# Patient Record
Sex: MALE | Race: WHITE | Employment: OTHER | URBAN - METROPOLITAN AREA
[De-identification: names, ages, dates, MRNs, and addresses within clinical notes are randomized per-mention and may not be internally consistent; named-entity substitution may affect disease eponyms.]

---

## 2017-01-13 ENCOUNTER — OFFICE VISIT (OUTPATIENT)
Dept: FAMILY MEDICINE | Facility: CLINIC | Age: 47
End: 2017-01-13
Payer: COMMERCIAL

## 2017-01-13 VITALS
TEMPERATURE: 98.2 F | HEART RATE: 67 BPM | SYSTOLIC BLOOD PRESSURE: 104 MMHG | WEIGHT: 182.7 LBS | BODY MASS INDEX: 27.69 KG/M2 | HEIGHT: 68 IN | DIASTOLIC BLOOD PRESSURE: 63 MMHG | OXYGEN SATURATION: 98 %

## 2017-01-13 DIAGNOSIS — M54.9 CHRONIC BACK PAIN GREATER THAN 3 MONTHS DURATION: ICD-10-CM

## 2017-01-13 DIAGNOSIS — G89.29 CHRONIC BACK PAIN GREATER THAN 3 MONTHS DURATION: ICD-10-CM

## 2017-01-13 PROCEDURE — 99214 OFFICE O/P EST MOD 30 MIN: CPT

## 2017-01-13 RX ORDER — GABAPENTIN 300 MG/1
300 CAPSULE ORAL 3 TIMES DAILY PRN
Qty: 30 CAPSULE | Refills: 3 | Status: SHIPPED | OUTPATIENT
Start: 2017-01-13 | End: 2017-01-23

## 2017-01-13 NOTE — PROGRESS NOTES
"  SUBJECTIVE:                                                    Marcel Melchor is a 46 year old male who presents to clinic today for the following health issues:      Back Pain      Duration: SEVERAL  months         Specific cause: lifting over his head     Description:   Location of pain: low back right  Character of pain: sharp  Pain radiation:radiates into the right leg  New numbness or weakness in legs, not attributed to pain:  YES    Intensity: Currently  severe    History:   Pain interferes with job: YES,   History of back problems: previous herniated disc  Any previous MRI or X-rays: Yes- at [place].  Date Nov 3  Sees a specialist for back pain:  [facility]  Therapies tried without relief:     Alleviating factors:   Improved by: steroid injection      Precipitating factors:  Worsened by: Sitting    Functional and Psychosocial Screen (Star STarT Back):      Not performed today       Accompanying Signs & Symptoms:  Risk of Fracture:  None  Risk of Cauda Equina:  None  Risk of Infection:  None  Risk of Cancer:  None  Risk of Ankylosing Spondylitis:  Onset at age <35, male, AND morning back stiffness. no          This is a chronic condition for which patient has been seen by multiple providers. He has had more than 2 steroid injections unsuccessfully. He was placed on gabapentin but did not have any relief from the medicine. He has tried a chiropractor and abdominal therapies unsuccessfully. He is frustrated with ongoing symptoms.        ROS:  Constitutional, HEENT, cardiovascular, pulmonary, gi and gu systems are negative, except as otherwise noted.    OBJECTIVE:                                                    /63 mmHg  Pulse 67  Temp(Src) 98.2  F (36.8  C) (Oral)  Ht 5' 8\" (1.727 m)  Wt 182 lb 11.2 oz (82.872 kg)  BMI 27.79 kg/m2  SpO2 98%  Body mass index is 27.79 kg/(m^2).   middle-aged male in no acute distress. Nontoxic looking. Lumbar sacral area discomfort to palpation. However no " significant spinal column tenderness to palpation. Able to flex to ground level without difficulties. Bilateral straight leg lifting was greater than 60 . Abdomen was soft and nontender with bowel sounds active throughout. No organomegaly. No rebound tenderness. Reflexes are brisk.         ASSESSMENT/PLAN:                                                      Chronic persistent lower back pain. We discussed about condition extensively. Patient very frustrated about management. Requesting opiate prescription but was educated otherwise with discussion about ineffectiveness for such chronic condition. He is agreeable to be seen by a surgeon for further consultation. I will notify him as appropriate. He had preference for physician back and neck. In the meantime I will bump up his gabapentin. He will follow-up with his primary care doctor or spine specialist. Greater than 3o minutes was spent with this patient educating him appropriately about spinal pain management.    ICD-10-CM    1. Chronic back pain greater than 3 months duration M54.9 ORTHO  REFERRAL    G89.29        FAIZAN Lockwood [facility]

## 2017-01-13 NOTE — PATIENT INSTRUCTIONS
"  * Sciatica    Sciatica (\"Lumbar Radiculopathy\") causes a pain that spreads from the lower back down into the buttock, hip and leg. Sometimes leg pain can occur without any back pain. Sciatica is due to irritation or pressure on a spinal nerve as it comes out of the spinal canal. This is most often due to a bulge or rupture of a nearby spinal disk (the cartilage cushion between each spinal bone), which presses on a nearby nerve. Other causes include spinal stenosis (narrowing of the spinal canal) and spasm of the piriformis muscle (a muscle in the buttocks that the sciatic nerve passes through).  Sciatica may begin after a sudden twisting/bending force (such as in a car accident), or sometimes after a simple awkward movement. In either case, muscle spasm is commonly present and contributes to the pain.  The diagnosis of sciatica is made from the symptoms and physical exam. Unless you had a physical injury (such as a car accident or fall), X-rays are usually not ordered for the initial evaluation of sciatica because the nerves and disks cannot be seen on an X-ray. Most sciatica (80-90%) gets better with time.  What can I do about my low back pain?  There are three main things you can do to ease low back pain and help it go away.    Use heat or cold packs.    Take medicine as directed.    Use positions, movements and exercises. Stay active! Too much rest can make your symptoms worse.  Using heat or cold packs  Try cold packs or gentle heat to ease your pain. Use whichever gives the most relief. Apply the cold pack or heat for 15 minutes at a time, as often as needed.  Taking medicine  If taking over-the-counter medicine:    Take ibuprofen (Advil, Motrin) 600 mg. three times a day as needed for pain.  OR    Take Aleve (naproxen sodium) 220 to 440 mg. two times a day as needed for pain  If your doctor prescribed a muscle relaxant (cyclobenzapine 10 mg.):    Take one half ( ) to 1 tablet at bedtime    Do not drive when " taking this medicine. This drug may make you sleepy.  Using positions, movements and exercises  Research tells us that moving your joints and muscles can help you recover from back pain. Such activity should be simple and gentle.  Use the positions below as well as walking to help relieve your discomfort. Try taking a short walk every 3 to 4 hours during the day. Walk for a few minutes inside your home or take longer walks outside, on a treadmill or at a mall. Slowly increase the amount of time you walk. Expect discomfort when you begin, but it should lessen as your back starts to recover.  Finding a position that is comfortable  When your back pain is new, you may find that certain positions will ease your pain. Gently try each of the following positions until you find one that eases your pain. Once you find a position of comfort, use it as often as you like while you recover. Return to your daily routine as soon as possible.     Lie on your back with your legs bent. You can do this by placing a pillow under your knees or lie on the floor and rest your lower legs on the seat of a chair.    Lie on your side with your knees bent and place a pillow between your knees.    Lie on your stomach over pillows.  When should I call my doctor?  Your back pain should improve over the first couple of weeks. As it improves, you should be able to return to your normal activities. But call your doctor if:    You have a sudden change in your ability to control? your bladder or bowels.    You begin to feel tingling in your groin or legs.    The pain spreads down your leg and into your foot.    Your toes, feet or leg muscles begin to feel weak.    You feel generally unwell or sick.    Your pain gets worse.    9290-8657 [employer]. [address] [place] [address], [place] [address]. All rights reserved. This information is not intended as a substitute for professional medical care. Always follow your healthcare professional's  instructions.

## 2017-01-13 NOTE — NURSING NOTE
"Chief Complaint   Patient presents with     Back Pain       Initial /63 mmHg  Pulse 67  Temp(Src) 98.2  F (36.8  C) (Oral)  Ht 5' 8\" (1.727 m)  Wt 182 lb 11.2 oz (82.872 kg)  BMI 27.79 kg/m2  SpO2 98% Estimated body mass index is 27.79 kg/(m^2) as calculated from the following:    Height as of this encounter: 5' 8\" (1.727 m).    Weight as of this encounter: 182 lb 11.2 oz (82.872 kg).  BP completed using cuff size: large Lorraine Peters CMA\      "

## 2017-01-13 NOTE — MR AVS SNAPSHOT
"              After Visit Summary   1/13/2017    Marcel Melchor    MRN: [id number]           Patient Information     Date Of Birth          1970        Visit Information        Provider Department      1/13/2017 4:15 PM  [facility]        Today's Diagnoses     Acute right-sided low back pain with right-sided sciatica    -  1       Care Instructions      * Sciatica    Sciatica (\"Lumbar Radiculopathy\") causes a pain that spreads from the lower back down into the buttock, hip and leg. Sometimes leg pain can occur without any back pain. Sciatica is due to irritation or pressure on a spinal nerve as it comes out of the spinal canal. This is most often due to a bulge or rupture of a nearby spinal disk (the cartilage cushion between each spinal bone), which presses on a nearby nerve. Other causes include spinal stenosis (narrowing of the spinal canal) and spasm of the piriformis muscle (a muscle in the buttocks that the sciatic nerve passes through).  Sciatica may begin after a sudden twisting/bending force (such as in a car accident), or sometimes after a simple awkward movement. In either case, muscle spasm is commonly present and contributes to the pain.  The diagnosis of sciatica is made from the symptoms and physical exam. Unless you had a physical injury (such as a car accident or fall), X-rays are usually not ordered for the initial evaluation of sciatica because the nerves and disks cannot be seen on an X-ray. Most sciatica (80-90%) gets better with time.  What can I do about my low back pain?  There are three main things you can do to ease low back pain and help it go away.    Use heat or cold packs.    Take medicine as directed.    Use positions, movements and exercises. Stay active! Too much rest can make your symptoms worse.  Using heat or cold packs  Try cold packs or gentle heat to ease your pain. Use whichever gives the most relief. Apply the cold pack or heat for 15 minutes at " a time, as often as needed.  Taking medicine  If taking over-the-counter medicine:    Take ibuprofen (Advil, Motrin) 600 mg. three times a day as needed for pain.  OR    Take Aleve (naproxen sodium) 220 to 440 mg. two times a day as needed for pain  If your doctor prescribed a muscle relaxant (cyclobenzapine 10 mg.):    Take one half ( ) to 1 tablet at bedtime    Do not drive when taking this medicine. This drug may make you sleepy.  Using positions, movements and exercises  Research tells us that moving your joints and muscles can help you recover from back pain. Such activity should be simple and gentle.  Use the positions below as well as walking to help relieve your discomfort. Try taking a short walk every 3 to 4 hours during the day. Walk for a few minutes inside your home or take longer walks outside, on a treadmill or at a mall. Slowly increase the amount of time you walk. Expect discomfort when you begin, but it should lessen as your back starts to recover.  Finding a position that is comfortable  When your back pain is new, you may find that certain positions will ease your pain. Gently try each of the following positions until you find one that eases your pain. Once you find a position of comfort, use it as often as you like while you recover. Return to your daily routine as soon as possible.     Lie on your back with your legs bent. You can do this by placing a pillow under your knees or lie on the floor and rest your lower legs on the seat of a chair.    Lie on your side with your knees bent and place a pillow between your knees.    Lie on your stomach over pillows.  When should I call my doctor?  Your back pain should improve over the first couple of weeks. As it improves, you should be able to return to your normal activities. But call your doctor if:    You have a sudden change in your ability to control? your bladder or bowels.    You begin to feel tingling in your groin or legs.    The pain spreads  "down your leg and into your foot.    Your toes, feet or leg muscles begin to feel weak.    You feel generally unwell or sick.    Your pain gets worse.    1465-6286 [employer]. [address] [place] [address], [place] [address]. All rights reserved. This information is not intended as a substitute for professional medical care. Always follow your healthcare professional's instructions.              Follow-ups after your visit        Who to contact     If you have questions or need follow up information about today's clinic visit or your schedule please contact [facility] directly at [phone].  Normal or non-critical lab and imaging results will be communicated to you by Blaasthart, letter or phone within 4 business days after the clinic has received the results. If you do not hear from us within 7 days, please contact the clinic through [employer] or phone. If you have a critical or abnormal lab result, we will notify you by phone as soon as possible.  Submit refill requests through [employer] or call your pharmacy and they will forward the refill request to us. Please allow 3 business days for your refill to be completed.          Additional Information About Your Visit        MyChart Information     [employer] gives you secure access to your electronic health record. If you see a primary care provider, you can also send messages to your care team and make appointments. If you have questions, please call your primary care clinic.  If you do not have a primary care provider, please call [phone] and they will assist you.        Care EveryWhere ID     This is your Care EveryWhere ID. This could be used by other organizations to access your [facility] medical records  [id number]        Your Vitals Were     Pulse Temperature Height BMI (Body Mass Index) Pulse Oximetry       67 98.2  F (36.8  C) (Oral) 5' 8\" (1.727 m) 27.79 kg/m2 98%        Blood Pressure from Last 3 Encounters:   01/13/17 104/63 "   12/01/16 110/70   10/31/16 100/56    Weight from Last 3 Encounters:   01/13/17 182 lb 11.2 oz (82.872 kg)   12/01/16 174 lb 9.6 oz (79.198 kg)   10/31/16 171 lb (77.565 kg)              Today, you had the following     No orders found for display         Today's Medication Changes          These changes are accurate as of: 1/13/17  4:58 PM.  If you have any questions, ask your nurse or doctor.               These medicines have changed or have updated prescriptions.        Dose/Directions    gabapentin 300 MG capsule   Commonly known as:  NEURONTIN   This may have changed:    - when to take this  - reasons to take this   Used for:  Acute right-sided low back pain with right-sided sciatica        Dose:  300 mg   Take 1 capsule (300 mg) by mouth 3 times daily as needed   Quantity:  30 capsule   Refills:  3            Where to get your medicines      These medications were sent to [facility]/pharmacy #[id number] - [place], [place] - [address] [address]  [address] [address] [facility] [address]     Phone:  [phone]    - gabapentin 300 MG capsule             Primary Care Provider Office Phone # Fax #    Wes Braga PA-C [phone]       [facility] [address] [facility] [address]        Thank you!     Thank you for choosing [facility]  for your care. Our goal is always to provide you with excellent care. Hearing back from our patients is one way we can continue to improve our services. Please take a few minutes to complete the written survey that you may receive in the mail after your visit with us. Thank you!             Your Updated Medication List - Protect others around you: Learn how to safely use, store and throw away your medicines at www.disposemymeds.org.          This list is accurate as of: 1/13/17  4:58 PM.  Always use your most recent med list.                   Brand Name Dispense Instructions for use    amphetamine-dextroamphetamine 10 MG per tablet     ADDERALL     TAKE 1 TABLET BY ORAL ROUTE TWICE A DAY       * CANASA 1000 MG Suppository   Generic drug:  mesalamine      USE 1 SUPPOSITORY PER RECTUM EVERY NIGHT       * ASACOL  MG EC tablet   Generic drug:  mesalamine      TAKE 3 TABLETS BY MOUTH TWICE DAILY       clindamycin 1 % lotion    CLEOCIN T    60 mL    APPLY TOPICALLY TO FACE AND CHEST TWICE A DAY AS NEEDED       finasteride 1 MG tablet    PROPECIA     Take 1 tablet by mouth daily       gabapentin 300 MG capsule    NEURONTIN    30 capsule    Take 1 capsule (300 mg) by mouth 3 times daily as needed       venlafaxine 75 MG 24 hr capsule    EFFEXOR-XR    60 capsule    Take 2 capsules (150 mg) by mouth daily       * Notice:  This list has 2 medication(s) that are the same as other medications prescribed for you. Read the directions carefully, and ask your doctor or other care provider to review them with you.

## 2017-01-23 ENCOUNTER — OFFICE VISIT (OUTPATIENT)
Dept: FAMILY MEDICINE | Facility: CLINIC | Age: 47
End: 2017-01-23
Payer: COMMERCIAL

## 2017-01-23 VITALS
WEIGHT: 190 LBS | TEMPERATURE: 98.6 F | SYSTOLIC BLOOD PRESSURE: 108 MMHG | DIASTOLIC BLOOD PRESSURE: 75 MMHG | RESPIRATION RATE: 16 BRPM | HEART RATE: 80 BPM | BODY MASS INDEX: 28.9 KG/M2

## 2017-01-23 DIAGNOSIS — M54.40 CHRONIC RIGHT-SIDED LOW BACK PAIN WITH SCIATICA, SCIATICA LATERALITY UNSPECIFIED: Primary | ICD-10-CM

## 2017-01-23 DIAGNOSIS — G89.29 CHRONIC RIGHT-SIDED LOW BACK PAIN WITH SCIATICA, SCIATICA LATERALITY UNSPECIFIED: Primary | ICD-10-CM

## 2017-01-23 PROCEDURE — 99214 OFFICE O/P EST MOD 30 MIN: CPT | Performed by: PHYSICIAN ASSISTANT

## 2017-01-23 RX ORDER — CYCLOBENZAPRINE HCL 5 MG
5 TABLET ORAL 3 TIMES DAILY PRN
Qty: 30 TABLET | Refills: 0 | Status: SHIPPED | OUTPATIENT
Start: 2017-01-23 | End: 2017-02-20

## 2017-01-23 RX ORDER — HYDROCODONE BITARTRATE AND ACETAMINOPHEN 5; 325 MG/1; MG/1
1-2 TABLET ORAL EVERY 4 HOURS PRN
Qty: 20 TABLET | Refills: 0 | Status: SHIPPED | OUTPATIENT
Start: 2017-01-23 | End: 2017-06-06

## 2017-01-23 RX ORDER — GABAPENTIN 300 MG/1
300 CAPSULE ORAL 3 TIMES DAILY PRN
Qty: 90 CAPSULE | Refills: 1 | Status: SHIPPED | OUTPATIENT
Start: 2017-01-23 | End: 2017-03-21

## 2017-01-23 ASSESSMENT — ANXIETY QUESTIONNAIRES
GAD7 TOTAL SCORE: 6
5. BEING SO RESTLESS THAT IT IS HARD TO SIT STILL: SEVERAL DAYS
2. NOT BEING ABLE TO STOP OR CONTROL WORRYING: SEVERAL DAYS
IF YOU CHECKED OFF ANY PROBLEMS ON THIS QUESTIONNAIRE, HOW DIFFICULT HAVE THESE PROBLEMS MADE IT FOR YOU TO DO YOUR WORK, TAKE CARE OF THINGS AT HOME, OR GET ALONG WITH OTHER PEOPLE: SOMEWHAT DIFFICULT
1. FEELING NERVOUS, ANXIOUS, OR ON EDGE: SEVERAL DAYS
3. WORRYING TOO MUCH ABOUT DIFFERENT THINGS: NOT AT ALL
6. BECOMING EASILY ANNOYED OR IRRITABLE: MORE THAN HALF THE DAYS
7. FEELING AFRAID AS IF SOMETHING AWFUL MIGHT HAPPEN: NOT AT ALL

## 2017-01-23 ASSESSMENT — PATIENT HEALTH QUESTIONNAIRE - PHQ9: 5. POOR APPETITE OR OVEREATING: SEVERAL DAYS

## 2017-01-23 NOTE — NURSING NOTE
"Chief Complaint   Patient presents with     Recheck Medication       Initial /75 mmHg  Pulse 80  Temp(Src) 98.6  F (37  C) (Oral)  Resp 16  Wt 190 lb (86.183 kg) Estimated body mass index is 28.9 kg/(m^2) as calculated from the following:    Height as of 1/13/17: 5' 8\" (1.727 m).    Weight as of this encounter: 190 lb (86.183 kg).  BP completed using cuff size: bela Greenberg MA      "

## 2017-01-23 NOTE — MR AVS SNAPSHOT
After Visit Summary   1/23/2017    Marcel Melchor    MRN: [id number]           Patient Information     Date Of Birth          1970        Visit Information        Provider Department      1/23/2017 9:45 AM Wes Braga PA-C [facility]        Today's Diagnoses     Chronic right-sided low back pain with sciatica, sciatica laterality unspecified    -  1        Follow-ups after your visit        Follow-up notes from your care team     Return for back specialist.      Who to contact     If you have questions or need follow up information about today's clinic visit or your schedule please contact [facility] directly at [phone].  Normal or non-critical lab and imaging results will be communicated to you by Avazhart, letter or phone within 4 business days after the clinic has received the results. If you do not hear from us within 7 days, please contact the clinic through Avazhart or phone. If you have a critical or abnormal lab result, we will notify you by phone as soon as possible.  Submit refill requests through [employer] or call your pharmacy and they will forward the refill request to us. Please allow 3 business days for your refill to be completed.          Additional Information About Your Visit        MyChart Information     [employer] gives you secure access to your electronic health record. If you see a primary care provider, you can also send messages to your care team and make appointments. If you have questions, please call your primary care clinic.  If you do not have a primary care provider, please call [phone] and they will assist you.        Care EveryWhere ID     This is your Care EveryWhere ID. This could be used by other organizations to access your [facility] medical records  [id number]        Your Vitals Were     Pulse Temperature Respirations             80 98.6  F (37  C) (Oral) 16          Blood Pressure from Last 3 Encounters:    01/23/17 108/75   01/13/17 104/63   12/01/16 110/70    Weight from Last 3 Encounters:   01/23/17 190 lb (86.183 kg)   01/13/17 182 lb 11.2 oz (82.872 kg)   12/01/16 174 lb 9.6 oz (79.198 kg)              Today, you had the following     No orders found for display         Today's Medication Changes          These changes are accurate as of: 1/23/17 10:39 AM.  If you have any questions, ask your nurse or doctor.               Start taking these medicines.        Dose/Directions    cyclobenzaprine 5 MG tablet   Commonly known as:  FLEXERIL   Used for:  Chronic right-sided low back pain with sciatica, sciatica laterality unspecified   Started by:  Wes Braga PA-C        Dose:  5 mg   Take 1 tablet (5 mg) by mouth 3 times daily as needed for muscle spasms   Quantity:  30 tablet   Refills:  0       HYDROcodone-acetaminophen 5-325 MG per tablet   Commonly known as:  NORCO   Used for:  Chronic right-sided low back pain with sciatica, sciatica laterality unspecified   Started by:  Wes Braga PA-C        Dose:  1-2 tablet   Take 1-2 tablets by mouth every 4 hours as needed for moderate to severe pain   Quantity:  20 tablet   Refills:  0            Where to get your medicines      These medications were sent to [facility]/pharmacy #[id number] - [place] - [address]  [address] [facility] [address]     Phone:  [phone]    - cyclobenzaprine 5 MG tablet  - gabapentin 300 MG capsule      Some of these will need a paper prescription and others can be bought over the counter.  Ask your nurse if you have questions.     Bring a paper prescription for each of these medications    - HYDROcodone-acetaminophen 5-325 MG per tablet             Primary Care Provider Office Phone # Fax #    Wes Braga PA-C [phone]       [facility] [address]  [facility] [address]        Thank you!     Thank you for choosing [facility]  for your  care. Our goal is always to provide you with excellent care. Hearing back from our patients is one way we can continue to improve our services. Please take a few minutes to complete the written survey that you may receive in the mail after your visit with us. Thank you!             Your Updated Medication List - Protect others around you: Learn how to safely use, store and throw away your medicines at www.disposemymeds.org.          This list is accurate as of: 1/23/17 10:39 AM.  Always use your most recent med list.                   Brand Name Dispense Instructions for use    amphetamine-dextroamphetamine 10 MG per tablet    ADDERALL     TAKE 1 TABLET BY ORAL ROUTE TWICE A DAY       ASACOL  MG EC tablet   Generic drug:  mesalamine      TAKE 3 TABLETS BY MOUTH TWICE DAILY       clindamycin 1 % lotion    CLEOCIN T    60 mL    APPLY TOPICALLY TO FACE AND CHEST TWICE A DAY AS NEEDED       cyclobenzaprine 5 MG tablet    FLEXERIL    30 tablet    Take 1 tablet (5 mg) by mouth 3 times daily as needed for muscle spasms       finasteride 1 MG tablet    PROPECIA     Take 1 tablet by mouth daily       gabapentin 300 MG capsule    NEURONTIN    90 capsule    Take 1 capsule (300 mg) by mouth 3 times daily as needed       HYDROcodone-acetaminophen 5-325 MG per tablet    NORCO    20 tablet    Take 1-2 tablets by mouth every 4 hours as needed for moderate to severe pain       venlafaxine 75 MG 24 hr capsule    EFFEXOR-XR    60 capsule    Take 2 capsules (150 mg) by mouth daily

## 2017-01-23 NOTE — PROGRESS NOTES
SUBJECTIVE:                                                    Marcel Melchor is a 46 year old male who presents to clinic today for the following health issues:      Back Pain Follow Up       Description:   Location of pain:  bilateral  Character of pain: sharp, stabbing, burning, cramping, constant and intermittent  Pain radiation: to calf  Since last visit, pain is:  Worsened-did kyaking  New numbness or weakness in legs, not attributed to pain:  no     Intensity: Currently 6/10    History:   Pain interferes with job: YES  Therapies tried without relief: decompression, acupuncture and chiropractor  Therapies tried with relief: ? none       Accompanying Signs & Symptoms:  Risk of Fracture:  None  Risk of Cauda Equina:  None  Risk of Infection:  None  Risk of Cancer:  None           Was on vacation between christmas and new years and went kayaking which made things much worse.  Then on a convention recent he tweaked it again but a lot of sitting and standing.  Went in to , was not given anything for acute pain but increased gabapentin to 300mg TID which made an improvement but last night he bent down to pick something up and had immediate pain going down right leg again.  Could not sleep at all last night.    Two steroid injections at [facility] late last fall that only really provided a couple days of relief.    Just wondering today about avoiding these times of acute pain or what can be done when they do happen.  NSIAD's have not been helpful.  Prednisone not very helpful either.  Feeling frustrated about what to do next.  Will to try anything at this point.      Problem list and histories reviewed & adjusted, as indicated.  Additional history: as documented    Problem list, Medication list, Allergies, and Medical/Social/Surgical histories reviewed in [facility] and updated as appropriate.    ROS:  Constitutional, HEENT, cardiovascular, pulmonary, gi and gu systems are negative, except as otherwise noted.    OBJECTIVE:                                                     /75 mmHg  Pulse 80  Temp(Src) 98.6  F (37  C) (Oral)  Resp 16  Wt 190 lb (86.183 kg)  Body mass index is 28.9 kg/(m^2).  GENERAL: healthy, alert and no distress  MS: no gross musculoskeletal defects noted, no edema  SKIN: no suspicious lesions or rashes  BACK: ROM limited due to pain, TTP right paralumbar muscles with muscle spasm present  PSYCH: mentation appears normal, affect normal/bright    Diagnostic Test Results:  none      ASSESSMENT/PLAN:                                                    1. Chronic right-sided low back pain with sciatica, sciatica laterality unspecified  Refilled gabapentin-- discussed OK to try two capsules at a given dose and let me know if this works better.  Can re-write Rx as needed.  Short term Rx for norco and to try flexeril for pain.  He will call Ortho referral provided by  as he has not had time to do this yet.  He was referred to [facility] in [place].  - gabapentin (NEURONTIN) 300 MG capsule; Take 1 capsule (300 mg) by mouth 3 times daily as needed  Dispense: 90 capsule; Refill: 1  - cyclobenzaprine (FLEXERIL) 5 MG tablet; Take 1 tablet (5 mg) by mouth 3 times daily as needed for muscle spasms  Dispense: 30 tablet; Refill: 0  - HYDROcodone-acetaminophen (NORCO) 5-325 MG per tablet; Take 1-2 tablets by mouth every 4 hours as needed for moderate to severe pain  Dispense: 20 tablet; Refill: 0      Rtc if sxs change, worsen or fail to resolve with above tx.        Wes Braga PA-C  [facility]

## 2017-01-24 ASSESSMENT — PATIENT HEALTH QUESTIONNAIRE - PHQ9: SUM OF ALL RESPONSES TO PHQ QUESTIONS 1-9: 10

## 2017-01-24 ASSESSMENT — ANXIETY QUESTIONNAIRES: GAD7 TOTAL SCORE: 6

## 2017-01-30 DIAGNOSIS — F41.9 ANXIETY: Primary | ICD-10-CM

## 2017-01-30 DIAGNOSIS — R45.4 IRRITABILITY AND ANGER: ICD-10-CM

## 2017-01-30 NOTE — TELEPHONE ENCOUNTER
venlafaxine (EFFEXOR-XR) 75 MG 24 hr capsule    Last Written Prescription Date: 12/1/16  Last Fill Quantity: 60, # refills: 1  Last Office Visit with [facility], [facility] or [facility] prescribing provider: 1/23/2017          BP Readings from Last 3 Encounters:   01/23/17 108/75   01/13/17 104/63   12/01/16 110/70     Pulse: (for Fetzima)  CREATININE   Date Value Ref Range Status   11/03/2015 0.88 mg/dL Final   ]    Last PHQ-9 score on record=   PHQ-9 SCORE 1/23/2017   Total Score -   Total Score 10       MANUEL Lopez

## 2017-01-31 RX ORDER — VENLAFAXINE HYDROCHLORIDE 75 MG/1
150 CAPSULE, EXTENDED RELEASE ORAL DAILY
Qty: 60 CAPSULE | Refills: 3 | Status: SHIPPED | OUTPATIENT
Start: 2017-01-31 | End: 2017-03-23

## 2017-01-31 NOTE — TELEPHONE ENCOUNTER
Prescription approved per [facility] Refill Protocol.    Carmelina Ross, RN, BSN, PHN

## 2017-02-01 ENCOUNTER — TRANSFERRED RECORDS (OUTPATIENT)
Dept: HEALTH INFORMATION MANAGEMENT | Facility: CLINIC | Age: 47
End: 2017-02-01

## 2017-02-20 DIAGNOSIS — G89.29 CHRONIC RIGHT-SIDED LOW BACK PAIN WITH SCIATICA, SCIATICA LATERALITY UNSPECIFIED: ICD-10-CM

## 2017-02-20 DIAGNOSIS — M54.40 CHRONIC RIGHT-SIDED LOW BACK PAIN WITH SCIATICA, SCIATICA LATERALITY UNSPECIFIED: ICD-10-CM

## 2017-02-20 RX ORDER — CYCLOBENZAPRINE HCL 5 MG
5 TABLET ORAL 3 TIMES DAILY PRN
Qty: 30 TABLET | Refills: 0 | Status: SHIPPED | OUTPATIENT
Start: 2017-02-20 | End: 2017-03-23

## 2017-02-20 NOTE — TELEPHONE ENCOUNTER
cyclobenzaprine (FLEXERIL) 5 MG tablet      Last Written Prescription Date:  1/23/17  Last Fill Quantity: 30,   # refills: 0  Last Office Visit with [facility], [facility] or [facility] prescribing provider: 1/23/2017    Future Office visit:       Routing refill request to provider for review/approval because:  Drug not on the [facility], [facility] or [facility] refill protocol or controlled substance    MANUEL Lopez

## 2017-03-01 ENCOUNTER — TELEPHONE (OUTPATIENT)
Dept: PALLIATIVE MEDICINE | Facility: CLINIC | Age: 47
End: 2017-03-01

## 2017-03-01 ENCOUNTER — OFFICE VISIT (OUTPATIENT)
Dept: NEUROSURGERY | Facility: CLINIC | Age: 47
End: 2017-03-01
Attending: PHYSICIAN ASSISTANT
Payer: COMMERCIAL

## 2017-03-01 VITALS
WEIGHT: 190 LBS | RESPIRATION RATE: 100 BRPM | DIASTOLIC BLOOD PRESSURE: 62 MMHG | HEART RATE: 73 BPM | SYSTOLIC BLOOD PRESSURE: 108 MMHG | TEMPERATURE: 97.6 F | HEIGHT: 68 IN | BODY MASS INDEX: 28.79 KG/M2

## 2017-03-01 DIAGNOSIS — M54.16 LUMBAR RADICULAR PAIN: Primary | ICD-10-CM

## 2017-03-01 PROCEDURE — 99211 OFF/OP EST MAY X REQ PHY/QHP: CPT | Performed by: NURSE PRACTITIONER

## 2017-03-01 PROCEDURE — 99203 OFFICE O/P NEW LOW 30 MIN: CPT | Performed by: NURSE PRACTITIONER

## 2017-03-01 ASSESSMENT — PAIN SCALES - GENERAL: PAINLEVEL: MODERATE PAIN (5)

## 2017-03-01 NOTE — NURSING NOTE
"Marcel Melchor is a 47 year old male who presents for:  Chief Complaint   Patient presents with     Neurologic Problem     LBP right side x over 3 months referred by PCP, Wes Braga PA-C        Initial Vitals:  /62 (BP Location: Right arm, Patient Position: Chair, Cuff Size: Adult Large)  Pulse 73  Temp 97.6  F (36.4  C)  Resp (!) 100  Ht 5' 8\" (1.727 m)  Wt 190 lb (86.2 kg)  BMI 28.89 kg/m2 Estimated body mass index is 28.89 kg/(m^2) as calculated from the following:    Height as of this encounter: 5' 8\" (1.727 m).    Weight as of this encounter: 190 lb (86.2 kg).. Body surface area is 2.03 meters squared. BP completed using cuff size: large  Moderate Pain (5)    Do you feel safe in your environment?  Yes  Do you need any refills today? No    Nursing Comments: LBP right side x over 3 months referred by PCP, Wes Braga PA-C.  Patient rates low back pain today as 5.      5 min. nursing intake time  Lisbeth Sam CMA      Discharge plan:     1.  Please schedule your injection. Someone will contact you from the pain clinic within 24 hours to schedule.      2. Please contact the clinic if pain persists at [phone]. To schedule with Dr. Abraham Venegas to discuss surgery needed     2 min. nursing discharge time  Lisbeth Sam CMA    "

## 2017-03-01 NOTE — MR AVS SNAPSHOT
After Visit Summary   3/1/2017    Marcel Melchor    MRN: [id number]           Patient Information     Date Of Birth          1970        Visit Information        Provider Department      3/1/2017 10:40 AM Pam Aguilar APRN CNP [facility]        Today's Diagnoses     Lumbar radicular pain    -  1      Care Instructions    1.  Please schedule your injection. Someone will contact you from the pain clinic within 24 hours to schedule.      2. Please contact the clinic if pain persists at [phone]. To schedule with Dr. Abraham Venegas to discuss surgery needed         Follow-ups after your visit        Additional Services     PAIN MANAGEMENT CENTER ([place]) REFERRAL       Your provider has referred you to the [facility]. Dr. Pierre or Dr. Johnson    Reason for Referral: Procedure or injection only - patient will be contacted within 24 hrs to schedule: Epidural Steroid (transforaminal approach): Lumbar right L4-5 please has had 3 L5-S1.  Diagnostic as well as therapeutic     Please complete the following questions:    What is your diagnosis for the patient's pain? Lumbar radicular pain     Do you have any specific questions for the pain specialist? No    Are there any red flags that may impact the assessment or management of the patient? None    **ANY DIAGNOSTIC TESTS THAT ARE NOT IN EPIC SHOULD BE SENT TO THE PAIN CENTER**    Please note the Pre-Op Pain Consult must be scheduled 2-3 weeks prior to the patient's surgery.  Patient's trying to schedule within 2 weeks of surgery may not be accommodated.     Pre-Op Pain Consults are only good for 30 days.    REGARDING OPIOID MEDICATIONS:  We will always address appropriateness of opioid pain medications, but we generally will not automatically take on a prescribing role. When we do take on prescribing of opioids for chronic pain, it is in collaboration with the referring physician for an intermediate  period of time (months), with an expectation that the primary physician or provider will assume the prescribing role if medications are effective at stable doses with demonstrated compliance.  Therefore, please do not assume that your prescribing responsibilities end on the day of pain clinic consultation.  Is this agreeable to you? YES    For any questions, contact the [facility] at [phone].    Please be aware that coverage of these services is subject to the terms and limitations of your health insurance plan.  Call member services at your health plan with any benefit or coverage questions.      Please bring the following with you to your appointment:    (1) Any X-Rays, CTs or MRIs which have been performed.  Contact the facility where they were done to arrange for  prior to your scheduled appointment.    (2) List of current medications   (3) This referral request   (4) Any documents/labs given to you for this referral                  Who to contact     If you have questions or need follow up information about today's clinic visit or your schedule please contact [facility] directly at [phone].  Normal or non-critical lab and imaging results will be communicated to you by MyChart, letter or phone within 4 business days after the clinic has received the results. If you do not hear from us within 7 days, please contact the clinic through [employer] or phone. If you have a critical or abnormal lab result, we will notify you by phone as soon as possible.  Submit refill requests through [employer] or call your pharmacy and they will forward the refill request to us. Please allow 3 business days for your refill to be completed.          Additional Information About Your Visit        [employer] Information     [employer] gives you secure access to your electronic health record. If you see a primary care provider, you can also send messages to your care team and make  "appointments. If you have questions, please call your primary care clinic.  If you do not have a primary care provider, please call [phone] and they will assist you.        Care EveryWhere ID     This is your Care EveryWhere ID. This could be used by other organizations to access your [facility] medical records  [id number]        Your Vitals Were     Pulse Temperature Respirations Height BMI (Body Mass Index)       73 97.6  F (36.4  C) 100 5' 8\" (1.727 m) 28.89 kg/m2        Blood Pressure from Last 3 Encounters:   03/01/17 108/62   01/23/17 108/75   01/13/17 104/63    Weight from Last 3 Encounters:   03/01/17 190 lb (86.2 kg)   01/23/17 190 lb (86.2 kg)   01/13/17 182 lb 11.2 oz (82.9 kg)              We Performed the Following     PAIN MANAGEMENT CENTER ([place]) REFERRAL        Primary Care Provider Office Phone # Fax #    Wes Braga PA-C [phone]       [address] [facility] [address]        Thank you!     Thank you for choosing [facility]  for your care. Our goal is always to provide you with excellent care. Hearing back from our patients is one way we can continue to improve our services. Please take a few minutes to complete the written survey that you may receive in the mail after your visit with us. Thank you!             Your Updated Medication List - Protect others around you: Learn how to safely use, store and throw away your medicines at www.disposemymeds.org.          This list is accurate as of: 3/1/17 11:05 AM.  Always use your most recent med list.                   Brand Name Dispense Instructions for use    amphetamine-dextroamphetamine 10 MG per tablet    ADDERALL     TAKE 1 TABLET BY ORAL ROUTE TWICE A DAY       ASACOL  MG EC tablet   Generic drug:  mesalamine      TAKE 3 TABLETS BY MOUTH TWICE DAILY       clindamycin 1 % lotion    CLEOCIN T    60 mL    APPLY TOPICALLY TO FACE AND CHEST TWICE A DAY AS " NEEDED       cyclobenzaprine 5 MG tablet    FLEXERIL    30 tablet    Take 1 tablet (5 mg) by mouth 3 times daily as needed for muscle spasms       finasteride 1 MG tablet    PROPECIA     Take 1 tablet by mouth daily       gabapentin 300 MG capsule    NEURONTIN    90 capsule    Take 1 capsule (300 mg) by mouth 3 times daily as needed       HYDROcodone-acetaminophen 5-325 MG per tablet    NORCO    20 tablet    Take 1-2 tablets by mouth every 4 hours as needed for moderate to severe pain       venlafaxine 75 MG 24 hr capsule    EFFEXOR-XR    60 capsule    Take 2 capsules (150 mg) by mouth daily

## 2017-03-01 NOTE — PATIENT INSTRUCTIONS
1.  Please schedule your injection. Someone will contact you from the pain clinic within 24 hours to schedule.      2. Please contact the clinic if pain persists at [phone]. To schedule with Dr. Abraham Venegas to discuss surgery needed

## 2017-03-01 NOTE — PROGRESS NOTES
Dr. Abraham Venegas  [facility]  Neurosurgery Clinic Visit        CC: low back and leg pain     Primary care Provider: Wes Braga      Reason For Visit:   I was asked by Dr. Braga to consult on the patient for lumbar radicular pain.      HPI: Marcel Melchor is a 47 year old male with lumbar radicular pain. He reports that the pain began about 3 months ago. He noted that he has had back pain on and off but in the past 3 months it has gotten worse. He went to [facility] and had 3 lumbar injections without any pain relief. He notes that the pain is worse with sitting. He notes low back pain with radicular pain in to the right lateral thigh to the lateral calf.  He states that driving is difficult.  He denies any falls or foot drop.  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      Pain at its worst 10  Pain right now:  5    Past Medical History   Diagnosis Date     Anxiety 9/3/2013     AJAY (obstructive sleep apnea) 11/11/2014     Rotator cuff syndrome 1/19/2012       Past Medical History reviewed with patient during visit.    Past Surgical History   Procedure Laterality Date     C nonspecific procedure       fx R ankle-ORIF     Past Surgical History reviewed with patient during visit.    Current Outpatient Prescriptions   Medication     cyclobenzaprine (FLEXERIL) 5 MG tablet     venlafaxine (EFFEXOR-XR) 75 MG 24 hr capsule     gabapentin (NEURONTIN) 300 MG capsule     amphetamine-dextroamphetamine (ADDERALL) 10 MG tablet     finasteride (PROPECIA) 1 MG tablet     ASACOL  MG EC tablet     clindamycin (CLEOCIN T) 1 % lotion     HYDROcodone-acetaminophen (NORCO) 5-325 MG per tablet     No current facility-administered medications for this visit.        Allergies   Allergen Reactions     Amoxicillin      itching       Social History     Social History     Marital status: Single     Spouse name: N/A     Number of children: N/A     Years of education: N/A     Social History Main Topics     Smoking  status: Never Smoker     Smokeless tobacco: Never Used     Alcohol use No     Drug use: No     Sexual activity: Yes     Partners: Female     Other Topics Concern     Parent/Sibling W/ Cabg, Mi Or Angioplasty Before 65f 55m? No     Social History Narrative       Family History   Problem Relation Age of Onset     Breast Cancer Mother       at age 47         Review Of Systems  Skin: negative  Eyes: negative  Ears/Nose/Throat: negative  Respiratory: No shortness of breath, dyspnea on exertion, cough, or hemoptysis  Cardiovascular: negative  Gastrointestinal: Chrons  Genitourinary: negative  Musculoskeletal: back pain  Neurologic: right leg numbness  Psychiatric: negative  Hematologic/Lymphatic/Immunologic: negative  Endocrine: negative     ROS: 10 point ROS neg other than the symptoms noted above in the HPI.      Vital Signs: There were no vitals taken for this visit.    Examination:  Constitutional:  Alert, well nourished, NAD.  HEENT: Normocephalic, atraumatic.   Pulm:  Without shortness of breath   CV:  No pitting edema of BLE.    Neurological:  Awake  Alert  Oriented x 3  Speech clear  Cranial nerves II - XII intact  PERRL  EOMI  Face symmetric  Tongue midline  Motor exam   Shoulder Abduction:  Right:  5/5   Left:  5/5  Biceps:                      Right:  5/5   Left:  5/5  Triceps:                     Right:  5/5   Left:  5/5  Wrist Extensors:       Right:  5/5   Left:  5/5  Wrist Flexors:           Right:  5/5   Left:  5/5  Intrinsics:                   Right:  5/5   Left:  5/5   Hip Flexor:                Right: 5/5  Left:  5/5  Hip Adductor:             Right:  5/5  Left:  5/5  Hip Abductor:             Right:  5/5  Left:  5/5  Gastroc Soleus:        Right:  5/5  Left:  5/5  Tib/Ant:                      Right:  5/5  Left:  5/5  EHL:                          Right:  5/5  Left:  5/5   Sensation normal to bilateral upper and lower extremities    Gait: Able to stand from a seated position. Normal non-antalgic,  non-myelopathic gait.  Able to heel/toe walk without loss of balance    Lumbar examination reveals tenderness of the spine and paraspinous muscles.  Pain with lumbar extension.  Hip height is symmetrical. Negative SI joint, sciatic notch or greater trochanter.  Straight leg raise positive for leg pain on the right.      Imaging:     [facility] 11-3-2016    1.  3mm AP right posterolateral disc extrusion compresses right L5 root.     2.  Right foraminal and far lateral osteophyte and disc bulge at L2-3 encroaches upon right L2 ganglion nerve.  3. Right L2-3 annular fissure and disc protrusion without neural impingement.     Assessment/Plan:    Marcel Melchor is a 47 year old male with lumbar radicular pain. He reports that the pain began about 3 months ago. He noted that he has had back pain on and off but in the past 3 months it has gotten worse. He went to [facility] and had 3 lumbar injections without any pain relief. He notes that the pain is worse with sitting. He notes low back pain with radicular pain in to the right lateral thigh to the lateral calf.  He states that driving is difficult.  He denies any falls or foot drop.  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  His lumbar MRI was reviewed in detail.  It was explained that he does have a herniation at L4-5 on the right. We obtained his [facility] injection reports. He had 2 LESI at L5-S1 and one transforaminal on the right at L5-S1.  It is recommended that he try one more injection at L4-5. He is open to this.        Patient Instructions   1.  Please schedule your injection. Someone will contact you from the pain clinic within 24 hours to schedule.      2. Please contact the clinic if pain persists at [phone]. To schedule with Dr. Abraham Venegas to discuss surgery needed          Pam Aguilar [facility]  [facility]  [address] [address]    Tel [phone]  Pager [phone]  Pain

## 2017-03-02 NOTE — TELEPHONE ENCOUNTER
Pre-screening questions for Radiology Injections:    Injection to be done at which interventional clinic site? [facility]    Procedure ordered by Pam Aguilar NP    Procedure ordered? Lumbar Epidural Steroid Injection    What insurance would patient like us to bill for this procedure? BCBS      Worker's comp-Any injection DO NOT SCHEDULE and route to Sheron Gamez.      [employer] insurance - If scheduling an SI joint injection DO NOT SCHEDULE and route Sheron Gamez.    [employer]- MBB's must be scheduled at LEAST two weeks apart      [employer] - Any injection besides hip/shoulder/knee joint DO NOT SCHEDULE and route to Sheron Gamez. She will obtain PA and call pt back to schedule procedure or notify pt of denial.     Is an  needed? No     Patient has a drive home? (mandatory) YES:      Is patient taking any blood thinners (plavix, coumadin, jantoven, warfarin, heparin, pradaxa or dabigatran )? No   (If so, do not schedule, contact RN and/or MD)     Is patient taking any aspirin products? No   (If more than 325mg/day do not schedule; Contact RN/MD. For all non-cervical interventional procedures if patient is taking MORE than 325mg/day, limit aspirin to 81-325mg/day x 1 week. No hold required day of procedure.  For CERVICAL procedures, hold all aspirin products for 6 days.)      Does the patient have a bleeding or clotting disorder? No   (If yes, okay to schedule, but contact RN/MD).  **For any patients with platelet count <100, must be forwarded to provider**    Is patient diabetic?  No  If YES, have them bring their glucometer.    Does patient have an active infection or treated for one within the past week? No     Is patient currently taking any antibiotics?  No   For patients on chronic, preventative, or prophylactic antibiotics, procedures can be scheduled.   For patients on antibiotics for active or recent infection:  Gabby Santiago, Alex Knapp-antibiotic course must  have been completed for 4 days  Franck King-antibiotic course must have been completed for 7 days    Is patient currently taking any steroid medications? (i.e. Prednisone, Medrol)  No   For patients on steroid medications:  Gabby Santiago [place], Burton-steroid course must have been completed for 4 days  Franck King-steroid course must have been completed for 7 days  Review with patient:  If you are started on any steroids or antibiotics between now and your appointment, you must contact us because it may affect our ability to perform your procedure     Is patient actively being treated for cancer or immunocompromised, including the spleen having been removed? No  **For Dr. Parekh patients without spleens should have the chart sent to her**  (If YES, do NOT schedule and route to RN)    Are you able to get on and off an exam table with minimal or no assistance? Yes  (If NO, do NOT schedule and route to RN)  Are you able to roll over and lay on your stomach with minimal or no assistance? Yes  (If NO, do NOT schedule and route to RN)         Any allergies to contrast dye, iodine, shellfish, or numbing and steroid medications? No  (If so, inform nursing and note in scheduling comments.)    Allergies: Amoxicillin      Any chance of pregnancy?Not Applicable    Has the patient had a flu shot or any other vaccinations within 7 days before or after the procedure.  No       Does patient have an MRI/CT?  YES:   (SI joint, hip injections, lumbar sympathetic blocks, and stellate ganglion blocks do not require an MRI)    If so, was it done at [place]? Yes      If not, where was it done? N/A     Was the MRI done w/in the last 3 years?  Yes   If MRI was not done at [facility], [facility] or [facility] do NOT schedule. Route to nursing.  (If pt has disc the injection can be scheduled but pt has to bring disc to appt. If they show up w/out disc the injection cannot be done)    Reminders (please tell patient  if applicable):       Instructed pt to arrive 30 minutes early for IV start if this is for a cervical procedure, ALL sympathetic (stellate ganglion, hypogastric, or lumbar sympathetic block) and all sedation procedures (RFA, spinal cord stimulation trials).  Not Applicable    -IVs are not routinely placed for Pierre and [place]azi cervical case       If NPO for sedation, informed patient that it is okay to take medications with sips of water (except if they are to hold blood thinners).  Not Applicable   *DO take blood pressure medication if it is prescribed*      If this is for a cervical ANDREY, informed patient that aspirin needs to be held for 6 days.   Not Applicable      Do not schedule procedures requiring IV placement in the first appointment of the day or first appointment after lunch         For patients 85 or older we recommend having an adult stay w/ them for the remainder of the day.         Does the patient have any questions?  NO      Sheron Gamez  [facility]

## 2017-03-21 DIAGNOSIS — M54.40 CHRONIC RIGHT-SIDED LOW BACK PAIN WITH SCIATICA, SCIATICA LATERALITY UNSPECIFIED: ICD-10-CM

## 2017-03-21 DIAGNOSIS — G89.29 CHRONIC RIGHT-SIDED LOW BACK PAIN WITH SCIATICA, SCIATICA LATERALITY UNSPECIFIED: ICD-10-CM

## 2017-03-21 RX ORDER — GABAPENTIN 300 MG/1
300 CAPSULE ORAL 3 TIMES DAILY PRN
Qty: 90 CAPSULE | Refills: 1 | Status: SHIPPED | OUTPATIENT
Start: 2017-03-21 | End: 2017-05-17

## 2017-03-21 NOTE — TELEPHONE ENCOUNTER
gabapentin (NEURONTIN) 300 MG capsule      Sig: Take 1 capsule (300 mg) by mouth 3 times daily as needed    Last Written Prescription Date: 1/23/17  Per pharm last fill date: 2/23/17  Last Quantity: 90, # refills: 1  Last Office Visit with [facility] or  [facility] prescribing provider: 1/23/2017       Creatinine   Date Value Ref Range Status   11/03/2015 0.88 mg/dL Final     Lab Results   Component Value Date    AST 15 11/03/2015     Lab Results   Component Value Date    ALT 28 11/03/2015     BP Readings from Last 3 Encounters:   03/01/17 108/62   01/23/17 108/75   01/13/17 104/63         Routing refill request to provider for review/approval because:  Drug not on the [facility], [facility] or  [facility] refill protocol or controlled substance    MANUEL Lopez

## 2017-03-23 ENCOUNTER — OFFICE VISIT (OUTPATIENT)
Dept: ORTHOPEDICS | Facility: CLINIC | Age: 47
End: 2017-03-23
Payer: COMMERCIAL

## 2017-03-23 ENCOUNTER — RADIANT APPOINTMENT (OUTPATIENT)
Dept: GENERAL RADIOLOGY | Facility: CLINIC | Age: 47
End: 2017-03-23
Attending: PHYSICAL MEDICINE & REHABILITATION
Payer: COMMERCIAL

## 2017-03-23 ENCOUNTER — OFFICE VISIT (OUTPATIENT)
Dept: FAMILY MEDICINE | Facility: CLINIC | Age: 47
End: 2017-03-23
Payer: COMMERCIAL

## 2017-03-23 VITALS — DIASTOLIC BLOOD PRESSURE: 85 MMHG | OXYGEN SATURATION: 100 % | SYSTOLIC BLOOD PRESSURE: 122 MMHG | HEART RATE: 54 BPM

## 2017-03-23 VITALS
OXYGEN SATURATION: 99 % | WEIGHT: 176 LBS | RESPIRATION RATE: 16 BRPM | TEMPERATURE: 98.9 F | BODY MASS INDEX: 26.76 KG/M2 | DIASTOLIC BLOOD PRESSURE: 58 MMHG | HEART RATE: 68 BPM | SYSTOLIC BLOOD PRESSURE: 100 MMHG

## 2017-03-23 DIAGNOSIS — F41.9 ANXIETY: ICD-10-CM

## 2017-03-23 DIAGNOSIS — R45.4 IRRITABILITY AND ANGER: ICD-10-CM

## 2017-03-23 DIAGNOSIS — M51.26 LUMBAR DISC HERNIATION: ICD-10-CM

## 2017-03-23 DIAGNOSIS — M51.369 LUMBAR DEGENERATIVE DISC DISEASE: ICD-10-CM

## 2017-03-23 DIAGNOSIS — H81.10 BPPV (BENIGN PAROXYSMAL POSITIONAL VERTIGO), UNSPECIFIED LATERALITY: Primary | ICD-10-CM

## 2017-03-23 DIAGNOSIS — M54.16 RIGHT LUMBAR RADICULITIS: Primary | ICD-10-CM

## 2017-03-23 DIAGNOSIS — M54.16 LUMBAR RADICULAR PAIN: ICD-10-CM

## 2017-03-23 PROCEDURE — 99213 OFFICE O/P EST LOW 20 MIN: CPT | Performed by: PHYSICIAN ASSISTANT

## 2017-03-23 PROCEDURE — 64483 NJX AA&/STRD TFRM EPI L/S 1: CPT | Mod: RT | Performed by: PHYSICAL MEDICINE & REHABILITATION

## 2017-03-23 RX ORDER — VENLAFAXINE HYDROCHLORIDE 75 MG/1
75 CAPSULE, EXTENDED RELEASE ORAL DAILY
Qty: 30 CAPSULE | Refills: 0 | Status: SHIPPED | OUTPATIENT
Start: 2017-03-23 | End: 2017-06-06

## 2017-03-23 ASSESSMENT — PAIN SCALES - GENERAL: PAINLEVEL: SEVERE PAIN (6)

## 2017-03-23 NOTE — PROGRESS NOTES
"  SUBJECTIVE:                                                    Marcel Melchor is a 47 year old male who presents to clinic today for the following health issues:      EAR PROBLEM      Duration: last week    Description (location/character/radiation): ringing in ears, off balance, dizziness, some vomiting    Intensity:  moderate    Accompanying signs and symptoms: constant ringing    History (similar episodes/previous evaluation): none    Precipitating or alleviating factors: Chrons    Therapies tried and outcome: None     Marcel is here due to \"not feeling well\".  He is feeling dizzy, off balance and nauseous.  He mentions that he stopped taking all medications over the weekend as he felt they were not working.  He notes that he feels that his mood has never really improved as he had hoped with them.  Also notes that his back pain continues despite using gabapentin.  So he figured that he should not stay on them.  Now over the weekend ears ringing, off balance, dizzy.  Anxious, irritable.  Had last injection for back and leg pain today.  Has been \"miserable\"/ thinks they had been injecting the incorrect spot for the previous injections.      Problem list and histories reviewed & adjusted, as indicated.  Additional history: as documented      Reviewed and updated as needed this visit by clinical staff  Tobacco  Meds  Problems  Med Hx  Surg Hx  Fam Hx  Soc Hx      Reviewed and updated as needed this visit by Provider         ROS:  Constitutional, HEENT, cardiovascular, pulmonary, gi and gu systems are negative, except as otherwise noted.    OBJECTIVE:                                                    /58 (BP Location: Right arm, Patient Position: Chair, Cuff Size: Adult Regular)  Pulse 68  Temp 98.9  F (37.2  C) (Oral)  Resp 16  Wt 176 lb (79.8 kg)  SpO2 99%  BMI 26.76 kg/m2  Body mass index is 26.76 kg/(m^2).  GENERAL: healthy, alert and no distress  HENT: ear canals and TM's normal, nose and " mouth without ulcers or lesions  MS: no gross musculoskeletal defects noted, no edema  SKIN: no suspicious lesions or rashes  NEURO: Normal strength and tone, mentation intact and speech normal  PSYCH: mentation appears normal and affect flat    Diagnostic Test Results:  none      ASSESSMENT/PLAN:                                                    1. BPPV (benign paroxysmal positional vertigo), unspecified laterality  It is possible he is experiencing vertigo symptoms on top of withdrawal from stopping his medications suddenly.  He will restart Effexor as he is going on vacation next week.  He can take 75mg or 150mg and start tapering off if this is what he would like to do.  Follow up when back from vacation.  See handout for discussion re: vertigo.    2. Anxiety    - venlafaxine (EFFEXOR-XR) 75 MG 24 hr capsule; Take 1 capsule (75 mg) by mouth daily  Dispense: 30 capsule; Refill: 0    3. Irritability and anger    - venlafaxine (EFFEXOR-XR) 75 MG 24 hr capsule; Take 1 capsule (75 mg) by mouth daily  Dispense: 30 capsule; Refill: 0        Wes Braga PA-C  [facility]

## 2017-03-23 NOTE — NURSING NOTE
"Chief Complaint   Patient presents with     Ear Problem       Initial /58 (BP Location: Right arm, Patient Position: Chair, Cuff Size: Adult Regular)  Pulse 68  Temp 98.9  F (37.2  C) (Oral)  Resp 16  Wt 176 lb (79.8 kg)  SpO2 99%  BMI 26.76 kg/m2 Estimated body mass index is 26.76 kg/(m^2) as calculated from the following:    Height as of 3/1/17: 5' 8\" (1.727 m).    Weight as of this encounter: 176 lb (79.8 kg).  Medication Reconciliation: complete   Leonila Greenberg MA      "

## 2017-03-23 NOTE — NURSING NOTE
Injection intake:    If this procedure is requiring IV sedation has patient been NPO for 6  Hours? NA    Is patient on coumadin, plavix or other prescribed blood thinner?   No    If patient is on coumadin was it held for 5 days?   NA    If patient is on plavix was it held for 7 days?    NA     Does patient take aspirin?  No    If this is for a cervical procedure and patient is on aspirin has it been held for 6 days?   NA    Any allergies to contrast dye, iodine, steroid and/or numbing medications?  NO    Is patient currently taking antibiotics or have an active infection?  Not sure, possible ear    Does patient have a ? Yes       Is patient pregnant or breastfeeding?  NO    Are the vital signs normal?  Yes

## 2017-03-23 NOTE — PATIENT INSTRUCTIONS
[facility]   Discharge Instructions  Provider: Dr. James Hastings  Phone: [phone], option #2    Your Procedure:  Meds used:  Lidocaine (local anesthetic)   Bupivacaine (anesthetic)   DepoMedrol (steroid)   Omnipaque (contrast dye)        You may resume your normal diet and medications.     Avoid strenuous activity for the first 24 hours. You may resume your regular activities after that.     You may shower, however no swimming, tub baths, or hot tubs for 24 hours following your procedure    Be cautious with walking as numbness and/or weakness in the lower extremities  may occur up to 6-8 hours due to effect of local anesthetic    With diabetes, check your blood sugar more frequently than usual as your blood sugar may be higher than normal for 10-14 days following steroid injection. Contact your doctor who manages your diabetes if your blood sugar is higher than usual    Mild to moderate increase in pain for one to several days following the injection is not uncommon    You may use ice packs 10-15 minutes three to four times a day at the injection site for comfort    You may use anti-inflammatory medications (such as Ibuprofen or Aleve or Advil) or Tylenol for pain control if necessary    Steroid injections may take several days to start working and you may see more effect from the procedure after 10-14 days      If you experience any of the following,  Call [facility] during work hours at [phone], option #2 or on call physician after hours at [phone], option #2:  -Fever over 100 degree F  -Swelling, bleeding, redness, drainage, warmth at the injection site  -Progressive weakness or numbness of your legs   -Loss of bowel or bladder function  -Unusual headache that is not relieved by Tylenol  -Unusual new onset of pain that is not improving

## 2017-03-23 NOTE — MR AVS SNAPSHOT
After Visit Summary   3/23/2017    Marcel Melchor    MRN: [id number]           Patient Information     Date Of Birth          1970        Visit Information        Provider Department      3/23/2017 8:00 AM James Hastings, DO [facility]        Today's Diagnoses     Right lumbar radiculitis    -  1    Lumbar disc herniation        Lumbar degenerative disc disease          Care Instructions    [facility]   Discharge Instructions  Provider: Dr. James Hastings  Phone: [phone], option #2    Your Procedure:  Meds used:  Lidocaine (local anesthetic)   Bupivacaine (anesthetic)   DepoMedrol (steroid)   Omnipaque (contrast dye)        You may resume your normal diet and medications.     Avoid strenuous activity for the first 24 hours. You may resume your regular activities after that.     You may shower, however no swimming, tub baths, or hot tubs for 24 hours following your procedure    Be cautious with walking as numbness and/or weakness in the lower extremities  may occur up to 6-8 hours due to effect of local anesthetic    With diabetes, check your blood sugar more frequently than usual as your blood sugar may be higher than normal for 10-14 days following steroid injection. Contact your doctor who manages your diabetes if your blood sugar is higher than usual    Mild to moderate increase in pain for one to several days following the injection is not uncommon    You may use ice packs 10-15 minutes three to four times a day at the injection site for comfort    You may use anti-inflammatory medications (such as Ibuprofen or Aleve or Advil) or Tylenol for pain control if necessary    Steroid injections may take several days to start working and you may see more effect from the procedure after 10-14 days      If you experience any of the following,  Call [facility] during work hours at [phone], option #2 or on call physician  after hours at [phone], option #2:  -Fever over 100 degree F  -Swelling, bleeding, redness, drainage, warmth at the injection site  -Progressive weakness or numbness of your legs   -Loss of bowel or bladder function  -Unusual headache that is not relieved by Tylenol  -Unusual new onset of pain that is not improving          Follow-ups after your visit        Your next 10 appointments already scheduled     Mar 23, 2017 11:20 AM CDT   SHORT with Wes Braga PA-C   [facility]    [address] [place] [address], [address]  [facility] [address] [phone]              Who to contact     If you have questions or need follow up information about today's clinic visit or your schedule please contact [facility] directly at [phone].  Normal or non-critical lab and imaging results will be communicated to you by MyChart, letter or phone within 4 business days after the clinic has received the results. If you do not hear from us within 7 days, please contact the clinic through Grapeshothart or phone. If you have a critical or abnormal lab result, we will notify you by phone as soon as possible.  Submit refill requests through [email or web address] or call your pharmacy and they will forward the refill request to us. Please allow 3 business days for your refill to be completed.          Additional Information About Your Visit        MyChart Information     [email or web address] gives you secure access to your electronic health record. If you see a primary care provider, you can also send messages to your care team and make appointments. If you have questions, please call your primary care clinic.  If you do not have a primary care provider, please call [phone] and they will assist you.        Care EveryWhere ID     This is your Care EveryWhere ID. This could be used by other organizations to access your [facility] medical records  [id number]        Your Vitals Were     Pulse  Pulse Oximetry                64 100%           Blood Pressure from Last 3 Encounters:   03/23/17 119/78   03/01/17 108/62   01/23/17 108/75    Weight from Last 3 Encounters:   03/01/17 86.2 kg (190 lb)   01/23/17 86.2 kg (190 lb)   01/13/17 82.9 kg (182 lb 11.2 oz)              Today, you had the following     No orders found for display       Primary Care Provider Office Phone # Fax #    Wes Braga PA-C [phone]       [place] [id number]  [facility] [address]        Thank you!     Thank you for choosing [facility]  for your care. Our goal is always to provide you with excellent care. Hearing back from our patients is one way we can continue to improve our services. Please take a few minutes to complete the written survey that you may receive in the mail after your visit with us. Thank you!             Your Updated Medication List - Protect others around you: Learn how to safely use, store and throw away your medicines at www.disposemymeds.org.          This list is accurate as of: 3/23/17  8:19 AM.  Always use your most recent med list.                   Brand Name Dispense Instructions for use    amphetamine-dextroamphetamine 10 MG per tablet    ADDERALL     TAKE 1 TABLET BY ORAL ROUTE TWICE A DAY       ASACOL  MG EC tablet   Generic drug:  mesalamine      TAKE 3 TABLETS BY MOUTH TWICE DAILY       clindamycin 1 % lotion    CLEOCIN T    60 mL    APPLY TOPICALLY TO FACE AND CHEST TWICE A DAY AS NEEDED       cyclobenzaprine 5 MG tablet    FLEXERIL    30 tablet    Take 1 tablet (5 mg) by mouth 3 times daily as needed for muscle spasms       finasteride 1 MG tablet    PROPECIA     Take 1 tablet by mouth daily       gabapentin 300 MG capsule    NEURONTIN    90 capsule    Take 1 capsule (300 mg) by mouth 3 times daily as needed       HYDROcodone-acetaminophen 5-325 MG per tablet    NORCO    20 tablet    Take 1-2 tablets by mouth every 4  hours as needed for moderate to severe pain       venlafaxine 75 MG 24 hr capsule    EFFEXOR-XR    60 capsule    Take 2 capsules (150 mg) by mouth daily

## 2017-03-23 NOTE — PATIENT INSTRUCTIONS
Benign Positional Vertigo    The inner ear is located behind the middle ear. It is a part of the balance center of the body. It contains small calcium particles within fluid filled canals (semi-circular canals). These particles can move out of position as a result of aging, head trauma or disease of the inner ear. Once that happens, movement of the head into certain positions may cause the particles to stimulate the inner ear and create the feeling of vertigo.  Vertigo is a false feeling of motion (as if you or the room is spinning). A vertigo attack may cause sudden nausea, vomiting and heavy sweating. Severe vertigo causes a loss of balance and may result in falling. During an attack of vertigo, head movement and body position changes will worsen symptoms.  An episode of vertigo may last seconds, minutes or hours. Once you are over the first episode of vertigo, it may never return. Sometimes symptoms recur off and on over several weeks or longer.  Home Care:    If symptoms are severe, rest quietly in bed. Change positions slowly. There is usually one position that will feel best, such as lying on one side or lying on your back with your head slightly raised on pillows.    Do not drive or work with dangerous machinery for one week after symptoms disappear, in case of a sudden return of symptoms.    Take medicine as prescribed to relieve your symptoms. Unless another medicine was prescribed for nausea, vomiting and vertigo, you may use over-the-counter motion sickness pills, such as meclizine (Bonine, Bonamine, Antivert) or dimenhydrinate (Dramamine).  Follow Up  with your doctor or as directed by our staff. Report any persistent ringing in the ear or hearing loss to your doctor.  [NOTE: If you had a CT or MRI scan, it will be reviewed by a specialist. You will be notified of any new findings that may affect your care.]  Get Prompt Medical Attention  if any of the following occur:    Worsening of vertigo not  controlled by the medicine prescribed    Repeated vomiting not controlled by the medicine prescribed    Increased weakness or fainting    Severe headache or unusual drowsiness or confusion    Weakness of an arm or leg or one side of the face    Difficulty with speech or vision    Seizure    0708-6908 [employer]. [address] [place] [address]. All rights reserved. This information is not intended as a substitute for professional medical care. Always follow your healthcare professional's instructions.

## 2017-03-23 NOTE — Clinical Note
Dear Marcel Pastrana saw me at [facility] on Mar 23, 2017.  Please refer to the visit note at your convenience and feel free to contact me should you have any questions.  Sincerely,  James Hastings DO, [facility]

## 2017-03-23 NOTE — MR AVS SNAPSHOT
After Visit Summary   3/23/2017    Marcel Melchor    MRN: [id number]           Patient Information     Date Of Birth          1970        Visit Information        Provider Department      3/23/2017 11:20 AM Wes Braga PA-C [facility]        Today's Diagnoses     BPPV (benign paroxysmal positional vertigo), unspecified laterality    -  1    Anxiety        Irritability and anger          Care Instructions      Benign Positional Vertigo    The inner ear is located behind the middle ear. It is a part of the balance center of the body. It contains small calcium particles within fluid filled canals (semi-circular canals). These particles can move out of position as a result of aging, head trauma or disease of the inner ear. Once that happens, movement of the head into certain positions may cause the particles to stimulate the inner ear and create the feeling of vertigo.  Vertigo is a false feeling of motion (as if you or the room is spinning). A vertigo attack may cause sudden nausea, vomiting and heavy sweating. Severe vertigo causes a loss of balance and may result in falling. During an attack of vertigo, head movement and body position changes will worsen symptoms.  An episode of vertigo may last seconds, minutes or hours. Once you are over the first episode of vertigo, it may never return. Sometimes symptoms recur off and on over several weeks or longer.  Home Care:    If symptoms are severe, rest quietly in bed. Change positions slowly. There is usually one position that will feel best, such as lying on one side or lying on your back with your head slightly raised on pillows.    Do not drive or work with dangerous machinery for one week after symptoms disappear, in case of a sudden return of symptoms.    Take medicine as prescribed to relieve your symptoms. Unless another medicine was prescribed for nausea, vomiting and vertigo, you may use over-the-counter motion  sickness pills, such as meclizine (Bonine, Bonamine, Antivert) or dimenhydrinate (Dramamine).  Follow Up  with your doctor or as directed by our staff. Report any persistent ringing in the ear or hearing loss to your doctor.  [NOTE: If you had a CT or MRI scan, it will be reviewed by a specialist. You will be notified of any new findings that may affect your care.]  Get Prompt Medical Attention  if any of the following occur:    Worsening of vertigo not controlled by the medicine prescribed    Repeated vomiting not controlled by the medicine prescribed    Increased weakness or fainting    Severe headache or unusual drowsiness or confusion    Weakness of an arm or leg or one side of the face    Difficulty with speech or vision    Seizure    2012-0478 [employer]. [address] [place] [address]. All rights reserved. This information is not intended as a substitute for professional medical care. Always follow your healthcare professional's instructions.              Follow-ups after your visit        Follow-up notes from your care team     Return if symptoms worsen or fail to improve.      Who to contact     If you have questions or need follow up information about today's clinic visit or your schedule please contact [facility] directly at [phone].  Normal or non-critical lab and imaging results will be communicated to you by MyChart, letter or phone within 4 business days after the clinic has received the results. If you do not hear from us within 7 days, please contact the clinic through Vizifyhart or phone. If you have a critical or abnormal lab result, we will notify you by phone as soon as possible.  Submit refill requests through Snapdeal or call your pharmacy and they will forward the refill request to us. Please allow 3 business days for your refill to be completed.          Additional Information About Your Visit        VizifyharEVRST Information     Snapdeal gives you secure  access to your electronic health record. If you see a primary care provider, you can also send messages to your care team and make appointments. If you have questions, please call your primary care clinic.  If you do not have a primary care provider, please call [phone] and they will assist you.        Care EveryWhere ID     This is your Care EveryWhere ID. This could be used by other organizations to access your [facility] medical records  [id number]        Your Vitals Were     Pulse Temperature Respirations Pulse Oximetry BMI (Body Mass Index)       68 98.9  F (37.2  C) (Oral) 16 99% 26.76 kg/m2        Blood Pressure from Last 3 Encounters:   03/23/17 100/58   03/23/17 122/85   03/01/17 108/62    Weight from Last 3 Encounters:   03/23/17 176 lb (79.8 kg)   03/01/17 190 lb (86.2 kg)   01/23/17 190 lb (86.2 kg)              Today, you had the following     No orders found for display         Today's Medication Changes          These changes are accurate as of: 3/23/17 12:09 PM.  If you have any questions, ask your nurse or doctor.               These medicines have changed or have updated prescriptions.        Dose/Directions    venlafaxine 75 MG 24 hr capsule   Commonly known as:  EFFEXOR-XR   This may have changed:  how much to take   Used for:  Anxiety, Irritability and anger   Changed by:  Wes Braga PA-C        Dose:  75 mg   Take 1 capsule (75 mg) by mouth daily   Quantity:  30 capsule   Refills:  0            Where to get your medicines      These medications were sent to [facility]/pharmacy #[id number] - [place], [place] - [address]  [address] [facility] [address]     Phone:  [phone]     venlafaxine 75 MG 24 hr capsule                Primary Care Provider Office Phone # Fax #    Wes Braga PA-C [phone]       [facility] [address] [facility] [address]        Thank you!     Thank you for choosing [facility]  for  your care. Our goal is always to provide you with excellent care. Hearing back from our patients is one way we can continue to improve our services. Please take a few minutes to complete the written survey that you may receive in the mail after your visit with us. Thank you!             Your Updated Medication List - Protect others around you: Learn how to safely use, store and throw away your medicines at www.disposemymeds.org.          This list is accurate as of: 3/23/17 12:09 PM.  Always use your most recent med list.                   Brand Name Dispense Instructions for use    amphetamine-dextroamphetamine 10 MG per tablet    ADDERALL     Reported on 3/23/2017       ASACOL  MG EC tablet   Generic drug:  mesalamine      TAKE 3 TABLETS BY MOUTH TWICE DAILY       clindamycin 1 % lotion    CLEOCIN T    60 mL    APPLY TOPICALLY TO FACE AND CHEST TWICE A DAY AS NEEDED       finasteride 1 MG tablet    PROPECIA     Take 1 tablet by mouth daily Reported on 3/23/2017       gabapentin 300 MG capsule    NEURONTIN    90 capsule    Take 1 capsule (300 mg) by mouth 3 times daily as needed       HYDROcodone-acetaminophen 5-325 MG per tablet    NORCO    20 tablet    Take 1-2 tablets by mouth every 4 hours as needed for moderate to severe pain       venlafaxine 75 MG 24 hr capsule    EFFEXOR-XR    30 capsule    Take 1 capsule (75 mg) by mouth daily

## 2017-03-23 NOTE — NURSING NOTE
Discharge Information    IV Discontiued Time:  NA    Amount of Fluid Infused:  NA    Discharge Criteria = When patient returns to baseline or as per MD order    Consciousness:  Pt is fully awake    Circulation:  BP +/- 20% of pre-procedure level    Respiration:  Patient is able to breathe deeply    O2 Sat:  Patient is able to maintain O2 Sat >92% on room air    Activity:  Moves 4 extremities on command    Ambulation:  Patient is able to stand and walk or stand and pivot into wheelchair    Dressing:  Clean/dry or No Dressing    Notes:   Discharge instructions and AVS given to patient    Patient meets criteria for discharge?  YES    Admitted to PCU?  No    Responsible adult present to accompany patient home?  Yes    Signature/Title:    Shelley Holcomb  RN Care Coordinator  [facility]

## 2017-03-23 NOTE — PROGRESS NOTES
Lumbar Transforaminal Epidural Corticosteroid Injection    INDICATIONS:  Patient is a 47 year old male with history of right low back pain with radiation secondary to lumbar disc bulge and lumbar degenerative disc disease, who presents for a right L4-5 lumbar transforaminal epidural corticosteroid injection at the request of Pam Aguilar NP.  Patient has been suffering from pain of the right low back with radiation affecting most regular activities of daily living and interfering with active rehabilitation modalities.    PREOPERATIVE DIAGNOSIS:  Right lumbar radiculitis/lumbar disc bulge/lumbar degenerative disc disease    POSTOPERATIVE DIAGNOSIS:  Same    PROCEDURE PERFORMED:  Right L4-5 transforaminal epidural corticosteroid injection with fluoroscopic guidance and localization    ATTENDING INTERVENTIONALIST:  James Hastings DO, CAQSM    A pause for the cause was completed, verifying correct patient, procedure, site, positioning, and implants or special equipment. Written informed consent was obtained.      After the risks and benefits of the procedure were explained to the patient, a standard transforaminal epidural injection procedure supply package was utilized.  Patient was brought to the procedure room and was sterilely prepped and draped in the usual fashion in the prone position.  No preprocedure medication was utilized.      PROCEDURES:  1. 4.5 cc's of 1% Lidocaine and 0.5 cc's of 8.4% Sodium Bicarbonate was injected into the underlying tissues in the direction of the foramen.  2. Under image intensifier control, a 22-gauge 5-inch spinal needle was successfully directed into the L4 subpedicular space employing a posterior oblique approach.  3. Instillation of 0.8 cc's of Omnipaque contrast medium passed by the L4 epiradicular sheath and showed continuous flow into the epidural space with good flow around the nerve root with 9.2 cc's of Omnipaque contrast medium discarded.  Patient was monitored for any  untoward reaction to the contrast medium before proceeding onward.  4. Following needle position verification, a solution of 2 cc of 0.5% Marcaine and 1 cc of Depo Medrol (80 milligrams per milliliter) was instilled.  Needle was then removed.      MONITORING:  Vital signs and cardiac monitoring was performed at all times (see nurse's notes).  Patient was observed for 15 minutes following the procedure and was then discharged fully alert and oriented in stable condition.    TOTAL FLUOROSCOPY TIME:  12 seconds    PAIN RESPONSE:  At the time of discharge, the patient noted 83% improvement in pain from 6/10 down to 1/10 post-procedure    PLAN:    1. Instructed to avoid baths and swimming activities for 24 hours.  Continue with normal daily activities as tolerated.  2. Acetaminophen/ice/Ibuprofen to be used for improved pain control post-procedure.  3. Follow-up with spine surgery service as directed for further evaluation/medical care.  Consider surgical intervention, increase dose of Gabapentin, formal physical therapy, etc. as deemed appropriate moving forward.  Instructed to return if symptoms worsen/increase before scheduled follow-up clinical visit.    James Hastings DO, [facility]    Disclaimer: This note consists of symbols derived from keyboarding, dictation and/or voice recognition software. As a result, there may be errors in the script that have gone undetected. Please consider this when interpreting information found in this chart.

## 2017-05-31 DIAGNOSIS — F41.9 ANXIETY: ICD-10-CM

## 2017-05-31 DIAGNOSIS — R45.4 IRRITABILITY AND ANGER: ICD-10-CM

## 2017-05-31 NOTE — TELEPHONE ENCOUNTER
venlafaxine (EFFEXOR-XR) 75 MG 24 hr capsule     Last Written Prescription Date: 3/23/17  Last Fill Quantity: 30, # refills: 0  Last Office Visit with G primary care provider: 3/23/2017       Last PHQ-9 score on record=   PHQ-9 SCORE 1/23/2017   Total Score -   Total Score 10         MANUEL Lopez  May 31, 2017  8:16 AM

## 2017-06-01 RX ORDER — VENLAFAXINE HYDROCHLORIDE 75 MG/1
CAPSULE, EXTENDED RELEASE ORAL
Qty: 60 CAPSULE | Refills: 0 | Status: SHIPPED | OUTPATIENT
Start: 2017-06-01 | End: 2017-06-06

## 2017-06-01 NOTE — TELEPHONE ENCOUNTER
Pt called and updated PHQ.      PHQ-9 SCORE 12/1/2016 1/23/2017 6/1/2017   Total Score - - -   Total Score 3 10 9

## 2017-06-01 NOTE — TELEPHONE ENCOUNTER
Spoke with patient but he could not do PHQ at this time.  States he will call back later  Efrain Grayson RN, BSN

## 2017-06-01 NOTE — TELEPHONE ENCOUNTER
At last visit it looks like he was tapering off.  I can refill for a month but let's have him come in for a visit or do a phone visit to discuss.  PHQ is a little high still.      Thanks,  Wes

## 2017-06-02 ASSESSMENT — PATIENT HEALTH QUESTIONNAIRE - PHQ9: SUM OF ALL RESPONSES TO PHQ QUESTIONS 1-9: 9

## 2017-06-02 NOTE — TELEPHONE ENCOUNTER
Pt informed and states he will call back to schedule.  Advised to schedule before he runs out of medication  Efrain Grayson RN, BSN

## 2017-06-06 ENCOUNTER — TRANSFERRED RECORDS (OUTPATIENT)
Dept: HEALTH INFORMATION MANAGEMENT | Facility: CLINIC | Age: 47
End: 2017-06-06

## 2017-06-06 ENCOUNTER — OFFICE VISIT (OUTPATIENT)
Dept: FAMILY MEDICINE | Facility: CLINIC | Age: 47
End: 2017-06-06
Payer: COMMERCIAL

## 2017-06-06 VITALS
DIASTOLIC BLOOD PRESSURE: 64 MMHG | HEART RATE: 70 BPM | BODY MASS INDEX: 27.83 KG/M2 | RESPIRATION RATE: 16 BRPM | WEIGHT: 183 LBS | TEMPERATURE: 98.3 F | SYSTOLIC BLOOD PRESSURE: 102 MMHG

## 2017-06-06 DIAGNOSIS — R45.4 IRRITABILITY AND ANGER: ICD-10-CM

## 2017-06-06 DIAGNOSIS — L65.9 HAIR LOSS: ICD-10-CM

## 2017-06-06 DIAGNOSIS — F41.9 ANXIETY: Primary | ICD-10-CM

## 2017-06-06 PROCEDURE — 99213 OFFICE O/P EST LOW 20 MIN: CPT | Performed by: PHYSICIAN ASSISTANT

## 2017-06-06 RX ORDER — FINASTERIDE 1 MG/1
1 TABLET, FILM COATED ORAL DAILY
Qty: 90 TABLET | Refills: 3 | Status: SHIPPED | OUTPATIENT
Start: 2017-06-06 | End: 2018-04-04

## 2017-06-06 RX ORDER — VENLAFAXINE HYDROCHLORIDE 75 MG/1
150 CAPSULE, EXTENDED RELEASE ORAL DAILY
Qty: 90 CAPSULE | Refills: 0 | Status: SHIPPED | OUTPATIENT
Start: 2017-06-06 | End: 2017-08-01

## 2017-06-06 ASSESSMENT — ANXIETY QUESTIONNAIRES
3. WORRYING TOO MUCH ABOUT DIFFERENT THINGS: MORE THAN HALF THE DAYS
6. BECOMING EASILY ANNOYED OR IRRITABLE: NEARLY EVERY DAY
5. BEING SO RESTLESS THAT IT IS HARD TO SIT STILL: MORE THAN HALF THE DAYS
GAD7 TOTAL SCORE: 13
1. FEELING NERVOUS, ANXIOUS, OR ON EDGE: NEARLY EVERY DAY
2. NOT BEING ABLE TO STOP OR CONTROL WORRYING: SEVERAL DAYS
7. FEELING AFRAID AS IF SOMETHING AWFUL MIGHT HAPPEN: NOT AT ALL
IF YOU CHECKED OFF ANY PROBLEMS ON THIS QUESTIONNAIRE, HOW DIFFICULT HAVE THESE PROBLEMS MADE IT FOR YOU TO DO YOUR WORK, TAKE CARE OF THINGS AT HOME, OR GET ALONG WITH OTHER PEOPLE: SOMEWHAT DIFFICULT

## 2017-06-06 ASSESSMENT — PATIENT HEALTH QUESTIONNAIRE - PHQ9: 5. POOR APPETITE OR OVEREATING: MORE THAN HALF THE DAYS

## 2017-06-06 NOTE — PROGRESS NOTES
"HPI    SUBJECTIVE:                                                    Marcel Melchor is a 47 year old male who presents to clinic today for the following health issues:      Medication Followup of EFFEXOR-XR 75 MG    Taking Medication as prescribed: yes    Side Effects:  None    Medication Helping Symptoms:  \"I don't know\"    Marecl is here today for Effexor-XR 75 mg. Does note that originally he was planning on going off of Effexor. Notes that he was planning on going off to \"feel more like himself.\" Now \"doesn't know who he is.\" Is wondering if he should be back on 150 mg. Does note of increased edginess and moodiness.  Is slightly concerned about weight gain on effexor. Does note that he was up to 190 lbs. Normally hovers around 175. Notes he will not eat during the day, but will come home and graze.        PROBLEMS TO ADD ON...  Refill of Propecia needed.         Problem list and histories reviewed & adjusted, as indicated.  Additional history: as documented    BP Readings from Last 3 Encounters:   06/06/17 102/64   03/23/17 100/58   03/23/17 122/85    Wt Readings from Last 3 Encounters:   06/06/17 83 kg (183 lb)   03/23/17 79.8 kg (176 lb)   03/01/17 86.2 kg (190 lb)            Labs reviewed in EPIC    Reviewed and updated as needed this visit by clinical staff  Tobacco  Allergies  Problems  Med Hx  Soc Hx      Reviewed and updated as needed this visit by Provider         ROS:  CONSTITUTIONAL:NEGATIVE for fever, chills, change in weight  INTEGUMENTARY/SKIN: NEGATIVE for worrisome rashes, moles or lesions  MUSCULOSKELETAL: NEGATIVE for significant arthralgias or myalgia  PSYCHIATRIC: POSITIVE for anxiety    This document serves as a record of the services and decisions personally performed and made by Wes Braga PA-C. It was created on her behalf by Sarina Gaspar, a trained medical scribe. The creation of this document is based the provider's statements to the medical scribe.  Sarina Gaspar June 6, 2017 " 3:28 PM    OBJECTIVE:                                                    /64 (BP Location: Right arm, Cuff Size: Adult Regular)  Pulse 70  Temp 98.3  F (36.8  C) (Oral)  Resp 16  Wt 83 kg (183 lb)  BMI 27.83 kg/m2  Body mass index is 27.83 kg/(m^2).  GENERAL: healthy, alert and no distress  MS: no gross musculoskeletal defects noted, no edema  SKIN: no suspicious lesions or rashes  NEURO: Normal strength and tone, mentation intact and speech normal  PSYCH: mentation appears normal, affect normal/bright    Diagnostic Test Results:  none      ASSESSMENT/PLAN:                                                    1. Anxiety  Will increase back to 150 mg daily due to increased anxiety and irritability. Will monitor for a month and check in for progress. Monitor weight. Follow up in one month.   - venlafaxine (EFFEXOR-XR) 75 MG 24 hr capsule; Take 2 capsules (150 mg) by mouth daily  Dispense: 90 capsule; Refill: 0    2. Irritability and anger  Increase to 150 mg to see if this helps with mood symptoms. Follow up in one month.   - venlafaxine (EFFEXOR-XR) 75 MG 24 hr capsule; Take 2 capsules (150 mg) by mouth daily  Dispense: 90 capsule; Refill: 0    3. Hair loss  Refill needed. Follow up in one year.   - finasteride (PROPECIA) 1 MG tablet; Take 1 tablet (1 mg) by mouth daily Reported on 3/23/2017  Dispense: 90 tablet; Refill: 3      The information in this document, created by the medical scribe for me, accurately reflects the services I personally performed and the decisions made by me. I have reviewed and approved this document for accuracy prior to leaving the patient care area.  3:28 PM 6/6/2017          Wes Braga PA-C  [facility]      Physical Exam

## 2017-06-06 NOTE — MR AVS SNAPSHOT
After Visit Summary   6/6/2017    Marcel Melchor    MRN: [id number]           Patient Information     Date Of Birth          1970        Visit Information        Provider Department      6/6/2017 3:00 PM Wes Braga PA-C [facility]        Today's Diagnoses     Anxiety    -  1    Irritability and anger        Hair loss           Follow-ups after your visit        Who to contact     If you have questions or need follow up information about today's clinic visit or your schedule please contact [facility] directly at [phone].  Normal or non-critical lab and imaging results will be communicated to you by [employer], letter or phone within 4 business days after the clinic has received the results. If you do not hear from us within 7 days, please contact the clinic through C[employer] or phone. If you have a critical or abnormal lab result, we will notify you by phone as soon as possible.  Submit refill requests through [employer] or call your pharmacy and they will forward the refill request to us. Please allow 3 business days for your refill to be completed.          Additional Information About Your Visit        MyChart Information     [employer] gives you secure access to your electronic health record. If you see a primary care provider, you can also send messages to your care team and make appointments. If you have questions, please call your primary care clinic.  If you do not have a primary care provider, please call [phone] and they will assist you.        Care EveryWhere ID     This is your Care EveryWhere ID. This could be used by other organizations to access your [facility] medical records  [id number]        Your Vitals Were     Pulse Temperature Respirations BMI (Body Mass Index)          70 98.3  F (36.8  C) (Oral) 16 27.83 kg/m2         Blood Pressure from Last 3 Encounters:   06/06/17 102/64   03/23/17 100/58   03/23/17 122/85    Weight from Last 3  Encounters:   06/06/17 183 lb (83 kg)   03/23/17 176 lb (79.8 kg)   03/01/17 190 lb (86.2 kg)              Today, you had the following     No orders found for display         Today's Medication Changes          These changes are accurate as of: 6/6/17  3:49 PM.  If you have any questions, ask your nurse or doctor.               These medicines have changed or have updated prescriptions.        Dose/Directions    venlafaxine 75 MG 24 hr capsule   Commonly known as:  EFFEXOR-XR   This may have changed:  how much to take   Used for:  Anxiety, Irritability and anger   Changed by:  Wes Braga PA-C        Dose:  150 mg   Take 2 capsules (150 mg) by mouth daily   Quantity:  90 capsule   Refills:  0            Where to get your medicines      These medications were sent to [facility]/pharmacy #[id number] - [place], [place] - [address]  [facility]  [address] [address]     Phone:  [phone]     finasteride 1 MG tablet    venlafaxine 75 MG 24 hr capsule                Primary Care Provider Office Phone # Fax #    Wes Braga PA-C [phone]       [facility] [address] [facility] [address]        Thank you!     Thank you for choosing [facility]  for your care. Our goal is always to provide you with excellent care. Hearing back from our patients is one way we can continue to improve our services. Please take a few minutes to complete the written survey that you may receive in the mail after your visit with us. Thank you!             Your Updated Medication List - Protect others around you: Learn how to safely use, store and throw away your medicines at www.disposemymeds.org.          This list is accurate as of: 6/6/17  3:49 PM.  Always use your most recent med list.                   Brand Name Dispense Instructions for use    amphetamine-dextroamphetamine 10 MG per tablet    ADDERALL     Reported on 3/23/2017       ASACOL  MG EC  tablet   Generic drug:  mesalamine      TAKE 3 TABLETS BY MOUTH TWICE DAILY       clindamycin 1 % lotion    CLEOCIN T    60 mL    APPLY TOPICALLY TO FACE AND CHEST TWICE A DAY AS NEEDED       finasteride 1 MG tablet    PROPECIA    90 tablet    Take 1 tablet (1 mg) by mouth daily Reported on 3/23/2017       gabapentin 300 MG capsule    NEURONTIN    90 capsule    TAKE 1 CAPSULE (300 MG) BY MOUTH 3 TIMES DAILY AS NEEDED       venlafaxine 75 MG 24 hr capsule    EFFEXOR-XR    90 capsule    Take 2 capsules (150 mg) by mouth daily

## 2017-06-06 NOTE — NURSING NOTE
"Chief Complaint   Patient presents with     Recheck Medication       Initial /64 (BP Location: Right arm, Cuff Size: Adult Regular)  Pulse 70  Temp 98.3  F (36.8  C) (Oral)  Resp 16  Wt 183 lb (83 kg)  BMI 27.83 kg/m2 Estimated body mass index is 27.83 kg/(m^2) as calculated from the following:    Height as of 3/1/17: 5' 8\" (1.727 m).    Weight as of this encounter: 183 lb (83 kg).  Medication Reconciliation: complete   Olga Medina MA    "

## 2017-06-07 ASSESSMENT — PATIENT HEALTH QUESTIONNAIRE - PHQ9: SUM OF ALL RESPONSES TO PHQ QUESTIONS 1-9: 11

## 2017-06-07 ASSESSMENT — ANXIETY QUESTIONNAIRES: GAD7 TOTAL SCORE: 13

## 2017-08-01 DIAGNOSIS — F41.9 ANXIETY: ICD-10-CM

## 2017-08-01 DIAGNOSIS — R45.4 IRRITABILITY AND ANGER: ICD-10-CM

## 2017-08-01 NOTE — LETTER
[address], [address]  [facility] [address]  Phone: [phone]  Fax: [phone]    08/02/17    Marcel Melchor  [address] [facility] [address]      Dear Marcel:     We recently received a call from your pharmacy requesting a refill of your medication.    A review of your chart indicates that an appointment is required with your provider for office follow up visit. Please call the clinic at [phone] to schedule your appointment.    We have authorized one refill of your medication to allow time for you to schedule your appointment.    Taking care of your health is important to us, and ongoing visits with your provider are vital to your care.  We look forward to seeing you in the near future.              Sincerely,      Wes Braga PA-C/Jessika FERGUSON RN

## 2017-08-02 RX ORDER — VENLAFAXINE HYDROCHLORIDE 75 MG/1
150 CAPSULE, EXTENDED RELEASE ORAL DAILY
Qty: 60 CAPSULE | Refills: 0 | Status: SHIPPED | OUTPATIENT
Start: 2017-08-02 | End: 2017-08-07

## 2017-08-02 NOTE — TELEPHONE ENCOUNTER
Medication is being filled for 1 time refill only due to:  due for f/u visit and PHQ 9, letter sent to schedule appt    Jessika Knapp RN, BS  Clinical Nurse Triage.

## 2017-08-02 NOTE — TELEPHONE ENCOUNTER
venlafaxine (EFFEXOR-XR) 75 MG 24 hr capsule     Last Written Prescription Date: 6/6/17  Last Fill Quantity: 90, # refills: 0  Last Office Visit with G primary care provider: 6/6/2017       Last PHQ-9 score on record=   PHQ-9 SCORE 6/6/2017   Total Score -   Total Score 11         MANUEL Lopez  August 2, 2017  8:22 AM

## 2017-08-07 DIAGNOSIS — R45.4 IRRITABILITY AND ANGER: ICD-10-CM

## 2017-08-07 DIAGNOSIS — F41.9 ANXIETY: ICD-10-CM

## 2017-08-07 RX ORDER — VENLAFAXINE HYDROCHLORIDE 75 MG/1
CAPSULE, EXTENDED RELEASE ORAL
Qty: 60 CAPSULE | Refills: 9 | Status: SHIPPED | OUTPATIENT
Start: 2017-08-07 | End: 2018-06-26

## 2017-08-07 NOTE — TELEPHONE ENCOUNTER
venlafaxine (EFFEXOR-XR) 75 MG 24 hr capsule    Last Written Prescription Date: 8/2/17  Last Fill Quantity: 60, # refills: 0  Last Office Visit with [facility], [facility] or [facility] prescribing provider: 6/6/17        BP Readings from Last 3 Encounters:   06/06/17 102/64   03/23/17 100/58   03/23/17 122/85     Pulse: (for Fetzima)  Creatinine   Date Value Ref Range Status   11/03/2015 0.88 mg/dL Final   ]    Last PHQ-9 score on record=   PHQ-9 SCORE 6/6/2017   Total Score -   Total Score 11

## 2017-11-16 ENCOUNTER — OFFICE VISIT (OUTPATIENT)
Dept: FAMILY MEDICINE | Facility: CLINIC | Age: 47
End: 2017-11-16
Payer: COMMERCIAL

## 2017-11-16 VITALS
WEIGHT: 187 LBS | HEART RATE: 68 BPM | SYSTOLIC BLOOD PRESSURE: 112 MMHG | RESPIRATION RATE: 16 BRPM | TEMPERATURE: 98.4 F | DIASTOLIC BLOOD PRESSURE: 64 MMHG | BODY MASS INDEX: 28.43 KG/M2

## 2017-11-16 DIAGNOSIS — M62.830 BACK MUSCLE SPASM: Primary | ICD-10-CM

## 2017-11-16 DIAGNOSIS — Z23 NEED FOR PROPHYLACTIC VACCINATION AND INOCULATION AGAINST INFLUENZA: ICD-10-CM

## 2017-11-16 PROCEDURE — 99213 OFFICE O/P EST LOW 20 MIN: CPT | Mod: 25 | Performed by: PHYSICIAN ASSISTANT

## 2017-11-16 PROCEDURE — 90471 IMMUNIZATION ADMIN: CPT | Performed by: PHYSICIAN ASSISTANT

## 2017-11-16 PROCEDURE — 90686 IIV4 VACC NO PRSV 0.5 ML IM: CPT | Performed by: PHYSICIAN ASSISTANT

## 2017-11-16 RX ORDER — MESALAMINE 1000 MG/1
1000 SUPPOSITORY RECTAL 2 TIMES DAILY
COMMUNITY
End: 2018-04-04

## 2017-11-16 RX ORDER — CYCLOBENZAPRINE HCL 10 MG
5-10 TABLET ORAL 3 TIMES DAILY PRN
Qty: 30 TABLET | Refills: 1 | Status: SHIPPED | OUTPATIENT
Start: 2017-11-16 | End: 2018-04-04

## 2017-11-16 NOTE — PROGRESS NOTES
SUBJECTIVE:   Marcel Melchor is a 47 year old male who presents to clinic today for the following health issues:      Back Pain       Duration: couple weeks        Specific cause: none    Description:   Location of pain: low back bilateral and middle of back bilateral  Character of pain: sharp, stabbing, burning, cramping and intermittent  Pain radiation:none  New numbness or weakness in legs, not attributed to pain:  no     Intensity: moderate    History:   Pain interferes with job: Not applicable  History of back problems: recurrent self limited episodes of low back pain in the past  Any previous MRI or X-rays: Yes- at [facility].  Date 10/3/16  Sees a specialist for back pain:  No  Therapies tried without relief: tens unit, cold, heat and NSAIDs    Alleviating factors:   Improved by: none      Precipitating factors:  Worsened by: Sitting    Functional and Psychosocial Screen (Star STarT Back):      Not performed today      Accompanying Signs & Symptoms:  Risk of Fracture:  None  Risk of Cauda Equina:  None  Risk of Infection:  None  Risk of Cancer:  None  Risk of Ankylosing Spondylitis:  Onset at age <35, male, AND morning back stiffness. no       Marcel has chronic back pain and is here for eval of a different type of pain today.  It feels differen than usual to him.  Last injection was providing relief until last couple of weeks.  Pain is when he is standing from seated position.  No numbness or tingling.  Has tried doing yoga but this was difficult for him due to pain.  Otherwise he is not doing anything else for the pain.      Problem list and histories reviewed & adjusted, as indicated.  Additional history: as documented      Reviewed and updated as needed this visit by clinical staff  Tobacco  Allergies  Meds  Problems  Med Hx  Surg Hx  Fam Hx  Soc Hx        Reviewed and updated as needed this visit by Provider         ROS:  Constitutional, HEENT, cardiovascular, pulmonary, gi and gu systems are negative,  except as otherwise noted.      OBJECTIVE:   /64 (BP Location: Right arm, Patient Position: Sitting, Cuff Size: Adult Regular)  Pulse 68  Temp 98.4  F (36.9  C) (Oral)  Resp 16  Wt 187 lb (84.8 kg)  BMI 28.43 kg/m2  Body mass index is 28.43 kg/(m^2).  GENERAL: healthy, alert and no distress  MS: no gross musculoskeletal defects noted, no edema  SKIN: no suspicious lesions or rashes  NEURO: Normal strength and tone, mentation intact and speech normal  Comprehensive back pain exam:  No tenderness, Lower extremity strength functional and equal on both sides, Lower extremity reflexes within normal limits bilaterally and Straight leg raise negative bilaterally  PSYCH: mentation appears normal, affect normal/bright    Diagnostic Test Results:  none     ASSESSMENT/PLAN:   1. Back muscle spasm    - cyclobenzaprine (FLEXERIL) 10 MG tablet; Take 0.5-1 tablets (5-10 mg) by mouth 3 times daily as needed for muscle spasms  Dispense: 30 tablet; Refill: 1    2. Need for prophylactic vaccination and inoculation against influenza    - FLU VAC, SPLIT VIRUS IM > 3 YO (QUADRIVALENT) [16884]  - Vaccine Administration, Initial [99792]    He will follow up if muscle relaxor is not effective.     Wes Braga PA-C  [facility]    Injectable Influenza Immunization Documentation    1.  Is the person to be vaccinated sick today?   No    2. Does the person to be vaccinated have an allergy to a component   of the vaccine?   No  Egg Allergy Algorithm Link    3. Has the person to be vaccinated ever had a serious reaction   to influenza vaccine in the past?   No    4. Has the person to be vaccinated ever had Guillain-Barré syndrome?   No    Form completed by Marcel

## 2017-11-16 NOTE — MR AVS SNAPSHOT
After Visit Summary   11/16/2017    Marcel Melchor    MRN: [id number]           Patient Information     Date Of Birth          1970        Visit Information        Provider Department      11/16/2017 11:40 AM Wes Braga PA-C [facility]        Today's Diagnoses     Back muscle spasm    -  1    Need for prophylactic vaccination and inoculation against influenza           Follow-ups after your visit        Follow-up notes from your care team     Return in about 1 week (around 11/23/2017).      Who to contact     If you have questions or need follow up information about today's clinic visit or your schedule please contact [facility] directly at [phone].  Normal or non-critical lab and imaging results will be communicated to you by [employer], letter or phone within 4 business days after the clinic has received the results. If you do not hear from us within 7 days, please contact the clinic through [employer] or phone. If you have a critical or abnormal lab result, we will notify you by phone as soon as possible.  Submit refill requests through [employer] or call your pharmacy and they will forward the refill request to us. Please allow 3 business days for your refill to be completed.          Additional Information About Your Visit        MyChart Information     [employer] gives you secure access to your electronic health record. If you see a primary care provider, you can also send messages to your care team and make appointments. If you have questions, please call your primary care clinic.  If you do not have a primary care provider, please call [phone] and they will assist you.        Care EveryWhere ID     This is your Care EveryWhere ID. This could be used by other organizations to access your [facility] medical records  [id number]        Your Vitals Were     Pulse Temperature Respirations BMI (Body Mass Index)          68 98.4  F (36.9  C) (Oral) 16  28.43 kg/m2         Blood Pressure from Last 3 Encounters:   11/16/17 112/64   06/06/17 102/64   03/23/17 100/58    Weight from Last 3 Encounters:   11/16/17 187 lb (84.8 kg)   06/06/17 183 lb (83 kg)   03/23/17 176 lb (79.8 kg)              We Performed the Following     FLU VAC, SPLIT VIRUS IM > 3 YO (QUADRIVALENT) [59080]     Vaccine Administration, Initial [10275]          Today's Medication Changes          These changes are accurate as of: 11/16/17 12:19 PM.  If you have any questions, ask your nurse or doctor.               Start taking these medicines.        Dose/Directions    cyclobenzaprine 10 MG tablet   Commonly known as:  FLEXERIL   Used for:  Back muscle spasm   Started by:  Wes Braga PA-C        Dose:  5-10 mg   Take 0.5-1 tablets (5-10 mg) by mouth 3 times daily as needed for muscle spasms   Quantity:  30 tablet   Refills:  1            Where to get your medicines      These medications were sent to [facility]/pharmacy #[id number] - [place], [place] - [address]  [address] [address] [address]     Phone:  [phone]     cyclobenzaprine 10 MG tablet                Primary Care Provider Office Phone # Fax #    Wes Braga PA-C [phone]       [address] [address] [address]        Equal Access to Services     [facility] Hospital[facility] AH: Hadii federico ku hadasho Soomaali, waaxda luqadaha, qaybta kaalmada adeegyada, rafael lagunas . So [facility] [phone].    ATENCIÓN: Si habla español, tiene a friedman disposición servicios gratuitos de asistencia lingüística. Llame al [phone].    We comply with applicable federal civil rights laws and [place] laws. We do not discriminate on the basis of race, color, national origin, age, disability, sex, sexual orientation, or gender identity.            Thank you!     Thank you for choosing [facility]  for your care. Our goal is always to provide you with excellent care. Hearing back from  our patients is one way we can continue to improve our services. Please take a few minutes to complete the written survey that you may receive in the mail after your visit with us. Thank you!             Your Updated Medication List - Protect others around you: Learn how to safely use, store and throw away your medicines at www.disposemymeds.org.          This list is accurate as of: 11/16/17 12:19 PM.  Always use your most recent med list.                   Brand Name Dispense Instructions for use Diagnosis    amphetamine-dextroamphetamine 10 MG per tablet    ADDERALL     Reported on 3/23/2017        * mesalamine 1000 MG Suppository    CANASA     Place 1,000 mg rectally 2 times daily        * ASACOL  MG EC tablet   Generic drug:  mesalamine      TAKE 3 TABLETS BY MOUTH TWICE DAILY        clindamycin 1 % lotion    CLEOCIN T    60 mL    APPLY TOPICALLY TO FACE AND CHEST TWICE A DAY AS NEEDED    Acne, unspecified acne type       cyclobenzaprine 10 MG tablet    FLEXERIL    30 tablet    Take 0.5-1 tablets (5-10 mg) by mouth 3 times daily as needed for muscle spasms    Back muscle spasm       finasteride 1 MG tablet    PROPECIA    90 tablet    Take 1 tablet (1 mg) by mouth daily Reported on 3/23/2017    Hair loss       venlafaxine 75 MG 24 hr capsule    EFFEXOR-XR    60 capsule    TAKE 2 CAPSULES (150 MG) BY MOUTH DAILY    Anxiety, Irritability and anger       * Notice:  This list has 2 medication(s) that are the same as other medications prescribed for you. Read the directions carefully, and ask your doctor or other care provider to review them with you.

## 2018-02-14 ENCOUNTER — TRANSFERRED RECORDS (OUTPATIENT)
Dept: HEALTH INFORMATION MANAGEMENT | Facility: CLINIC | Age: 48
End: 2018-02-14

## 2018-04-04 ENCOUNTER — OFFICE VISIT (OUTPATIENT)
Dept: FAMILY MEDICINE | Facility: CLINIC | Age: 48
End: 2018-04-04
Payer: COMMERCIAL

## 2018-04-04 VITALS
WEIGHT: 194 LBS | DIASTOLIC BLOOD PRESSURE: 70 MMHG | TEMPERATURE: 98.6 F | RESPIRATION RATE: 14 BRPM | BODY MASS INDEX: 29.5 KG/M2 | SYSTOLIC BLOOD PRESSURE: 112 MMHG | HEART RATE: 68 BPM

## 2018-04-04 DIAGNOSIS — M54.41 ACUTE BILATERAL LOW BACK PAIN WITH RIGHT-SIDED SCIATICA: Primary | ICD-10-CM

## 2018-04-04 PROCEDURE — 99213 OFFICE O/P EST LOW 20 MIN: CPT | Performed by: NURSE PRACTITIONER

## 2018-04-04 RX ORDER — CYCLOBENZAPRINE HCL 5 MG
5 TABLET ORAL 3 TIMES DAILY PRN
Qty: 30 TABLET | Refills: 0 | Status: SHIPPED | OUTPATIENT
Start: 2018-04-04 | End: 2018-04-30

## 2018-04-04 NOTE — PROGRESS NOTES
HPI    SUBJECTIVE:   Marcel Melchor is a 48 year old male who presents to clinic today for the following health issues:      Back Pain       Duration: Has been ongoing. Seen for last on 11/16/17        Specific cause: none    Description:   Location of pain: low back bilateral  Character of pain: sharp and dull ache  Pain radiation:radiates into the right buttocks  New numbness or weakness in legs, not attributed to pain:  no     Intensity: moderate    History:   Pain interferes with job: YES  History of back problems: recurrent self limited episodes of low back pain in the past  Any previous MRI or X-rays: Yes- at [facility].  Date 11/17  Sees a specialist for back pain:  Has seen specialist before, but not recently.   Therapies tried without relief: NSAIDs and stretch    Alleviating factors:   Improved by: injections and sleep       Precipitating factors:  Worsened by: Sitting and getting up     Functional and Psychosocial Screen (QR Wild STarT Back):      Not performed today    Accompanying Signs & Symptoms:  Risk of Fracture:  None  Risk of Cauda Equina:  None  Risk of Infection:  None  Risk of Cancer:  None  Risk of Ankylosing Spondylitis:  Onset at age <35, male, AND morning back stiffness. no     He has chronic low back pain.  Works as a .  About one year ago he had an acute flare of back pain.  He had an injection done in March 2017 and this worked really well.  Pain overall much improved.  Has had some very mild flares since that time, but tolerable and short lasting.  This flare started abut a month ago.  Low back pain radiating to the R buttock.  Pain worsens when going from sitting to standing.  Would like to try another injection.  He was also given flexeril.  This did help some with the pain though made him drowsy so was only taking at night.          Problem list and histories reviewed & adjusted, as indicated.  Additional history: as documented    Current Outpatient Prescriptions   Medication  Sig Dispense Refill     cyclobenzaprine (FLEXERIL) 5 MG tablet Take 1 tablet (5 mg) by mouth 3 times daily as needed for muscle spasms 30 tablet 0     venlafaxine (EFFEXOR-XR) 75 MG 24 hr capsule TAKE 2 CAPSULES (150 MG) BY MOUTH DAILY 60 capsule 9     ASACOL  MG EC tablet TAKE 3 TABLETS BY MOUTH TWICE DAILY  0     clindamycin (CLEOCIN T) 1 % lotion APPLY TOPICALLY TO FACE AND CHEST TWICE A DAY AS NEEDED 60 mL 3     Allergies   Allergen Reactions     Amoxicillin      itching       Reviewed and updated as needed this visit by clinical staff  Tobacco  Allergies  Problems  Med Hx  Soc Hx      Reviewed and updated as needed this visit by Provider         ROS:  CONSTITUTIONAL:NEGATIVE  for fever   INTEGUMENTARY/SKIN: NEGATIVE for rashes  MUSCULOSKELETAL: POSITIVE  for back pain   NEURO: NEGATIVE for numbness or tingling and weakness     OBJECTIVE:     /70 (BP Location: Right arm, Cuff Size: Adult Regular)  Pulse 68  Temp 98.6  F (37  C) (Oral)  Resp 14  Wt 194 lb (88 kg)  BMI 29.5 kg/m2  Body mass index is 29.5 kg/(m^2).  GENERAL: healthy, alert and no distress  MS: back: no spinous tenderness, R and L paralumbar tenderness more notable on the R side, ROM limited in all planes due to discomfort, gait normal, normal straight leg raise bilat  SKIN: no suspicious lesions or rashes  NEURO: Lower extremities:  DTR's normal/intact, strength normal  PSYCH: mentation appears normal, affect normal/bright    Diagnostic Test Results:  none     ASSESSMENT/PLAN:   1. Acute bilateral low back pain with right-sided sciatica  In the past found relief with steroid injection and would like to have another one.  Referral to ortho.  Flexeril as needed along with rest, heat and tylenol.  Avoid NSAIDs due to hx of crohn's.    - ORTHO  REFERRAL  - cyclobenzaprine (FLEXERIL) 5 MG tablet; Take 1 tablet (5 mg) by mouth 3 times daily as needed for muscle spasms  Dispense: 30 tablet; Refill: 0      Aleja Cardoso  APRN CNP  [facility]      ROS      Physical Exam

## 2018-04-04 NOTE — MR AVS SNAPSHOT
After Visit Summary   4/4/2018    Marcel Melchor    MRN: [id number]           Patient Information     Date Of Birth          1970        Visit Information        Provider Department      4/4/2018 11:40 AM Aleja Cardoso APRN CNP [facility]        Today's Diagnoses     Acute bilateral low back pain with right-sided sciatica    -  1       Follow-ups after your visit        Additional Services     ORTHO  REFERRAL       [facility] is referring you to the Orthopedic  Services at [facility].       The  Representative will assist you in the coordination of your Orthopedic and Musculoskeletal Care as prescribed by your physician.    The  Representative will call you within 1 business day to help schedule your appointment, or you may contact the  Representative at:    All areas ~ [phone]     Type of Referral : Non Surgical       Timeframe requested: 3 - 5 days    Had steroid injection with Dr. Hastings in March 2017.  Would like to be seen for possible injection again.      Coverage of these services is subject to the terms and limitations of your health insurance plan.  Please call member services at your health plan with any benefit or coverage questions.      If X-rays, CT or MRI's have been performed, please contact the facility where they were done to arrange for , prior to your scheduled appointment.  Please bring this referral request to your appointment and present it to your specialist.                  Who to contact     If you have questions or need follow up information about today's clinic visit or your schedule please contact [facility] directly at [phone].  Normal or non-critical lab and imaging results will be communicated to you by MyChart, letter or phone within 4 business days after the clinic has received the results. If you do not hear from us  within 7 days, please contact the clinic through [employer] or phone. If you have a critical or abnormal lab result, we will notify you by phone as soon as possible.  Submit refill requests through [employer] or call your pharmacy and they will forward the refill request to us. Please allow 3 business days for your refill to be completed.          Additional Information About Your Visit        [employer] [employer] Information     [employer] gives you secure access to your electronic health record. If you see a primary care provider, you can also send messages to your care team and make appointments. If you have questions, please call your primary care clinic.  If you do not have a primary care provider, please call [phone] and they will assist you.        Care EveryWhere ID     This is your Care EveryWhere ID. This could be used by other organizations to access your [facility] medical records  [id number]        Your Vitals Were     Pulse Temperature Respirations BMI (Body Mass Index)          68 98.6  F (37  C) (Oral) 14 29.5 kg/m2         Blood Pressure from Last 3 Encounters:   04/04/18 112/70   11/16/17 112/64   06/06/17 102/64    Weight from Last 3 Encounters:   04/04/18 194 lb (88 kg)   11/16/17 187 lb (84.8 kg)   06/06/17 183 lb (83 kg)              We Performed the Following     ORTHO  REFERRAL          Today's Medication Changes          These changes are accurate as of 4/4/18 12:26 PM.  If you have any questions, ask your nurse or doctor.               Start taking these medicines.        Dose/Directions    cyclobenzaprine 5 MG tablet   Commonly known as:  FLEXERIL   Used for:  Acute bilateral low back pain with right-sided sciatica   Started by:  Aleja Cardoso APRN CNP        Dose:  5 mg   Take 1 tablet (5 mg) by mouth 3 times daily as needed for muscle spasms   Quantity:  30 tablet   Refills:  0            Where to get your medicines      These medications were sent to [facility]/pharmacy #[id number] - [place], [place]  - [address]  [address], [facility] [address]     Phone:  [phone]     cyclobenzaprine 5 MG tablet                Primary Care Provider Office Phone # Fax #    Wes Braga PA-C [phone]       [address]  [facility] [address]        Equal Access to Services     [facility] Center[facility]YLA : Hadii aad ku hadasho Soomaali, waaxda luqadaha, qaybta kaalmada adeegyada, waxay idiin hayaan adeeg kharash la'aan ah. So [facility] [phone].    ATENCIÓN: Si habla español, tiene a friedman disposición servicios gratuitos de asistencia lingüística. Philomenaame al [phone].    We comply with applicable federal civil rights laws and [place] laws. We do not discriminate on the basis of race, color, national origin, age, disability, sex, sexual orientation, or gender identity.            Thank you!     Thank you for choosing [facility]  for your care. Our goal is always to provide you with excellent care. Hearing back from our patients is one way we can continue to improve our services. Please take a few minutes to complete the written survey that you may receive in the mail after your visit with us. Thank you!             Your Updated Medication List - Protect others around you: Learn how to safely use, store and throw away your medicines at www.disposemymeds.org.          This list is accurate as of 4/4/18 12:26 PM.  Always use your most recent med list.                   Brand Name Dispense Instructions for use Diagnosis    ASACOL  MG EC tablet   Generic drug:  mesalamine      TAKE 3 TABLETS BY MOUTH TWICE DAILY        clindamycin 1 % lotion    CLEOCIN T    60 mL    APPLY TOPICALLY TO FACE AND CHEST TWICE A DAY AS NEEDED    Acne, unspecified acne type       cyclobenzaprine 5 MG tablet    FLEXERIL    30 tablet    Take 1 tablet (5 mg) by mouth 3 times daily as needed for muscle spasms    Acute bilateral low back pain with right-sided sciatica       venlafaxine 75 MG 24 hr  capsule    EFFEXOR-XR    60 capsule    TAKE 2 CAPSULES (150 MG) BY MOUTH DAILY    Anxiety, Irritability and anger

## 2018-04-06 ENCOUNTER — OFFICE VISIT (OUTPATIENT)
Dept: ORTHOPEDICS | Facility: CLINIC | Age: 48
End: 2018-04-06
Payer: COMMERCIAL

## 2018-04-06 VITALS
SYSTOLIC BLOOD PRESSURE: 112 MMHG | DIASTOLIC BLOOD PRESSURE: 70 MMHG | WEIGHT: 194 LBS | BODY MASS INDEX: 29.4 KG/M2 | HEIGHT: 68 IN

## 2018-04-06 DIAGNOSIS — M51.369 LUMBAR DEGENERATIVE DISC DISEASE: ICD-10-CM

## 2018-04-06 DIAGNOSIS — M51.26 LUMBAR DISC HERNIATION: ICD-10-CM

## 2018-04-06 DIAGNOSIS — M54.16 RIGHT LUMBAR RADICULOPATHY: Primary | ICD-10-CM

## 2018-04-06 PROCEDURE — 99214 OFFICE O/P EST MOD 30 MIN: CPT | Performed by: FAMILY MEDICINE

## 2018-04-06 NOTE — MR AVS SNAPSHOT
After Visit Summary   4/6/2018    Marcel Melchor    MRN: [id number]           Patient Information     Date Of Birth          1970        Visit Information        Provider Department      4/6/2018 3:20 PM Daniel Conde DO [facility]        Today's Diagnoses     Right lumbar radiculopathy    -  1    Lumbar disc herniation        Lumbar degenerative disc disease          Care Instructions    1. Right lumbar radiculopathy    2. Lumbar disc herniation    3. Lumbar degenerative disc disease      Repeat injection ordered - may require repeat MRI prior  Referral to physical therapy for spine based rehab  If pain is not well controlled recommend re-establish care with Pam for ongoing care and management    Follow up with Pam / Dr. Venegas's team  or sooner if needed. Call direct clinic number [[phone]] at any time with questions or concerns.      NEW WEBSITE HAS LAUNCHED  [email or web address]    We care about your feedback and use it to improve our services. Please leave feedback on the Testimonials & Reviews page.              Follow-ups after your visit        Additional Services     BRETT PT, HAND, AND CHIROPRACTIC REFERRAL       **This order will print in the [facility] Scheduling Office**    Physical Therapy, Hand Therapy and Chiropractic Care are available through:    *[facility]  *[facility]  *[facility]    Call one number to schedule at any of the above locations: [phone].    Your provider has referred you to: Physical [place] at [facility] or [facility] [place]    Indication/Reason for Referral: Low Back Pain - R lumbar radic, acute/chronic  Onset of Illness: see chart  Therapy Orders: Evaluate and Treat  Special Programs: None  Special Request: MDT PT please - Faviola Leary ([place]), Caren Chau ([place]), Maura Young ([place]), Caren Kruse ([place]) or Anastacio Lambert ([place])    Star Graves       Additional Comments for the Therapist or Chiropractor: Formal physical therapy - specific exercises to include centralization with flexion/extension activities with mobilization/manipulation and core stabilization with use of modalities (ie ice, ultrasound, electrical stimulation, etc.) as needed with home exercise prescription.    Please be aware that coverage of these services is subject to the terms and limitations of your health insurance plan.  Call member services at your health plan with any benefit or coverage questions.      Please bring the following to your appointment:    *Your personal calendar for scheduling future appointments  *Comfortable clothing            PAIN MANAGEMENT REFERRAL       [facility] Referral    Please be aware that coverage of these services is subject to the terms and limitations of your health insurance plan.  Call member services at your health plan with any benefit or coverage questions.      Please bring the following to your appointment:  Any x-rays, CTs or MRIs which have been performed.  Contact the facility where they were done to arrange for  prior to your scheduled appointment.  Any new CT, MRI or other procedures ordered by your specialist must be performed at a [place] facility or coordinated by your clinic's referral office.    List of current medications   This referral request  Any documents/labs given to you for this referral      Reason for Consult:  Procedure or injection only - patient will be contacted within 24 hrs to schedule: Epidural Steroid (transforaminal approach): Repeat right TFESI L4-5     Please answer the following questions:    What is your diagnosis for this patient's pain?  Right lumbar radic    Do you have any specific questions for the pain specialist? No    Are there any red flags that may impact the assessment or management of this patient?    None    ANY DIAGNOSTIC TESTS THAT ARE NOT IN [facility] SHOULD BE SENT TO  THE PAIN CENTER    Please note the pre op pain consult must be scheduled 2-3 weeks prior to the patient's surgery. Patient's trying to schedule within 2 weeks of surgery may not be accommodated.    Pre Op Pain Consults are only good for 30 days.    REGARDING OPIOID MEDICATIONS:  We will always address appropriateness of opioid pain medications, but we generally will not automatically take on a prescribing role. When we do take on prescribing of opioids for chronic pain, it is in collaboration with the referring physician for an intermediate period of time (months), with an expectation that the primary physician or provider will assume the prescribing role if medications are effective at stable doses with demonstrated compliance.  Therefore, please do not assume that your prescribing responsibilities end on the day of pain clinic consultation.  Is this agreeable to you? YES    For any questions, contact the [facility] at [phone]                  Who to contact     If you have questions or need follow up information about today's clinic visit or your schedule please contact [facility] directly at [phone].  Normal or non-critical lab and imaging results will be communicated to you by HandelabraGameshart, letter or phone within 4 business days after the clinic has received the results. If you do not hear from us within 7 days, please contact the clinic through Closetboxt or phone. If you have a critical or abnormal lab result, we will notify you by phone as soon as possible.  Submit refill requests through Ubiquigent or call your pharmacy and they will forward the refill request to us. Please allow 3 business days for your refill to be completed.          Additional Information About Your Visit        Ubiquigent Information     [employer] gives you secure access to your electronic health record. If you see a primary care provider, you can also send messages to your care team and make  "appointments. If you have questions, please call your primary care clinic.  If you do not have a primary care provider, please call [phone] and they will assist you.        Care EveryWhere ID     This is your Care EveryWhere ID. This could be used by other organizations to access your [place] medical records  [id number]        Your Vitals Were     Height BMI (Body Mass Index)                5' 8\" (1.727 m) 29.5 kg/m2           Blood Pressure from Last 3 Encounters:   04/06/18 112/70   04/04/18 112/70   11/16/17 112/64    Weight from Last 3 Encounters:   04/06/18 194 lb (88 kg)   04/04/18 194 lb (88 kg)   11/16/17 187 lb (84.8 kg)              We Performed the Following     [facility] PT, HAND, AND CHIROPRACTIC REFERRAL     PAIN MANAGEMENT REFERRAL        Primary Care Provider Office Phone # Fax #    Wes Elly Braga PA-C [phone]       [address] [facility] [address]        Equal Access to Services     [facility]: Hadii aad ku hadasho Soomaali, waaxda luqadaha, qaybta kaalmada adeegyada, waxay idiin hayaan luis lagunas . So [facility] [phone].    ATENCIÓN: Si habla español, tiene a friedman disposición servicios gratuitos de asistencia lingüística. Llame al [phone].    We comply with applicable federal civil rights laws and [place] laws. We do not discriminate on the basis of race, color, national origin, age, disability, sex, sexual orientation, or gender identity.            Thank you!     Thank you for choosing [facility]  for your care. Our goal is always to provide you with excellent care. Hearing back from our patients is one way we can continue to improve our services. Please take a few minutes to complete the written survey that you may receive in the mail after your visit with us. Thank you!             Your Updated Medication List - Protect others around you: Learn how to safely use, store and throw away your medicines at " www.disposemymeds.org.          This list is accurate as of 4/6/18  3:52 PM.  Always use your most recent med list.                   Brand Name Dispense Instructions for use Diagnosis    ASACOL  MG EC tablet   Generic drug:  mesalamine      TAKE 3 TABLETS BY MOUTH TWICE DAILY        clindamycin 1 % lotion    CLEOCIN T    60 mL    APPLY TOPICALLY TO FACE AND CHEST TWICE A DAY AS NEEDED    Acne, unspecified acne type       cyclobenzaprine 5 MG tablet    FLEXERIL    30 tablet    Take 1 tablet (5 mg) by mouth 3 times daily as needed for muscle spasms    Acute bilateral low back pain with right-sided sciatica       venlafaxine 75 MG 24 hr capsule    EFFEXOR-XR    60 capsule    TAKE 2 CAPSULES (150 MG) BY MOUTH DAILY    Anxiety, Irritability and anger

## 2018-04-06 NOTE — PATIENT INSTRUCTIONS
1. Right lumbar radiculopathy    2. Lumbar disc herniation    3. Lumbar degenerative disc disease      Repeat injection ordered - may require repeat MRI prior  Referral to physical therapy for spine based rehab  If pain is not well controlled recommend re-establish care with Pam for ongoing care and management    Follow up with Pam / Dr. Venegas's team  or sooner if needed. Call direct clinic number [[phone]] at any time with questions or concerns.      NEW WEBSITE HAS LAUNCHED  [email or web address]    We care about your feedback and use it to improve our services. Please leave feedback on the Testimonials & Reviews page.

## 2018-04-06 NOTE — LETTER
4/6/2018         RE: Marcel Melchor  [address] [place] [facility] [address]        Dear Colleague,    Thank you for referring your patient, Marcel Melchor, to the [facility]. Please see a copy of my visit note below.    ASSESSMENT & PLAN    1. Right lumbar radiculopathy    2. Lumbar disc herniation    3. Lumbar degenerative disc disease      Returns for known back pain, acute/chronic  Repeat injection ordered - may require repeat MRI before but will await notification from scheduling if this is required.   Referral to physical therapy for spine based rehab  If pain is not well controlled recommend re-establish care with Pam for ongoing care and management    Follow up with Pam / Dr. Venegas's team  or sooner if needed. Call direct clinic number [[phone]] at any time with questions or concerns.    -----    SUBJECTIVE  Marcel Melchor is a/an 48 year old male who is seen in consultation at the request of  Aleja Cardoso C.N.P. for evaluation of acute on chronic low back pain. The patient is seen by themselves.    Onset: 1 month(s) ago. Reports insidious onset without acute precipitating event.  Location of Pain: bilateral low back with radiation to the right gluteal region  Rating of Pain at worst: 7/10  Rating of Pain Currently: 6/10  Worsened by: lifting, getting up from sitting, extending his back  Better with: repositioning  Treatments tried: had 3 different lumbar injections at [facility] with no relief of pain in 2016, had a  right L4-5 lumbar transforaminal epidural corticosteroid injection on 3/23/2017 with 75-80% relief of pain that lasted for about 1 year  Quality: aching, dull and can become sharp stabbing when he stands up  Red flags: Weakness: No, bowel/bladder loss: No, foot drop: No  Associated symptoms: notes numbness/tingling in right posterior thigh  Orthopedic history: ongoing lower back pain  Relevant surgical history: NO  Patient Social History: self employed  "in topher industry     Patient's past medical, surgical, social, and family histories were reviewed today and no changes are noted.    REVIEW OF SYSTEMS:  10 point ROS is negative other than symptoms noted above in HPI, Past Medical History or as stated below  Constitutional: NEGATIVE for fever, chills, change in weight  Skin: NEGATIVE for worrisome rashes, moles or lesions  GI/: NEGATIVE for bowel or bladder changes  Neuro: NEGATIVE for weakness, dizziness or paresthesias    OBJECTIVE:  /70  Ht 5' 8\" (1.727 m)  Wt 194 lb (88 kg)  BMI 29.5 kg/m2   General: healthy, alert and in no distress  HEENT: no scleral icterus or conjunctival erythema  Skin: no suspicious lesions or rash. No jaundice.  CV: no pedal edema  Resp: normal respiratory effort without conversational dyspnea   Psych: normal mood and affect  Gait: normal steady gait with appropriate coordination and balance  Neuro: normal light touch sensory exam of the bilateral lower extremities.    MSK:  THORACIC/LUMBAR SPINE  Inspection:    No gross deformity/asymmetry  Range of Motion:     Lumbar flexion limited by pain    Lumbar extension limited by pain  Strength:    able to heel walk, able to toe walk, quadriceps 5/5, hamstrings 5/5, gastrocsoleus 5/5, tibialis anterior 5/5, extensor hallicus longus 5/5  Special Tests:    Negative: slump test (bilateral)      Independent visualization of the below image:  EXAM: MR LUMBAR SPINE WITHOUT CONTRAST    CLINICAL INFORMATION: Right low back pain into the buttock and hip. Rule out disc bulge.    COMPARISON: None    TECHNICAL INFORMATION: This examination was performed on the 0.63T open upright MRI. Sagittal and axial fast spin echo T2 and T1-weighted and sagittal STIR images were performed.    INTERPRETATION:    Osseous Structures:    Fat suppressed images are negative for acute or subacute fractures.    Paraspinous Soft Tissues:    No mass lesions.    Conus, Cord and Cauda Equina:    Normal position conus " and no evidence of intradural mass or arachnoiditis.    L5-S1: No disc herniation or central stenosis, mild left facet hypertrophy and patent nerve root canals.    L4-5: Moderate disc degeneration, dorsal disc bulge and asymmetric 3 mm right disc extrusion compresses right L5 root. No central spinal stenosis. Mild right and no left foraminal stenosis.    L2-3 and L3-4: Mild disc degeneration, disc bulge at each level and asymmetric right annular fissure and disc protrusion at L2-3 without neural impingement or stenosis. Right foraminal far lateral osteophyte and bulge abuts L2 ganglion and nerve. Patent left nerve root canal.    The L1-2 and T12-L1 levels are unremarkable.    CONCLUSION: Multilevel spondylosis with significant findings as follows:    1. 3 mm AP right posterolateral L4-5 disc extrusion compresses right L5 root.  2. Right foraminal and far lateral osteophyte and disc bulge at L2-3 encroaches upon right L2 ganglion and nerve.  3. Right L2-3 annular fissure and disc protrusion without neural impingement.    Electronically signed on 11/3/2016 11:19:00 AM by Dax Nick M.D.     Patient's conditions were thoroughly discussed during today's visit with greater than 50% of the visit spent counseling the patient with total time spent face-to-face with the patient being 15 minutes.    Daniel Conde DO [facility]      Again, thank you for allowing me to participate in the care of your patient.        Sincerely,        Daniel Conde DO

## 2018-04-06 NOTE — PROGRESS NOTES
ASSESSMENT & PLAN    1. Right lumbar radiculopathy    2. Lumbar disc herniation    3. Lumbar degenerative disc disease      Returns for known back pain, acute/chronic  Repeat injection ordered - may require repeat MRI before but will await notification from scheduling if this is required.   Referral to physical therapy for spine based rehab  If pain is not well controlled recommend re-establish care with Pam for ongoing care and management    Follow up with Pam / Dr. Venegas's team  or sooner if needed. Call direct clinic number [[phone]] at any time with questions or concerns.    -----    SUBJECTIVE  Marcel Melchor is a/an 48 year old male who is seen in consultation at the request of  Aleja Cardoso C.N.P. for evaluation of acute on chronic low back pain. The patient is seen by themselves.    Onset: 1 month(s) ago. Reports insidious onset without acute precipitating event.  Location of Pain: bilateral low back with radiation to the right gluteal region  Rating of Pain at worst: 7/10  Rating of Pain Currently: 6/10  Worsened by: lifting, getting up from sitting, extending his back  Better with: repositioning  Treatments tried: had 3 different lumbar injections at [facility] with no relief of pain in 2016, had a  right L4-5 lumbar transforaminal epidural corticosteroid injection on 3/23/2017 with 75-80% relief of pain that lasted for about 1 year  Quality: aching, dull and can become sharp stabbing when he stands up  Red flags: Weakness: No, bowel/bladder loss: No, foot drop: No  Associated symptoms: notes numbness/tingling in right posterior thigh  Orthopedic history: ongoing lower back pain  Relevant surgical history: NO  Patient Social History: self employed in topher industry     Patient's past medical, surgical, social, and family histories were reviewed today and no changes are noted.    REVIEW OF SYSTEMS:  10 point ROS is negative other than symptoms noted above in HPI, Past Medical History or as  "stated below  Constitutional: NEGATIVE for fever, chills, change in weight  Skin: NEGATIVE for worrisome rashes, moles or lesions  GI/: NEGATIVE for bowel or bladder changes  Neuro: NEGATIVE for weakness, dizziness or paresthesias    OBJECTIVE:  /70  Ht 5' 8\" (1.727 m)  Wt 194 lb (88 kg)  BMI 29.5 kg/m2   General: healthy, alert and in no distress  HEENT: no scleral icterus or conjunctival erythema  Skin: no suspicious lesions or rash. No jaundice.  CV: no pedal edema  Resp: normal respiratory effort without conversational dyspnea   Psych: normal mood and affect  Gait: normal steady gait with appropriate coordination and balance  Neuro: normal light touch sensory exam of the bilateral lower extremities.    MSK:  THORACIC/LUMBAR SPINE  Inspection:    No gross deformity/asymmetry  Range of Motion:     Lumbar flexion limited by pain    Lumbar extension limited by pain  Strength:    able to heel walk, able to toe walk, quadriceps 5/5, hamstrings 5/5, gastrocsoleus 5/5, tibialis anterior 5/5, extensor hallicus longus 5/5  Special Tests:    Negative: slump test (bilateral)      Independent visualization of the below image:  EXAM: MR LUMBAR SPINE WITHOUT CONTRAST    CLINICAL INFORMATION: Right low back pain into the buttock and hip. Rule out disc bulge.    COMPARISON: None    TECHNICAL INFORMATION: This examination was performed on the 0.63T open upright MRI. Sagittal and axial fast spin echo T2 and T1-weighted and sagittal STIR images were performed.    INTERPRETATION:    Osseous Structures:    Fat suppressed images are negative for acute or subacute fractures.    Paraspinous Soft Tissues:    No mass lesions.    Conus, Cord and Cauda Equina:    Normal position conus and no evidence of intradural mass or arachnoiditis.    L5-S1: No disc herniation or central stenosis, mild left facet hypertrophy and patent nerve root canals.    L4-5: Moderate disc degeneration, dorsal disc bulge and asymmetric 3 mm right disc " extrusion compresses right L5 root. No central spinal stenosis. Mild right and no left foraminal stenosis.    L2-3 and L3-4: Mild disc degeneration, disc bulge at each level and asymmetric right annular fissure and disc protrusion at L2-3 without neural impingement or stenosis. Right foraminal far lateral osteophyte and bulge abuts L2 ganglion and nerve. Patent left nerve root canal.    The L1-2 and T12-L1 levels are unremarkable.    CONCLUSION: Multilevel spondylosis with significant findings as follows:    1. 3 mm AP right posterolateral L4-5 disc extrusion compresses right L5 root.  2. Right foraminal and far lateral osteophyte and disc bulge at L2-3 encroaches upon right L2 ganglion and nerve.  3. Right L2-3 annular fissure and disc protrusion without neural impingement.    Electronically signed on 11/3/2016 11:19:00 AM by Dax iNck M.D.     Patient's conditions were thoroughly discussed during today's visit with greater than 50% of the visit spent counseling the patient with total time spent face-to-face with the patient being 15 minutes.    Daniel Conde,  [facility]

## 2018-04-09 ENCOUNTER — TELEPHONE (OUTPATIENT)
Dept: PALLIATIVE MEDICINE | Facility: CLINIC | Age: 48
End: 2018-04-09

## 2018-04-09 NOTE — TELEPHONE ENCOUNTER
Lumbar MRI located on [facility] site. Closing    Shelley Holcomb  RN-BSN Care Coordinator  [facility]

## 2018-04-09 NOTE — TELEPHONE ENCOUNTER
Pre-screening Questions for Radiology Injections:    Injection to be done at which interventional clinic site? [facility]    Procedure ordered by Dr. Conde    Procedure ordered? Lumbar Epidural Steroid Injection    What insurance would patient like us to bill for this procedure? BCBS      Worker's comp or MVA (motor vehicle accident) -Any injection DO NOT SCHEDULE and route to Dinorah Huerta.      [employer] insurance - For SI joint injections, DO NOT SCHEDULE and route Sheron Gamez. [employer] FREEDOM NO PA REQUIRED EFFECTIVE 11/1/2017      [employer]- MBB's must be scheduled at LEAST two weeks apart      [employer] - Any injection besides hip/shoulder/knee joint DO NOT SCHEDULE and route to Sheron Gamez. She will obtain PA and call pt back to schedule procedure or notify pt of denial.       HP [employer]-PA REQUIRED FOR NON-ANDREY OR Joint injections    Any chance of pregnancy? NO   If YES, do NOT schedule and route to RN pool    Is an  needed? No     Patient has a drive home? (mandatory) YES: INFORMED    Is patient taking any blood thinners (plavix, coumadin, jantoven, warfarin, heparin, pradaxa or dabigatran )? No   If hold needed, do NOT schedule, route to RN pool     Is patient taking any aspirin products? No     If more than 325mg/day do NOT schedule; route to RN pool     For CERVICAL procedures, hold all aspirin products for 6 days.      Does the patient have a bleeding or clotting disorder? No     If YES, okay to schedule AND route to RN nurse pool    **For any patients with platelet count <100, must be forwarded to provider**    Is patient diabetic?  No  If YES, have them bring their glucometer.    Does patient have an active infection or treated for one within the past week? No     Is patient currently taking any antibiotics?  No     For patients on chronic, preventative, or prophylactic antibiotics, procedures may be scheduled.     For patients on antibiotics for active or recent  infection:    Ra Santiago Burton, Snitzer-antibiotic course must have been completed for 4 days    Dr. Hastings-antibiotic course must have been completed for 7 days    Is patient currently taking any steroid medications? (i.e. Prednisone, Medrol)  No     For patients on steroid medications:    Ra Santiago Burton, Snitzer-steroid course must have been completed for 4 days    -steroid course must have been completed for 7 days    Reviewed with patient:  If you are started on any steroids or antibiotics between now and your appointment, you must contact us because it may affect our ability to perform your procedure.  Yes    Is patient actively being treated for cancer or immunocompromised? No  If YES, do NOT schedule and route to RN pool     Are you able to get on and off an exam table with minimal or no assistance? Yes  If NO, do NOT schedule and route to RN pool    Are you able to roll over and lay on your stomach with minimal or no assistance? Yes  If NO, do NOT schedule and route to RN pool     Any allergies to contrast dye, iodine, shellfish, or numbing and steroid medications? No  If YES, route to RN pool AND add allergy information to appointment notes    Allergies: Amoxicillin      Has the patient had a flu shot or any other vaccinations within 7 days before or after the procedure.  No     Does patient have an MRI/CT?  YES: MRI  (SI joint, hip injections, lumbar sympathetic blocks, and stellate ganglion blocks do not require an MRI)    Was the MRI done w/in the last 3 years?  Yes    Was MRI done at [facility]? No      If not, where was it done? [facility]       If MRI was not done at [facility], [facility] or [facility] do NOT schedule and route to nursing.  If pt has an imaging disc, the injection may be scheduled but pt has to bring disc to appt. If they show up w/out disc the injection cannot be done    Reminders (please tell patient if applicable):       Instructed pt to arrive 30 minutes  early for IV start if this is for a cervical procedure, ALL sympathetic (stellate ganglion, hypogastric, or lumbar sympathetic block) and all sedation procedures (RFA, spinal cord stimulation trials).  Not Applicable   -IVs are not routinely placed for Dr. Johnson cervical cases   -Dr. Montesinos: IVs for cervical ESIs and cervical TBDs (not CMBBs/facet inj)      If NPO for sedation, informed patient that it is okay to take medications with sips of water (except if they are to hold blood thinners).  Not Applicable   *DO take blood pressure medication if it is prescribed*      If this is for a cervical ANDREY, informed patient that aspirin needs to be held for 6 days.   Not Applicable      For all patients not having spinal cord stimulator (SCS) trials or radiofrequency ablations (RFAs), informed patient:    IV sedation is not provided for this procedure.  If you feel that an oral anti-anxiety medication is needed, you can discuss this further with your referring provider or primary care provider.  The Pain Clinic provider will discuss specifics of what the procedure includes at your appointment.  Most procedures last 10-20 minutes.  We use numbing medications to help with any discomfort during the procedure.  Not Applicable      Do not schedule procedures requiring IV placement in the first appointment of the day or first appointment after lunch. Do NOT schedule at 0745, 0815 or 1245.  reviewed and not discussed       For patients 85 or older we recommend having an adult stay w/ them for the remainder of the day.   N/A    Does the patient have any questions?  NO  Christine Cortes  [facility]

## 2018-04-09 NOTE — TELEPHONE ENCOUNTER
ISSAC for patient to schedule Lumbar ANDREY      Christine Cortes    [facility]

## 2018-04-10 ENCOUNTER — TRANSFERRED RECORDS (OUTPATIENT)
Dept: HEALTH INFORMATION MANAGEMENT | Facility: CLINIC | Age: 48
End: 2018-04-10

## 2018-04-19 ENCOUNTER — RADIOLOGY INJECTION OFFICE VISIT (OUTPATIENT)
Dept: PALLIATIVE MEDICINE | Facility: CLINIC | Age: 48
End: 2018-04-19
Payer: COMMERCIAL

## 2018-04-19 ENCOUNTER — RADIANT APPOINTMENT (OUTPATIENT)
Dept: GENERAL RADIOLOGY | Facility: CLINIC | Age: 48
End: 2018-04-19
Attending: ANESTHESIOLOGY
Payer: COMMERCIAL

## 2018-04-19 VITALS — DIASTOLIC BLOOD PRESSURE: 83 MMHG | HEART RATE: 63 BPM | SYSTOLIC BLOOD PRESSURE: 130 MMHG | OXYGEN SATURATION: 99 %

## 2018-04-19 DIAGNOSIS — M54.16 LUMBAR RADICULOPATHY: Primary | ICD-10-CM

## 2018-04-19 DIAGNOSIS — M54.16 RIGHT LUMBAR RADICULOPATHY: ICD-10-CM

## 2018-04-19 PROCEDURE — 64483 NJX AA&/STRD TFRM EPI L/S 1: CPT | Mod: RT | Performed by: ANESTHESIOLOGY

## 2018-04-19 NOTE — NURSING NOTE
Pre-procedure Intake    Have you been fasting? NA    If yes, for how long?     Are you taking a prescribed blood thinner such as coumadin, Plavix, Xarelto?    If yes, when did you take your last dose?     Do you take aspirin?  No    If cervical procedure, have you held aspirin for 6 days?   NA    Do you have any allergies to contrast dye, iodine, steroid and/or numbing medications?  NO    Are you currently taking antibiotics or have an active infection?  NO    Have you had a fever/elevated temperature within the past week? NO    Are you currently taking oral steroids? NO    Do you have a ? Yes       Are you pregnant or breastfeeding?  Not Applicable    Are the vital signs normal?  Yes

## 2018-04-19 NOTE — PATIENT INSTRUCTIONS
[facility] Procedure Discharge Instructions    Today you saw:   Dr. Jodi Ledesma    Your procedure:  Epidural steroid injection      Medications used:  Lidocaine (anesthetic)  Dexamethasone (steroid)  Omnipaque (contrast)              Be cautious when walking as numbness and/or weakness in the legs may occur up to 6-8 hours after the procedure due to effect of the local anesthetic    Do not drive for 6 hours. The effect of the local anesthetic could slow your reflexes.     Avoid strenuous activity for the first 24 hours. You may resume your regular activities after that.     You may shower, however avoid swimming, tub baths or hot tubs for 24 hours following your procedure    You may have a mild to moderate increase in pain for several days following the injection.      You may use ice packs for 10-15 minutes, 3 to 4 times a day at the injection site for comfort    Do not use heat to painful areas for 6 to 8 hours. This will give the local anesthetic time to wear off and prevent you from accidentally burning your skin.    You may use anti-inflammatory medications (such as Ibuprofen/Advil or Aleve) or Tylenol for pain control if necessary    With diabetes, check your blood sugar more frequently than usual as your blood sugar may be higher than normal for 10-14 days following a steroid injection. Contact your doctor who manages your diabetes if your blood sugar is higher than usual    It may take up to 14 days for the steroid medication to start working although you may feel the effect as early as a few days after the procedure.     Follow up with your referring provider in 2-3 weeks      If you experience any of the following, call the pain center line during work hours at [phone] or on-call physician after hours at [phone]:  -Fever over 100 degree F  -Swelling, bleeding, redness, drainage, warmth at the injection site  -Progressive weakness or numbness in your legs or  arms  -Loss of bowel or bladder function  -Unusual headache that is not relieved by Tylenol or your regular headache medication  -Unusual new onset of pain that is not improving    Phone #s:  Nurse triage line for general questions: [phone]

## 2018-04-19 NOTE — NURSING NOTE
Discharge Information    IV Discontiued Time:  NA    Amount of Fluid Infused:  NA    Discharge Criteria = When patient returns to baseline or as per MD order    Consciousness:  Pt is fully awake    Circulation:  BP +/- 20% of pre-procedure level    Respiration:  Patient is able to breathe deeply    O2 Sat:  Patient is able to maintain O2 Sat >92% on room air    Activity:  Moves 4 extremities on command    Ambulation:  Patient is able to stand and walk or stand and pivot into wheelchair    Dressing:  Clean/dry or No Dressing    Notes:   Discharge instructions and AVS given to patient    Patient meets criteria for discharge?  YES    Admitted to PCU?  No    Responsible adult present to accompany patient home?  Yes    Signature/Title:    Evelina Rendon RN Care Coordinator  [facility]

## 2018-04-19 NOTE — MR AVS SNAPSHOT
After Visit Summary   4/19/2018    Marcel Melchor    MRN: [id number]           Patient Information     Date Of Birth          1970        Visit Information        Provider Department      4/19/2018 2:45 PM Jodi Johnson MD [facility]        Care Instructions    [facility] Procedure Discharge Instructions    Today you saw:   Dr. Jodi Ledesma    Your procedure:  Epidural steroid injection      Medications used:  Lidocaine (anesthetic)  Dexamethasone (steroid)  Omnipaque (contrast)              Be cautious when walking as numbness and/or weakness in the legs may occur up to 6-8 hours after the procedure due to effect of the local anesthetic    Do not drive for 6 hours. The effect of the local anesthetic could slow your reflexes.     Avoid strenuous activity for the first 24 hours. You may resume your regular activities after that.     You may shower, however avoid swimming, tub baths or hot tubs for 24 hours following your procedure    You may have a mild to moderate increase in pain for several days following the injection.      You may use ice packs for 10-15 minutes, 3 to 4 times a day at the injection site for comfort    Do not use heat to painful areas for 6 to 8 hours. This will give the local anesthetic time to wear off and prevent you from accidentally burning your skin.    You may use anti-inflammatory medications (such as Ibuprofen/Advil or Aleve) or Tylenol for pain control if necessary    With diabetes, check your blood sugar more frequently than usual as your blood sugar may be higher than normal for 10-14 days following a steroid injection. Contact your doctor who manages your diabetes if your blood sugar is higher than usual    It may take up to 14 days for the steroid medication to start working although you may feel the effect as early as a few days after the procedure.     Follow up with your referring provider in 2-3 weeks      If  you experience any of the following, call the pain center line during work hours at [phone] or on-call physician after hours at [phone]:  -Fever over 100 degree F  -Swelling, bleeding, redness, drainage, warmth at the injection site  -Progressive weakness or numbness in your legs or arms  -Loss of bowel or bladder function  -Unusual headache that is not relieved by Tylenol or your regular headache medication  -Unusual new onset of pain that is not improving    Phone #s:  Nurse triage line for general questions: [phone]          Follow-ups after your visit        Who to contact     If you have questions or need follow up information about today's clinic visit or your schedule please contact [facility] directly at [phone].  Normal or non-critical lab and imaging results will be communicated to you by [employer], letter or phone within 4 business days after the clinic has received the results. If you do not hear from us within 7 days, please contact the clinic through [employer] or phone. If you have a critical or abnormal lab result, we will notify you by phone as soon as possible.  Submit refill requests through [employer] or call your pharmacy and they will forward the refill request to us. Please allow 3 business days for your refill to be completed.          Additional Information About Your Visit        [employer] Information     [employer] gives you secure access to your electronic health record. If you see a primary care provider, you can also send messages to your care team and make appointments. If you have questions, please call your primary care clinic.  If you do not have a primary care provider, please call [phone] and they will assist you.        Care EveryWhere ID     This is your Care EveryWhere ID. This could be used by other organizations to access your [facility] medical records  [id number]        Your Vitals Were     Pulse Pulse Oximetry                62 99%            Blood Pressure from Last 3 Encounters:   04/19/18 132/77   04/06/18 112/70   04/04/18 112/70    Weight from Last 3 Encounters:   04/06/18 88 kg (194 lb)   04/04/18 88 kg (194 lb)   11/16/17 84.8 kg (187 lb)              Today, you had the following     No orders found for display       Primary Care Provider Office Phone # Fax #    Wes Elly Braga PA-C [phone]       [id number]  [facility] [address]        Equal Access to Services     [facility]: Hadii aad ku hadasho Soomaali, waaxda luqadaha, qaybta kaalmada adeegyada, rafael lagunas . So [facility] [phone].    ATENCIÓN: Si habla español, tiene a friedman disposición servicios gratuitos de asistencia lingüística. [facility] [phone].    We comply with applicable federal civil rights laws and [place] laws. We do not discriminate on the basis of race, color, national origin, age, disability, sex, sexual orientation, or gender identity.            Thank you!     Thank you for choosing [facility]  for your care. Our goal is always to provide you with excellent care. Hearing back from our patients is one way we can continue to improve our services. Please take a few minutes to complete the written survey that you may receive in the mail after your visit with us. Thank you!             Your Updated Medication List - Protect others around you: Learn how to safely use, store and throw away your medicines at www.disposemymeds.org.          This list is accurate as of 4/19/18  3:15 PM.  Always use your most recent med list.                   Brand Name Dispense Instructions for use Diagnosis    ASACOL  MG EC tablet   Generic drug:  mesalamine      TAKE 3 TABLETS BY MOUTH TWICE DAILY        clindamycin 1 % lotion    CLEOCIN T    60 mL    APPLY TOPICALLY TO FACE AND CHEST TWICE A DAY AS NEEDED    Acne, unspecified acne type       cyclobenzaprine 5 MG tablet    FLEXERIL    30 tablet    Take 1 tablet  (5 mg) by mouth 3 times daily as needed for muscle spasms    Acute bilateral low back pain with right-sided sciatica       venlafaxine 75 MG 24 hr capsule    EFFEXOR-XR    60 capsule    TAKE 2 CAPSULES (150 MG) BY MOUTH DAILY    Anxiety, Irritability and anger

## 2018-04-19 NOTE — PROGRESS NOTES
[facility] - Procedure Note    Date of Service: 4/19/2018    Procedure performed: Right L4-5 transforaminal epidural steroid injection with fluoroscopic guidance  Diagnosis: Lumbar spondylosis; Lumbar radiculitis/radiculopathy  : Jodi Johnson MD & Scotty Ledesma DO (pain fellow)   Anesthesia: none    Indications: Marcel Melchor is a 48 year old male who is seen at the request of Dr. Daniel Conde DO  for lumbar transforaminal epidural steroid injection. The patient describes right > left sided low back pain that is constant.The patient has been exhibiting symptoms consistent with lumbar intraspinal inflammation and radiculopathy. Symptoms have been persistent, disabling, and intermittently severe. The patient reports minimal improvement with conservative treatment, including chiropractor, yoga, and currently setting up physical therapy.     He has previously had a right L4/5 TFESI in March 2017 by Dr. Hastings which gave him 9 months of relief.      Lumbar MRI was done on 11/03/2016 which showed       Allergies:      Allergies   Allergen Reactions     Amoxicillin      itching        Vitals:  /77  Pulse 62  SpO2 99%    Review of Systems: The patient denies recent fever, chills, illness, use of antibiotics or anticoagulants. All other 10-point review of systems negative.     Procedure: The procedure and risks were explained, and informed written consent was obtained from the patient. Risks include but are not limited to: infection, bleeding, increased pain, and damage to soft tissue, nerve, muscle, and vasculature structures. After getting informed consent, patient was brought into the procedure suite and was placed in a prone position on the procedure table. A Pause for the Cause was performed. Patient was prepped and draped in sterile fashion.     After identifying the right L4 neuroforamen, the C-arm was rotated to a right lateral oblique angle.  A total of 4.5 mL of Lidocaine 1%  was used to anesthetize the skin and the needle track at a skin entry site coaxial with the fluoroscopy beam, and overriding the superior aspect of the neuroforamen.  A 22 gauge 5 inch spinal needle was advanced under intermittent fluoroscopy until it entered the foramen superiorly.    The position was then inspected from anteroposterior and lateral views, and the needle adjusted appropriately.  After negative aspiration, a total of 1 mL of Omnipaque-300 was injected using static and continuous fluoroscopy confirming appropriate position, with spread along the nerve root sheath and into the epidural space, with no intravascular or intrathecal uptake. 9 mL of Omnipaque-300 was wasted.    1mL of 1% lidocaine with 20 mg of dexamethasone was injected.  The needle was removed. Hemostasis was achieved, the area was cleaned, and bandaids were placed when appropriate. Images were saved to PACS.    The patient tolerated the procedure well, and was taken to the recovery room, and there was no evidence of procedural complications. No new sensory or motor deficits were noted following the procedure. The patient was stable and able to ambulate on discharge home. Post-procedure instructions were provided.     Pre-procedure pain score: 5/10 in the back, 4/10 in the leg  Post-procedure pain score: 2/10 in the back, 0/10 in the leg    Assessment/Plan: Marcel Melchor is a 48 year old male s/p right L4/5 transforaminal epidural steroid injection today for lumbar spondylosis, radiculitis/radiculopathy.     1. Following today's procedure, the patient was advised to contact the [facility] for any of the following:   Fever, chills, or night sweats   New onset of pain, numbness, or weakness   Any questions/concerns regarding the procedure  If unable to contact the Pain Center, the patient was instructed to go to a local Emergency Room for any complications.   2. The patient will receive a follow-up call in 1 week.  3.  The patient should follow-up with the referring provider in 2 weeks for post-procedure evaluation.      Jodi Johnson MD   [facility]

## 2018-04-24 ENCOUNTER — THERAPY VISIT (OUTPATIENT)
Dept: PHYSICAL THERAPY | Facility: CLINIC | Age: 48
End: 2018-04-24
Payer: COMMERCIAL

## 2018-04-24 DIAGNOSIS — M54.50 LUMBAGO: Primary | ICD-10-CM

## 2018-04-24 PROCEDURE — 97110 THERAPEUTIC EXERCISES: CPT | Mod: GP | Performed by: PHYSICAL THERAPIST

## 2018-04-24 PROCEDURE — 97161 PT EVAL LOW COMPLEX 20 MIN: CPT | Mod: GP | Performed by: PHYSICAL THERAPIST

## 2018-04-24 NOTE — PROGRESS NOTES
[facility] Initial Evaluation  Subjective:  Patient is a 48 year old male presenting with rehab back hpi. The history is provided by the patient. No  was used.   Marcel Melchor is a 48 year old male with a lumbar condition.  Condition occurred with:  Lifting (shoveling snow).  Condition occurred: at home.  This is a recurrent condition  Pt reports having LBP that has been present for greater than 5 weeks after shoveling snow at home.  He reports having multiple injections in lumbar spine with most recent occurring on 4/19/18 with substantial relief of sx's.  MD appt on 4/19/18..    Patient reports pain:  Central lumbar spine.  Radiates to:  No radiation.  Pain is described as aching and is intermittent and reported as 2/10.  Associated symptoms:  Loss of strength and loss of motion/stiffness. Pain is worse during the day.  Symptoms are exacerbated by bending, walking and sitting and relieved by rest.  Since onset symptoms are rapidly improving.  Special tests:  MRI.  Previous treatment includes other (Injections).  There was significant improvement following previous treatment.  General health as reported by patient is good.  Pertinent medical history includes:  Sleep disorder/apnea.  Medical allergies: no.  Other surgeries include:  No.  Current medications:  Anti-inflammatory.  Current occupation is   .  Patient is working in normal job without restrictions.  Primary job tasks include:  Prolonged sitting, prolonged standing, lifting and driving.    Barriers include:  None as reported by the patient.    Red flags:  None as reported by the patient.                        Objective:  Standing Alignment:        Lumbar:  Normal  Pelvic:  Normal              Flexibility/Screens:   Positive screens:  Lumbar                   Lumbar/SI Evaluation  ROM:    AROM Lumbar:   Flexion:          100% no sx's  Ext:                    100% with ER tightness   Side Bend:         Left:  100%    Right:  100%  Rotation:           Left:     Right:   Side Glide:        Left:     Right:           Lumbar Myotomes:  normal            Lumbar DTR's:  normal        Lumbar Dermtomes:  normal                Neural Tension/Mobility:  Lumbar:  Normal        Lumbar Palpation:  normal        Lumbar Provocation:  normal      Spinal Segmental Conclusions: Normal                                                         General     ROS    Assessment/Plan:    Patient is a 48 year old male with lumbar complaints.    Patient has the following significant findings with corresponding treatment plan.                Diagnosis 1:  LBP  Pain -  self management, education, directional preference exercise and home program  Decreased ROM/flexibility - manual therapy and therapeutic exercise  Decreased strength - therapeutic exercise and therapeutic activities  Impaired muscle performance - neuro re-education  Decreased function - therapeutic activities    Therapy Evaluation Codes:   1) History comprised of:   Personal factors that impact the plan of care:      None.    Comorbidity factors that impact the plan of care are:      Sleep disorder/apnea.     Medications impacting care: Anti-inflammatory.  2) Examination of Body Systems comprised of:   Body structures and functions that impact the plan of care:      Lumbar spine.   Activity limitations that impact the plan of care are:      Bending, Lifting, Running, Sitting, Standing and Walking.  3) Clinical presentation characteristics are:   Stable/Uncomplicated.  4) Decision-Making    Low complexity using standardized patient assessment instrument and/or measureable assessment of functional outcome.  Cumulative Therapy Evaluation is: Low complexity.    Previous and current functional limitations:  (See Goal Flow Sheet for this information)    Short term and Long term goals: (See Goal Flow Sheet for this information)     Communication ability:  Patient appears to be able to  clearly communicate and understand verbal and written communication and follow directions correctly.  Treatment Explanation - The following has been discussed with the patient:   RX ordered/plan of care  Anticipated outcomes  Possible risks and side effects  This patient would benefit from PT intervention to resume normal activities.   Rehab potential is good.    Frequency:  1 X week, once daily  Duration:  for 6 weeks  Discharge Plan:  Achieve all LTG.  Independent in home treatment program.  Reach maximal therapeutic benefit.    Please refer to the daily flowsheet for treatment today, total treatment time and time spent performing 1:1 timed codes.

## 2018-04-24 NOTE — MR AVS SNAPSHOT
After Visit Summary   4/24/2018    Marcel Melchor    MRN: [id number]           Patient Information     Date Of Birth          1970        Visit Information        Provider Department      4/24/2018 12:20 PM Benny Galaviz PT [facility] Physical Therapy        Today's Diagnoses     Lumbago    -  1       Follow-ups after your visit        Who to contact     If you have questions or need follow up information about today's clinic visit or your schedule please contact [facility] directly at [phone].  Normal or non-critical lab and imaging results will be communicated to you by Referrizerhart, letter or phone within 4 business days after the clinic has received the results. If you do not hear from us within 7 days, please contact the clinic through [employer] or phone. If you have a critical or abnormal lab result, we will notify you by phone as soon as possible.  Submit refill requests through [employer] or call your pharmacy and they will forward the refill request to us. Please allow 3 business days for your refill to be completed.          Additional Information About Your Visit        MyChart Information     [employer] gives you secure access to your electronic health record. If you see a primary care provider, you can also send messages to your care team and make appointments. If you have questions, please call your primary care clinic.  If you do not have a primary care provider, please call [phone] and they will assist you.        Care EveryWhere ID     This is your Care EveryWhere ID. This could be used by other organizations to access your [facility] medical records  [id number]         Blood Pressure from Last 3 Encounters:   04/19/18 130/83   04/06/18 112/70   04/04/18 112/70    Weight from Last 3 Encounters:   04/06/18 88 kg (194 lb)   04/04/18 88 kg (194 lb)   11/16/17 84.8 kg (187 lb)              We  Performed the Following     HC PT EVAL, LOW COMPLEXITY     BRETT INITIAL EVAL REPORT     THERAPEUTIC EXERCISES        Primary Care Provider Office Phone # Fax #    Wes Braga PA-C [phone]       [id number]  [facility] [address]        Equal Access to Services     JUSTO SIDHU : Hadii aad ku hadasho Soomaali, waaxda luqadaha, qaybta kaalmada adeegyada, waxay idiin hayaan aderacquel moustaphaverna lamarline . So [facility] [phone].    ATENCIÓN: Si habla español, tiene a friedman disposición servicios gratuitos de asistencia lingüística. Dang al [phone].    We comply with applicable federal civil rights laws and [place] laws. We do not discriminate on the basis of race, color, national origin, age, disability, sex, sexual orientation, or gender identity.            Thank you!     Thank you for choosing [facility]  for your care. Our goal is always to provide you with excellent care. Hearing back from our patients is one way we can continue to improve our services. Please take a few minutes to complete the written survey that you may receive in the mail after your visit with us. Thank you!             Your Updated Medication List - Protect others around you: Learn how to safely use, store and throw away your medicines at www.disposemymeds.org.          This list is accurate as of 4/24/18  1:12 PM.  Always use your most recent med list.                   Brand Name Dispense Instructions for use Diagnosis    ASACOL  MG EC tablet   Generic drug:  mesalamine      TAKE 3 TABLETS BY MOUTH TWICE DAILY        clindamycin 1 % lotion    CLEOCIN T    60 mL    APPLY TOPICALLY TO FACE AND CHEST TWICE A DAY AS NEEDED    Acne, unspecified acne type       cyclobenzaprine 5 MG tablet    FLEXERIL    30 tablet    Take 1 tablet (5 mg) by mouth 3 times daily as needed for muscle spasms    Acute bilateral low back pain with right-sided sciatica       venlafaxine  75 MG 24 hr capsule    EFFEXOR-XR    60 capsule    TAKE 2 CAPSULES (150 MG) BY MOUTH DAILY    Anxiety, Irritability and anger

## 2018-04-30 DIAGNOSIS — M54.41 ACUTE BILATERAL LOW BACK PAIN WITH RIGHT-SIDED SCIATICA: ICD-10-CM

## 2018-05-01 RX ORDER — CYCLOBENZAPRINE HCL 5 MG
TABLET ORAL
Qty: 30 TABLET | Refills: 0 | Status: SHIPPED | OUTPATIENT
Start: 2018-05-01 | End: 2018-06-06

## 2018-05-01 NOTE — TELEPHONE ENCOUNTER
cyclobenzaprine (FLEXERIL) 5 MG tablet      Sig: Take 1 tablet (5 mg) by mouth 3 times daily as needed for muscle spasms  Last Written Prescription Date:  4/4/18  Last Fill Quantity: 30,   # refills: 0  Last Office Visit with [facility], [facility] or [facility] prescribing provider: 4/4/2018     Associated Diagnoses   Acute bilateral low back pain with right-sided sciatica [M54.41]  - Primary           Routing refill request to provider for review/approval because:  Drug not on the [facility], [facility] or  [employer] refill protocol or controlled substance    MANUEL Lopez  May 1, 2018  8:35 AM

## 2018-06-04 ENCOUNTER — TRANSFERRED RECORDS (OUTPATIENT)
Dept: HEALTH INFORMATION MANAGEMENT | Facility: CLINIC | Age: 48
End: 2018-06-04

## 2018-06-06 DIAGNOSIS — M54.41 ACUTE BILATERAL LOW BACK PAIN WITH RIGHT-SIDED SCIATICA: ICD-10-CM

## 2018-06-06 RX ORDER — CYCLOBENZAPRINE HCL 5 MG
TABLET ORAL
Qty: 30 TABLET | Refills: 0 | Status: SHIPPED | OUTPATIENT
Start: 2018-06-06 | End: 2018-09-27

## 2018-06-06 NOTE — TELEPHONE ENCOUNTER
Will refill x 1 month.  Should be seen for back pain follow up at that time.  Looks as though he is also due to follow up with his PCP for mood (Effexor med check).  Please call to schedule appointment.    Aleja Cardoso CNP

## 2018-06-06 NOTE — TELEPHONE ENCOUNTER
"Spoke with patient, he states\" that he does NOT need a refill on this medication at this time, \"  \"that he still has a full bottle of them from the last time\". He said he told the pharmacy this as well.   I did let him know that he will be due for a follow up on his other medications if a refill is needed.  Patient verbalized understanding. Stated \"he would let us know when he can get in\"    Leonila Greenberg MA    "

## 2018-06-06 NOTE — TELEPHONE ENCOUNTER
Requested Prescriptions   Pending Prescriptions Disp Refills     cyclobenzaprine (FLEXERIL) 5 MG tablet [Pharmacy Med Name: CYCLOBENZAPRINE 5 MG TABLET] 30 tablet 0     Sig: TAKE 1 TABLET (5 MG) BY MOUTH 3 TIMES DAILY AS NEEDED FOR MUSCLE SPASMS    There is no refill protocol information for this order        Last Written Prescription Date:  5/1/18  Last Fill Quantity: 30,  # refills: 0   Last Office Visit: 4/4/2018   Future Office Visit:         Routing refill request to provider for review/approval because:  Drug not on the [facility], [facility] or [facility] refill protocol or controlled substance

## 2018-06-08 ENCOUNTER — OFFICE VISIT (OUTPATIENT)
Dept: FAMILY MEDICINE | Facility: CLINIC | Age: 48
End: 2018-06-08
Payer: COMMERCIAL

## 2018-06-08 VITALS
WEIGHT: 194.8 LBS | RESPIRATION RATE: 16 BRPM | SYSTOLIC BLOOD PRESSURE: 118 MMHG | DIASTOLIC BLOOD PRESSURE: 78 MMHG | TEMPERATURE: 98.6 F | BODY MASS INDEX: 29.52 KG/M2 | HEIGHT: 68 IN | OXYGEN SATURATION: 97 % | HEART RATE: 70 BPM

## 2018-06-08 DIAGNOSIS — R53.83 FATIGUE, UNSPECIFIED TYPE: Primary | ICD-10-CM

## 2018-06-08 DIAGNOSIS — K50.00 CROHN'S DISEASE OF SMALL INTESTINE WITHOUT COMPLICATION (H): ICD-10-CM

## 2018-06-08 DIAGNOSIS — R45.4 IRRITABILITY AND ANGER: ICD-10-CM

## 2018-06-08 DIAGNOSIS — F41.9 ANXIETY: ICD-10-CM

## 2018-06-08 LAB
ERYTHROCYTE [DISTWIDTH] IN BLOOD BY AUTOMATED COUNT: 14.2 % (ref 10–15)
HCT VFR BLD AUTO: 44.9 % (ref 40–53)
HGB BLD-MCNC: 16.4 G/DL (ref 13.3–17.7)
MCH RBC QN AUTO: 29.4 PG (ref 26.5–33)
MCHC RBC AUTO-ENTMCNC: 36.5 G/DL (ref 31.5–36.5)
MCV RBC AUTO: 81 FL (ref 78–100)
PLATELET # BLD AUTO: 244 10E9/L (ref 150–450)
RBC # BLD AUTO: 5.58 10E12/L (ref 4.4–5.9)
WBC # BLD AUTO: 4.9 10E9/L (ref 4–11)

## 2018-06-08 PROCEDURE — 99214 OFFICE O/P EST MOD 30 MIN: CPT | Performed by: NURSE PRACTITIONER

## 2018-06-08 PROCEDURE — 82306 VITAMIN D 25 HYDROXY: CPT | Performed by: NURSE PRACTITIONER

## 2018-06-08 PROCEDURE — 85027 COMPLETE CBC AUTOMATED: CPT | Performed by: NURSE PRACTITIONER

## 2018-06-08 PROCEDURE — 36415 COLL VENOUS BLD VENIPUNCTURE: CPT | Performed by: NURSE PRACTITIONER

## 2018-06-08 PROCEDURE — 84443 ASSAY THYROID STIM HORMONE: CPT | Performed by: NURSE PRACTITIONER

## 2018-06-08 RX ORDER — VENLAFAXINE HYDROCHLORIDE 150 MG/1
150 CAPSULE, EXTENDED RELEASE ORAL DAILY
Qty: 90 CAPSULE | Refills: 3 | Status: SHIPPED | OUTPATIENT
Start: 2018-06-08 | End: 2019-05-03

## 2018-06-08 ASSESSMENT — ANXIETY QUESTIONNAIRES
7. FEELING AFRAID AS IF SOMETHING AWFUL MIGHT HAPPEN: NOT AT ALL
3. WORRYING TOO MUCH ABOUT DIFFERENT THINGS: MORE THAN HALF THE DAYS
6. BECOMING EASILY ANNOYED OR IRRITABLE: NEARLY EVERY DAY
5. BEING SO RESTLESS THAT IT IS HARD TO SIT STILL: NOT AT ALL
2. NOT BEING ABLE TO STOP OR CONTROL WORRYING: SEVERAL DAYS
GAD7 TOTAL SCORE: 10
1. FEELING NERVOUS, ANXIOUS, OR ON EDGE: MORE THAN HALF THE DAYS
IF YOU CHECKED OFF ANY PROBLEMS ON THIS QUESTIONNAIRE, HOW DIFFICULT HAVE THESE PROBLEMS MADE IT FOR YOU TO DO YOUR WORK, TAKE CARE OF THINGS AT HOME, OR GET ALONG WITH OTHER PEOPLE: SOMEWHAT DIFFICULT

## 2018-06-08 ASSESSMENT — PATIENT HEALTH QUESTIONNAIRE - PHQ9: 5. POOR APPETITE OR OVEREATING: MORE THAN HALF THE DAYS

## 2018-06-08 NOTE — PROGRESS NOTES
SUBJECTIVE:   Marcel Melchor is a 48 year old male who presents to clinic today for the following health issues:      Anxiety Follow-Up     Status since last visit: No change    Other associated symptoms:None    Complicating factors:   Significant life event: No   Current substance abuse: None  Depression symptoms: No  ASUNCION-7 SCORE 12/1/2016 1/23/2017 6/6/2017   Total Score 5 6 13     PHQ-9 SCORE 6/1/2017 6/6/2017 6/6/2017   Total Score - - -   Total Score 9 11 11       ASUNCION-7    Amount of exercise or physical activity: 4-5 days/week for an average of greater than 60 minutes    Problems taking medications regularly: No    Medication side effects: none    Diet: regular (no restrictions)      Medication Followup of all Medications    Taking Medication as prescribed: yes    Side Effects:  None    Medication Helping Symptoms:  Pt states he does not know and that's why he is here.      Was seen at the sleep clinic and told he has slight narcolepsy.  Tried adderal.  This didn't help.  Now on nuvigil. In the beginning this was working really well.  Was walking up feeling rested which is something he hadn't done for quite some time. Now wakes up feeling tired again.  He also had [facility].  Testosterone was low and now doing injections.  He has been doing this for the past 3-4 months.  Still having some fatigue and waking up feeling tired.  He does have AJAY and wears a cpap.  He has been stressed with work.  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  This hasn't been working out well and now is causing issues with his wife and the friendship.  He reports he gets frustrated easily.  Some days this is better than others.        Problem list and histories reviewed & adjusted, as indicated.  Additional history: as documented    Current Outpatient Prescriptions   Medication Sig Dispense Refill     ASACOL  MG EC tablet TAKE 3 TABLETS BY MOUTH TWICE DAILY  0  "    clindamycin (CLEOCIN T) 1 % lotion APPLY TOPICALLY TO FACE AND CHEST TWICE A DAY AS NEEDED 60 mL 3     cyclobenzaprine (FLEXERIL) 5 MG tablet TAKE 1 TABLET (5 MG) BY MOUTH 3 TIMES DAILY AS NEEDED FOR MUSCLE SPASMS 30 tablet 0     venlafaxine (EFFEXOR-XR) 75 MG 24 hr capsule TAKE 2 CAPSULES (150 MG) BY MOUTH DAILY 60 capsule 9     Allergies   Allergen Reactions     Amoxicillin      itching       Reviewed and updated as needed this visit by clinical staff  Tobacco  Allergies  Meds  Med Hx  Surg Hx  Fam Hx  Soc Hx      Reviewed and updated as needed this visit by Provider         ROS:  Constitutional, HEENT, cardiovascular, pulmonary, gi and gu systems are negative, except as otherwise noted.    OBJECTIVE:     /78 (BP Location: Right arm, Patient Position: Chair, Cuff Size: Adult Regular)  Pulse 70  Temp 98.6  F (37  C) (Oral)  Resp 16  Ht 5' 8\" (1.727 m)  Wt 194 lb 12.8 oz (88.4 kg)  SpO2 97%  BMI 29.62 kg/m2  Body mass index is 29.62 kg/(m^2).  GENERAL: healthy, alert and no distress  NECK: no adenopathy, no asymmetry, masses, or scars and thyroid normal to palpation  RESP: lungs clear to auscultation - no rales, rhonchi or wheezes  CV: regular rate and rhythm, normal S1 S2, no S3 or S4, no murmur, click or rub, no peripheral edema and peripheral pulses strong  PSYCH: mentation appears normal, affect normal/bright    Diagnostic Test Results:  none     ASSESSMENT/PLAN:   1. Fatigue, unspecified type  Will check labs today.    - CBC with platelets  - TSH with free T4 reflex  - Vitamin D Deficiency    2. Anxiety  Overall thinks the medication is helping.  Will continue.  He is wondering if counseling would be helpful.  I do think this would benefit him, referral placed.     - venlafaxine (EFFEXOR-XR) 150 MG 24 hr capsule; Take 1 capsule (150 mg) by mouth daily  Dispense: 90 capsule; Refill: 3  - MENTAL HEALTH REFERRAL  - Adult; Outpatient Treatment; Individual/Couples/Family/Group Therapy/Health " Psychology; [facility] [phone]; We will contact you to schedule the appointment or please call with any questions    3. Irritability and anger  Overall thinks the medication is helping.  Will continue.  He is wondering if counseling would be helpful.  I do think this would benefit him, referral placed.    - venlafaxine (EFFEXOR-XR) 150 MG 24 hr capsule; Take 1 capsule (150 mg) by mouth daily  Dispense: 90 capsule; Refill: 3  - MENTAL HEALTH REFERRAL  - Adult; Outpatient Treatment; Individual/Couples/Family/Group Therapy/Health Psychology; [facility] [phone]; We will contact you to schedule the appointment or please call with any questions    4. Crohn's disease of small intestine without complication (H)  On daily asacol; doing well.  Managed by JAIME.    F/u  6 mos    FAIZAN [facility]

## 2018-06-08 NOTE — MR AVS SNAPSHOT
After Visit Summary   6/8/2018    Marcel Melchor    MRN: [id number]           Patient Information     Date Of Birth          1970        Visit Information        Provider Department      6/8/2018 9:00 AM Aleja Cardoso APRN CNP [facility]        Today's Diagnoses     Fatigue, unspecified type    -  1    Anxiety        Irritability and anger           Follow-ups after your visit        Additional Services     MENTAL HEALTH REFERRAL  - Adult; Outpatient Treatment; Individual/Couples/Family/Group Therapy/Health Psychology; G: [facility] [phone]; We will contact you to schedule the appointment or please call with any questions       All scheduling is subject to the client's specific insurance plan & benefits, provider/location availability, and provider clinical specialities.  Please arrive 15 minutes early for your first appointment and bring your completed paperwork.    Please be aware that coverage of these services is subject to the terms and limitations of your health insurance plan.  Call member services at your health plan with any benefit or coverage questions.                            Follow-up notes from your care team     Return in about 6 months (around 12/8/2018) for mental health.      Who to contact     If you have questions or need follow up information about today's clinic visit or your schedule please contact [facility] directly at [phone].  Normal or non-critical lab and imaging results will be communicated to you by MyChart, letter or phone within 4 business days after the clinic has received the results. If you do not hear from us within 7 days, please contact the clinic through MyChart or phone. If you have a critical or abnormal lab result, we will notify you by phone as soon as possible.  Submit refill requests through [employer] or call your pharmacy and they will forward the refill request to us. Please  "allow 3 business days for your refill to be completed.          Additional Information About Your Visit        Refresh[employer]iohart Information     [employer] gives you secure access to your electronic health record. If you see a primary care provider, you can also send messages to your care team and make appointments. If you have questions, please call your primary care clinic.  If you do not have a primary care provider, please call [phone] and they will assist you.        Care EveryWhere ID     This is your Care EveryWhere ID. This could be used by other organizations to access your [facility] medical records  [id number]        Your Vitals Were     Pulse Temperature Respirations Height Pulse Oximetry BMI (Body Mass Index)    70 98.6  F (37  C) (Oral) 16 5' 8\" (1.727 m) 97% 29.62 kg/m2       Blood Pressure from Last 3 Encounters:   06/08/18 118/78   04/19/18 130/83   04/06/18 112/70    Weight from Last 3 Encounters:   06/08/18 194 lb 12.8 oz (88.4 kg)   04/06/18 194 lb (88 kg)   04/04/18 194 lb (88 kg)              We Performed the Following     CBC with platelets     MENTAL HEALTH REFERRAL  - Adult; Outpatient Treatment; Individual/Couples/Family/Group Therapy/Health Psychology; [facility] [phone]; We will contact you to schedule the appointment or please call with any questions     TSH with free T4 reflex     Vitamin D Deficiency          Today's Medication Changes          These changes are accurate as of 6/8/18  3:05 PM.  If you have any questions, ask your nurse or doctor.               These medicines have changed or have updated prescriptions.        Dose/Directions    * venlafaxine 75 MG 24 hr capsule   Commonly known as:  EFFEXOR-XR   This may have changed:  Another medication with the same name was added. Make sure you understand how and when to take each.   Used for:  Anxiety, Irritability and anger   Changed by:  Aleja Cardoso APRN CNP        TAKE 2 CAPSULES (150 MG) BY MOUTH " DAILY   Quantity:  60 capsule   Refills:  9       * venlafaxine 150 MG 24 hr capsule   Commonly known as:  EFFEXOR-XR   This may have changed:  You were already taking a medication with the same name, and this prescription was added. Make sure you understand how and when to take each.   Used for:  Anxiety, Irritability and anger   Changed by:  Aleja Cardoso APRN CNP        Dose:  150 mg   Take 1 capsule (150 mg) by mouth daily   Quantity:  90 capsule   Refills:  3       * Notice:  This list has 2 medication(s) that are the same as other medications prescribed for you. Read the directions carefully, and ask your doctor or other care provider to review them with you.         Where to get your medicines      These medications were sent to [facility]/pharmacy #[id number] - [place] - [address]  [address] [facility] [address]     Phone:  [phone]     venlafaxine 150 MG 24 hr capsule                Primary Care Provider Office Phone # Fax #    Wes Braga PA-C [phone]       [id number]  [facility] [address]        Equal Access to Services     [facility]: Hadii federico fernandez hadasho Soomaali, waaxda luqadaha, qaybta kaalmada aderacquelyafide, rafael lagunas . So [facility] [phone].    ATENCIÓN: Si habla español, tiene a friedman disposición servicios gratuitos de asistencia lingüística. Dang al [phone].    We comply with applicable federal civil rights laws and [place] laws. We do not discriminate on the basis of race, color, national origin, age, disability, sex, sexual orientation, or gender identity.            Thank you!     Thank you for choosing [facility]  for your care. Our goal is always to provide you with excellent care. Hearing back from our patients is one way we can continue to improve our services. Please take a few minutes to complete the written survey that you may receive in the mail after your visit with us.  Thank you!             Your Updated Medication List - Protect others around you: Learn how to safely use, store and throw away your medicines at www.disposemymeds.org.          This list is accurate as of 6/8/18  3:05 PM.  Always use your most recent med list.                   Brand Name Dispense Instructions for use Diagnosis    ASACOL  MG EC tablet   Generic drug:  mesalamine      TAKE 3 TABLETS BY MOUTH TWICE DAILY        clindamycin 1 % lotion    CLEOCIN T    60 mL    APPLY TOPICALLY TO FACE AND CHEST TWICE A DAY AS NEEDED    Acne, unspecified acne type       cyclobenzaprine 5 MG tablet    FLEXERIL    30 tablet    TAKE 1 TABLET (5 MG) BY MOUTH 3 TIMES DAILY AS NEEDED FOR MUSCLE SPASMS    Acute bilateral low back pain with right-sided sciatica       NUVIGIL PO           * venlafaxine 75 MG 24 hr capsule    EFFEXOR-XR    60 capsule    TAKE 2 CAPSULES (150 MG) BY MOUTH DAILY    Anxiety, Irritability and anger       * venlafaxine 150 MG 24 hr capsule    EFFEXOR-XR    90 capsule    Take 1 capsule (150 mg) by mouth daily    Anxiety, Irritability and anger       * Notice:  This list has 2 medication(s) that are the same as other medications prescribed for you. Read the directions carefully, and ask your doctor or other care provider to review them with you.

## 2018-06-09 LAB — TSH SERPL DL<=0.005 MIU/L-ACNC: 0.97 MU/L (ref 0.4–4)

## 2018-06-09 ASSESSMENT — ANXIETY QUESTIONNAIRES: GAD7 TOTAL SCORE: 10

## 2018-06-09 ASSESSMENT — PATIENT HEALTH QUESTIONNAIRE - PHQ9: SUM OF ALL RESPONSES TO PHQ QUESTIONS 1-9: 9

## 2018-06-11 LAB — DEPRECATED CALCIDIOL+CALCIFEROL SERPL-MC: 49 UG/L (ref 20–75)

## 2018-06-20 ENCOUNTER — OFFICE VISIT (OUTPATIENT)
Dept: FAMILY MEDICINE | Facility: CLINIC | Age: 48
End: 2018-06-20
Payer: COMMERCIAL

## 2018-06-20 VITALS
HEIGHT: 68 IN | DIASTOLIC BLOOD PRESSURE: 72 MMHG | BODY MASS INDEX: 29.67 KG/M2 | RESPIRATION RATE: 16 BRPM | WEIGHT: 195.8 LBS | SYSTOLIC BLOOD PRESSURE: 110 MMHG | TEMPERATURE: 98.2 F | HEART RATE: 64 BPM | OXYGEN SATURATION: 98 %

## 2018-06-20 DIAGNOSIS — S99.922A INJURY OF TOE ON LEFT FOOT, INITIAL ENCOUNTER: Primary | ICD-10-CM

## 2018-06-20 PROCEDURE — 99213 OFFICE O/P EST LOW 20 MIN: CPT | Performed by: NURSE PRACTITIONER

## 2018-06-20 NOTE — MR AVS SNAPSHOT
After Visit Summary   6/20/2018    Marcel Melchor    MRN: [id number]           Patient Information     Date Of Birth          1970        Visit Information        Provider Department      6/20/2018 11:40 AM Aleja Cardoso APRN CNP [facility]        Today's Diagnoses     Injury of toe on left foot, initial encounter    -  1      Care Instructions    Soak toe in epsom salts.  May lose toe nail.  If redness or swelling worsen or if drainage appears please let me know.            Follow-ups after your visit        Your next 10 appointments already scheduled     Jul 23, 2018  1:30 PM CDT   (Arrive by 1:00 PM)   New Visit with Isis [facility] [place] ([facility] [place])    [address]  [place] [address] [phone]            Jul 30, 2018  3:30 PM CDT   Return Visit with Isis [facility] [place] ([facility] [place])    [address]  [place] [address] [phone]              Who to contact     If you have questions or need follow up information about today's clinic visit or your schedule please contact [facility] directly at [phone].  Normal or non-critical lab and imaging results will be communicated to you by ExpertFilehart, letter or phone within 4 business days after the clinic has received the results. If you do not hear from us within 7 days, please contact the clinic through ExpertFilehart or phone. If you have a critical or abnormal lab result, we will notify you by phone as soon as possible.  Submit refill requests through Pops or call your pharmacy and they will forward the refill request to us. Please allow 3 business days for your refill to be completed.          Additional Information About Your Visit        MyChart Information     Pops gives you secure access to your electronic health record. If you see a primary care provider, you can also send messages to your care team and make  "appointments. If you have questions, please call your primary care clinic.  If you do not have a primary care provider, please call [phone] and they will assist you.        Care EveryWhere ID     This is your Care EveryWhere ID. This could be used by other organizations to access your [place] medical records  [id number]        Your Vitals Were     Pulse Temperature Respirations Height Pulse Oximetry BMI (Body Mass Index)    64 98.2  F (36.8  C) (Oral) 16 5' 8\" (1.727 m) 98% 29.77 kg/m2       Blood Pressure from Last 3 Encounters:   06/20/18 110/72   06/08/18 118/78   04/19/18 130/83    Weight from Last 3 Encounters:   06/20/18 195 lb 12.8 oz (88.8 kg)   06/08/18 194 lb 12.8 oz (88.4 kg)   04/06/18 194 lb (88 kg)              Today, you had the following     No orders found for display       Primary Care Provider Office Phone # Fax #    Wes Braga PA-C [phone]       [id number]  [place] [facility] [address]        Equal Access to Services     [facility] AH: Hadii aad ku hadasho Soomaali, waaxda luqadaha, qaybta kaalmada adeegyada, waxay idiin hayaan adeeg kharaverna lamarline ah. So [facility] [phone].    ATENCIÓN: Si habla español, tiene a friedman disposición servicios gratuitos de asistencia lingüística. Llame al [phone].    We comply with applicable federal civil rights laws and [place] laws. We do not discriminate on the basis of race, color, national origin, age, disability, sex, sexual orientation, or gender identity.            Thank you!     Thank you for choosing [facility]  for your care. Our goal is always to provide you with excellent care. Hearing back from our patients is one way we can continue to improve our services. Please take a few minutes to complete the written survey that you may receive in the mail after your visit with us. Thank you!             Your Updated Medication List - Protect others around you: Learn how to safely use, store and throw " away your medicines at www.disposemymeds.org.          This list is accurate as of 6/20/18 12:06 PM.  Always use your most recent med list.                   Brand Name Dispense Instructions for use Diagnosis    ASACOL  MG EC tablet   Generic drug:  mesalamine      TAKE 3 TABLETS BY MOUTH TWICE DAILY        clindamycin 1 % lotion    CLEOCIN T    60 mL    APPLY TOPICALLY TO FACE AND CHEST TWICE A DAY AS NEEDED    Acne, unspecified acne type       cyclobenzaprine 5 MG tablet    FLEXERIL    30 tablet    TAKE 1 TABLET (5 MG) BY MOUTH 3 TIMES DAILY AS NEEDED FOR MUSCLE SPASMS    Acute bilateral low back pain with right-sided sciatica       NUVIGIL PO           * venlafaxine 75 MG 24 hr capsule    EFFEXOR-XR    60 capsule    TAKE 2 CAPSULES (150 MG) BY MOUTH DAILY    Anxiety, Irritability and anger       * venlafaxine 150 MG 24 hr capsule    EFFEXOR-XR    90 capsule    Take 1 capsule (150 mg) by mouth daily    Anxiety, Irritability and anger       * Notice:  This list has 2 medication(s) that are the same as other medications prescribed for you. Read the directions carefully, and ask your doctor or other care provider to review them with you.

## 2018-06-20 NOTE — PATIENT INSTRUCTIONS
Soak toe in epsom salts.  May lose toe nail.  If redness or swelling worsen or if drainage appears please let me know.

## 2018-06-20 NOTE — PROGRESS NOTES
"  SUBJECTIVE:   Marcel Melchor is a 48 year old male who presents to clinic today for the following health issues:    Joint Pain    Onset: Ongoing for Five Days    Description:   Location: Left Great Toe  Character: Sharp and Dull ache    Intensity: 5/10    Progression of Symptoms: same    Accompanying Signs & Symptoms:  Other symptoms: swelling and redness.     History:   Previous similar pain: no       Precipitating factors:   Trauma or overuse: YES- Stubbed Left Toe    Alleviating factors:  Improved by: Nothing    Therapies Tried and outcome: Pt had put hydrogen peroxide on toe after injury to prevent infection. No other therapies have been tried.            Problem list and histories reviewed & adjusted, as indicated.  Additional history: as documented    Current Outpatient Prescriptions   Medication Sig Dispense Refill     Armodafinil (NUVIGIL PO)        ASACOL  MG EC tablet TAKE 3 TABLETS BY MOUTH TWICE DAILY  0     clindamycin (CLEOCIN T) 1 % lotion APPLY TOPICALLY TO FACE AND CHEST TWICE A DAY AS NEEDED 60 mL 3     cyclobenzaprine (FLEXERIL) 5 MG tablet TAKE 1 TABLET (5 MG) BY MOUTH 3 TIMES DAILY AS NEEDED FOR MUSCLE SPASMS 30 tablet 0     venlafaxine (EFFEXOR-XR) 150 MG 24 hr capsule Take 1 capsule (150 mg) by mouth daily 90 capsule 3     venlafaxine (EFFEXOR-XR) 75 MG 24 hr capsule TAKE 2 CAPSULES (150 MG) BY MOUTH DAILY 60 capsule 9     Allergies   Allergen Reactions     Amoxicillin      itching       Reviewed and updated as needed this visit by clinical staff  Tobacco  Allergies  Meds  Med Hx  Surg Hx  Fam Hx  Soc Hx      Reviewed and updated as needed this visit by Provider         ROS:  MUSCULOSKELETAL: POSITIVE  for L toe pain     OBJECTIVE:     /72 (BP Location: Right arm, Patient Position: Chair, Cuff Size: Adult Large)  Pulse 64  Temp 98.2  F (36.8  C) (Oral)  Resp 16  Ht 5' 8\" (1.727 m)  Wt 195 lb 12.8 oz (88.8 kg)  SpO2 98%  BMI 29.77 kg/m2  Body mass index is 29.77 " kg/(m^2).  GENERAL: healthy, alert and no distress  MS: L foot: no gross musculoskeletal defects noted, L great toe: mild redness and swelling along the proximal nail fold, no drainage, nail slightly darkened but no cynthia subungual hematoma    SKIN: no suspicious lesions or rashes    L great toe, swelling and redness around the mid border, no drainage  Diagnostic Test Results:  none     ASSESSMENT/PLAN:   1. Injury of toe on left foot, initial encounter  Soak in Epsom salts. If increased redness, swelling or drainage let me know.      F/u as needed if no improvement or worsening symptoms     FAIZAN [facility]

## 2018-06-26 DIAGNOSIS — R45.4 IRRITABILITY AND ANGER: ICD-10-CM

## 2018-06-26 DIAGNOSIS — F41.9 ANXIETY: ICD-10-CM

## 2018-06-26 RX ORDER — VENLAFAXINE HYDROCHLORIDE 75 MG/1
150 CAPSULE, EXTENDED RELEASE ORAL DAILY
Qty: 60 CAPSULE | Refills: 0 | OUTPATIENT
Start: 2018-06-26

## 2018-06-26 NOTE — TELEPHONE ENCOUNTER
PHQ-9 SCORE 6/6/2017 6/6/2017 6/8/2018   Total Score - - -   Total Score 11 11 9     ASUNCION-7 SCORE 1/23/2017 6/6/2017 6/8/2018   Total Score 6 13 10       Routing refill request to provider for review/approval because:  PHQ 9 ASUNCION 7 > 4    Jessika Knapp RN, BS  Clinical Nurse Triage.

## 2018-06-26 NOTE — TELEPHONE ENCOUNTER
I think this is the pharmacy asking for 75mg caps on auto refill.  Aleja just gave him 150mg's.  I will deny this but maybe someone can double check me on this.    Wes

## 2018-06-26 NOTE — TELEPHONE ENCOUNTER
"Requested Prescriptions   Pending Prescriptions Disp Refills     venlafaxine (EFFEXOR-XR) 75 MG 24 hr capsule [Pharmacy Med Name: VENLAFAXINE HCL ER 75 MG CAP] 60 capsule 4    Last Written Prescription Date:  8/7/17  Last Fill Quantity: 60,  # refills: 9   Last Office Visit: 6/20/18  Future Office Visit:    Next 5 appointments (look out 90 days)     Jul 30, 2018  3:30 PM CDT   Return Visit with Isis Estrella   [facility] [place] ([facility])    [address]  [facility] [address]   [phone]                  Sig: TAKE 2 CAPSULES (150 MG) BY MOUTH DAILY    Serotonin-Norepinephrine Reuptake Inhibitors  Failed    6/26/2018  1:05 AM       Failed - Normal serum creatinine on file in past 12 months    Recent Labs   Lab Test 11/03/15   CR  0.88            Passed - Blood pressure under 140/90 in past 12 months    BP Readings from Last 3 Encounters:   06/20/18 110/72   06/08/18 118/78   04/19/18 130/83                Passed - Recent (12 mo) or future (30 days) visit within the authorizing provider's specialty    Patient had office visit in the last 12 months or has a visit in the next 30 days with authorizing provider or within the authorizing provider's specialty.  See \"Patient Info\" tab in inbasket, or \"Choose Columns\" in Meds & Orders section of the refill encounter.           Passed - Patient is age 18 or older          "

## 2018-07-19 ENCOUNTER — TRANSFERRED RECORDS (OUTPATIENT)
Dept: HEALTH INFORMATION MANAGEMENT | Facility: CLINIC | Age: 48
End: 2018-07-19

## 2018-07-23 ENCOUNTER — OFFICE VISIT (OUTPATIENT)
Dept: PSYCHOLOGY | Facility: CLINIC | Age: 48
End: 2018-07-23
Attending: NURSE PRACTITIONER
Payer: COMMERCIAL

## 2018-07-23 DIAGNOSIS — F43.25 ADJUSTMENT DISORDER WITH MIXED DISTURBANCE OF EMOTIONS AND CONDUCT: Primary | ICD-10-CM

## 2018-07-23 PROCEDURE — 90791 PSYCH DIAGNOSTIC EVALUATION: CPT | Performed by: SOCIAL WORKER

## 2018-07-23 ASSESSMENT — ANXIETY QUESTIONNAIRES
IF YOU CHECKED OFF ANY PROBLEMS ON THIS QUESTIONNAIRE, HOW DIFFICULT HAVE THESE PROBLEMS MADE IT FOR YOU TO DO YOUR WORK, TAKE CARE OF THINGS AT HOME, OR GET ALONG WITH OTHER PEOPLE: VERY DIFFICULT
2. NOT BEING ABLE TO STOP OR CONTROL WORRYING: SEVERAL DAYS
1. FEELING NERVOUS, ANXIOUS, OR ON EDGE: NEARLY EVERY DAY
3. WORRYING TOO MUCH ABOUT DIFFERENT THINGS: SEVERAL DAYS
5. BEING SO RESTLESS THAT IT IS HARD TO SIT STILL: SEVERAL DAYS
GAD7 TOTAL SCORE: 9
6. BECOMING EASILY ANNOYED OR IRRITABLE: MORE THAN HALF THE DAYS
7. FEELING AFRAID AS IF SOMETHING AWFUL MIGHT HAPPEN: NOT AT ALL

## 2018-07-23 ASSESSMENT — PATIENT HEALTH QUESTIONNAIRE - PHQ9: 5. POOR APPETITE OR OVEREATING: SEVERAL DAYS

## 2018-07-23 NOTE — Clinical Note
I saw this client for an intake assessment and will continue meeting with them for individual therapy. We will discuss stress management to reduce irritability. The client requested and I agree that a psychiatrist appointment would be helpful for him as he would like to have a specialist look at the various medication he is taking. Please let me know if you have questions or concerns.  Thank you, Isis Estrella

## 2018-07-23 NOTE — MR AVS SNAPSHOT
MRN:[id number]                      After Visit Summary   7/23/2018    Marcel Melchor    MRN: [id number]           Visit Information        Provider Department      7/23/2018 1:30 PM Sameer [facility] Generic      Your next 10 appointments already scheduled     Jul 30, 2018  3:30 PM CDT   Return Visit with [facility] ([facility] [place])    [address]  [place] [address]   [phone]            Aug 14, 2018  3:30 PM CDT   Return Visit with Isis [facility] ([facility] [place])    [address]  [place] [address] [phone]            Aug 21, 2018  3:30 PM CDT   Return Visit with Isis [facility] ([facility])    [address]  [place] [address]   [phone]              MyChart Information     DeskGodt gives you secure access to your electronic health record. If you see a primary care provider, you can also send messages to your care team and make appointments. If you have questions, please call your primary care clinic.  If you do not have a primary care provider, please call [phone] and they will assist you.        Care EveryWhere ID     This is your Care EveryWhere ID. This could be used by other organizations to access your [place] medical records  [id number]        Equal Access to Services     JUSTO SIDHU AH: Hadii federico fernandez hadasho Soleeanneali, waaxda luqadaha, qaybta kaalmada adeegyada, waxlaureano idifelipe vivar. So [facility] [phone].    ATENCIÓN: Si habla español, tiene a friedman disposición servicios gratuitos de asistencia lingüística. Llame al [phone].    We comply with applicable federal civil rights laws and [place] laws. We do not discriminate on the basis of race, color, national origin, age, disability, sex, sexual orientation, or gender identity.

## 2018-07-24 DIAGNOSIS — F41.9 ANXIETY: Primary | ICD-10-CM

## 2018-07-24 DIAGNOSIS — F43.25 ADJUSTMENT DISORDER WITH MIXED DISTURBANCE OF EMOTIONS AND CONDUCT: ICD-10-CM

## 2018-07-24 NOTE — PROGRESS NOTES
Adult Intake Structured Interview  Standard Diagnostic Assessment      CLIENT'S NAME: Marcel Melchor  MRN:   [id number]  :   1970  ACCT. NUMBER: [id number]  DATE OF SERVICE: 18      Identifying Information:  Client is a 48 year old, ,  male. Client was referred for counseling by Dr. Cardoso at [facility]. Client is currently employed full time. Client attended the session alone.       Client's Statement of Presenting Concern:  Client reports the reason for seeking therapy at this time as increased irritability and anxiety impacting his relationships.  Client stated that his symptoms have resulted in the following functional impairments: self-care, social interactions and work / vocational responsibilities      History of Presenting Concern:  Client reports that these problem(s) began in his childhood when he first remembers irritability and anxiety. He explained feeling stressed, on edge, annoyed and struggling to have patience on a daily basis. Client has attempted to resolve these concerns in the past through medication. Client reports that other professional(s) are involved in providing support / services. The client's PCP is also monitoring symptoms.      Social History:  Client reported he grew up in [place], [place]. They were the second born of four children. He has a brother 1.5 years older than him, a 44 year old sister and a 40 year old sister.  Parents  43 years ago when the client was 5 years old. The client's mother did remarry 32 years ago The client's father did not remarry and remains single. The client reports that his mother  at 47 years old from breast cancer. He reports that his father is still alive at 74 years old and suffers from COPD. Client reported that his  How Did Your Itching Occur?: gradual in onset  (over a period of years) How Severe Is Your Itching?: moderate childhood was rough with a single dad, very poor but wouldn't change it. Client described his current relationships with family of origin as closest to his oldest brother but that all three of his siblings suffer from substance abuse.    Client reported a history of one committed relationships or marriages. Client has been  for 20 years. Client reported having one child, an 18 year old daughter. Client identified some stable and meaningful social connections. Client reported that he has not been involved with the legal system. Client's highest education level was high school graduate. Client did not identify any learning problems. There are no ethnic, cultural or Yazdanism factors that may be relevant for therapy. Client identified his preferred language to be English. Client reported he does not need the assistance of an  or other support involved in therapy. Modifications will not be used to assist communication in therapy. Client did not serve in the .     Client reports family history includes Breast Cancer in his mother.    Mental Health History:  Client reported the following biological family members or relatives with mental health issues: Mother experienced Depression.  Client exhibited symptoms of Obsessive compulsive disorder but had not been formally diagnosed. The client reported feeling like he has OCD due to his tendency to fixate on certain things. Client has not received mental health services in the past.  Hospitalizations: None.  Client is currently receiving the following services: medication(s) from physician / PCP.      Chemical Health History:  Client reported the following biological family members or relatives with chemical health issues: Brother reportedly used prescription drugs , Father reportedly used alcohol , Mother reportedly used prescription drugs , Sister reportedly uses prescription drugs , Sister reportedly uses alcohol . Client has not received chemical  dependency treatment in the past. Client is not currently receiving any chemical dependency treatment. Client reports no problems as a result of their drinking / drug use.      Client Reports:  Client denies using alcohol.  Client denies using tobacco.  Client denies using marijuana.  Client reports using caffeine 6-10 times per day and drinks one diet soda at a time. Client started using caffeine at age 6.  Client denies using street drugs.  Client denies the non-medical use of prescription or over the counter drugs.    CAGE: None of the patient's responses to the CAGE screening were positive / Negative CAGE score   Based on the negative Cage-Aid score and clinical interview there  are not indications of drug or alcohol abuse.    Discussed the general effects of drugs and alcohol on health and well-being. Discussed the client's caffeine consumption and encouraged client to reduce use. Client reported he would consider it.      Significant Losses / Trauma / Abuse / Neglect Issues:  There are no indications or report of: significant losses, trauma, abuse or neglect.    Issues of possible neglect are not present.      Medical Issues:  Client has had a physical exam to rule out medical causes for current symptoms. Date of last physical exam was within the past year. Client was encouraged to follow up with PCP if symptoms were to develop. The client has a [facility] Provider, who is named Wes Braga.. The client reports not having a psychiatrist. Client reports no current medical concerns. The client reports the presence of chronic or episodic pain in the form of lower back pain. The pain level is mild and has a frequency of recurring.. There are not significant nutritional concerns.     Client reports current meds as:   Current Outpatient Prescriptions   Medication Sig     Armodafinil (NUVIGIL PO)      ASACOL  MG EC tablet TAKE 3 TABLETS BY MOUTH TWICE DAILY     clindamycin (CLEOCIN T) 1 %  lotion APPLY TOPICALLY TO FACE AND CHEST TWICE A DAY AS NEEDED     cyclobenzaprine (FLEXERIL) 5 MG tablet TAKE 1 TABLET (5 MG) BY MOUTH 3 TIMES DAILY AS NEEDED FOR MUSCLE SPASMS     venlafaxine (EFFEXOR-XR) 150 MG 24 hr capsule Take 1 capsule (150 mg) by mouth daily     No current facility-administered medications for this visit.        Client Allergies:  Allergies   Allergen Reactions     Amoxicillin      itching     the following allergies to medications: penicillin    Medical History:  Past Medical History:   Diagnosis Date     Anxiety 9/3/2013     AJAY (obstructive sleep apnea) 11/11/2014     Rotator cuff syndrome 1/19/2012         Medication Adherence:  Client reports taking prescribed medications as prescribed.    Client was provided recommendation to follow-up with prescribing physician.    Mental Status Assessment:  Appearance:   Appropriate   Eye Contact:   Good   Psychomotor Behavior: Restless  client shifted throughout sessions  Attitude:   Cooperative   Orientation:   All  Speech   Rate / Production: Hyperverbal  Normal  Client was very talkative   Volume:  Normal   Mood:    Anxious  Normal client presented as somewhat anxious, but overall in a good mood  Affect:    Appropriate   Thought Content:  Clear   Thought Form:  Coherent  Logical   Insight:    Good       Review of Symptoms:  Depression: Interest Energy Concentration Appetite  Eliane:  No symptoms  Psychosis: No symptoms  Anxiety: Worries Nervousness Irritability  Panic:  No symptoms  Post Traumatic Stress Disorder: No symptoms  Obsessive Compulsive Disorder: No symptoms  Eating Disorder: No symptoms  Oppositional Defiant Disorder: No symptoms  ADD / ADHD: No symptoms  Conduct Disorder: No symptoms      Safety Assessment:    History of Safety Concerns:   Client denied a history of suicidal ideation.    Client denied a history of suicide attempts.    Client denied a history of homicidal ideation.    Client denied a history of self-injurious ideation  "and behaviors.    Client denied a history of personal safety concerns.    Client denied a history of assaultive behaviors.        Current Safety Concerns:  Client denies current suicidal ideation.    Client denies current homicidal ideation and behaviors.  Client denies current self-injurious ideation and behaviors.    Client denies current concerns for personal safety.    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     Plan for Safety and Risk Management:  A safety and risk management plan has not been developed at this time, however client was given the after-hours number / 911 should there be a change in any of these risk factors.    Client's Strengths and Limitations:  Client identified the following strengths or resources that will help him succeed in counseling: friends / good social support and family support. Client identified the following supports: family, Uatsdin / spirituality and friends. Things that may interfere with the client's success in counseling include: \"self\".      Diagnostic Criteria:  A. The development of emotional or behavioral symptoms in response to an identifiable stressor(s) occurring within 3 months of the onset of the stressor(s)  B. These symptoms or behaviors are clinically significant, as evidenced by one or both of the following:  C. The stress-related disturbance does not meet criteria for another disorder & is not not an exacerbation of another mental disorder  D. The symptoms do not represent normal bereavement  E. Once the stressor or its consequences have terminated, the symptoms do not persist for more than an " additional 6 months       * Adjustment Disorder with Mixed Disturbance of Emotions and Conduct: The predominant manfestations are both emotional symptoms (e.g. 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 Health conditions  include  disease or illnesses, other health problems that may be short or long lasting,  injuries, mental health or emotional problems, and problems with alcohol or drugs.                     Think back over the past 30 days and answer these questions, thinking about how much  difficulty you had doing the following activities. For each question, please [place] only  one response.    S1 Standing for long periods such as 30 minutes? Moderate =   3   S2 Taking care of household responsibilities? Mild =           2   S3 Learning a new task, for example, learning how to get to a new place? Mild =           2   S4 How much of a problem do you have joining community activities (for example, festivals, Hoahaoism or other activities) in the same way as anyone else can? Severe =       4   S5 How much have you been emotionally affected by your health problems?  Mild =           2     In the past 30 days, how much difficulty did you have in:   S6 Concentrating on doing something for ten minutes? Moderate =   3   S7 Walking a long distance such as a kilometer (or equivalent)? None =         1   S8 Washing your whole body? None =         1   S9 Getting dressed? None =         1   S10 Dealing with people you do not know? Severe =       4   S11 Maintaining a friendship? Mild =           2   S12 Your day to day work? None =         1     H1 Overall, in the past 30 days, how many days were these difficulties present? Record number of days 20   H2 In the past 30 days, for how many days were you totally unable to carry out your usual activities or work because of any health condition? Record number of days  0   H3 In the past 30 days, not counting the days that you were totally unable, for how many days did you cut back or reduce your usual activities or work because of any health condition? Record number of days 0     Attendance Agreement:  Client has signed Attendance Agreement:Yes      Collaboration:  The client is receiving treatment / structured support from the following professional(s) / service and treatment. Collaboration will be initiated with: primary care physician.      Preliminary Treatment Plan:  The client reports no currently identified Sikh, ethnic or cultural issues relevant to therapy.     services are not indicated.    Modifications to assist communication are not indicated.    The concerns identified by the client will be addressed in therapy.    Initial Treatment will focus on: Adjustment Difficulties related to: irritability and conflict in relationships.    As a preliminary treatment goal, client will develop coping/problem-solving skills to facilitate more adaptive adjustment as evidenced by client's PHQ-9 reducing from 10 to 6 over the next three months and client reporting decreased irritability.    The focus of initial interventions will  be to teach stress mangement techniques.    The following referral(s) will be initiated: psychiatry.    A Release of Information is not needed at this time.    Report to child / adult protection services was NA.    Client will have access to their [facility]' medical record.    Isis BELL, LGSW July 24, 2018  Note reviewed and clinical supervision by ARABELLA Staples LICSW 7/27/2018

## 2018-07-24 NOTE — PROGRESS NOTES
I received a message from thearpist that Marcel recently had seen.  He was requesting to see psych to discuss his meds.  They needed an order.  I signed the order.  I'm not sure if Marcel will need to call to schedule this appointment or if someone will automatically reach out to him.  If he needs to schedule please give him the information he needs.    Aleja Cardoso CNP

## 2018-07-25 ASSESSMENT — ANXIETY QUESTIONNAIRES: GAD7 TOTAL SCORE: 9

## 2018-07-25 ASSESSMENT — PATIENT HEALTH QUESTIONNAIRE - PHQ9: SUM OF ALL RESPONSES TO PHQ QUESTIONS 1-9: 10

## 2018-07-30 ENCOUNTER — OFFICE VISIT (OUTPATIENT)
Dept: PSYCHOLOGY | Facility: CLINIC | Age: 48
End: 2018-07-30
Attending: NURSE PRACTITIONER
Payer: COMMERCIAL

## 2018-07-30 DIAGNOSIS — F43.25 ADJUSTMENT DISORDER WITH MIXED DISTURBANCE OF EMOTIONS AND CONDUCT: Primary | ICD-10-CM

## 2018-07-30 PROCEDURE — 90834 PSYTX W PT 45 MINUTES: CPT | Performed by: SOCIAL WORKER

## 2018-07-30 ASSESSMENT — PATIENT HEALTH QUESTIONNAIRE - PHQ9: 5. POOR APPETITE OR OVEREATING: SEVERAL DAYS

## 2018-07-30 ASSESSMENT — ANXIETY QUESTIONNAIRES
6. BECOMING EASILY ANNOYED OR IRRITABLE: MORE THAN HALF THE DAYS
5. BEING SO RESTLESS THAT IT IS HARD TO SIT STILL: NOT AT ALL
IF YOU CHECKED OFF ANY PROBLEMS ON THIS QUESTIONNAIRE, HOW DIFFICULT HAVE THESE PROBLEMS MADE IT FOR YOU TO DO YOUR WORK, TAKE CARE OF THINGS AT HOME, OR GET ALONG WITH OTHER PEOPLE: SOMEWHAT DIFFICULT
1. FEELING NERVOUS, ANXIOUS, OR ON EDGE: SEVERAL DAYS
3. WORRYING TOO MUCH ABOUT DIFFERENT THINGS: SEVERAL DAYS
7. FEELING AFRAID AS IF SOMETHING AWFUL MIGHT HAPPEN: NOT AT ALL
2. NOT BEING ABLE TO STOP OR CONTROL WORRYING: NOT AT ALL
GAD7 TOTAL SCORE: 5

## 2018-07-30 NOTE — PROGRESS NOTES
Progress Note    Client Name: Marcel Melchor  Date: 7/30/2018         Service Type: Individual      Session Start Time: 3:30  Session End Time: 4:15      Session Length: 45 min     Session #: 2     Attendees: Client attended alone    Treatment Plan Last Reviewed: 7/30/2018  PHQ-9 / ASUNCION-7 : 7/30/2018     DATA      Progress Since Last Session (Related to Symptoms / Goals / Homework):   Symptoms: No change client reports no change in symptoms    Homework: Completed in session identified goals for therapy      Episode of Care Goals: Minimal progress - PREPARATION (Decided to change - considering how); Intervened by negotiating a change plan and determining options / strategies for behavior change, identifying triggers, exploring social supports, and working towards setting a date to begin behavior change     Current / Ongoing Stressors and Concerns:   Ongoing: The client reports receiving feedback from others that he is irritable and difficult to work with. The client reports he may get irritable due to anxiety.              Current: The client reported that he had tried to be more mindful of what he was saying since his last appointment. He described several situations at work that caused him irritability and that he struggled to handle in a calm manner. Discussed the client's goals for therapy. He identified wanting to feel less annoyed and irritable, and discussed how helping his anxiety may help. The client reported he also wanted to learn to communicate more effectively with others. Explored the client's concern that in changing himself, he would become less genuine and have to pretend. Discussed how the client can change to be more effective in life while remaining authentic to himself.     Treatment Objective(s) Addressed in This Session:   use relaxation strategies 2 times per day to reduce the physical symptoms of anxiety       Intervention:   Motivational  Interviewing: identified goals for therapy        ASSESSMENT: Current Emotional / Mental Status (status of significant symptoms):   Risk status (Self / Other harm or suicidal ideation)   Client denies current fears or concerns for personal safety.   Client denies current or recent suicidal ideation or behaviors.   Client denies current or recent homicidal ideation or behaviors.   Client denies current or recent self injurious behavior or ideation.   Client denies other safety concerns.   Client Client reports there has been no change in risk factors since their last session.     Client Client reports there has been no change in protective factors since their last session.     A safety and risk management plan has not been developed at this time, however client was given the after-hours number / 911 should there be a change in any of these risk factors.     Appearance:   Appropriate    Eye Contact:   Good    Psychomotor Behavior: Normal    Attitude:   Cooperative    Orientation:   All   Speech    Rate / Production: Normal     Volume:  Normal    Mood:    Anxious  Irritable  Normal client appeared to be in an overall good mood, presented as irritable and anxious talking about work   Affect:    Appropriate    Thought Content:  Clear    Thought Form:  Coherent  Logical    Insight:    Good      Medication Review:   No changes to current psychiatric medication(s)     Medication Compliance:   NA     Changes in Health Issues:   None reported     Chemical Use Review:   Substance Use: Chemical use reviewed, no active concerns identified      Tobacco Use: No current tobacco use.       Collateral Reports Completed:   Not Applicable    PLAN: (Client Tasks / Therapist Tasks / Other)  Client will practice paced breathing, continue increasing his awareness into his thoughts and emotions, pause before talking when upset and return for therapy in two weeks.        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    Anticipated number of session or this episode of care: 8-12      MeasurableTreatment Goal(s) related to diagnosis / functional impairment(s)  Goal 1: Client will reduce symptoms of anxiety and irritability as evidenced by ASUNCION-7 reducing from 5 to 0 over the next four months and client reporting improved ability to maintain calm.    I will know I've met my goal when I don't have recurring issues with irritability.      Objective #A (Client Action)    Client will use cognitive strategies identified in therapy to challenge anxious thoughts.  Status: New - Date: 7/30/2018     Intervention(s)  Therapist will assign homework to challenge distorted thinking  teach CBT skills.    Objective #B  Client will identify 3 initial signs or symptoms of anxiety.  Status: New - Date: 7/30/2018     Intervention(s)  Therapist will teach emotional recognition/identification. DBT.    Objective #C  Client will use relaxation strategies 3 times per day to reduce the physical symptoms of anxiety.  Status: New - Date: 7/30/2018     Intervention(s)  Therapist will assign homework to use mindfulness  teach mindfulness skills.      Goal 2: Client will improve ability to communicate effectively with others as evidenced by client reporting use of 3-4 new communications skills on a  weekly basis.    I will know I've met my goal when I can feel open to being genuine and have feelings come out.      Objective #A (Client Action)    Status: New - Date: 7/30/2018     Client will learn & utilize at least 3 assertive communication skills weekly.    Intervention(s)  Therapist will assign homework to practice communication skills  teach assertiveness skills. DEAR MAN.    Objective #B  Client will pause and use skills before talking when irritable.    Status: New - Date: 7/30/2018     Intervention(s)  Therapist will teach mindfulness skills.    Objective #C  Client will identify and maintain motivation for changing communication skills.  Status: New - Date: 7/30/2018     Intervention(s)  Therapist will teach motivational skills.        Client has reviewed and agreed to the above plan.      Isis BELL, SW July 30, 2018  Note reviewed and clinical supervision by ARABELLA Staples [facility] 8/3/2018

## 2018-07-30 NOTE — MR AVS SNAPSHOT
MRN:[id number]                      After Visit Summary   7/30/2018    Marcel Melchor    MRN: [id number]           Visit Information        Provider Department      7/30/2018 3:30 PM [facility]      Your next 10 appointments already scheduled     Aug 14, 2018  3:30 PM CDT   Return Visit with [facility] ([facility])    [address]  [place] [address]   [phone]            Aug 21, 2018  3:30 PM CDT   Return Visit with [facility] ([facility] [place])    [address]  [place] [address] [phone]            Aug 30, 2018  4:30 PM CDT   Return Visit with [facility] ([facility])    [address]  [place] [address] [phone]              MyChart Information     [facility] gives you secure access to your electronic health record. If you see a primary care provider, you can also send messages to your care team and make appointments. If you have questions, please call your primary care clinic.  If you do not have a primary care provider, please call [phone] and they will assist you.        Care EveryWhere ID     This is your Care EveryWhere ID. This could be used by other organizations to access your [facility] medical records  [id number]        Equal Access to Services     JUSTO SIDHU AH: Hadii federico fernandez hadasho Soleeanneali, waaxda luqadaha, qaybta kaalmada adeegyada, waxlaureano idifelipe vivar. So [facility] [phone].    ATENCIÓN: Si habla español, tiene a friedman disposición servicios gratuitos de asistencia lingüística. Llame al [phone].    We comply with applicable federal civil rights laws and [place] laws. We do not discriminate on the basis of race, color, national origin, age, disability, sex, sexual orientation, or gender identity.

## 2018-07-31 ASSESSMENT — PATIENT HEALTH QUESTIONNAIRE - PHQ9: SUM OF ALL RESPONSES TO PHQ QUESTIONS 1-9: 7

## 2018-07-31 ASSESSMENT — ANXIETY QUESTIONNAIRES: GAD7 TOTAL SCORE: 5

## 2018-08-03 ENCOUNTER — TRANSFERRED RECORDS (OUTPATIENT)
Dept: HEALTH INFORMATION MANAGEMENT | Facility: CLINIC | Age: 48
End: 2018-08-03

## 2018-08-14 ENCOUNTER — OFFICE VISIT (OUTPATIENT)
Dept: FAMILY MEDICINE | Facility: CLINIC | Age: 48
End: 2018-08-14
Payer: COMMERCIAL

## 2018-08-14 ENCOUNTER — RADIANT APPOINTMENT (OUTPATIENT)
Dept: GENERAL RADIOLOGY | Facility: CLINIC | Age: 48
End: 2018-08-14
Attending: PHYSICIAN ASSISTANT
Payer: COMMERCIAL

## 2018-08-14 ENCOUNTER — OFFICE VISIT (OUTPATIENT)
Dept: PSYCHOLOGY | Facility: CLINIC | Age: 48
End: 2018-08-14
Payer: COMMERCIAL

## 2018-08-14 VITALS
RESPIRATION RATE: 18 BRPM | HEART RATE: 72 BPM | TEMPERATURE: 98.6 F | BODY MASS INDEX: 29.65 KG/M2 | WEIGHT: 195 LBS | DIASTOLIC BLOOD PRESSURE: 70 MMHG | SYSTOLIC BLOOD PRESSURE: 100 MMHG

## 2018-08-14 DIAGNOSIS — S99.912A ANKLE INJURY, LEFT, INITIAL ENCOUNTER: ICD-10-CM

## 2018-08-14 DIAGNOSIS — H60.8X1 OTHER OTITIS EXTERNA, RIGHT EAR: ICD-10-CM

## 2018-08-14 DIAGNOSIS — F43.25 ADJUSTMENT DISORDER WITH MIXED DISTURBANCE OF EMOTIONS AND CONDUCT: Primary | ICD-10-CM

## 2018-08-14 DIAGNOSIS — S99.912A ANKLE INJURY, LEFT, INITIAL ENCOUNTER: Primary | ICD-10-CM

## 2018-08-14 PROCEDURE — 99213 OFFICE O/P EST LOW 20 MIN: CPT | Performed by: PHYSICIAN ASSISTANT

## 2018-08-14 PROCEDURE — 90834 PSYTX W PT 45 MINUTES: CPT | Performed by: SOCIAL WORKER

## 2018-08-14 PROCEDURE — 73610 X-RAY EXAM OF ANKLE: CPT | Mod: LT

## 2018-08-14 RX ORDER — NEOMYCIN SULFATE, POLYMYXIN B SULFATE AND HYDROCORTISONE 10; 3.5; 1 MG/ML; MG/ML; [USP'U]/ML
4 SUSPENSION/ DROPS AURICULAR (OTIC) 4 TIMES DAILY
Qty: 10 ML | Refills: 0 | Status: SHIPPED | OUTPATIENT
Start: 2018-08-14 | End: 2018-09-27

## 2018-08-14 NOTE — PROGRESS NOTES
Progress Note    Client Name: Marcel Lauohjacinta  Date: 8/14/2018         Service Type: Individual      Session Start Time: 3:30  Session End Time: 4:15      Session Length: 45 min     Session #: 3     Attendees: Client attended alone    Treatment Plan Last Reviewed: 7/30/2018  PHQ-9 / ASUNCION-7 : 8/14/2018     DATA      Progress Since Last Session (Related to Symptoms / Goals / Homework):   Symptoms: Improving client reported decreased irritability    Homework: Achieved / completed to satisfaction client used paced breathing, challenged irritability      Episode of Care Goals: Satisfactory progress - ACTION (Actively working towards change); Intervened by reinforcing change plan / affirming steps taken     Current / Ongoing Stressors and Concerns:   Ongoing: The client reports receiving feedback from others that he is irritable and difficult to work with. The client reports he may get irritable due to anxiety.              Current: The client reported that he had been practicing on a daily basis to improve his irritability. He stated that he was using paced breathing as needed to reduce the intensity of his irritation and that he was thinking through his responses to people. The client explained that he had focusing on identifying when his impulsive response wouldn't be effective and changing his response accordingly. Explored how the client was finding ways to be assertive while also being polite and professional. He identified that he was getting better at letting go of moments when he is irritable as he is recognizing it isn't worth all of the energy it takes to be angry. 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 Discussed how the client was working to improve his relationships at home by saying positive things out loud towards his daughter and wife. He stated  feeling like he was not getting reinforcement from his family to say nice things, as they are dismissive when he tries to be nice to them. Explored how the client could be reinforced to continue being kind to his family members by recognizing he was upholding his values by doing so.       Treatment Objective(s) Addressed in This Session:   use cognitive strategies identified in therapy to challenge anxious thoughts       Intervention:   DBT: discussed practicing acceptance over inability to control situations, coping ahead  Motivational Interviewing: reviewed client's use of skills, identified areas that could be used more effectively        ASSESSMENT: Current Emotional / Mental Status (status of significant symptoms):   Risk status (Self / Other harm or suicidal ideation)   Client denies current fears or concerns for personal safety.   Client denies current or recent suicidal ideation or behaviors.   Client denies current or recent homicidal ideation or behaviors.   Client denies current or recent self injurious behavior or ideation.   Client denies other safety concerns.   Client Client reports there has been no change in risk factors since their last session.     Client Client reports there has been no change in protective factors since their last session.     A safety and risk management plan has not been developed at this time, however client was given the after-hours number / 911 should there be a change in any of these risk factors.     Appearance:   Appropriate    Eye Contact:   Good    Psychomotor Behavior: Normal    Attitude:   Cooperative    Orientation:   All   Speech    Rate / Production: Normal     Volume:  Normal    Mood:    Normal client presented with a good mood   Affect:    Appropriate    Thought Content:  Clear    Thought Form:  Coherent  Logical    Insight:    Good      Medication Review:   No changes to current psychiatric medication(s)     Medication Compliance:   NA     Changes in Health  Issues:   None reported     Chemical Use Review:   Substance Use: Chemical use reviewed, no active concerns identified      Tobacco Use: No current tobacco use.       Collateral Reports Completed:   Not Applicable    PLAN: (Client Tasks / Therapist Tasks / Other)  Client will continue using paced breathing, pausing when feeling irritable, identifying signs of irritability and return for therapy in two weeks.        Isis Estrella MSW, [facility] 8/14/2018  Note reviewed and clinical supervision by ARABELLA Staples [facility] 8/27/2018                                                            ________________________________________________________________________    Treatment Plan    Client's Name: Marcel Melchor  YOB: 1970    Date: 7/30/2108    DSM-V Diagnoses: Adjustment Disorders  309.4 (F43.25) With mixed disturbance of emotions and conduct R/O ASUNCION, client's symptoms may be due to anxiety, further assessment needed  Psychosocial / Contextual Factors: client reports experiencing irritability and anxiety at work  WHODAS: see in    Referral / Collaboration:  Referral to another professional/service is not indicated at this time..    Anticipated number of session or this episode of care: 8-12      MeasurableTreatment Goal(s) related to diagnosis / functional impairment(s)  Goal 1: Client will reduce symptoms of anxiety and irritability as evidenced by ASUNCION-7 reducing from 5 to 0 over the next four months and client reporting improved ability to maintain calm.    I will know I've met my goal when I don't have recurring issues with irritability.      Objective #A (Client Action)    Client will use cognitive strategies identified in therapy to challenge anxious thoughts.  Status: New - Date: 7/30/2018     Intervention(s)  Therapist will assign homework to challenge distorted thinking  teach CBT skills.    Objective #B  Client will identify 3 initial signs or symptoms of anxiety.  Status: New - Date: 7/30/2018      Intervention(s)  Therapist will teach emotional recognition/identification. DBT.    Objective #C  Client will use relaxation strategies 3 times per day to reduce the physical symptoms of anxiety.  Status: New - Date: 7/30/2018     Intervention(s)  Therapist will assign homework to use mindfulness  teach mindfulness skills.      Goal 2: Client will improve ability to communicate effectively with others as evidenced by client reporting use of 3-4 new communications skills on a weekly basis.    I will know I've met my goal when I can feel open to being genuine and have feelings come out.      Objective #A (Client Action)    Status: New - Date: 7/30/2018     Client will learn & utilize at least 3 assertive communication skills weekly.    Intervention(s)  Therapist will assign homework to practice communication skills  teach assertiveness skills. DEAR MINAL.    Objective #B  Client will pause and use skills before talking when irritable.    Status: New - Date: 7/30/2018     Intervention(s)  Therapist will teach mindfulness skills.    Objective #C  Client will identify and maintain motivation for changing communication skills.  Status: New - Date: 7/30/2018     Intervention(s)  Therapist will teach motivational skills.        Client has reviewed and agreed to the above plan.      Isis Estrella MSKARAN, LGSW July 30, 2018  Note reviewed and clinical supervision by ARABELLA Staples [facility] 8/[facility]/2018

## 2018-08-14 NOTE — MR AVS SNAPSHOT
MRN:[id number]                      After Visit Summary   8/14/2018    Marcel Melchor    MRN: [id number]           Visit Information        Provider Department      8/14/2018 3:30 PM Sameer Isis [facility] Generic      Your next 10 appointments already scheduled     Aug 30, 2018  4:30 PM CDT   Return Visit with Isis Sameer   [facility] ([facility])    [address]  [place] [address] [phone]            Sep 11, 2018  1:30 PM CDT   Return Visit with Isis Estrella   [facility] [place] ([facility] [place])    [address]  [place] [address] [phone]            Sep 27, 2018  1:15 PM CDT   (Arrive by 1:00 PM)   [facility]illo [facility]   [facility]    [address]  [address]  [place] [address] [phone]              MyChart Information     [employer] gives you secure access to your electronic health record. If you see a primary care provider, you can also send messages to your care team and make appointments. If you have questions, please call your primary care clinic.  If you do not have a primary care provider, please call [phone] and they will assist you.        Care EveryWhere ID     This is your Care EveryWhere ID. This could be used by other organizations to access your [place] medical records  [id number]        Equal Access to Services     JUSTO SIDHU : Hadii federico fernandez hadasho Soleeanneali, waaxda luqadaha, qaybta kaalmada adeegyada, waxlaureano cortesfelipe vivar. So [facility] [phone].    ATENCIÓN: Si habla español, tiene a friedman disposición servicios gratuitos de asistencia lingüística. Llame al [phone].    We comply with applicable federal civil rights laws and [place] laws. We do not discriminate on the basis of race, color, national origin, age, disability, sex, sexual orientation, or gender  identity.

## 2018-08-14 NOTE — MR AVS SNAPSHOT
After Visit Summary   8/14/2018    Marcel Melchor    MRN: [id number]           Patient Information     Date Of Birth          1970        Visit Information        Provider Department      8/14/2018 10:00 AM Wes Braga PA-C [facility]        Today's Diagnoses     Ankle injury, left, initial encounter    -  1    Other otitis externa, right ear           Follow-ups after your visit        Your next 10 appointments already scheduled     Aug 14, 2018  3:30 PM CDT   Return Visit with Isis [facility] ([facility])    [address]  [facility] [address]   [phone]            Aug 21, 2018  3:30 PM CDT   Return Visit with Isis [facility] ([facility])    [address]  [facility] [address]   [phone]            Aug 30, 2018  4:30 PM CDT   Return Visit with Isis [facility] ([facility] [place])    [address]  [facility] [address]   [phone]            Sep 27, 2018  1:15 PM CDT   (Arrive by 1:00 PM)   New Visit with Gregoria Castillo NP   [facility] ([facility]    [address]  [address]  [facility] [address] [phone]              Who to contact     If you have questions or need follow up information about today's clinic visit or your schedule please contact [facility] directly at [phone].  Normal or non-critical lab and imaging results will be communicated to you by MyChart, letter or phone within 4 business days after the clinic has received the results. If you do not hear from us within 7 days, please contact the clinic through MyChart or phone. If you have a critical or abnormal lab result, we will notify you by phone as soon as possible.  Submit refill requests through [employer] or call your pharmacy and they will forward the refill request to us. Please allow 3  business days for your refill to be completed.          Additional Information About Your Visit        [employer] Information     [employer] gives you secure access to your electronic health record. If you see a primary care provider, you can also send messages to your care team and make appointments. If you have questions, please call your primary care clinic.  If you do not have a primary care provider, please call [phone] and they will assist you.        Care EveryWhere ID     This is your Care EveryWhere ID. This could be used by other organizations to access your [place] medical records  [id number]        Your Vitals Were     Pulse Temperature Respirations BMI (Body Mass Index)          72 98.6  F (37  C) (Oral) 18 29.65 kg/m2         Blood Pressure from Last 3 Encounters:   08/14/18 100/70   06/20/18 110/72   06/08/18 118/78    Weight from Last 3 Encounters:   08/14/18 195 lb (88.5 kg)   06/20/18 195 lb 12.8 oz (88.8 kg)   06/08/18 194 lb 12.8 oz (88.4 kg)                 Today's Medication Changes          These changes are accurate as of 8/14/18 11:27 AM.  If you have any questions, ask your nurse or doctor.               Start taking these medicines.        Dose/Directions    neomycin-polymyxin-hydrocortisone 3.5-68583-6 otic suspension   Commonly known as:  CORTISPORIN   Used for:  Other otitis externa, right ear   Started by:  Wes Braga PA-C        Dose:  4 drop   Place 4 drops into the right ear 4 times daily   Quantity:  10 mL   Refills:  0       order for DME   Used for:  Ankle injury, left, initial encounter   Started by:  Wes Braga PA-C        Equipment being ordered: Cam boot   Quantity:  1 each   Refills:  0            Where to get your medicines      These medications were [facility] to [id number] [place] [facility] [address]  [id number], [facility] [address]     Phone:  [phone]     neomycin-polymyxin-hydrocortisone 3.5-46731-0 otic suspension          Some of these will need a paper prescription and others can be bought over the counter.  Ask your nurse if you have questions.     Bring a paper prescription for each of these medications     order for DME                Primary Care Provider Office Phone # Fax #    Wes Braga PA-C [phone] [id number]  [facility] [address]        Equal Access to Services     JUSTO SIDHU : Hadii aad ku hadasho Soomaali, waaxda luqadaha, qaybta kaalmada adeegyada, waxay idiin hayaan adeeg kharash la'aan ah. So [facility] [phone].    ATENCIÓN: Si habla español, tiene a friedman disposición servicios gratuitos de asistencia lingüística. Llame al [phone].    We comply with applicable federal civil rights laws and [place] laws. We do not discriminate on the basis of race, color, national origin, age, disability, sex, sexual orientation, or gender identity.            Thank you!     Thank you for choosing [facility]  for your care. Our goal is always to provide you with excellent care. Hearing back from our patients is one way we can continue to improve our services. Please take a few minutes to complete the written survey that you may receive in the mail after your visit with us. Thank you!             Your Updated Medication List - Protect others around you: Learn how to safely use, store and throw away your medicines at www.disposemymeds.org.          This list is accurate as of 8/14/18 11:27 AM.  Always use your most recent med list.                   Brand Name Dispense Instructions for use Diagnosis    ASACOL  MG EC tablet   Generic drug:  mesalamine      TAKE 3 TABLETS BY MOUTH TWICE DAILY        clindamycin 1 % lotion    CLEOCIN T    60 mL    APPLY TOPICALLY TO FACE AND CHEST TWICE A DAY AS NEEDED    Acne, unspecified acne type       cyclobenzaprine 5 MG tablet    FLEXERIL    30 tablet    TAKE 1 TABLET (5 MG) BY MOUTH 3 TIMES DAILY AS NEEDED FOR MUSCLE SPASMS     Acute bilateral low back pain with right-sided sciatica       neomycin-polymyxin-hydrocortisone 3.5-93872-7 otic suspension    CORTISPORIN    10 mL    Place 4 drops into the right ear 4 times daily    Other otitis externa, right ear       NUVIGIL PO           order for DME     1 each    Equipment being ordered: Cam boot    Ankle injury, left, initial encounter       venlafaxine 150 MG 24 hr capsule    EFFEXOR-XR    90 capsule    Take 1 capsule (150 mg) by mouth daily    Anxiety, Irritability and anger

## 2018-08-14 NOTE — PROGRESS NOTES
SUBJECTIVE:   Marcel Melchor is a 48 year old male who presents to clinic today for the following health issues:      Joint Pain    Onset: 8/13/18    Description:   Location: left ankle  Character: Sharp, Stabbing, Gnawing, Burning and Cramping    Intensity: 5-6/10    Progression of Symptoms: same    Accompanying Signs & Symptoms:  Other symptoms: swelling and tightness, side to side motion really makes it hurt    History:   Previous similar pain: no       Precipitating factors:   Trauma or overuse: YES- jet skiing yesterday    Alleviating factors:  Improved by: rest/inactivity and ice    Therapies Tried and outcome: ice, elevation    Yesterday while jet skiing Marcel injured his left ankle.  Iced and elevated it.  Took some ibuprofen and then went to bed.  Has not taken anything yet today.  Is bearing weight OK but painful.  Also would like right ear checked, feels that there is still water in it from when he fell.      Problem list and histories reviewed & adjusted, as indicated.  Additional history: as documented      Reviewed and updated as needed this visit by clinical staff  Tobacco  Allergies  Meds  Med Hx  Surg Hx  Fam Hx  Soc Hx      Reviewed and updated as needed this visit by Provider         ROS:  Constitutional, HEENT, cardiovascular, pulmonary, gi and gu systems are negative, except as otherwise noted.    OBJECTIVE:     /70 (BP Location: Right arm, Patient Position: Sitting, Cuff Size: Adult Regular)  Pulse 72  Temp 98.6  F (37  C) (Oral)  Resp 18  Wt 195 lb (88.5 kg)  BMI 29.65 kg/m2  Body mass index is 29.65 kg/(m^2).  GENERAL: healthy, alert and no distress  MS: LLE exam shows moderate swelling of lateral malleolus.  TTP posterior to lateral malleolus an up on to fibula.  There is also TTP at 5th metatarsal base.  No ecchymosis noted.  Ligaments intact when testing.  ENT: soft white material present in right ear canal, no inflammation noted.  SKIN: no suspicious lesions or  "rashes  PSYCH: mentation appears normal, affect normal/bright    Diagnostic Test Results:  Xray - FINDINGS: Lateral soft tissue swelling. No fracture or malalignment.  Mortise joint is congruent.     IMPRESSION: No acute osseous abnormality. (noted area of density between tibia and fibula considered benign density or \"bone island\") I was able to call radiologist and speak with him to confirm.  This is not acute finding.    ASSESSMENT/PLAN:   1. Ankle injury, left, initial encounter    - XR Ankle Left G/E 3 Views; Future  - order for DME; Equipment being ordered: Cam boot  Dispense: 1 each; Refill: 0    2. Other otitis externa, right ear    - neomycin-polymyxin-hydrocortisone (CORTISPORIN) 3.5-11465-1 otic suspension; Place 4 drops into the right ear 4 times daily  Dispense: 10 mL; Refill: 0        Wes Braga PA-C  [facility]    "

## 2018-08-15 ASSESSMENT — ANXIETY QUESTIONNAIRES
5. BEING SO RESTLESS THAT IT IS HARD TO SIT STILL: SEVERAL DAYS
IF YOU CHECKED OFF ANY PROBLEMS ON THIS QUESTIONNAIRE, HOW DIFFICULT HAVE THESE PROBLEMS MADE IT FOR YOU TO DO YOUR WORK, TAKE CARE OF THINGS AT HOME, OR GET ALONG WITH OTHER PEOPLE: NOT DIFFICULT AT ALL
7. FEELING AFRAID AS IF SOMETHING AWFUL MIGHT HAPPEN: NOT AT ALL
6. BECOMING EASILY ANNOYED OR IRRITABLE: SEVERAL DAYS
3. WORRYING TOO MUCH ABOUT DIFFERENT THINGS: NOT AT ALL
1. FEELING NERVOUS, ANXIOUS, OR ON EDGE: SEVERAL DAYS
GAD7 TOTAL SCORE: 3
2. NOT BEING ABLE TO STOP OR CONTROL WORRYING: NOT AT ALL

## 2018-08-15 ASSESSMENT — PATIENT HEALTH QUESTIONNAIRE - PHQ9: 5. POOR APPETITE OR OVEREATING: NOT AT ALL

## 2018-08-16 ASSESSMENT — PATIENT HEALTH QUESTIONNAIRE - PHQ9: SUM OF ALL RESPONSES TO PHQ QUESTIONS 1-9: 7

## 2018-08-16 ASSESSMENT — ANXIETY QUESTIONNAIRES: GAD7 TOTAL SCORE: 3

## 2018-08-21 ENCOUNTER — OFFICE VISIT (OUTPATIENT)
Dept: PSYCHOLOGY | Facility: CLINIC | Age: 48
End: 2018-08-21
Payer: COMMERCIAL

## 2018-08-21 ENCOUNTER — OFFICE VISIT (OUTPATIENT)
Dept: FAMILY MEDICINE | Facility: CLINIC | Age: 48
End: 2018-08-21
Payer: COMMERCIAL

## 2018-08-21 VITALS
BODY MASS INDEX: 30.26 KG/M2 | RESPIRATION RATE: 16 BRPM | HEART RATE: 60 BPM | DIASTOLIC BLOOD PRESSURE: 80 MMHG | TEMPERATURE: 98.3 F | WEIGHT: 199 LBS | SYSTOLIC BLOOD PRESSURE: 124 MMHG

## 2018-08-21 DIAGNOSIS — F43.25 ACUTE ADJUSTMENT DISORDER WITH MIXED DISTURBANCE OF EMOTIONS AND CONDUCT: Primary | ICD-10-CM

## 2018-08-21 DIAGNOSIS — S93.402D SPRAIN OF LEFT ANKLE, UNSPECIFIED LIGAMENT, SUBSEQUENT ENCOUNTER: Primary | ICD-10-CM

## 2018-08-21 PROCEDURE — 99213 OFFICE O/P EST LOW 20 MIN: CPT | Performed by: PHYSICIAN ASSISTANT

## 2018-08-21 PROCEDURE — 90834 PSYTX W PT 45 MINUTES: CPT | Performed by: SOCIAL WORKER

## 2018-08-21 ASSESSMENT — ANXIETY QUESTIONNAIRES
IF YOU CHECKED OFF ANY PROBLEMS ON THIS QUESTIONNAIRE, HOW DIFFICULT HAVE THESE PROBLEMS MADE IT FOR YOU TO DO YOUR WORK, TAKE CARE OF THINGS AT HOME, OR GET ALONG WITH OTHER PEOPLE: NOT DIFFICULT AT ALL
2. NOT BEING ABLE TO STOP OR CONTROL WORRYING: SEVERAL DAYS
5. BEING SO RESTLESS THAT IT IS HARD TO SIT STILL: SEVERAL DAYS
1. FEELING NERVOUS, ANXIOUS, OR ON EDGE: SEVERAL DAYS
7. FEELING AFRAID AS IF SOMETHING AWFUL MIGHT HAPPEN: NOT AT ALL
GAD7 TOTAL SCORE: 7
6. BECOMING EASILY ANNOYED OR IRRITABLE: MORE THAN HALF THE DAYS
3. WORRYING TOO MUCH ABOUT DIFFERENT THINGS: SEVERAL DAYS

## 2018-08-21 ASSESSMENT — PATIENT HEALTH QUESTIONNAIRE - PHQ9: 5. POOR APPETITE OR OVEREATING: SEVERAL DAYS

## 2018-08-21 NOTE — MR AVS SNAPSHOT
After Visit Summary   8/21/2018    Marcel Melchor    MRN: [id number]           Patient Information     Date Of Birth          1970        Visit Information        Provider Department      8/21/2018 1:40 PM Wes Braga PA-C [facility]        Today's Diagnoses     Sprain of left ankle, unspecified ligament, subsequent encounter    -  1       Follow-ups after your visit        Follow-up notes from your care team     Return in about 2 weeks (around 9/4/2018) for Follow up.      Your next 10 appointments already scheduled     Aug 21, 2018  3:30 PM CDT   [place] Visit with Isis  [place] ([facility] [place])    [address]  [place] [address]   [phone]            Aug 30, 2018  4:30 PM CDT   Return Visit with Isis  [place] ([facility] [place])    [address]  [place] [address]   [phone]            Sep 11, 2018  1:30 PM CDT   [place] Visit with Isis  [place] ([facility] [place])    [address]  [place] [address]   [phone]            Sep 27, 2018  1:15 PM CDT   (Arrive by 1:00 PM)   [facility] with Gregoria Castillo NP   [facility]    [address]  [address]  [facility] [id number] [phone]              Who to contact     If you have questions or need follow up information about today's clinic visit or your schedule please contact [facility] directly at [phone].  Normal or non-critical lab and imaging results will be communicated to you by MyChart, letter or phone within 4 business days after the clinic has received the results. If you do not hear from us within 7 days, please contact the clinic through MyChart or phone. If you have a critical or abnormal lab result, we will notify you by phone as soon as possible.  Submit refill requests through [employer]  or call your pharmacy and they will forward the refill request to us. Please allow 3 business days for your refill to be completed.          Additional Information About Your Visit        Active DSPhart Information     5 examples gives you secure access to your electronic health record. If you see a primary care provider, you can also send messages to your care team and make appointments. If you have questions, please call your primary care clinic.  If you do not have a primary care provider, please call [phone] and they will assist you.        Care EveryWhere ID     This is your Care EveryWhere ID. This could be used by other organizations to access your [facility] medical records  [id number]        Your Vitals Were     Pulse Temperature Respirations BMI (Body Mass Index)          60 98.3  F (36.8  C) (Oral) 16 30.26 kg/m2         Blood Pressure from Last 3 Encounters:   08/21/18 124/80   08/14/18 100/70   06/20/18 110/72    Weight from Last 3 Encounters:   08/21/18 199 lb (90.3 kg)   08/14/18 195 lb (88.5 kg)   06/20/18 195 lb 12.8 oz (88.8 kg)              Today, you had the following     No orders found for display         Today's Medication Changes          These changes are accurate as of 8/21/18  2:18 PM.  If you have any questions, ask your nurse or doctor.               Start taking these medicines.        Dose/Directions    order for DME   Used for:  Sprain of left ankle, unspecified ligament, subsequent encounter   Started by:  Wes Braga PA-C        Equipment being ordered: ankle brace/air cast   Quantity:  1 each   Refills:  0            Where to get your medicines      Some of these will need a paper prescription and others can be bought over the counter.  Ask your nurse if you have questions.     Bring a paper prescription for each of these medications     order for DME                Primary Care Provider Office Phone # Fax #    Wes Braga PA-C [phone]  [facility] [address]        Equal Access to Services     JUSTO SIDHU : Hadii federico fernandez hadrobertrigo Soleeanneali, waaxda luqadaha, qaybta kaalmafied beth, rafael vivar. So [facility] [phone].    ATENCIÓN: Si habla español, tiene a friedman disposición servicios gratuitos de asistencia lingüística. Llame al [phone].    We comply with applicable federal civil rights laws and [place] laws. We do not discriminate on the basis of race, color, national origin, age, disability, sex, sexual orientation, or gender identity.            Thank you!     Thank you for choosing [facility]  for your care. Our goal is always to provide you with excellent care. Hearing back from our patients is one way we can continue to improve our services. Please take a few minutes to complete the written survey that you may receive in the mail after your visit with us. Thank you!             Your Updated Medication List - Protect others around you: Learn how to safely use, store and throw away your medicines at www.disposemymeds.org.          This list is accurate as of 8/21/18  2:18 PM.  Always use your most recent med list.                   Brand Name Dispense Instructions for use Diagnosis    ASACOL  MG EC tablet   Generic drug:  mesalamine      TAKE 3 TABLETS BY MOUTH TWICE DAILY        clindamycin 1 % lotion    CLEOCIN T    60 mL    APPLY TOPICALLY TO FACE AND CHEST TWICE A DAY AS NEEDED    Acne, unspecified acne type       cyclobenzaprine 5 MG tablet    FLEXERIL    30 tablet    TAKE 1 TABLET (5 MG) BY MOUTH 3 TIMES DAILY AS NEEDED FOR MUSCLE SPASMS    Acute bilateral low back pain with right-sided sciatica       neomycin-polymyxin-hydrocortisone [id number] otic suspension    CORTISPORIN    10 mL    Place 4 drops into the right ear 4 times daily    Other otitis externa, right ear       NUVIGIL PO           order for DME     1 each    Equipment being ordered: Cam boot    Ankle  injury, left, initial encounter       order for DME     1 each    Equipment being ordered: ankle brace/air cast    Sprain of left ankle, unspecified ligament, subsequent encounter       venlafaxine 150 MG 24 hr capsule    EFFEXOR-XR    90 capsule    Take 1 capsule (150 mg) by mouth daily    Anxiety, Irritability and anger

## 2018-08-21 NOTE — MR AVS SNAPSHOT
MRN:[id number]                      After Visit Summary   8/21/2018    Marcel Melchor    MRN: [id number]           Visit Information        Provider Department      8/21/2018 3:30 PM Sameer Isis [facility]      Your next 10 appointments already scheduled     Aug 30, 2018  4:30 PM CDT   Return Visit with Isis Sameer   [facility] ([facility])    [address]  [place] [address] [phone]            Sep 11, 2018  1:30 PM CDT   Return Visit with Isis Estrella   [facility] [place] ([facility] [place])    [address]  [place] [address] [phone]            Sep 27, 2018  1:15 PM CDT   (Arrive by 1:00 PM)   [facility] Visit with Gregoria [facility]illo NP   [facility]    [address]  [address]  [facility] [address] [phone]              MyChart Information     [employer] App gives you secure access to your electronic health record. If you see a primary care provider, you can also send messages to your care team and make appointments. If you have questions, please call your primary care clinic.  If you do not have a primary care provider, please call [phone] and they will assist you.        Care EveryWhere ID     This is your Care EveryWhere ID. This could be used by other organizations to access your [place] medical records  [id number]        Equal Access to Services     JUSTO SIDHU : Hadii federico fernandez hadasho Soleeanneali, waaxda luqadaha, qaybta kaalmada adeegyada, waxlaureano cortesfelipe vivar. So [facility] [phone].    ATENCIÓN: Si habla español, tiene a friedman disposición servicios gratuitos de asistencia lingüística. Llame al [phone].    We comply with applicable federal civil rights laws and [place] laws. We do not discriminate on the basis of race, color, national origin, age, disability, sex, sexual orientation, or gender  identity.

## 2018-08-21 NOTE — PROGRESS NOTES
Progress Note    Client Name: Marcel Lauohjacinta  Date: 8/21/2018         Service Type: Individual      Session Start Time: 3:30  Session End Time: 4:15      Session Length: 45 min     Session #: 4     Attendees: Client attended alone    Treatment Plan Last Reviewed: 7/30/2018  PHQ-9 / ASUNCION-7 : 8/21/2018     DATA      Progress Since Last Session (Related to Symptoms / Goals / Homework):   Symptoms: Improving client reported decreased irritability    Homework: Achieved / completed to satisfaction client used paced breathing, challenged irritability      Episode of Care Goals: Satisfactory progress - ACTION (Actively working towards change); Intervened by reinforcing change plan / affirming steps taken     Current / Ongoing Stressors and Concerns:   Ongoing: The client reports receiving feedback from others that he is irritable and difficult to work with. The client reports he may get irritable due to anxiety.              Current: The client reported that he had been practicing reducing his irritability and responding to people with more patience. He described several situations at work in which he had to be more mindful about his response in order to resist reacting with irritation. The client discussed struggling to find the line between assertiveness and being too authoritarian. Explored how the client could identify what he is trying to achieve in his conversations with others, and consider whether his response will help him to reach this goal. 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 Discussed how the client could be assertive, manage people as needed but interact with his employees with empathy to create a positive work environment.     Treatment Objective(s) Addressed in This Session:   use relaxation strategies 2 times per day to reduce  the physical symptoms of anxiety  pause and use skills before talking when irritable.     Intervention:   DBT: Discussed client's use of skills with interpersonal relationships, how he had been using paced breathing to reduce irritability  Motivational Interviewing: Discussed client's motivation for change, how he can keep his motivation in mind to make progress towards change        ASSESSMENT: Current Emotional / Mental Status (status of significant symptoms):   Risk status (Self / Other harm or suicidal ideation)   Client denies current fears or concerns for personal safety.   Client denies current or recent suicidal ideation or behaviors.   Client denies current or recent homicidal ideation or behaviors.   Client denies current or recent self injurious behavior or ideation.   Client denies other safety concerns.   Client Client reports there has been no change in risk factors since their last session.     Client Client reports there has been no change in protective factors since their last session.     A safety and risk management plan has not been developed at this time, however client was given the after-hours number / 911 should there be a change in any of these risk factors.     Appearance:   Appropriate    Eye Contact:   Good    Psychomotor Behavior: Normal    Attitude:   Cooperative    Orientation:   All   Speech    Rate / Production: Normal     Volume:  Normal    Mood:    Normal client presented with a good mood   Affect:    Appropriate    Thought Content:  Clear    Thought Form:  Coherent  Logical    Insight:    Good      Medication Review:   No changes to current psychiatric medication(s)     Medication Compliance:   NA     Changes in Health Issues:   None reported     Chemical Use Review:   Substance Use: Chemical use reviewed, no active concerns identified      Tobacco Use: No current tobacco use.       Collateral Reports Completed:   Not Applicable    PLAN: (Client Tasks / Therapist Tasks /  Other)  Client will identify his goal when in conversation and ask himself if his actions were effective in reaching his goal, continue using paced breathing when feeling irritable or overwhelmed, and return for therapy in two weeks.        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    Anticipated number of session or this episode of care: 8-12      MeasurableTreatment Goal(s) related to diagnosis / functional impairment(s)  Goal 1: Client will reduce symptoms of anxiety and irritability as evidenced by ASUNCION-7 reducing from 5 to 0 over the next four months and client reporting improved ability to maintain calm.    I will know I've met my goal when I don't have recurring issues with irritability.      Objective #A (Client Action)    Client will use cognitive strategies identified in therapy to challenge anxious thoughts.  Status: New - Date: 7/30/2018     Intervention(s)  Therapist will assign homework to challenge distorted thinking  teach CBT skills.    Objective #B  Client will identify 3 initial signs or symptoms of anxiety.  Status: New - Date: 7/30/2018     Intervention(s)  Therapist will teach emotional recognition/identification. DBT.    Objective #C  Client will use relaxation strategies 3 times per day to reduce the physical  symptoms of anxiety.  Status: New - Date: 7/30/2018     Intervention(s)  Therapist will assign homework to use mindfulness  teach mindfulness skills.      Goal 2: Client will improve ability to communicate effectively with others as evidenced by client reporting use of 3-4 new communications skills on a weekly basis.    I will know I've met my goal when I can feel open to being genuine and have feelings come out.      Objective #A (Client Action)    Status: New - Date: 7/30/2018     Client will learn & utilize at least 3 assertive communication skills weekly.    Intervention(s)  Therapist will assign homework to practice communication skills  teach assertiveness skills. DEAR MAN.    Objective #B  Client will pause and use skills before talking when irritable.    Status: New - Date: 7/30/2018     Intervention(s)  Therapist will teach mindfulness skills.    Objective #C  Client will identify and maintain motivation for changing communication skills.  Status: New - Date: 7/30/2018     Intervention(s)  Therapist will teach motivational skills.        Client has reviewed and agreed to the above plan.      Isis BELL, LGSW July 30, 2018  Note reviewed and clinical supervision by ARABELLA Staples [facility] 8/3/2018

## 2018-08-21 NOTE — PROGRESS NOTES
SUBJECTIVE:   Marcel Melchor is a 48 year old male who presents to clinic today for the following health issues:    Recheck left ankle, still having a lot of tenderness,especially on outer side of ankle, now discoloration of toes, and outside of heel. Sensitive to touch on the skin.    He has been wearing walking boot most of the day.  Takes off to sleep and for 20-30 minutes to stretch during the day.  Has concerns about the discoloration and about improvement.      Problem list and histories reviewed & adjusted, as indicated.  Additional history: as documented      Reviewed and updated as needed this visit by clinical staff  Tobacco  Allergies  Meds  Med Hx  Surg Hx  Fam Hx  Soc Hx      Reviewed and updated as needed this visit by Provider         ROS:  Constitutional, HEENT, cardiovascular, pulmonary, gi and gu systems are negative, except as otherwise noted.    OBJECTIVE:     /80 (BP Location: Right arm, Patient Position: Sitting, Cuff Size: Adult Regular)  Pulse 60  Temp 98.3  F (36.8  C) (Oral)  Resp 16  Wt 199 lb (90.3 kg)  BMI 30.26 kg/m2  Body mass index is 30.26 kg/(m^2).  GENERAL: healthy, alert and no distress  MS: LLE exam shows normal strength and muscle mass, no deformities and good ROM- there is some mild ecchymosis present at the top of the 4-5th toes and lateral heel.  SKIN: no suspicious lesions or rashes  NEURO: Normal strength and tone, mentation intact and speech normal  PSYCH: mentation appears normal, affect normal/bright    Diagnostic Test Results:  none     ASSESSMENT/PLAN:   1. Sprain of left ankle, unspecified ligament, subsequent encounter  Stop using walking boot.  Discussed stretches to do.  Continue icing the ankle.  He will use air cast now for 1-2 weeks and then wean out of that.  Follow up with any lack of improvement.  - order for DME; Equipment being ordered: ankle brace/air cast  Dispense: 1 each; Refill: 0        Wse Braga PA-C  [facility]  [place]

## 2018-08-22 ASSESSMENT — ANXIETY QUESTIONNAIRES: GAD7 TOTAL SCORE: 7

## 2018-08-22 ASSESSMENT — PATIENT HEALTH QUESTIONNAIRE - PHQ9: SUM OF ALL RESPONSES TO PHQ QUESTIONS 1-9: 9

## 2018-08-30 ENCOUNTER — OFFICE VISIT (OUTPATIENT)
Dept: PSYCHOLOGY | Facility: CLINIC | Age: 48
End: 2018-08-30
Payer: COMMERCIAL

## 2018-08-30 DIAGNOSIS — F43.25 ADJUSTMENT DISORDER WITH MIXED DISTURBANCE OF EMOTIONS AND CONDUCT: Primary | ICD-10-CM

## 2018-08-30 PROCEDURE — 90834 PSYTX W PT 45 MINUTES: CPT | Performed by: SOCIAL WORKER

## 2018-08-30 ASSESSMENT — ANXIETY QUESTIONNAIRES
3. WORRYING TOO MUCH ABOUT DIFFERENT THINGS: NOT AT ALL
5. BEING SO RESTLESS THAT IT IS HARD TO SIT STILL: NOT AT ALL
1. FEELING NERVOUS, ANXIOUS, OR ON EDGE: SEVERAL DAYS
2. NOT BEING ABLE TO STOP OR CONTROL WORRYING: NOT AT ALL
IF YOU CHECKED OFF ANY PROBLEMS ON THIS QUESTIONNAIRE, HOW DIFFICULT HAVE THESE PROBLEMS MADE IT FOR YOU TO DO YOUR WORK, TAKE CARE OF THINGS AT HOME, OR GET ALONG WITH OTHER PEOPLE: SOMEWHAT DIFFICULT
GAD7 TOTAL SCORE: 4
6. BECOMING EASILY ANNOYED OR IRRITABLE: MORE THAN HALF THE DAYS
7. FEELING AFRAID AS IF SOMETHING AWFUL MIGHT HAPPEN: NOT AT ALL

## 2018-08-30 ASSESSMENT — PATIENT HEALTH QUESTIONNAIRE - PHQ9: 5. POOR APPETITE OR OVEREATING: SEVERAL DAYS

## 2018-08-30 NOTE — PROGRESS NOTES
Progress Note    Client Name: Marcel Melchor  Date: 8/30/2018         Service Type: Individual      Session Start Time: 4:30  Session End Time: 5:15      Session Length: 45 min     Session #: 5     Attendees: Client attended alone    Treatment Plan Last Reviewed: 7/30/2018  PHQ-9 / ASUNCION-7 : 8/30/2018     DATA      Progress Since Last Session (Related to Symptoms / Goals / Homework):   Symptoms: Worsening client reports some symptoms of depression with family conflict    Homework: Partially completed client used skills to minimize irritability at work, struggled at home      Episode of Care Goals: Satisfactory progress - ACTION (Actively working towards change); Intervened by reinforcing change plan / affirming steps taken     Current / Ongoing Stressors and Concerns:   Ongoing: The client reports receiving feedback from others that he is irritable and difficult to work with. The client reports he may get irritable due to anxiety.              Current: The client reported that he had continued to work on improving his irritability but that he had conflict with his daughter that disrupted his ability to use skills. He described the conflict, and how he was concerned that a video his daughter took would end up on the internet, and be detrimental to her in the long run. The client explained that he was able to wait to talk to her about it until the next day, but that instead of improving his irritability, his frustration increased over time. He stated that he was [place] while talking to his daughter over the phone he had not felt that he was overly harsh, though his daughter said that she felt hurt afterwards and his wife was upset with him. Discussed how the client has a fast processing speed and how this clashes at times with his wife and daughter, who process things slower.Explored how the client can slow down his processing speed to be more mindful about how he  communicates. Identified how the client feels protective of his 18 year old daughter and how he struggles to let her make her own mistakes.      Treatment Objective(s) Addressed in This Session:   use relaxation strategies 2 times per day to reduce the physical symptoms of anxiety  pause and use skills before talking when irritable.     Intervention:   DBT: Discussed how the client can change his communication style depending on the goal of each conversation, and how his fast processsing speed may be beneficial to him in some scenarios, but not all.   Motivational Interviewing: Identified how the client can be motivated to slow down when feeling angry and irritable        ASSESSMENT: Current Emotional / Mental Status (status of significant symptoms):   Risk status (Self / Other harm or suicidal ideation)   Client denies current fears or concerns for personal safety.   Client denies current or recent suicidal ideation or behaviors.   Client denies current or recent homicidal ideation or behaviors.   Client denies current or recent self injurious behavior or ideation.   Client denies other safety concerns.   Client Client reports there has been no change in risk factors since their last session.     Client Client reports there has been no change in protective factors since their last session.     A safety and risk management plan has not been developed at this time, however client was given the after-hours number / 911 should there be a change in any of these risk factors.     Appearance:   Appropriate    Eye Contact:   Good    Psychomotor Behavior: Normal    Attitude:   Cooperative    Orientation:   All   Speech    Rate / Production: Normal     Volume:  Normal    Mood:    Depressed  Normal client presented with an overall good mood, low mood when talking about his daughter and wife being upset with him   Affect:    Appropriate    Thought Content:  Clear    Thought Form:  Coherent  Logical    Insight:    Good       Medication Review:   No changes to current psychiatric medication(s)     Medication Compliance:   NA     Changes in Health Issues:   None reported     Chemical Use Review:   Substance Use: Chemical use reviewed, no active concerns identified      Tobacco Use: No current tobacco use.       Collateral Reports Completed:   Not Applicable    PLAN: (Client Tasks / Therapist Tasks / Other)  Client will identify his goal when in conversation and ask himself if his actions were effective in reaching his goal, continue using paced breathing when feeling irritable or overwhelmed, find a new hobby for his spare time and return for therapy in two weeks.        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    Anticipated number of session or this episode of care: 8-12      MeasurableTreatment Goal(s) related to diagnosis / functional impairment(s)  Goal 1: Client will reduce symptoms of anxiety and irritability as evidenced by ASUNCION-7 reducing from 5 to 0 over the next four months and client reporting improved ability to maintain calm.    I will know I've met my goal when I don't have recurring issues with irritability.      Objective #A (Client Action)    Client will use cognitive strategies identified in therapy to challenge anxious thoughts.  Status: New - Date:  7/30/2018     Intervention(s)  Therapist will assign homework to challenge distorted thinking  teach CBT skills.    Objective #B  Client will identify 3 initial signs or symptoms of anxiety.  Status: New - Date: 7/30/2018     Intervention(s)  Therapist will teach emotional recognition/identification. DBT.    Objective #C  Client will use relaxation strategies 3 times per day to reduce the physical symptoms of anxiety.  Status: New - Date: 7/30/2018     Intervention(s)  Therapist will assign homework to use mindfulness  teach mindfulness skills.      Goal 2: Client will improve ability to communicate effectively with others as evidenced by client reporting use of 3-4 new communications skills on a weekly basis.    I will know I've met my goal when I can feel open to being genuine and have feelings come out.      Objective #A (Client Action)    Status: New - Date: 7/30/2018     Client will learn & utilize at least 3 assertive communication skills weekly.    Intervention(s)  Therapist will assign homework to practice communication skills  teach assertiveness skills. DEAR MAN.    Objective #B  Client will pause and use skills before talking when irritable.    Status: New - Date: 7/30/2018     Intervention(s)  Therapist will teach mindfulness skills.    Objective #C  Client will identify and maintain motivation for changing communication skills.  Status: New - Date: 7/30/2018     Intervention(s)  Therapist will teach motivational skills.        Client has reviewed and agreed to the above plan.      Isis BELL, [facility] July 30, 2018  Note reviewed and clinical supervision by ARABELLA Staples [facility] 8/3/2018

## 2018-08-30 NOTE — MR AVS SNAPSHOT
MRN:[id number]                      After Visit Summary   8/30/2018    Marcel Melchor    MRN: [id number]           Visit Information        Provider Department      8/30/2018 4:30 PM Sameer Isis [facility] Generic      Your next 10 appointments already scheduled     Sep 11, 2018  1:30 PM CDT   Return Visit with Isis Estrella   [facility] ([facility])    [address]  [place] [address]   [phone]            Sep 26, 2018 12:00 PM CDT   Return Visit with Isis Estrella   [facility] ([facility] [place])    [address]  [place] [address] [phone]            Sep 27, 2018  1:15 PM CDT   (Arrive by 1:00 PM)   New Visit with Gregoria Castillo NP   [facility] ([facility]    [address]  [address]  [facility] [address] [phone]              MyChart Information     [employer] gives you secure access to your electronic health record. If you see a primary care provider, you can also send messages to your care team and make appointments. If you have questions, please call your primary care clinic.  If you do not have a primary care provider, please call [phone] and they will assist you.        Care EveryWhere ID     This is your Care EveryWhere ID. This could be used by other organizations to access your [place] medical records  [id number]        Equal Access to Services     JUSTO SIDHU : Hadii federico fenrandez hadasho Soleeanneali, waaxda luqadaha, qaybta kaalmada adeegyada, waxlaureano cortesfelipe vivar. So [facility] [phone].    ATENCIÓN: Si habla español, tiene a friedman disposición servicios gratuitos de asistencia lingüística. Llame al [phone].    We comply with applicable federal civil rights laws and [place] laws. We do not discriminate on the basis of race, color, national origin, age, disability, sex, sexual orientation, or gender  identity.

## 2018-08-31 ASSESSMENT — PATIENT HEALTH QUESTIONNAIRE - PHQ9: SUM OF ALL RESPONSES TO PHQ QUESTIONS 1-9: 12

## 2018-08-31 ASSESSMENT — ANXIETY QUESTIONNAIRES: GAD7 TOTAL SCORE: 4

## 2018-09-26 ENCOUNTER — OFFICE VISIT (OUTPATIENT)
Dept: PSYCHOLOGY | Facility: CLINIC | Age: 48
End: 2018-09-26
Payer: COMMERCIAL

## 2018-09-26 DIAGNOSIS — F43.25 ADJUSTMENT DISORDER WITH MIXED DISTURBANCE OF EMOTIONS AND CONDUCT: Primary | ICD-10-CM

## 2018-09-26 PROCEDURE — 90834 PSYTX W PT 45 MINUTES: CPT | Performed by: SOCIAL WORKER

## 2018-09-26 NOTE — MR AVS SNAPSHOT
MRN:[id number]                      After Visit Summary   9/26/2018    Marcel Melchor    MRN: [id number]           Visit Information        Provider Department      9/26/2018 12:00 PM Sameer [facility] Generic      Your next 10 appointments already scheduled     Oct 04, 2018  3:30 PM CDT   Return Visit with [facility] ([facility] [place])    [address]  [place] [address]   [phone]            Oct 11, 2018  4:30 PM CDT   [facility] [place]    [address]  [place] [address] [phone]            Oct 24, 2018  2:30 PM CDT   Return Visit with Isis [facility] ([facility] [place])    [address]  [place] [address]   [phone]              MyChart Information     [employer] gives you secure access to your electronic health record. If you see a primary care provider, you can also send messages to your care team and make appointments. If you have questions, please call your primary care clinic.  If you do not have a primary care provider, please call [phone] and they will assist you.        Care EveryWhere ID     This is your Care EveryWhere ID. This could be used by other organizations to access your [place] medical records  [id number]        Equal Access to Services     JUSTO SIDHU AH: Hadii aad ku hadasho Soleeanneali, waaxda luqadaha, qaybta kaalmada adeegyada, waxlaureano idifelipe vivar. So [facility] [phone].    ATENCIÓN: Si habla español, tiene a friedman disposición servicios gratuitos de asistencia lingüística. Llame al [phone].    We comply with applicable federal civil rights laws and [place] laws. We do not discriminate on the basis of race, color, national origin, age, disability, sex, sexual orientation, or gender identity.

## 2018-09-26 NOTE — PROGRESS NOTES
Progress Note    Client Name: Marcel Melchor  Date: 9/26/2018         Service Type: Individual      Session Start Time: 12:00  Session End Time: 12:45      Session Length: 45 min     Session #: 6     Attendees: Client attended alone    Treatment Plan Last Reviewed: 7/30/2018  PHQ-9 / ASUNCION-7 : 9/26/2018/2018     DATA      Progress Since Last Session (Related to Symptoms / Goals / Homework):   Symptoms: Improving client reports symptoms have improved overall    Homework: Achieved / completed to satisfaction client used skills to talk with daughter, resolve conflict      Episode of Care Goals: Satisfactory progress - ACTION (Actively working towards change); Intervened by reinforcing change plan / affirming steps taken     Current / Ongoing Stressors and Concerns:   Ongoing: The client reports receiving feedback from others that he is irritable and difficult to work with. The client reports he may get irritable due to anxiety.              Current: The client reported that he has been successfully using skills lately, particularly at work. He reported that he has not been having conflict at home lately and that he and his family just bought a lakehouse. The client discussed being excited about the purchase but having mixed feelings about being able to share the news with others. He stated that he worries he will be viewed differently if people know he could afford to get a lakehouse, and that they would have less respect for him. Discussed the client's fear of being viewed differently by his friends and people he works with. 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 Identified client's values around work ethic and earning this lakehouse, and how he wants the house to be a place for family to be together. Explored how the client's family values may be relatable with those  around him. Discussed the client's feelings of shame or embarrassment at buying the [employer].      Treatment Objective(s) Addressed in This Session:   use cognitive strategies identified in therapy to challenge anxious thoughts       Intervention:   DBT: Discussed the client's fear of being rejected by a group due to his increased wealth. Discussed client's values around wealth.  Motivational Interviewing: Reviewed client's efficacy with skills and how he could continue building competency in skills        ASSESSMENT: Current Emotional / Mental Status (status of significant symptoms):   Risk status (Self / Other harm or suicidal ideation)   Client denies current fears or concerns for personal safety.   Client denies current or recent suicidal ideation or behaviors.   Client denies current or recent homicidal ideation or behaviors.   Client denies current or recent self injurious behavior or ideation.   Client denies other safety concerns.   Client Client reports there has been no change in risk factors since their last session.     Client Client reports there has been no change in protective factors since their last session.     A safety and risk management plan has not been developed at this time, however client was given the after-hours number / 911 should there be a change in any of these risk factors.     Appearance:   Appropriate    Eye Contact:   Good    Psychomotor Behavior: Normal    Attitude:   Cooperative    Orientation:   All   Speech    Rate / Production: Normal     Volume:  Normal    Mood:    Anxious  client appeared anxious discussing his fears of being judged   Affect:    Appropriate    Thought Content:  Clear    Thought Form:  Coherent  Logical    Insight:    Good      Medication Review:   No changes to current psychiatric medication(s)     Medication Compliance:   NA     Changes in Health Issues:   None reported     Chemical Use Review:   Substance Use: Chemical use reviewed, no active concerns  identified      Tobacco Use: No current tobacco use.       Collateral Reports Completed:   Not Applicable    PLAN: (Client Tasks / Therapist Tasks / Other)  Client will consider his values around work ethic and wealth, identify whether his perceptions of his friends are accurate and that they would  him and return for therapy in two weeks.        Isis Estrella MSW, [facility] 9/26/2018                                Note reviewed and clinical supervision by Savannah Bonilla, MSW [facility] 10/4/2018   ________________________________________________________________________    Treatment Plan    Client's Name: Marcel Melchor  YOB: 1970    Date: 7/30/2108    DSM-V Diagnoses: Adjustment Disorders  309.4 (F43.25) With mixed disturbance of emotions and conduct R/O ASUNCION, client's symptoms may be due to anxiety, further assessment needed  Psychosocial / Contextual Factors: client reports experiencing irritability and anxiety at work  WHODAS: see in    Referral / Collaboration:  Referral to another professional/service is not indicated at this time..    Anticipated number of session or this episode of care: 8-12      MeasurableTreatment Goal(s) related to diagnosis / functional impairment(s)  Goal 1: Client will reduce symptoms of anxiety and irritability as evidenced by ASUNCION-7 reducing from 5 to 0 over the next four months and client reporting improved ability to maintain calm.    I will know I've met my goal when I don't have recurring issues with irritability.      Objective #A (Client Action)    Client will use cognitive strategies identified in therapy to challenge anxious thoughts.  Status: New - Date: 7/30/2018     Intervention(s)  Therapist will assign homework to challenge distorted thinking  teach CBT skills.    Objective #B  Client will identify 3 initial signs or symptoms of anxiety.  Status: New - Date: 7/30/2018     Intervention(s)  Therapist will teach emotional recognition/identification.  DBT.    Objective #C  Client will use relaxation strategies 3 times per day to reduce the physical symptoms of anxiety.  Status: New - Date: 7/30/2018     Intervention(s)  Therapist will assign homework to use mindfulness  teach mindfulness skills.      Goal 2: Client will improve ability to communicate effectively with others as evidenced by client reporting use of 3-4 new communications skills on a weekly basis.    I will know I've met my goal when I can feel open to being genuine and have feelings come out.      Objective #A (Client Action)    Status: New - Date: 7/30/2018     Client will learn & utilize at least 3 assertive communication skills weekly.    Intervention(s)  Therapist will assign homework to practice communication skills  teach assertiveness skills. DEAR MAN.    Objective #B  Client will pause and use skills before talking when irritable.    Status: New - Date: 7/30/2018     Intervention(s)  Therapist will teach mindfulness skills.    Objective #C  Client will identify and maintain motivation for changing communication skills.  Status: New - Date: 7/30/2018     Intervention(s)  Therapist will teach motivational skills.        Client has reviewed and agreed to the above plan.      Isis BELL, LGSW July 30, 2018  Note reviewed and clinical supervision by ARABELLA Staples LICSW 8/3/2018

## 2018-09-26 NOTE — PROGRESS NOTES
"                                                         Outpatient Psychiatric Evaluation- Standard  Adult    Name:  Marcel Melchor  : 1970    Source of Referral:  Primary Care Provider: Wes Braga PA-C - last visit 2018  Patient was referred to psychiatry from Aleja Cardoso - last visit 2018  The referral was recommended by new therapist.   Current Psychotherapist: Isis Estrella - last visit 2018  [facility] - sees them every 6 months    Identifying Data:  Patient is a 48 year old,   White American male  who presents for initial visit with me.  Patient is currently more than full time, owns company. Patient attended the session alone. Consent to communicate signed for Ermelinda Melchor patient's Spouse/Partner. Consent for treatment signed and included in electronic medical record. Discussed limits of confidentiality today. My Practice Policy was reviewed and signed.     Chief Complaint:  Consultation.  Patient reports: \"medication review\"  Patient prefers to be called: \"Marcel\"    HPI:  Patient is receiving Adderall (amphetamine salts) and Nuvigil (armodafinil) from sleep specialist. Has just started seeing a counselor. Patient reports that it was recommended to do a \"med check\" and was referred to psychiatry. No history of psychiatry.  Patient reports that years ago he was diagnosed with Attention Deficit Hyperactivity Disorder (ADHD) and was started on Wellbutrin (buproprion). Was on this for many years and then wanted to stop taking medication. Then he started to struggle with having Oppositional Defiant Disorder (ODD) symptoms. 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 Struggles at time with racing thoughts and irritability that is usually triggered by a stressor. Has been on Effexor (venlafaxine) for a few years for anxiety. Anxiety affected by thinking that people are smarter than him and he avoids social situations.   Past diagnoses include: Anxiety, " Attention Deficit Hyperactivity Disorder (ADHD), Narcolepsy  Current medications include: Effexor (venlafaxine), Nuvigil (armodafinil), Adderall (amphetamine salts)     Medication side effects: Denies but did have withdrawal effects  Current stressors include: Medical Comorbidities, Occupational Difficulties, Social Situations  Coping mechanisms and supports include: Exercise, Acupuncture, Avoidance    Answers for HPI/ROS submitted by the patient on 9/27/2018   If you checked off any problems, how difficult have these problems made it for you to do your work, take care of things at home, or get along with other people?: Somewhat difficult    Psychiatric Review of Symptoms:  Depression: Interest: Decrease    Depressed Mood    Energy: Decrease    Appetite: Decrease     Concentration: Decrease    Psychomotor agitation    Irritability: Increase     Ruminations: Increase    PHQ-9 scores:   PHQ-9 SCORE 8/30/2018 8/30/2018 9/27/2018   Total Score 12 12 7     Eliane:  Distractibility: Increase    Impulsiveness: Increase   MDQ Score: Negative Screen  Anxiety: Feeling nervous, anxious, or on edge    Uncontrolled worrying    Worrying too much about different things    Restlessness    Easily annoyed or irritable    Thoughts of impending doom    ASUNCION-7 scores:    ASUNCION-7 SCORE 8/21/2018 8/30/2018 9/27/2018   Total Score 7 4 7     Panic:  No symptoms   Agoraphobia:  No   PTSD:  No symptoms   OCD:  No symptoms   Psychosis: No symptoms   ADD / ADHD: Attention Problem(s)    Problems with Listening    Task Completion Difficulties    Poor Organization    Distractible    Forgetful    Interrupts    Intrudes    Previous Diagnosis    Previous Treatment: Stimulants and Wellbutrin  Gambling or shoplifting: No   Eating Disorder:  No symptoms  Sleep:   Uses a CPAP machine    History of sleep consult/study    Narcolepsy     Psychiatric History:   Hospitalizations: None  History of Commitment? No   Past Treatment: counseling, physician / PCP and  "medication(s) from physician / PCP  Suicide Attempts: No   Current Suicide Risk:  Suicide Assessment Completed Today.  Self-injurious Behavior: Denies  Electroconvulsive Therapy (ECT) or Transcranial Magnetic Stimulation (TMS): No   GeneSight Genetic Testing: No     Past medication trials include but are not limited to:   Effexor (venlafaxine)  Nuvigil (armodafinil)   Adderall (amphetamine salts)   Wellbutrin (buproprion)   Celexa (citalopram)     Substance Use History:  Current use of drugs or alcohol: Denies - history of alcohol use and stopped at age 24  Patient reports no problems as a result of their drinking / drug use.   Based on the clinical interview, there  are not indications of drug or alcohol abuse.  Tobacco use: No but quit chewed tobacco until mother . No cravings.   Caffeine:  Yes  12 sodas/day of Mt. Dew - feels he has to have this- also uses pre workout  Patient has not received chemical dependency treatment in the past  Recovery Programming Involvement: Not Applicable and None    Past Medical History:  Past Medical History:   Diagnosis Date     Anxiety 9/3/2013     AJAY (obstructive sleep apnea) 2014     Rotator cuff syndrome 2012      Surgery:   Past Surgical History:   Procedure Laterality Date     C NONSPECIFIC PROCEDURE      fx R ankle-ORIF     Allergies:     Allergies   Allergen Reactions     Amoxicillin      itching     Primary Care Provider: Wes Braga PA-C  Seizures or Head Injury: No  Diet: No Restrictions and reported as \"poor\"  Exercise: yes daily at the gym  Supplements: Reviewed per Electronic Medical Record Today    Current Medications:    Current Outpatient Prescriptions:      anastrozole (ARIMIDEX) 0.5 mg half-tab, Take 0.5 mg by mouth once a week, Disp: , Rfl:      Armodafinil (NUVIGIL PO), Take 250 mg by mouth every morning , Disp: , Rfl:      ASACOL  MG EC tablet, TAKE 3 TABLETS BY MOUTH TWICE DAILY, Disp: , Rfl: 0     clindamycin (CLEOCIN T) 1 % " lotion, APPLY TOPICALLY TO FACE AND CHEST TWICE A DAY AS NEEDED, Disp: 60 mL, Rfl: 3     mesalamine (CANASA) 1000 MG Suppository, Place 1,000 mg rectally 2 times daily, Disp: , Rfl:      testosterone cypionate (DEPOTESTOTERONE) 200 MG/ML injection, Inject 50 mg into the muscle every 14 days, Disp: , Rfl:      venlafaxine (EFFEXOR-XR) 150 MG 24 hr capsule, Take 1 capsule (150 mg) by mouth daily, Disp: 90 capsule, Rfl: 3     amphetamine-dextroamphetamine (ADDERALL) 10 MG per tablet, TAKE 1 TABLET BY MOUTH IN THE AFTERNOON PRN, Disp: , Rfl: 0    The [place] Prescription Monitoring Program has been reviewed and there are no concerns about diversionary activity for controlled substances at this time.      NAVARROINA WAYNE  Search Criteria: Last Name 'harrison' and First Name 'gera' and  =  and Request Period =  to  ' - 1 out of 1 Recipients Selected.    Fill Date Product, Str, Form Qty Days Pt ID Prescriber Written RX# N/R* Pharm **MED+  ---------- -------------------------------- ------------ ---- --------- ---------- ---------- ------------ ----- --------- ------  2018 DEXTROAMP-AMPHETAMIN 10 MG TAB 30.782187 30 [id number] 2018 [id number] N [id number] 00.0  2018 ARMODAFINIL 250 MG TABLET 30.120428 30 [id number] 2018 [id number] R [id number] 00.0  2018 DEXTROAMP-AMPHETAMIN 10 MG TAB 30.238795 30 [id number] 2018 [id number] N [id number] 00.0  2018 ARMODAFINIL 250 MG TABLET 30.239347 30 [id number] 2018 [id number] N [id number] 00.0  2018 ARMODAFINIL 250 MG TABLET 30.954643 30 [id number] 2018 [id number] R [id number] 00.0  2018 ARMODAFINIL 250 MG TABLET 30.971992 30 [id number] 2018 [id number] R [id number] 00.0  2018 ARMODAFINIL 250 MG TABLET 30.171801 30 [id number] 2018 [id number] R [id number] 00.0  04/10/2018 ARMODAFINIL 250 MG TABLET 30.130150 30 [id number]  "02/14/2018 [id number] R [id number] [facility]  03/13/2018 ARMODAFINIL 250 MG TABLET 30.905024 30 [id number] 02/14/2018 [id number] R [id number] [facility]  02/14/2018 ARMODAFINIL 250 MG TABLET 30.909353 30 [id number] 02/14/2018 [id number] N [id number] [facility]  02/13/2018 DEXTROAMP-AMPHETAMIN 10 MG TAB 90.041477 30 [id number] 02/06/2018 [id number] N [id number] [facility]  01/09/2018 DEXTROAMP-AMPHETAMIN 10 MG TAB 90.255704 30 [id number] 01/08/2018 [id number] N [id number] [facility]  12/09/2017 DEXTROAMP-AMPHETAMIN 10 MG TAB 90.699212 30 [id number] 12/06/2017 [id number] N [id number] [facility]  11/09/2017 DEXTROAMP-AMPHETAMIN 10 MG TAB 90.611122 30 [id number] 11/07/2017 [id number] N [id number] [facility]  10/07/2017 DEXTROAMP-AMPHETAMIN 10 MG TAB 90.305455 30 [id number] 10/06/2017 [id number] N [id number] [facility]    *N/R N=New R=Refill  +MED Daily    Prescribers for prescriptions listed  ----------------------------------------------------------------------------------------------------------------------------------  [id number] FRANCISCO HAWKINS; [facility] [place], [address], [facility] [address]  [id number] MARIE NEWTON MD; [facility], [address], [facility] [address]  [id number] ADRIAN MCCULLOUGH; [facility], [address], [facility] [address]     Vital Signs:  Vitals: /60 (BP Location: Left arm, Patient Position: Chair, Cuff Size: Adult Large)  Pulse 93  Temp 98.8  F (37.1  C) (Oral)  Resp 18  Ht 5' 9\" (1.753 m)  Wt 199 lb (90.3 kg)  SpO2 95%  BMI 29.39 kg/m2    Labs:  Most recent laboratory results reviewed and the pertinent results include:   Office Visit on 06/08/2018   Component Date Value Ref Range Status     WBC 06/08/2018 4.9  4.0 - 11.0 10e9/L Final     RBC Count 06/08/2018 5.58  4.4 - 5.9 10e12/L Final     Hemoglobin 06/08/2018 16.4  13.3 - 17.7 g/dL Final     Hematocrit 06/08/2018 44.9  40.0 - 53.0 % Final     MCV " 2018 81  78 - 100 fl Final     MCH 2018 29.4  26.5 - 33.0 pg Final     MCHC 2018 36.5  31.5 - 36.5 g/dL Final     RDW 2018 14.2  10.0 - 15.0 % Final     Platelet Count 2018 244  150 - 450 10e9/L Final     TSH 2018 0.97  0.40 - 4.00 mU/L Final     Vitamin D Deficiency screening 2018 49  20 - 75 ug/L Final    Comment: Season, race, dietary intake, and treatment affect the concentration of   25-hydroxy-Vitamin D. Values may decrease during winter months and increase   during summer months. Values 20-29 ug/L may indicate Vitamin D insufficiency   and values <20 ug/L may indicate Vitamin D deficiency.  Vitamin D determination is routinely performed by an immunoassay specific for   25 hydroxyvitamin D3.  If an individual is on vitamin D2 (ergocalciferol)   supplementation, please specify 25 OH vitamin D2 and D3 level determination by   LCMSMS test VITD23.         No EKG on file.    Review of Systems:  10 systems (general, cardiovascular, respiratory, eyes, ENT, endocrine, GI, , M/S, neurological) were reviewed. Most pertinent finding(s) is/are: chronic low back pain and knee pain . The remaining systems are all unremarkable.    Family History:   Patient reported family history includes:   Family History   Problem Relation Age of Onset     Breast Cancer Mother       at age 47     Mental Illness History: Denies  Substance Abuse History: Sister with alcohol use disorder. Other sister with opioid and hypnotic use disorder.   Suicide History: Denies  Medications: Denies     Social History:   Birth place: [place], [place]  Childhood: Yes intact home  Siblings:  one Brother(s),  two Sister(s) - Middle in [place]  Highest education level was high school graduate.   Current Living situation:  [place], [place] with Spouse/Partner and pet dogs. Feels safe at home.  Children: one 18 year old just started college    Legal History:  No: Patient denies any legal history    Significant Losses /  Trauma / Abuse / Neglect Issues:  There are indications or report of significant loss, trauma, abuse or neglect issues related to: death of mother from cancer.   Issues of possible neglect are not present.   A safety and risk management plan has not been developed at this time, however client was given the after-hours number / 911 should there be a change in any of these risk factors.    Mental Status Examination:     Appearance:  awake, alert, adequately groomed, appeared stated age, no apparent distress, normal weight and musclar build  Attitude:  cooperative   Eye Contact:  good  Gait and Station: Normal, No assistive Devices used and No dizziness or falls  Psychomotor Behavior:  no evidence of tardive dyskinesia, dystonia, or tics  Oriented to:  time, person, and place  Attention Span and Concentration:  Normal  Speech:  clear, coherent, regular rate, regular rhythm and fluent  Mood:  good  Affect:  appropriate and in normal range  Associations:  no loose associations  Thought Process:  logical, linear and goal oriented  Thought Content:  no evidence of suicidal ideation or homicidal ideation and no evidence of psychotic thought  Recent and Remote Memory:  intact Not formally assessed. No amnesia.  Fund of Knowledge: appropriate  Insight:  good  Judgment:  intact  Impulse Control:  intact    Suicide Risk Assessment:  Today Marcel Melchor reports history of depression and no thoughts of wanting to die. In addition, there are notable risk factors for self-harm, including age and anxiety. However, risk is mitigated by commitment to family, sobriety, absence of past attempts, ability to volunteer a safety plan, history of seeking help when needed, future oriented, denies suicidal intent or plan, no family history of suicide and denies homicidal ideation, intent, or plan. Therefore, based on all available evidence including the factors cited above, Marcel Melchor does not appear to be at imminent risk for  self-harm, does not meet criteria for a 72-hr hold, and therefore remains appropriate for ongoing outpatient level of care.  A thorough assessment of risk factors related to suicide and self-harm have been reviewed and are noted above. The patient convincingly denies acute suicidality on several occasions. Local community safety resources reviewed and printed for patient to use if needed. There was no deceit detected, and the patient presented in a manner that was believable.     DSM5  Diagnosis:  Attention-Deficit/Hyperactivity Disorder  314.01 (F90.2) Combined presentation   300.02 (F41.1) Generalized Anxiety Disorder   Rule out Social Anxiety Disorder  Caffeine Addiction     Medical Comorbidities Include:   Patient Active Problem List    Diagnosis Date Noted     Crohn's disease (H) 06/15/2015     Priority: Medium     AJAY (obstructive sleep apnea) 11/11/2014     Priority: Medium     Anxiety 09/03/2013     Priority: Medium     Rotator cuff syndrome 01/19/2012     Priority: Medium     Motion sickness 07/14/2011     Priority: Medium     CARDIOVASCULAR SCREENING; LDL GOAL LESS THAN 160 10/31/2010     Priority: Medium     Attention deficit disorder 12/04/2003     Priority: Medium     Problem list name updated by automated process. Provider to review       Dyspnea and respiratory abnormality 12/04/2003     Priority: Medium     Problem list name updated by automated process. Provider to review         Psychosocial & Contextual Factors:  Occupational Difficulties and Medical Comorbidites    Strengths and Opportunities:   Marcel Melchor identified the following strengths or resources that will help he succeed in counseling: commitment to health and well being, friends / good social support, family support, intelligence, positive work environment and sense of humor. Things that may interfere with the patient's success include:  none noted at this time.    There are no language or communication issues or need for  modification in treatment.   There are no ethnic, cultural or Baptist factors that may be relevant for therapy.  Client identified their preferred language to be English.  Client does not need the assistance of an  or other support involved in therapy.    A 12-item WHODAS 2.0 assessment was completed by the patient today and recorded in EPIC.    WHODAS 2.0 Total Score 9/27/2018   Total Score 30   Total Score MyChart 30       The Patient Activation Measure (MAYNOR) score was completed and recorded in [facility]. This assesses patient knowledge, skill, and confidence for self-management.   MAYNOR Score (Last Two) 6/10/2011 12/31/2012   MAYNOR Raw Score 52 39   Activation Score 100 56.4   MAYNOR Level 4 3        Impression:  Marcel Melchor has history of depression and anxiety that appears to be managed with current medications and therapy.  Medication side effects and alternatives reviewed. Health promotion activities recommended and reviewed today. All questions addressed. Education and counseling completed regarding risks and benefits of medications and psychotherapy options. Collaborative Care Psychiatry Service model reviewed today. Recommend therapy for additional support. May need help with reducing caffeine intake using a specific SMART Goals process. I defer to sleep specialists about other options for narcolepsy such as dose adjustments of Adderall (amphetamine salts) and Nuvigil (armodafinil). May also need to try Provigil (modafinil). Effexor (venlafaxine)  mg has been on since 2015 reggie. Discussed many different options for medications and treatment. Patient has many goals he would like to work on such as reducing quick anger outbursts and trying other thought stopping techniques so he can use the calming and relaxing techniques he is learning in therapy.     Treatment Plan:    Continue Effexor (venlafaxine)  mg by mouth daily for anxiety. May consider dose increase if needed. Try taking in the AM.  Could consider dose increase to 225- 300 mg daily for anxiety.     Continue Adderall (amphetamine salts) and Nuvigil (armodafinil) per sleep specialists. May need to change medications or doses.    The next week, keep track of how much caffeine you use and bring to appointment with Isis. Work on a plan to reduce caffeine intake.     Continue all other medications as reviewed per electronic medical record today.     Safety plan reviewed. To the Emergency Department as needed or call after hours crisis line at [phone] or [phone]. [place] Crisis Text Line: Text [place] to 680923  or  Suicide LifeLine Chat: suicideAmbiq Micro.org/chat    Continue individual therapy as planned with Isis.  Thank you for our work together in the Psychiatry Collaborative Care Model at [facility]. This is our last visit and I am returning your care back to your Primary Care Provider Wes Braga PA-C . If you are not doing well, please contact your Primary Care Provider office.     Follow up with primary care provider as planned or for acute medical concerns.    Call the psychiatric nurse line with medication questions or concerns at [phone].    [employer]ut may be used to communicate with your provider, but this is not intended to be used for emergencies.    Community Resources:    National Suicide Prevention Lifeline: 437.356.8366 (TTY: 293.840.3639). Call anytime for help.  (www.suicidepreventionlifeline.org)  National [place] on Mental Illness (www.ana.org): 572.544.4629 or 345-053-0146.   Mental Health Association (www.mentalhealth.org): 378.730.7071 or 017-510-6930.  [place] Crisis Text Line: Text [place] to 406709  Suicide LifeLine Chat: suicideAmbiq Micro.org/chat    Administrative Billing:   Time spent with patient was 60 minutes and greater than 50% of time or 50 minutes was spent in counseling and coordination of care regarding above diagnoses and reatment plan.    Patient  Status:  The patient is being returned to the referring provider for ongoing care and medication prescribing.  The patient can be referred back to this service for further consultation as needed.    Signed:   Gregoria Castillo, PhD, APRN, CNP  Psychiatry

## 2018-09-27 ENCOUNTER — OFFICE VISIT (OUTPATIENT)
Dept: PSYCHIATRY | Facility: CLINIC | Age: 48
End: 2018-09-27
Attending: NURSE PRACTITIONER
Payer: COMMERCIAL

## 2018-09-27 VITALS
OXYGEN SATURATION: 95 % | HEIGHT: 69 IN | WEIGHT: 199 LBS | BODY MASS INDEX: 29.47 KG/M2 | DIASTOLIC BLOOD PRESSURE: 60 MMHG | SYSTOLIC BLOOD PRESSURE: 110 MMHG | RESPIRATION RATE: 18 BRPM | TEMPERATURE: 98.8 F | HEART RATE: 93 BPM

## 2018-09-27 DIAGNOSIS — G47.419 PRIMARY NARCOLEPSY WITHOUT CATAPLEXY: ICD-10-CM

## 2018-09-27 DIAGNOSIS — F41.1 GAD (GENERALIZED ANXIETY DISORDER): Primary | ICD-10-CM

## 2018-09-27 DIAGNOSIS — F90.2 ATTENTION DEFICIT HYPERACTIVITY DISORDER (ADHD), COMBINED TYPE: ICD-10-CM

## 2018-09-27 PROBLEM — F15.20 CAFFEINE ADDICTION (H): Status: ACTIVE | Noted: 2018-09-27

## 2018-09-27 PROCEDURE — 90792 PSYCH DIAG EVAL W/MED SRVCS: CPT | Performed by: NURSE PRACTITIONER

## 2018-09-27 RX ORDER — TESTOSTERONE CYPIONATE 200 MG/ML
50 INJECTION, SOLUTION INTRAMUSCULAR
COMMUNITY

## 2018-09-27 RX ORDER — ANASTROZOLE 1 MG/1
0.5 TABLET ORAL WEEKLY
COMMUNITY
End: 2019-05-10

## 2018-09-27 RX ORDER — DEXTROAMPHETAMINE SACCHARATE, AMPHETAMINE ASPARTATE, DEXTROAMPHETAMINE SULFATE AND AMPHETAMINE SULFATE 2.5; 2.5; 2.5; 2.5 MG/1; MG/1; MG/1; MG/1
TABLET ORAL
Refills: 0 | COMMUNITY
Start: 2018-08-03 | End: 2019-05-03

## 2018-09-27 RX ORDER — MESALAMINE 1000 MG/1
1000 SUPPOSITORY RECTAL 2 TIMES DAILY
COMMUNITY
End: 2023-12-18

## 2018-09-27 ASSESSMENT — ANXIETY QUESTIONNAIRES
2. NOT BEING ABLE TO STOP OR CONTROL WORRYING: SEVERAL DAYS
GAD7 TOTAL SCORE: 7
4. TROUBLE RELAXING: SEVERAL DAYS
GAD7 TOTAL SCORE: 7
1. FEELING NERVOUS, ANXIOUS, OR ON EDGE: SEVERAL DAYS
7. FEELING AFRAID AS IF SOMETHING AWFUL MIGHT HAPPEN: NOT AT ALL
7. FEELING AFRAID AS IF SOMETHING AWFUL MIGHT HAPPEN: NOT AT ALL
3. WORRYING TOO MUCH ABOUT DIFFERENT THINGS: SEVERAL DAYS
GAD7 TOTAL SCORE: 7
5. BEING SO RESTLESS THAT IT IS HARD TO SIT STILL: NOT AT ALL
6. BECOMING EASILY ANNOYED OR IRRITABLE: NEARLY EVERY DAY

## 2018-09-27 ASSESSMENT — PATIENT HEALTH QUESTIONNAIRE - PHQ9
SUM OF ALL RESPONSES TO PHQ QUESTIONS 1-9: 7
10. IF YOU CHECKED OFF ANY PROBLEMS, HOW DIFFICULT HAVE THESE PROBLEMS MADE IT FOR YOU TO DO YOUR WORK, TAKE CARE OF THINGS AT HOME, OR GET ALONG WITH OTHER PEOPLE: SOMEWHAT DIFFICULT
SUM OF ALL RESPONSES TO PHQ QUESTIONS 1-9: 7

## 2018-09-27 NOTE — MR AVS SNAPSHOT
After Visit Summary   9/27/2018    Marcel Melchor    MRN: [id number]           Patient Information     Date Of Birth          1970        Visit Information        Provider Department      9/27/2018 1:15 PM Gregoria Castillo NP [facility]        Today's Diagnoses     ASUNCION (generalized anxiety disorder)    -  1    Primary narcolepsy without cataplexy        Attention deficit hyperactivity disorder (ADHD), combined type          Care Instructions    Treatment Plan:    Continue Effexor (venlafaxine)  mg by mouth daily for anxiety. May consider dose increase if needed. Try taking in the AM.     Continue Adderall (amphetamine salts) and Nuvigil (armodafinil) per sleep specialists. May need to change medications or doses.    Continue all other medications as reviewed per electronic medical record today.     Safety plan reviewed. To the Emergency Department as needed or call after hours crisis line at [phone] or [phone]. [place] Crisis Text Line: Text [place] to 083792  or  Suicide LifeLine Chat: suicidepreventionlifeline.org/chat    Continue individual therapy as planned with Isis.  Thank you for our work together in the Psychiatry Collaborative Care Model at [facility]. This is our last visit and I am returning your care back to your Primary Care Provider Wes Braga PA-C. If you are not doing well, please contact your Primary Care Provider office.     Follow up with primary care provider as planned or for acute medical concerns.    Call the psychiatric nurse line with medication questions or concerns at [phone].    MyChart may be used to communicate with your provider, but this is not intended to be used for emergencies.          Follow-ups after your visit        Follow-up notes from your care team     Return if symptoms worsen or fail to improve.      Your next 10 appointments already scheduled     Oct 04, 2018  3:30 PM CDT   Return Visit   08/09/18 1200   Visit Type   Type of Visit Initial       Present No   General Information   Start Of Care Date 08/09/18   Referring Physician Bradly Juarez MD   Orders Evaluate And Treat As Indicated   Date of Order 08/09/18   Medical Diagnosis AION both eyes   Onset Of Illness/injury Or Date Of Surgery 08/09/2018   Surgical/Medical history reviewed Yes  (second kidney transplant, TKA, MITZI bilateral, MI 4 years ago)   Precautions/Limitations anticoagulatants   Prior ADL/IADL status Independent prior to onset   Patient/family Goals Statement wants to maximize vision, reading,    Social History/Home Environment   Living Environment Apartment/condo  (is walking a lot)   Current Community Support ( -lives with wife)   Patient Role/employment History  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 No   Have you fallen and had an injury in the past year? No   Is patient a fall risk? No   Cognitive/Behavioral   Communication Intact   Cognitive Status Intact for evaluation process   Behavior Appropriate   How well do you understand your eye condition? Well   Vision related restrictions on visiting with family/friends Moderate  (transportation)   Reported emotional impact of visual impairment Mild   Adjustment to disability Good   Physical Status/Equipment   Physical Status No problems observed/reported   Visual Report   Functional Complaints Reading;Safety in mobility   Visual Complaints Scotoma Hindrance right eye;Scotoma Hindrance left eye;Constricted visual fields right eye;Constricted visual fields left eye   Artemio Jo  "Symptoms? No   Magnifier (strength and type) 4X dome, 5X handheld illuminated, 2X handheld   (uses all of them for different tasks)   Reading glasses (OTC readers -would like stronger)   Technology (cell phone - Stick and Play, laptop - )   Equipment comments has not used voice commands - uses Zoom on phone   Lighting and Glare   Is your lighting adequate? No/ at home  (needs to get brighter lights)   Is glare a problem? Yes/ indoors;Yes/ outdoors   Are you satisfied with your sunglasses? (does not wear sunglasses)   Sunglass filter color (does not wear sunglasses)   Visual Acuity   Acuity right eye 20/200   Acuity left eye 20/30-2   Contrast Sensitivity   Contrast sensitivity (score/25) 20/25 - BNL   [place] Read   Smallest print size read 0.8M - slowed to 12 words per minute   Critical print size 1.0M   Words per minute at critical print size 150   Dynavision: Evaluation of visual skills/search of extra personal space via 5X4 foot computerized light board with 64 stimuli.  The user reacts as quickly as possible by striking the lights as they turn on in random succession.   Dynavision Cascade Dynavision Mode A (single stimulus attention, 1 minute;Dynavision Mode B (timed attention, 1 minute);Dynavision Mode B Timed Divided Attention (1-second flash rate, 1 minute)   Dynavision Mode A (single stimulus attention, 1 minute)   Patient Score (Mode A, 1 min) 69   Dynavision Mode A (SSA, 1 Min) Comment \"safe \" score: 52+   Dynavision Mode B (timed attention, 1 minute)   Flash rate 1 second   Patient score (Mode B, 1 min) 57   Dynavision Mode B Comment \"safe \" score 42+ (pt's slowest response time to lower right quadrant)   Dynavision Mode B Timed Divided Attention (1-second flash rate, 1 minute)   Patient score, targets struck 49   Patient score, central stimulus identified (out of 10): calculate % 9/10   Dynavision Mode B Timed Divided Attention Comment \"Safe \" parameters: 35+ lights 10/10 targets  Pt's slowest " "with Isis Sameer   [facility] [place] ([facility] [place])    [address]  [place] [id number] [phone]            Oct 11, 2018  4:30 PM CDT   Return Visit with Isis Estrella   [facility] [place] ([facility] [place])    [address]  [place] [id number] [phone]            Oct 24, 2018  2:30 PM CDT   Return Visit with Isis [facility] [place] ([facility] [place])    [address]  [place] [id number] [phone]              Who to contact     If you have questions or need follow up information about today's clinic visit or your schedule please contact [facility] directly at [phone].  Normal or non-critical lab and imaging results will be communicated to you by MyChart, letter or phone within 4 business days after the clinic has received the results. If you do not hear from us within 7 days, please contact the clinic through AgentBridgehart or phone. If you have a critical or abnormal lab result, we will notify you by phone as soon as possible.  Submit refill requests through RentablesÂ® or call your pharmacy and they will forward the refill request to us. Please allow 3 business days for your refill to be completed.          Additional Information About Your Visit        MyChart Information     RentablesÂ® gives you secure access to your electronic health record. If you see a primary care provider, you can also send messages to your care team and make appointments. If you have questions, please call your primary care clinic.  If you do not have a primary care provider, please call [phone] and they will assist you.        Care EveryWhere ID     This is your Care EveryWhere ID. This could be used by other organizations to access your [place] medical records  [id number]        Your Vitals Were     Pulse Temperature Respirations Height Pulse Oximetry BMI (Body Mass Index)    93 98.8  F (37.1  C) (Oral) 18 5' 9\" (1.753 m) " 95% 29.39 kg/m2       Blood Pressure from Last 3 Encounters:   09/27/18 110/60   08/21/18 124/80   08/14/18 100/70    Weight from Last 3 Encounters:   09/27/18 199 lb (90.3 kg)   08/21/18 199 lb (90.3 kg)   08/14/18 195 lb (88.5 kg)              Today, you had the following     No orders found for display       Primary Care Provider Office Phone # Fax #    Wes Braga PA-C [phone]       [id number]  [facility] [id number]        Equal Access to Services     [facility]: Hadii aad ku hadasho Soomaali, waaxda luqadaha, qaybta kaalmada adeegyada, waxlaureano haas haypapin aderacquel lagunas . So [facility] [phone].    ATENCIÓN: Si habla español, tiene a friedman disposición servicios gratuitos de asistencia lingüística. Ll[facility] [phone].    We comply with applicable federal civil rights laws and [place] laws. We do not discriminate on the basis of race, color, national origin, age, disability, sex, sexual orientation, or gender identity.            Thank you!     Thank you for choosing [facility]  for your care. Our goal is always to provide you with excellent care. Hearing back from our patients is one way we can continue to improve our services. Please take a few minutes to complete the written survey that you may receive in the mail after your visit with us. Thank you!             Your Updated Medication List - Protect others around you: Learn how to safely use, store and throw away your medicines at www.disposemymeds.org.          This list is accurate as of 9/27/18  2:29 PM.  Always use your most recent med list.                   Brand Name Dispense Instructions for use Diagnosis    amphetamine-dextroamphetamine 10 MG per tablet    ADDERALL     TAKE 1 TABLET BY MOUTH IN THE AFTERNOON PRN        anastrozole 0.5 mg half-tab    ARIMIDEX     Take 0.5 mg by mouth once a week        * CANASA 1000 MG Suppository   Generic drug:  mesalamine      Place 1,000 mg rectally 2  response time to lower right quadrant   Education   Learner Patient   Readiness Eager;Acceptance   Method Booklet/handout;Explanation;Demonstration   Response Verbalizes understanding;Demonstrates understanding;Needs reinforcement   Clinical Impression, OT Eval   Criteria for Skilled Therapeutic Interventions Met yes;treatment indicated   Therapy  Diagnosis: Impaired ADL/IADL with deficits in Reading based ADL;Home management  (glare and light management)   Assessment of Occupational Performance 3-5 Performance Deficits   Identified Performance Deficits ad above   Clinical Decision Making (Complexity) Low complexity   OT Visual Rehabilitation Evaluation Plan   Therapy Plan Occupational therapy intervention  (safety in mobility)   Planned Interventions Visual field loss awareness;Visual skills training for safety in mobility;Low vision compensatory training for reading;Instruction in environmental adaptations for contrast;Instruction in environmental adaptations for lighting;Optical device/ADL device instruction and training;Computer modifications;Instruction in community resources   Frequency / Duration 3 tx visits over 9 weeks - 1X every 3 weeks for 9 weeks   Risks and Benefits of Treatment have been explained. Yes   Patient, Family in agreement with plan of care Yes   GOALS   Goals Near Vision;Visual Field;Environmental Modification;Resource Education   Goals Addressed this Session Near vision   Goal 1 - Near Vision   Goal Description: Near Vision Patient will verbalize awareness of visual field Loss and demonstrate improved use of visual skills/adaptive equipment for increased independence in reading-based activities of daily living, written communication and detail ADL tasks.   Target Date 10/11/18   Goal 2 - Visual field   Goal Description: Visual field Patient will verbalize awareness of visual field loss and demonstrate improved use of visual skills for increased safety/ independence in locating  objects/obstacles and in navigation   Target Date 10/11/18   Goal 3 - Environmental modification   Goal Description: Environment modification Patient will demonstrate understanding of the impact of lighting, contrast and glare on ADL activities, in conjunction with environmentally-based ADL modifications   Target Date 10/11/18   Goal 4 - Resource education   Goal Description: Resource education Patient will verbalize awareness of community resources for the following:;Audio access to print materials;Access to large print materials;Access to low vision devices;Support in vocational goals   Target Date 10/11/18   Total Evaluation Time   Total Evaluation Time 50   Therapy Certification   Certification date from 08/09/18   Certification date to 10/11/18   Medical Diagnosis AION both eyes      times daily        * ASACOL  MG EC tablet   Generic drug:  mesalamine      TAKE 3 TABLETS BY MOUTH TWICE DAILY        clindamycin 1 % lotion    CLEOCIN T    60 mL    APPLY TOPICALLY TO FACE AND CHEST TWICE A DAY AS NEEDED    Acne, unspecified acne type       NUVIGIL PO      Take 250 mg by mouth every morning        testosterone cypionate 200 MG/ML injection    DEPOTESTOTERONE     Inject 50 mg into the muscle every 14 days        venlafaxine 150 MG 24 hr capsule    EFFEXOR-XR    90 capsule    Take 1 capsule (150 mg) by mouth daily    Anxiety, Irritability and anger       * Notice:  This list has 2 medication(s) that are the same as other medications prescribed for you. Read the directions carefully, and ask your doctor or other care provider to review them with you.

## 2018-09-27 NOTE — PATIENT INSTRUCTIONS
Treatment Plan:    Continue Effexor (venlafaxine)  mg by mouth daily for anxiety. May consider dose increase if needed. Try taking in the AM.     Continue Adderall (amphetamine salts) and Nuvigil (armodafinil) per sleep specialists. May need to change medications or doses.    Continue all other medications as reviewed per electronic medical record today.     Safety plan reviewed. To the Emergency Department as needed or call after hours crisis line at [phone] or [phone]. [place] Crisis Text Line: Text [place] to 667879  or  Suicide LifeLine Chat: suicidepreventionlifeline.org/chat    Continue individual therapy as planned with Isis.  Thank you for our work together in the Psychiatry Collaborative Care Model at [facility]. This is our last visit and I am returning your care back to your Primary Care Provider Wes Braga PA-C. If you are not doing well, please contact your Primary Care Provider office.     Follow up with primary care provider as planned or for acute medical concerns.    Call the psychiatric nurse line with medication questions or concerns at [phone].    Compasshart may be used to communicate with your provider, but this is not intended to be used for emergencies.

## 2018-09-27 NOTE — NURSING NOTE
"Chief Complaint   Patient presents with     Consult     Referred by Aleja Cardoso- medication review      initial /60 (BP Location: Left arm, Patient Position: Chair, Cuff Size: Adult Large)  Pulse 93  Temp 98.8  F (37.1  C) (Oral)  Resp 18  Ht 5' 9\" (1.753 m)  Wt 199 lb (90.3 kg)  SpO2 95%  BMI 29.39 kg/m2 Estimated body mass index is 29.39 kg/(m^2) as calculated from the following:    Height as of this encounter: 5' 9\" (1.753 m).    Weight as of this encounter: 199 lb (90.3 kg)..  bp completed using cuff size large    "

## 2018-09-28 ASSESSMENT — ANXIETY QUESTIONNAIRES: GAD7 TOTAL SCORE: 7

## 2018-09-28 ASSESSMENT — PATIENT HEALTH QUESTIONNAIRE - PHQ9: SUM OF ALL RESPONSES TO PHQ QUESTIONS 1-9: 7

## 2018-10-04 ENCOUNTER — OFFICE VISIT (OUTPATIENT)
Dept: PSYCHOLOGY | Facility: CLINIC | Age: 48
End: 2018-10-04
Payer: COMMERCIAL

## 2018-10-04 DIAGNOSIS — F43.23 ADJUSTMENT DISORDER WITH MIXED ANXIETY AND DEPRESSED MOOD: Primary | ICD-10-CM

## 2018-10-04 PROCEDURE — 90832 PSYTX W PT 30 MINUTES: CPT | Performed by: SOCIAL WORKER

## 2018-10-04 ASSESSMENT — ANXIETY QUESTIONNAIRES
7. FEELING AFRAID AS IF SOMETHING AWFUL MIGHT HAPPEN: NOT AT ALL
GAD7 TOTAL SCORE: 5
5. BEING SO RESTLESS THAT IT IS HARD TO SIT STILL: NOT AT ALL
6. BECOMING EASILY ANNOYED OR IRRITABLE: SEVERAL DAYS
3. WORRYING TOO MUCH ABOUT DIFFERENT THINGS: SEVERAL DAYS
IF YOU CHECKED OFF ANY PROBLEMS ON THIS QUESTIONNAIRE, HOW DIFFICULT HAVE THESE PROBLEMS MADE IT FOR YOU TO DO YOUR WORK, TAKE CARE OF THINGS AT HOME, OR GET ALONG WITH OTHER PEOPLE: NOT DIFFICULT AT ALL
1. FEELING NERVOUS, ANXIOUS, OR ON EDGE: SEVERAL DAYS
2. NOT BEING ABLE TO STOP OR CONTROL WORRYING: SEVERAL DAYS

## 2018-10-04 ASSESSMENT — PATIENT HEALTH QUESTIONNAIRE - PHQ9: 5. POOR APPETITE OR OVEREATING: SEVERAL DAYS

## 2018-10-04 NOTE — MR AVS SNAPSHOT
MRN:[id number]                      After Visit Summary   10/4/2018    Marcel Melchor    MRN: [id number]           Visit Information        Provider Department      10/4/2018 3:30 PM Sameer Isis [facility] Generic      Your next 10 appointments already scheduled     Oct 24, 2018  2:30 PM CDT   Return Visit with Isis Sameer   [facility] ([facility])    [address]  [place] [id number] [phone]            Nov 15, 2018  3:30 PM CST   Return Visit with Isis Sameer   [facility] ([facility] [place])    [address]  [place] [id number] [phone]              MyChart Information     Zufflehart gives you secure access to your electronic health record. If you see a primary care provider, you can also send messages to your care team and make appointments. If you have questions, please call your primary care clinic.  If you do not have a primary care provider, please call [phone] and they will assist you.        Care EveryWhere ID     This is your Care EveryWhere ID. This could be used by other organizations to access your [place] medical records  [id number]        Equal Access to Services     JUSTO SIDHU AH: David Mayes, waliamda tino, qaalanta kaalmada ignacia, rafael vivar. So [facility] [phone].    ATENCIÓN: Si habla español, tiene a friedman disposición servicios gratuitos de asistencia lingüística. Llame al [phone].    We comply with applicable federal civil rights laws and [place] laws. We do not discriminate on the basis of race, color, national origin, age, disability, sex, sexual orientation, or gender identity.

## 2018-10-04 NOTE — PROGRESS NOTES
Progress Note    Client Name: Marcel Melchor  Date: 10/4/2018         Service Type: Individual      Session Start Time: 3:45  Session End Time: 4:15      Session Length: 30 min     Session #: 8     Attendees: Client attended alone    Treatment Plan Last Reviewed: 7/30/2018  PHQ-9 / ASUNCION-7 :10/4/2018/2018     DATA      Progress Since Last Session (Related to Symptoms / Goals / Homework):   Symptoms: No change client reports symptoms remain the same    Homework: Achieved / completed to satisfaction client practiced effective communication skills with brother      Episode of Care Goals: Satisfactory progress - ACTION (Actively working towards change); Intervened by reinforcing change plan / affirming steps taken     Current / Ongoing Stressors and Concerns:   Ongoing: The client reports receiving feedback from others that he is irritable and difficult to work with. The client reports he may get irritable due to anxiety.              Current: The client reported that he had been worried about his brother recently and has struggling to cope with his anxiety. He stated that he sat down with his brother and talked about the issues he had been having for hours, and tried to come up with a plan. Discussed how the client has had a difficult time managing anxiety about his brother which has challenged his ability to maintain boundaries. He explored feeling frustrated as he can see how his brother can fix the problems in his life, but he does not trust that his brother will be able to figure it out. Processed the client's inability to accept he cannot control his brother and how he can tolerate distress created by having to maintain boundaries in this relationship. He stated that he recently had an appointment with psychiatry and will be changing the time he takes his medication to improve their effectiveness. He reported that he was told he also needs to reduce his Mountain Dew  intake, which is currently about 12 servings a day.     Treatment Objective(s) Addressed in This Session:   use cognitive strategies identified in therapy to challenge anxious thoughts       Intervention:   DBT: Discussed client's anxiety, how to practice acceptance when something is out of his control, how to be supportive of others while respecting their autonomy   Motivational Interviewing: identified client's motivation for tolerating anxiety         ASSESSMENT: Current Emotional / Mental Status (status of significant symptoms):   Risk status (Self / Other harm or suicidal ideation)   Client denies current fears or concerns for personal safety.   Client denies current or recent suicidal ideation or behaviors.   Client denies current or recent homicidal ideation or behaviors.   Client denies current or recent self injurious behavior or ideation.   Client denies other safety concerns.   Client Client reports there has been no change in risk factors since their last session.     Client Client reports there has been no change in protective factors since their last session.     A safety and risk management plan has not been developed at this time, however client was given the after-hours number / 911 should there be a change in any of these risk factors.     Appearance:   Appropriate    Eye Contact:   Good    Psychomotor Behavior: Normal    Attitude:   Cooperative    Orientation:   All   Speech    Rate / Production: Normal     Volume:  Normal    Mood:    Anxious  client presented as anxious talking about his brother   Affect:    Appropriate    Thought Content:  Clear    Thought Form:  Coherent  Logical    Insight:    Good      Medication Review:   No changes to current psychiatric medication(s)     Medication Compliance:   NA     Changes in Health Issues:   None reported     Chemical Use Review:   Substance Use: Chemical use reviewed, no active concerns identified      Tobacco Use: No current tobacco use.        Collateral Reports Completed:   Not Applicable    PLAN: (Client Tasks / Therapist Tasks / Other)  Client will cope with anxiety related to brother, only drink 10 Mountain Dews a day, let brother make own decisions, invite him over and return for therapy in two weeks.        Isis Davisork MSW, [facility] 10/4/2018  Note reviewed and clinical supervision by ARABELLA Staples [facility] 10/15/2018       ________________________________________________________________________    Treatment Plan    Client's Name: Marcel Melchor  YOB: 1970    Date: 7/30/2108    DSM-V Diagnoses: Adjustment Disorders  309.4 (F43.25) With mixed disturbance of emotions and conduct R/O ASUNCION, client's symptoms may be due to anxiety, further assessment needed  Psychosocial / Contextual Factors: client reports experiencing irritability and anxiety at work  WHODAS: see in    Referral / Collaboration:  Referral to another professional/service is not indicated at this time..    Anticipated number of session or this episode of care: 8-12      MeasurableTreatment Goal(s) related to diagnosis / functional impairment(s)  Goal 1: Client will reduce symptoms of anxiety and irritability as evidenced by ASUNCION-7 reducing from 5 to 0 over the next four months and client reporting improved ability to maintain calm.    I will know I've met my goal when I don't have recurring issues with irritability.      Objective #A (Client Action)    Client will use cognitive strategies identified in therapy to challenge anxious thoughts.  Status: New - Date: 7/30/2018     Intervention(s)  Therapist will assign homework to challenge distorted thinking  teach CBT skills.    Objective #B  Client will identify 3 initial signs or symptoms of anxiety.  Status: New - Date: 7/30/2018     Intervention(s)  Therapist will teach emotional recognition/identification. DBT.    Objective #C  Client will use relaxation strategies 3 times per day to reduce the physical symptoms of  anxiety.  Status: New - Date: 7/30/2018     Intervention(s)  Therapist will assign homework to use mindfulness  teach mindfulness skills.      Goal 2: Client will improve ability to communicate effectively with others as evidenced by client reporting use of 3-4 new communications skills on a weekly basis.    I will know I've met my goal when I can feel open to being genuine and have feelings come out.      Objective #A (Client Action)    Status: New - Date: 7/30/2018     Client will learn & utilize at least 3 assertive communication skills weekly.    Intervention(s)  Therapist will assign homework to practice communication skills  teach assertiveness skills. DEAR MAN.    Objective #B  Client will pause and use skills before talking when irritable.    Status: New - Date: 7/30/2018     Intervention(s)  Therapist will teach mindfulness skills.    Objective #C  Client will identify and maintain motivation for changing communication skills.  Status: New - Date: 7/30/2018     Intervention(s)  Therapist will teach motivational skills.        Client has reviewed and agreed to the above plan.      Isis BELL, SW July 30, 2018  Note reviewed and clinical supervision by ARABELLA Staples [facility] 8/3/2018

## 2018-10-05 ASSESSMENT — ANXIETY QUESTIONNAIRES: GAD7 TOTAL SCORE: 5

## 2018-10-05 ASSESSMENT — PATIENT HEALTH QUESTIONNAIRE - PHQ9: SUM OF ALL RESPONSES TO PHQ QUESTIONS 1-9: 4

## 2018-10-24 ENCOUNTER — OFFICE VISIT (OUTPATIENT)
Dept: PSYCHOLOGY | Facility: CLINIC | Age: 48
End: 2018-10-24
Payer: COMMERCIAL

## 2018-10-24 DIAGNOSIS — F41.1 GAD (GENERALIZED ANXIETY DISORDER): Primary | ICD-10-CM

## 2018-10-24 PROCEDURE — 90834 PSYTX W PT 45 MINUTES: CPT | Performed by: SOCIAL WORKER

## 2018-10-24 NOTE — MR AVS SNAPSHOT
MRN:[id number]                      After Visit Summary   10/24/2018    Marcel Melchor    MRN: [id number]           Visit Information        Provider Department      10/24/2018 2:30 PM Sameer, [facility] Generic      Your next 10 appointments already scheduled     Nov 15, 2018  3:30 PM CST   Return Visit with [facility] [place] ([facility] [place])    [address]  [place] [address]   [phone]            Nov 28, 2018  3:30 PM CST   Return Visit with [facility] ([facility] [place])    [address]  [place] [address]   [phone]            Dec 12, 2018  3:30 PM CST   [facility] [place]    [address]  [place] [address]   [phone]            Dec 21, 2018  1:00 PM CST   [facility] [place]    [address]  [place] [address]   [phone]              MyChart Information     [employer] gives you secure access to your electronic health record. If you see a primary care provider, you can also send messages to your care team and make appointments. If you have questions, please call your primary care clinic.  If you do not have a primary care provider, please call [phone] and they will assist you.        Care EveryWhere ID     This is your Care EveryWhere ID. This could be used by other organizations to access your [facility] medical records  [id number]        Equal Access to Services     JUSTO SIDHU AH: Hadii federico fernandez hadashrigo Soceline, waaxda luqadaha, qaybta kaalmada ignacia, rafael vivar. So [facility] [phone].    ATENCIÓN: Si habla español, tiene a friedman disposición servicios gratuitos de asistencia lingüística. Llame al [phone].    We comply with applicable federal civil rights laws and  [place] laws. We do not discriminate on the basis of race, color, national origin, age, disability, sex, sexual orientation, or gender identity.

## 2018-10-31 ENCOUNTER — TRANSFERRED RECORDS (OUTPATIENT)
Dept: HEALTH INFORMATION MANAGEMENT | Facility: CLINIC | Age: 48
End: 2018-10-31

## 2018-11-01 NOTE — PROGRESS NOTES
"                                           Progress Note    Client Name: Marcel NG Tutdannyohjacinta  Date: 10/24/2018         Service Type: Individual      Session Start Time: 2:30  Session End Time: 3:15      Session Length: 45 min     Session #: 9     Attendees: Client and Spouse / Significant Other    Treatment Plan Last Reviewed: 7/30/2018  PHQ-9 / ASUNCION-7 :10/24/2018     DATA      Progress Since Last Session (Related to Symptoms / Goals / Homework):   Symptoms: No change client reports anxiety remains improved    Homework: Achieved / completed to satisfaction client practiced effective communication skills with brother      Episode of Care Goals: Satisfactory progress - ACTION (Actively working towards change); Intervened by reinforcing change plan / affirming steps taken     Current / Ongoing Stressors and Concerns:   Ongoing: The client reports receiving feedback from others that he is irritable and difficult to work with. The client reports he may get irritable due to anxiety.              Current: The client reported that he had continued to be concerned about his brother but that he was working on accepting his lack of control over his brother's decisions. Client's wife discussed past conflict she had experienced with the client's family that has required her to set firm boundaries regarding her involvement with them. She explored how her own family background helps her to both understand her 's issues with his family and set boundaries with them. Discussed how the client's wish to \"fix\" and \"save\" his family members reflects his behavior as an adult child of an alcoholic. Client identified how he was coping with his anxiety and getting support from his wife.     Treatment Objective(s) Addressed in This Session:   use cognitive strategies identified in therapy to challenge anxious thoughts       Intervention:   DBT: Explored client's family of origin and how his family explains his current behavior with his " siblings; coping skills with anxiety   Motivational Interviewing: Discussed client's progress , client wife identified improvements she had seen at home in relationship with         ASSESSMENT: Current Emotional / Mental Status (status of significant symptoms):   Risk status (Self / Other harm or suicidal ideation)   Client denies current fears or concerns for personal safety.   Client denies current or recent suicidal ideation or behaviors.   Client denies current or recent homicidal ideation or behaviors.   Client denies current or recent self injurious behavior or ideation.   Client denies other safety concerns.   Client Client reports there has been no change in risk factors since their last session.     Client Client reports there has been no change in protective factors since their last session.     A safety and risk management plan has not been developed at this time, however client was given the after-hours number / 911 should there be a change in any of these risk factors.     Appearance:   Appropriate    Eye Contact:   Good    Psychomotor Behavior: Normal    Attitude:   Cooperative    Orientation:   All   Speech    Rate / Production: Normal     Volume:  Normal    Mood:    Anxious  client presented as anxious talking about his brother   Affect:    Appropriate    Thought Content:  Clear    Thought Form:  Coherent  Logical    Insight:    Good      Medication Review:   No changes to current psychiatric medication(s)     Medication Compliance:   NA     Changes in Health Issues:   None reported     Chemical Use Review:   Substance Use: Chemical use reviewed, no active concerns identified      Tobacco Use: No current tobacco use.       Collateral Reports Completed:   Not Applicable    PLAN: (Client Tasks / Therapist Tasks / Other)  Client will begin tracking caffeine intake, cope with anxiety over family, seek support from his wife as needed  and return for therapy in two weeks.        Isis BELL,  SW 10/24/2018    Note reviewed and clinical supervision by ARABELLA Staples [facility] 11/2/2018     ________________________________________________________________________    Treatment Plan    Client's Name: Marcel Melchor  YOB: 1970    Date: 7/30/2108; to be reviewed    DSM-V Diagnoses: 300.02 (F41.1) Generalized Anxiety Disorder   Psychosocial / Contextual Factors: client reports experiencing irritability and anxiety at work  WHODAS: see in    Referral / Collaboration:  Referral to another professional/service is not indicated at this time..    Anticipated number of session or this episode of care: 8-12      MeasurableTreatment Goal(s) related to diagnosis / functional impairment(s)  Goal 1: Client will reduce symptoms of anxiety and irritability as evidenced by ASUNCION-7 reducing from 5 to 0 over the next four months and client reporting improved ability to maintain calm.    I will know I've met my goal when I don't have recurring issues with irritability.      Objective #A (Client Action)    Client will use cognitive strategies identified in therapy to challenge anxious thoughts.  Status: New - Date: 7/30/2018     Intervention(s)  Therapist will assign homework to challenge distorted thinking  teach CBT skills.    Objective #B  Client will identify 3 initial signs or symptoms of anxiety.  Status: New - Date: 7/30/2018     Intervention(s)  Therapist will teach emotional recognition/identification. DBT.    Objective #C  Client will use relaxation strategies 3 times per day to reduce the physical symptoms of anxiety.  Status: New - Date: 7/30/2018     Intervention(s)  Therapist will assign homework to use mindfulness  teach mindfulness skills.      Goal 2: Client will improve ability to communicate effectively with others as evidenced by client reporting use of 3-4 new communications skills on a weekly basis.    I will know I've met my goal when I can feel open to being genuine and have feelings  come out.      Objective #A (Client Action)    Status: New - Date: 7/30/2018     Client will learn & utilize at least 3 assertive communication skills weekly.    Intervention(s)  Therapist will assign homework to practice communication skills  teach assertiveness skills. DEAR MAN.    Objective #B  Client will pause and use skills before talking when irritable.    Status: New - Date: 7/30/2018     Intervention(s)  Therapist will teach mindfulness skills.    Objective #C  Client will identify and maintain motivation for changing communication skills.  Status: New - Date: 7/30/2018     Intervention(s)  Therapist will teach motivational skills.        Client has reviewed and agreed to the above plan.      Isis BELL, SW July 30, 2018  Note reviewed and clinical supervision by ARABELLA Staples [facility] 8/3/2018

## 2018-11-15 ENCOUNTER — OFFICE VISIT (OUTPATIENT)
Dept: PSYCHOLOGY | Facility: CLINIC | Age: 48
End: 2018-11-15
Payer: COMMERCIAL

## 2018-11-15 DIAGNOSIS — F41.1 GAD (GENERALIZED ANXIETY DISORDER): Primary | ICD-10-CM

## 2018-11-15 PROCEDURE — 90834 PSYTX W PT 45 MINUTES: CPT | Performed by: SOCIAL WORKER

## 2018-11-15 ASSESSMENT — ANXIETY QUESTIONNAIRES
3. WORRYING TOO MUCH ABOUT DIFFERENT THINGS: SEVERAL DAYS
2. NOT BEING ABLE TO STOP OR CONTROL WORRYING: SEVERAL DAYS
IF YOU CHECKED OFF ANY PROBLEMS ON THIS QUESTIONNAIRE, HOW DIFFICULT HAVE THESE PROBLEMS MADE IT FOR YOU TO DO YOUR WORK, TAKE CARE OF THINGS AT HOME, OR GET ALONG WITH OTHER PEOPLE: NOT DIFFICULT AT ALL
5. BEING SO RESTLESS THAT IT IS HARD TO SIT STILL: SEVERAL DAYS
GAD7 TOTAL SCORE: 8
1. FEELING NERVOUS, ANXIOUS, OR ON EDGE: SEVERAL DAYS
6. BECOMING EASILY ANNOYED OR IRRITABLE: MORE THAN HALF THE DAYS
7. FEELING AFRAID AS IF SOMETHING AWFUL MIGHT HAPPEN: NOT AT ALL

## 2018-11-15 ASSESSMENT — PATIENT HEALTH QUESTIONNAIRE - PHQ9
SUM OF ALL RESPONSES TO PHQ QUESTIONS 1-9: 9
5. POOR APPETITE OR OVEREATING: MORE THAN HALF THE DAYS

## 2018-11-15 NOTE — MR AVS SNAPSHOT
MRN:[id number]                      After Visit Summary   11/15/2018    Marcel Melchor    MRN: [id number]           Visit Information        Provider Department      11/15/2018 3:30 PM Sameer [facility] Generic      Your next 10 appointments already scheduled     Nov 28, 2018  3:30 PM CST   Return Visit with [facility] [place] ([facility] [place])    [address]  [place] [address]   [phone]            Dec 12, 2018  3:30 PM CST   Return Visit with Isis Sameer   [facility] [place] ([facility] [place])    [address]  [place] [address]   [phone]            Dec 21, 2018  1:00 PM CST   Return Visit with Isis Sameer   [facility] [place] ([facility] [place])    [address]  [place] [address]   [phone]              MyChart Information     [employer] gives you secure access to your electronic health record. If you see a primary care provider, you can also send messages to your care team and make appointments. If you have questions, please call your primary care clinic.  If you do not have a primary care provider, please call [phone] and they will assist you.        Care EveryWhere ID     This is your Care EveryWhere ID. This could be used by other organizations to access your [place] medical records  [id number]        Equal Access to Services     JUSTO SIDHU : Hadii federico mcclellano Soceline, waaxda luqadaha, qaybta kaalmada aderacquelyada, rafael vivar. So [facility] [phone].    ATENCIÓN: Si habla español, tiene a friedman disposición servicios gratuitos de asistencia lingüística. Llame al [phone].    We comply with applicable federal civil rights laws and [place] laws. We do not discriminate on the basis of race, color, national origin, age, disability, sex, sexual orientation, or gender identity.

## 2018-11-16 ASSESSMENT — ANXIETY QUESTIONNAIRES: GAD7 TOTAL SCORE: 8

## 2018-11-19 NOTE — PROGRESS NOTES
Progress Note    Client Name: Marcel Lauohjacinta  Date: 11/15/2018         Service Type: Individual      Session Start Time: 3:30  Session End Time: 4:15      Session Length: 45 min     Session #: 10     Attendees: Client and Spouse / Significant Other    Treatment Plan Last Reviewed: 7/30/2018; to be reviewed  PHQ-9 / ASUNCION-7 :11/15/2018     DATA      Progress Since Last Session (Related to Symptoms / Goals / Homework):   Symptoms: No change Client reports mood has not changed    Homework: Achieved / completed to satisfaction client practiced tolerating anxiety about brother      Episode of Care Goals: Satisfactory progress - ACTION (Actively working towards change); Intervened by reinforcing change plan / affirming steps taken     Current / Ongoing Stressors and Concerns:   Ongoing: The client reports receiving feedback from others that he is irritable and difficult to work with. The client reports he may get irritable due to anxiety.              Current: The client reported feeling like things had been the same lately, though his wife reported feeling like his mood had been worse. She described several recent interactions they had in which she felt he was not communicating effectively. The client's wife explained that he seems to shut down when he becomes too anxious and that he will become consumed with anxiety over his family. The client agreed that he has been feeling anxious lately but that he feels like his wife doesn't hear him out when he tries to talk to her about his family. Reviewed how they each have a different perspective on how to cope with the client's family, and how it may be easier for the client's wife to use logic while the client feels overwhelmed by his emotions. Identified how the client can slow down his processing speed when communicating with is wife about his family, and how his wife can provide support for the client.     Treatment  Objective(s) Addressed in This Session:   use cognitive strategies identified in therapy to challenge anxious thoughts  pause and use skills before talking when irritable     Intervention:   DBT: Processed how both the client and his wife weren't feeling heard when they would talk about his family, how client can use skills to descalate when feeling anxious, and how his wife may be more effective with her communication to support client  Motivational Interviewing: Identified barrier client was experiencing when coping with anxiety over his brother, how anxiety was negatively impacting his ability to communicate effectively. Identified strategies for addressing barrier.        ASSESSMENT: Current Emotional / Mental Status (status of significant symptoms):   Risk status (Self / Other harm or suicidal ideation)   Client denies current fears or concerns for personal safety.   Client denies current or recent suicidal ideation or behaviors.   Client denies current or recent homicidal ideation or behaviors.   Client denies current or recent self injurious behavior or ideation.   Client denies other safety concerns.   Client Client reports there has been no change in risk factors since their last session.     Client Client reports there has been no change in protective factors since their last session.     A safety and risk management plan has not been developed at this time, however client was given the after-hours number / 911 should there be a change in any of these risk factors.     Appearance:   Appropriate    Eye Contact:   Good    Psychomotor Behavior: Normal    Attitude:   Cooperative    Orientation:   All   Speech    Rate / Production: Normal     Volume:  Normal    Mood:    Anxious  client presented as anxious talking about his brother   Affect:    Appropriate    Thought Content:  Clear    Thought Form:  Coherent  Logical    Insight:    Good      Medication Review:   No changes to current psychiatric  medication(s)     Medication Compliance:   NA     Changes in Health Issues:   None reported     Chemical Use Review:   Substance Use: Chemical use reviewed, no active concerns identified      Tobacco Use: No current tobacco use.       Collateral Reports Completed:   Not Applicable    PLAN: (Client Tasks / Therapist Tasks / Other)  Client will consider whether he should change his caffeine intake, use mindfulness when feeling anxious, use mindfulness when talking to wife about his family and return for therapy in two weeks.        Isis Sameer MSW, [facility] [id number]  Note reviewed and clinical supervision by ARABELLA Staples [facility] 11/21/2018     ________________________________________________________________________    Treatment Plan    Client's Name: Marcel Melchor  YOB: 1970    Date: 7/30/2108; to be reviewed    DSM-V Diagnoses: 300.02 (F41.1) Generalized Anxiety Disorder   Psychosocial / Contextual Factors: client reports experiencing irritability and anxiety at work  WHODAS: see in    Referral / Collaboration:  Referral to another professional/service is not indicated at this time..    Anticipated number of session or this episode of care: 8-12      MeasurableTreatment Goal(s) related to diagnosis / functional impairment(s)  Goal 1: Client will reduce symptoms of anxiety and irritability as evidenced by ASUNCION-7 reducing from 5 to 0 over the next four months and client reporting improved ability to maintain calm.    I will know I've met my goal when I don't have recurring issues with irritability.      Objective #A (Client Action)    Client will use cognitive strategies identified in therapy to challenge anxious thoughts.  Status: New - Date: 7/30/2018     Intervention(s)  Therapist will assign homework to challenge distorted thinking  teach CBT skills.    Objective #B  Client will identify 3 initial signs or symptoms of anxiety.  Status: New - Date: 7/30/2018     Intervention(s)  Therapist will  teach emotional recognition/identification. DBT.    Objective #C  Client will use relaxation strategies 3 times per day to reduce the physical symptoms of anxiety.  Status: New - Date: 7/30/2018     Intervention(s)  Therapist will assign homework to use mindfulness  teach mindfulness skills.      Goal 2: Client will improve ability to communicate effectively with others as evidenced by client reporting use of 3-4 new communications skills on a weekly basis.    I will know I've met my goal when I can feel open to being genuine and have feelings come out.      Objective #A (Client Action)    Status: New - Date: 7/30/2018     Client will learn & utilize at least 3 assertive communication skills weekly.    Intervention(s)  Therapist will assign homework to practice communication skills  teach assertiveness skills. DEAR MAN.    Objective #B  Client will pause and use skills before talking when irritable.    Status: New - Date: 7/30/2018     Intervention(s)  Therapist will teach mindfulness skills.    Objective #C  Client will identify and maintain motivation for changing communication skills.  Status: New - Date: 7/30/2018     Intervention(s)  Therapist will teach motivational skills.        Client has reviewed and agreed to the above plan.      Isis BELL, LGSW July 30, 2018  Note reviewed and clinical supervision by ARABELLA Staples [facility]/2018

## 2018-11-28 ENCOUNTER — OFFICE VISIT (OUTPATIENT)
Dept: PSYCHOLOGY | Facility: CLINIC | Age: 48
End: 2018-11-28
Payer: COMMERCIAL

## 2018-11-28 DIAGNOSIS — F41.1 GAD (GENERALIZED ANXIETY DISORDER): Primary | ICD-10-CM

## 2018-11-28 PROCEDURE — 90834 PSYTX W PT 45 MINUTES: CPT | Performed by: SOCIAL WORKER

## 2018-11-28 ASSESSMENT — PATIENT HEALTH QUESTIONNAIRE - PHQ9
SUM OF ALL RESPONSES TO PHQ QUESTIONS 1-9: 8
5. POOR APPETITE OR OVEREATING: SEVERAL DAYS

## 2018-11-28 ASSESSMENT — ANXIETY QUESTIONNAIRES
5. BEING SO RESTLESS THAT IT IS HARD TO SIT STILL: SEVERAL DAYS
1. FEELING NERVOUS, ANXIOUS, OR ON EDGE: SEVERAL DAYS
6. BECOMING EASILY ANNOYED OR IRRITABLE: MORE THAN HALF THE DAYS
GAD7 TOTAL SCORE: 7
3. WORRYING TOO MUCH ABOUT DIFFERENT THINGS: SEVERAL DAYS
7. FEELING AFRAID AS IF SOMETHING AWFUL MIGHT HAPPEN: NOT AT ALL
2. NOT BEING ABLE TO STOP OR CONTROL WORRYING: SEVERAL DAYS

## 2018-11-28 NOTE — PROGRESS NOTES
Progress Note    Client Name: Marcel Melchor  Date: 11/28/2018         Service Type: Individual      Session Start Time: 3:30  Session End Time: 4:15      Session Length: 45 min     Session #: 11     Attendees: Client and Spouse / Significant Other    Treatment Plan Last Reviewed: 7/30/2018; 11/28/2018  PHQ-9 / ASUNCION-7 :11/28/2018     DATA      Progress Since Last Session (Related to Symptoms / Goals / Homework):   Symptoms: No change client reports anxiety has remained the same    Homework: Achieved / completed to satisfaction client practiced slowing down his thinking process      Episode of Care Goals: Satisfactory progress - ACTION (Actively working towards change); Intervened by reinforcing change plan / affirming steps taken     Current / Ongoing Stressors and Concerns:   Ongoing: The client reports receiving feedback from others that he is irritable and difficult to work with. The client reports he may get irritable due to anxiety.              Current: The client reported that he was continuing to work on his communication skills, though he reported having a misstep recently regarding their finances. The client's wife reported feeling overwhelmed after managing the purchase of their [employer], and had asked the client if they could refrain from any large purchases for a while as she maintains their accounting. The client reported that he had made a large purchase with one of their vehicles without checking with his wife, which caused issues. Discussed how the client could slow down his decision making process in the future, and his wife can communicate her needs more effectively. Reviewed client's goals for therapy.     Treatment Objective(s) Addressed in This Session:   learn & utilize at least 3 assertive communication skills weekly  pause and use skills before talking when irritable     Intervention:   DBT: Reviewed misscommunication between client and his wife,  discussed how they could use more effective communication skills in the future  Motivational Interviewing: Identified barrier client experienced towards his goals, discussed how client could address barrier. Reviewed and updated treatment plan.        ASSESSMENT: Current Emotional / Mental Status (status of significant symptoms):   Risk status (Self / Other harm or suicidal ideation)   Client denies current fears or concerns for personal safety.   Client denies current or recent suicidal ideation or behaviors.   Client denies current or recent homicidal ideation or behaviors.   Client denies current or recent self injurious behavior or ideation.   Client denies other safety concerns.   Client Client reports there has been no change in risk factors since their last session.     Client Client reports there has been no change in protective factors since their last session.     A safety and risk management plan has not been developed at this time, however client was given the after-hours number / 911 should there be a change in any of these risk factors.     Appearance:   Appropriate    Eye Contact:   Good    Psychomotor Behavior: Normal    Attitude:   Cooperative    Orientation:   All   Speech    Rate / Production: Normal     Volume:  Normal    Mood:    Anxious  client presented with anxiety talking about conflict   Affect:    Appropriate    Thought Content:  Clear    Thought Form:  Coherent  Logical    Insight:    Good      Medication Review:   No changes to current psychiatric medication(s)     Medication Compliance:   NA     Changes in Health Issues:   None reported     Chemical Use Review:   Substance Use: Chemical use reviewed, no active concerns identified      Tobacco Use: No current tobacco use.       Collateral Reports Completed:   Not Applicable    PLAN: (Client Tasks / Therapist Tasks / Other)  Client will continue practicing slowing down his thinking process, use effective communication skills with his  wife, use skills to cope with anxiety and return for therapy in two weeks.        Isis Davisork MSW, SW 11/28/2018    Note reviewed and clinical supervision by Savannah Bonilla [facility] 12/6/2018   ________________________________________________________________________    Treatment Plan    Client's Name: Marcel Melchor  YOB: 1970    Date: 7/30/2108; 11/28/2018    DSM-V Diagnoses: 300.02 (F41.1) Generalized Anxiety Disorder   Psychosocial / Contextual Factors: client reports conflict with family and anxiety  WHODAS: see in    Referral / Collaboration:  Referral to another professional/service is not indicated at this time..    Anticipated number of session or this episode of care: 8-12      MeasurableTreatment Goal(s) related to diagnosis / functional impairment(s)  Goal 1: Client will reduce symptoms of anxiety and irritability as evidenced by ASUNCION-7 reducing from 5 to 0 over the next four months and client reporting improved ability to maintain calm.    I will know I've met my goal when I don't have recurring issues with irritability.      Objective #A (Client Action)    Client will use cognitive strategies identified in therapy to challenge anxious thoughts.  Status: Continued - Date(s):11/28/2018     Intervention(s)  Therapist will assign homework to challenge distorted thinking  teach CBT skills.    Objective #B  Client will identify 3 initial signs or symptoms of anxiety.  Status: Continued - Date(s): 11/28/2018     Intervention(s)  Therapist will teach emotional recognition/identification. DBT.    Objective #C  Client will use relaxation strategies 3 times per day to reduce the physical symptoms of anxiety.  Status: Continued - Date(s): 11/28/2018     Intervention(s)  Therapist will assign homework to use mindfulness  teach mindfulness skills.      Goal 2: Client will improve ability to communicate effectively with others as evidenced by client reporting use of 3-4 new communications skills  on a weekly basis.    I will know I've met my goal when I can feel open to being genuine and have feelings come out.      Objective #A (Client Action)    Status: Continued - Date(s): 11/28/2018     Client will learn & utilize at least 3 assertive communication skills weekly.    Intervention(s)  Therapist will assign homework to practice communication skills  teach assertiveness skills. DEAR MAN.    Objective #B  Client will pause and use skills before talking when irritable.    Status: Continued - Date(s): 11/28/2018     Intervention(s)  Therapist will teach mindfulness skills.    Objective #C  Client will identify and maintain motivation for changing communication skills.  Status: Continued - Date(s): 11/28/2018     Intervention(s)  Therapist will teach motivational skills.        Client has reviewed and agreed to the above plan.      Isis BELL, [facility] November 28, 2018                                                                                           Note reviewed and clinical supervision by ARABELLA Staples [facility] 12/6/2018

## 2018-11-28 NOTE — MR AVS SNAPSHOT
MRN:[id number]                      After Visit Summary   11/28/2018    Marcel Melchor    MRN: [id number]           Visit Information        Provider Department      11/28/2018 3:30 PM Sameer [facility]      Your next 10 appointments already scheduled     Dec 12, 2018  3:30 PM CST   Return Visit with [facility] [place] ([facility] [place])    [address]  [place] [address]   [phone]            Dec 21, 2018  1:00 PM CST   Return Visit with Isis Sameer   [facility] [place] ([facility] [place])    [address]  [place] [address]   [phone]            Jan 09, 2019 10:00 AM CST   Return Visit with Isis Sameer   [facility] [place] ([facility] [place])    [address]  [place] [address]   [phone]              MyChart Information     [employer] gives you secure access to your electronic health record. If you see a primary care provider, you can also send messages to your care team and make appointments. If you have questions, please call your primary care clinic.  If you do not have a primary care provider, please call [phone] and they will assist you.        Care EveryWhere ID     This is your Care EveryWhere ID. This could be used by other organizations to access your [place] medical records  [id number]        Equal Access to Services     JUSTO SIDHU : Hadii federico mcclellano Soceline, waaxda luqadaha, qaybta kaalmada aderacquelyada, rafael vivar. So [facility] [phone].    ATENCIÓN: Si habla español, tiene a friedman disposición servicios gratuitos de asistencia lingüística. Llame al [phone].    We comply with applicable federal civil rights laws and [place] laws. We do not discriminate on the basis of race, color, national origin, age, disability, sex, sexual orientation, or gender identity.

## 2018-11-29 ASSESSMENT — ANXIETY QUESTIONNAIRES: GAD7 TOTAL SCORE: 7

## 2018-12-12 ENCOUNTER — OFFICE VISIT (OUTPATIENT)
Dept: PSYCHOLOGY | Facility: CLINIC | Age: 48
End: 2018-12-12
Payer: COMMERCIAL

## 2018-12-12 DIAGNOSIS — F41.1 GAD (GENERALIZED ANXIETY DISORDER): Primary | ICD-10-CM

## 2018-12-12 PROCEDURE — 90834 PSYTX W PT 45 MINUTES: CPT | Performed by: SOCIAL WORKER

## 2018-12-12 ASSESSMENT — ANXIETY QUESTIONNAIRES
5. BEING SO RESTLESS THAT IT IS HARD TO SIT STILL: NOT AT ALL
2. NOT BEING ABLE TO STOP OR CONTROL WORRYING: NOT AT ALL
GAD7 TOTAL SCORE: 4
IF YOU CHECKED OFF ANY PROBLEMS ON THIS QUESTIONNAIRE, HOW DIFFICULT HAVE THESE PROBLEMS MADE IT FOR YOU TO DO YOUR WORK, TAKE CARE OF THINGS AT HOME, OR GET ALONG WITH OTHER PEOPLE: NOT DIFFICULT AT ALL
7. FEELING AFRAID AS IF SOMETHING AWFUL MIGHT HAPPEN: NOT AT ALL
1. FEELING NERVOUS, ANXIOUS, OR ON EDGE: SEVERAL DAYS
3. WORRYING TOO MUCH ABOUT DIFFERENT THINGS: SEVERAL DAYS
6. BECOMING EASILY ANNOYED OR IRRITABLE: SEVERAL DAYS

## 2018-12-12 ASSESSMENT — PATIENT HEALTH QUESTIONNAIRE - PHQ9
SUM OF ALL RESPONSES TO PHQ QUESTIONS 1-9: 6
5. POOR APPETITE OR OVEREATING: SEVERAL DAYS

## 2018-12-12 NOTE — PROGRESS NOTES
Progress Note    Client Name: Marcel Melchor  Date: 12/12/2018         Service Type: Individual      Session Start Time: 4:30  Session End Time: 5:15      Session Length: 45 min     Session #: 12     Attendees: Client and Spouse / Significant Other    Treatment Plan Last Reviewed: 7/30/2018; 11/28/2018  PHQ-9 / ASUNCION-7 :12/12/2018     DATA      Progress Since Last Session (Related to Symptoms / Goals / Homework):   Symptoms: No change client reports anxiety is stable    Homework: Achieved / completed to satisfaction client practiced effective communication skills      Episode of Care Goals: Satisfactory progress - ACTION (Actively working towards change); Intervened by reinforcing change plan / affirming steps taken     Current / Ongoing Stressors and Concerns:   Ongoing: The client reports receiving feedback from others that he is irritable and difficult to work with. The client reports he may get irritable due to anxiety.              Current: The client reported that he had been trying to be more open with his wife and be more proactive with communication. Discussed how the client was navigating stress at work differently than he had in the past.He stated that he was talking more calmly when he is upset and isn't yelling. Discussed the family dynamic with their daughter and how the client's daughter also suffers from anxiety. Client's wife reported that both their daughter and the client seem to have some social anxiety. Client's wife reported she still feels issue about jeep client bought is unresolved.      Treatment Objective(s) Addressed in This Session:   learn & utilize at least 3 assertive communication skills weekly  pause and use skills before talking when irritable     Intervention:   DBT: Reviewed areas in which client used skills effectively, particularly at work. Reviewed how client and his wife were not discussing conflict around the jeep, explored how  client and wife can have conversation with each other to resolve conflict  Motivational Interviewing: Identified barrier client was experiencing with effective communication        ASSESSMENT: Current Emotional / Mental Status (status of significant symptoms):   Risk status (Self / Other harm or suicidal ideation)   Client denies current fears or concerns for personal safety.   Client denies current or recent suicidal ideation or behaviors.   Client denies current or recent homicidal ideation or behaviors.   Client denies current or recent self injurious behavior or ideation.   Client denies other safety concerns.   Client Client reports there has been no change in risk factors since their last session.     Client Client reports there has been no change in protective factors since their last session.     A safety and risk management plan has not been developed at this time, however client was given the after-hours number / 911 should there be a change in any of these risk factors.     Appearance:   Appropriate    Eye Contact:   Good    Psychomotor Behavior: Normal    Attitude:   Cooperative    Orientation:   All   Speech    Rate / Production: Normal     Volume:  Normal    Mood:    Anxious  client appeared anxious talking with wife   Affect:    Appropriate    Thought Content:  Clear    Thought Form:  Coherent  Logical    Insight:    Good      Medication Review:   No changes to current psychiatric medication(s)     Medication Compliance:   NA     Changes in Health Issues:   None reported     Chemical Use Review:   Substance Use: Chemical use reviewed, no active concerns identified      Tobacco Use: No current tobacco use.       Collateral Reports Completed:   Not Applicable    PLAN: (Client Tasks / Therapist Tasks / Other)  Client will continue practicing effective communication skills, talk with wife about conflict with jeep, resist urge to avoid and shut down with conflict and return for therapy in one  week.        Isis Estrella MSW, [facility] 12/12/2018  Note reviewed and clinical supervision by Savannah Bonilla, MSKARAN [facility] 12/14/2018   ________________________________________________________________________    Treatment Plan    Client's Name: Marcel Melchor  YOB: 1970    Date: 7/30/2108; 11/28/2018    DSM-V Diagnoses: 300.02 (F41.1) Generalized Anxiety Disorder   Psychosocial / Contextual Factors: client reports conflict with family and anxiety  WHODAS: see in    Referral / Collaboration:  Referral to another professional/service is not indicated at this time..    Anticipated number of session or this episode of care: 8-12      MeasurableTreatment Goal(s) related to diagnosis / functional impairment(s)  Goal 1: Client will reduce symptoms of anxiety and irritability as evidenced by ASUNCION-7 reducing from 5 to 0 over the next four months and client reporting improved ability to maintain calm.    I will know I've met my goal when I don't have recurring issues with irritability.      Objective #A (Client Action)    Client will use cognitive strategies identified in therapy to challenge anxious thoughts.  Status: Continued - Date(s):11/28/2018     Intervention(s)  Therapist will assign homework to challenge distorted thinking  teach CBT skills.    Objective #B  Client will identify 3 initial signs or symptoms of anxiety.  Status: Continued - Date(s): 11/28/2018     Intervention(s)  Therapist will teach emotional recognition/identification. DBT.    Objective #C  Client will use relaxation strategies 3 times per day to reduce the physical symptoms of anxiety.  Status: Continued - Date(s): 11/28/2018     Intervention(s)  Therapist will assign homework to use mindfulness  teach mindfulness skills.      Goal 2: Client will improve ability to communicate effectively with others as evidenced by client reporting use of 3-4 new communications skills on a weekly basis.    I will know I've met my goal when I can feel  open to being genuine and have feelings come out.      Objective #A (Client Action)    Status: Continued - Date(s): 11/28/2018     Client will learn & utilize at least 3 assertive communication skills weekly.    Intervention(s)  Therapist will assign homework to practice communication skills  teach assertiveness skills. DEAR MAN.    Objective #B  Client will pause and use skills before talking when irritable.    Status: Continued - Date(s): 11/28/2018     Intervention(s)  Therapist will teach mindfulness skills.    Objective #C  Client will identify and maintain motivation for changing communication skills.  Status: Continued - Date(s): 11/28/2018     Intervention(s)  Therapist will teach motivational skills.        Client has reviewed and agreed to the above plan.      Isis BELL, [facility] November 28, 2018                                                                                           Note reviewed and clinical supervision by ARABELLA Staples [facility] 12/6/2018

## 2018-12-13 ENCOUNTER — OFFICE VISIT (OUTPATIENT)
Dept: FAMILY MEDICINE | Facility: CLINIC | Age: 48
End: 2018-12-13
Payer: COMMERCIAL

## 2018-12-13 VITALS
BODY MASS INDEX: 28.06 KG/M2 | TEMPERATURE: 98.4 F | RESPIRATION RATE: 18 BRPM | SYSTOLIC BLOOD PRESSURE: 120 MMHG | WEIGHT: 190 LBS | DIASTOLIC BLOOD PRESSURE: 80 MMHG | HEART RATE: 72 BPM

## 2018-12-13 DIAGNOSIS — F69 BEHAVIOR CONCERN IN ADULT: Primary | ICD-10-CM

## 2018-12-13 DIAGNOSIS — F41.1 GAD (GENERALIZED ANXIETY DISORDER): ICD-10-CM

## 2018-12-13 DIAGNOSIS — F15.20 CAFFEINE ADDICTION (H): ICD-10-CM

## 2018-12-13 PROCEDURE — 99214 OFFICE O/P EST MOD 30 MIN: CPT | Performed by: PHYSICIAN ASSISTANT

## 2018-12-13 ASSESSMENT — ANXIETY QUESTIONNAIRES: GAD7 TOTAL SCORE: 4

## 2018-12-13 NOTE — PATIENT INSTRUCTIONS
Call Gregoria:  [facility]: Collaborative Care Psychiatry Service [phone].     Set up with Neurology.

## 2018-12-13 NOTE — PROGRESS NOTES
"  SUBJECTIVE:   Marcel Melchor is a 48 year old male who presents to clinic today for the following health issues:    Marcel is here to discuss the possibility of having an MRI.  He has concerns over his own behaviors lately.  He feels that the way he has been reacting to potential conflict is not normal.  He used to \"explode\" or go over the edge with anger when something made him upset.  Lately he has had a few situations in which he would normally have reacted this way and he has not.  He has been very mellow and relaxed and had a kind of \"I don't care attitude\".  He had a recent incident with his wife that would normally have set things off for him and it has not.  He is concerned about himself.  In addition her recently attended a reflexology session in which the person doing this found a \"spot\" relating the to the \"brain or carotid\".    He has been on Effexor for a while now to help with anxiety, irritability and anger.  He thinks it has been helping.  He did try taking two of them around Thanksgiving and thinks he felt better.    He denies symptoms of headache, numbness, tingling, visual changes, hallucinations, weakness or slurred speech.      Problem list and histories reviewed & adjusted, as indicated.  Additional history: as documented      Reviewed and updated as needed this visit by clinical staff  Tobacco  Allergies  Meds  Med Hx  Surg Hx  Fam Hx  Soc Hx      Reviewed and updated as needed this visit by Provider         ROS:  Constitutional, HEENT, cardiovascular, pulmonary, gi and gu systems are negative, except as otherwise noted.    OBJECTIVE:     /80 (BP Location: Right arm, Patient Position: Sitting, Cuff Size: Adult Regular)   Pulse 72   Temp 98.4  F (36.9  C) (Oral)   Resp 18   Wt 86.2 kg (190 lb)   BMI 28.06 kg/m    Body mass index is 28.06 kg/m .  GENERAL: healthy, alert and no distress  PSYCH: mentation appears normal, affect normal/bright    Diagnostic Test Results:  none "     ASSESSMENT/PLAN:   1. Behavior concern in adult  I discussed with him that I do not think what he is describing requires an MRI.  If he wants to discuss more I would be more comfortable having him discuss with Neurology if he is still concerned about this.  I gave him a referral to Neurology.  - NEUROLOGY ADULT REFERRAL    2. ASUNCION (generalized anxiety disorder)  - Continue with Effexor, and also continue with therapy.    3. Caffeine addiction (H)  - he mentioned this today in passing. He does say he is addicted to caffeine and that he has an addictive personality in general.  I did talk to him about the fact that too much caffeine would likely make his anxiety worse.          Wes Braga PA-C  [facility]

## 2018-12-21 ENCOUNTER — OFFICE VISIT (OUTPATIENT)
Dept: PSYCHOLOGY | Facility: CLINIC | Age: 48
End: 2018-12-21
Payer: COMMERCIAL

## 2018-12-21 DIAGNOSIS — F41.1 GAD (GENERALIZED ANXIETY DISORDER): Primary | ICD-10-CM

## 2018-12-21 PROCEDURE — 90834 PSYTX W PT 45 MINUTES: CPT | Performed by: SOCIAL WORKER

## 2018-12-21 ASSESSMENT — ANXIETY QUESTIONNAIRES
1. FEELING NERVOUS, ANXIOUS, OR ON EDGE: MORE THAN HALF THE DAYS
IF YOU CHECKED OFF ANY PROBLEMS ON THIS QUESTIONNAIRE, HOW DIFFICULT HAVE THESE PROBLEMS MADE IT FOR YOU TO DO YOUR WORK, TAKE CARE OF THINGS AT HOME, OR GET ALONG WITH OTHER PEOPLE: SOMEWHAT DIFFICULT
7. FEELING AFRAID AS IF SOMETHING AWFUL MIGHT HAPPEN: NOT AT ALL
3. WORRYING TOO MUCH ABOUT DIFFERENT THINGS: MORE THAN HALF THE DAYS
2. NOT BEING ABLE TO STOP OR CONTROL WORRYING: MORE THAN HALF THE DAYS
5. BEING SO RESTLESS THAT IT IS HARD TO SIT STILL: SEVERAL DAYS
GAD7 TOTAL SCORE: 10
6. BECOMING EASILY ANNOYED OR IRRITABLE: MORE THAN HALF THE DAYS

## 2018-12-21 ASSESSMENT — PATIENT HEALTH QUESTIONNAIRE - PHQ9
SUM OF ALL RESPONSES TO PHQ QUESTIONS 1-9: 9
5. POOR APPETITE OR OVEREATING: SEVERAL DAYS

## 2018-12-21 NOTE — PROGRESS NOTES
Progress Note    Client Name: Marcel Melchor  Date: 12/21/2018         Service Type: Individual      Session Start Time: 10:00  Session End Time: 10:45      Session Length: 45 min     Session #: 13     Attendees: Client attended alone    Treatment Plan Last Reviewed: 7/30/2018; 11/28/2018  PHQ-9 / ASUNCION-7 :12/21/2018     DATA      Progress Since Last Session (Related to Symptoms / Goals / Homework):   Symptoms: No change client reports anxiety is stable    Homework: Achieved / completed to satisfaction client practiced effective communication skills      Episode of Care Goals: Satisfactory progress - ACTION (Actively working towards change); Intervened by reinforcing change plan / affirming steps taken     Current / Ongoing Stressors and Concerns:   Ongoing: The client reports receiving feedback from others that he is irritable and difficult to work with. The client reports he may get irritable due to anxiety.              Current: The client reported that he and his wife had been struggling to communicate effectively. He explained that his wife has continued to bring up problems with the way he handled purchasing upgrades for their jeep. The client reported that he has been upset after finding out his daughter was bullied in middle school, and his wife didn't tell him the details until recently. Discussed how the client and his wife may benefit from couples counseling to address their communication issues. The client reported that they have also been struggling to agree on how they should handle their finances. Identified how the client can practice validation and assertiveness with his wife.      Treatment Objective(s) Addressed in This Session:   learn & utilize at least 3 assertive communication skills weekly  pause and use skills before talking when irritable     Intervention:   DBT: Discussed areas in which the client was not using effective communication skills,  Practiced how client can use validation and assertiveness skills. discussed how client's emotions are overtaking his ability to use logic and skills   Motivational Interviewing: validated client's frustration with communication issues, identified steps he can take towards his goals        ASSESSMENT: Current Emotional / Mental Status (status of significant symptoms):   Risk status (Self / Other harm or suicidal ideation)   Client denies current fears or concerns for personal safety.   Client denies current or recent suicidal ideation or behaviors.   Client denies current or recent homicidal ideation or behaviors.   Client denies current or recent self injurious behavior or ideation.   Client denies other safety concerns.   Client Client reports there has been no change in risk factors since their last session.     Client Client reports there has been no change in protective factors since their last session.     A safety and risk management plan has not been developed at this time, however client was given the after-hours number / 911 should there be a change in any of these risk factors.     Appearance:   Appropriate    Eye Contact:   Good    Psychomotor Behavior: Normal    Attitude:   Cooperative    Orientation:   All   Speech    Rate / Production: Normal     Volume:  Normal    Mood:    Anxious  Irritable  client appeared anxious talking with wife   Affect:    Appropriate    Thought Content:  Clear    Thought Form:  Coherent  Logical    Insight:    Good      Medication Review:   No changes to current psychiatric medication(s)     Medication Compliance:   NA     Changes in Health Issues:   None reported     Chemical Use Review:   Substance Use: Chemical use reviewed, no active concerns identified      Tobacco Use: No current tobacco use.       Collateral Reports Completed:   Not Applicable    PLAN: (Client Tasks / Therapist Tasks / Other)  Client will talk to wife about couple's counseling, practice skills to reduce  irritability when talking with wife, practice effective communication skills and return for therapy in two weeks.        Isis Sameer MSW, SW 12/21/2018  Note reviewed and clinical supervision by ARABELLA Staples [facility] 1/[facility]/2019   ________________________________________________________________________    Treatment Plan    Client's Name: Marcel Melchor  YOB: 1970    Date: 7/30/2108; 11/28/2018    DSM-V Diagnoses: 300.02 (F41.1) Generalized Anxiety Disorder   Psychosocial / Contextual Factors: client reports conflict with family and anxiety  WHODAS: see in    Referral / Collaboration:  Referral to another professional/service is not indicated at this time..    Anticipated number of session or this episode of care: 8-12      MeasurableTreatment Goal(s) related to diagnosis / functional impairment(s)  Goal 1: Client will reduce symptoms of anxiety and irritability as evidenced by ASUNCION-7 reducing from 5 to 0 over the next four months and client reporting improved ability to maintain calm.    I will know I've met my goal when I don't have recurring issues with irritability.      Objective #A (Client Action)    Client will use cognitive strategies identified in therapy to challenge anxious thoughts.  Status: Continued - Date(s):11/28/2018     Intervention(s)  Therapist will assign homework to challenge distorted thinking  teach CBT skills.    Objective #B  Client will identify 3 initial signs or symptoms of anxiety.  Status: Continued - Date(s): 11/28/2018     Intervention(s)  Therapist will teach emotional recognition/identification. DBT.    Objective #C  Client will use relaxation strategies 3 times per day to reduce the physical symptoms of anxiety.  Status: Continued - Date(s): 11/28/2018     Intervention(s)  Therapist will assign homework to use mindfulness  teach mindfulness skills.      Goal 2: Client will improve ability to communicate effectively with others as evidenced by client reporting  use of 3-4 new communications skills on a weekly basis.    I will know I've met my goal when I can feel open to being genuine and have feelings come out.      Objective #A (Client Action)    Status: Continued - Date(s): 11/28/2018     Client will learn & utilize at least 3 assertive communication skills weekly.    Intervention(s)  Therapist will assign homework to practice communication skills  teach assertiveness skills. DEAR MAN.    Objective #B  Client will pause and use skills before talking when irritable.    Status: Continued - Date(s): 11/28/2018     Intervention(s)  Therapist will teach mindfulness skills.    Objective #C  Client will identify and maintain motivation for changing communication skills.  Status: Continued - Date(s): 11/28/2018     Intervention(s)  Therapist will teach motivational skills.        Client has reviewed and agreed to the above plan.      Isis BELL, [facility] November 28, 2018                                                                                           Note reviewed and clinical supervision by ARABELLA Staples [facility] 12/6/2018

## 2018-12-22 ASSESSMENT — ANXIETY QUESTIONNAIRES: GAD7 TOTAL SCORE: 10

## 2019-01-09 ENCOUNTER — OFFICE VISIT (OUTPATIENT)
Dept: PSYCHOLOGY | Facility: CLINIC | Age: 49
End: 2019-01-09
Payer: COMMERCIAL

## 2019-01-09 DIAGNOSIS — F41.1 GAD (GENERALIZED ANXIETY DISORDER): Primary | ICD-10-CM

## 2019-01-09 PROCEDURE — 90834 PSYTX W PT 45 MINUTES: CPT | Performed by: SOCIAL WORKER

## 2019-01-09 ASSESSMENT — ANXIETY QUESTIONNAIRES
1. FEELING NERVOUS, ANXIOUS, OR ON EDGE: SEVERAL DAYS
2. NOT BEING ABLE TO STOP OR CONTROL WORRYING: SEVERAL DAYS
GAD7 TOTAL SCORE: 9
5. BEING SO RESTLESS THAT IT IS HARD TO SIT STILL: SEVERAL DAYS
6. BECOMING EASILY ANNOYED OR IRRITABLE: MORE THAN HALF THE DAYS
3. WORRYING TOO MUCH ABOUT DIFFERENT THINGS: MORE THAN HALF THE DAYS
IF YOU CHECKED OFF ANY PROBLEMS ON THIS QUESTIONNAIRE, HOW DIFFICULT HAVE THESE PROBLEMS MADE IT FOR YOU TO DO YOUR WORK, TAKE CARE OF THINGS AT HOME, OR GET ALONG WITH OTHER PEOPLE: NOT DIFFICULT AT ALL
7. FEELING AFRAID AS IF SOMETHING AWFUL MIGHT HAPPEN: SEVERAL DAYS

## 2019-01-09 ASSESSMENT — PATIENT HEALTH QUESTIONNAIRE - PHQ9
5. POOR APPETITE OR OVEREATING: SEVERAL DAYS
SUM OF ALL RESPONSES TO PHQ QUESTIONS 1-9: 9

## 2019-01-09 NOTE — PROGRESS NOTES
Progress Note    Client Name: Marcel Melchor  Date: 1/9/2019         Service Type: Individual      Session Start Time: 10:00  Session End Time: 10:55      Session Length: 55 min     Session #: 14     Attendees: Client attended alone    Treatment Plan Last Reviewed: 7/30/2018; 11/28/2018  PHQ-9 / ASUNCION-7 :1/9/2019     DATA      Progress Since Last Session (Related to Symptoms / Goals / Homework):   Symptoms: Worsening client reports increased anxiety    Homework: Partially completed client reports using some skills      Episode of Care Goals: Satisfactory progress - ACTION (Actively working towards change); Intervened by reinforcing change plan / affirming steps taken     Current / Ongoing Stressors and Concerns:   Ongoing: The client reports receiving feedback from others that he is irritable and difficult to work with. The client reports he may get irritable due to anxiety.              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 Client's wife reported that client has been increasingly irritable at home and not using effective communication skills. Client described issues they have been arguing over lately. Identified how both the client and his wife were interpreting each other's statements rather than asking for clarification, which was causing them both to react defensively when they communicated. Reviewed how the client can use skills to cope with increased stress from work to maintain ability to communicate effectively. Discussed how wife's employment with the client's business has contributed to conflict in relationship, whether it continued to make sense for wife to work for the client. Reviewed how client and wife are stuck in maladaptive patterns in how they react to each other.     Treatment Objective(s) Addressed in This Session:   learn & utilize at least 3 assertive communication  skills weekly  pause and use skills before talking when irritable     Intervention:   CBT: Reviewed how client and wife are instigating maladaptive cognitive behavioral patterns when reacting to each other  DBT: discussed how client's stress is increasing his vulnerability to emotion mind, how client can maintain control over anxiety to stay effective in relationships  Motivational Interviewing: identified how client has encountered barrier to effectiveness, how client can begin to address barrier        ASSESSMENT: Current Emotional / Mental Status (status of significant symptoms):   Risk status (Self / Other harm or suicidal ideation)   Client denies current fears or concerns for personal safety.   Client denies current or recent suicidal ideation or behaviors.   Client denies current or recent homicidal ideation or behaviors.   Client denies current or recent self injurious behavior or ideation.   Client denies other safety concerns.   Client Client reports there has been a change in risk factors since their last session.  client reports increase stress, conflict in relationship   Client Client reports there has been no change in protective factors since their last session.     A safety and risk management plan has not been developed at this time, however client was given the after-hours number / 911 should there be a change in any of these risk factors.     Appearance:   Appropriate    Eye Contact:   Good    Psychomotor Behavior: Normal    Attitude:   Cooperative    Orientation:   All   Speech    Rate / Production: Normal     Volume:  Normal    Mood:    Anxious  Irritable  client presented with irritability and anxiety talking about conflict with wife. Increased stress   Affect:    Appropriate    Thought Content:  Clear    Thought Form:  Coherent  Logical    Insight:    Good      Medication Review:   No changes to current psychiatric medication(s)     Medication Compliance:   NA     Changes in Health  Issues:   None reported     Chemical Use Review:   Substance Use: Chemical use reviewed, no active concerns identified      Tobacco Use: No current tobacco use.       Collateral Reports Completed:   Not Applicable    PLAN: (Client Tasks / Therapist Tasks / Other)  Client will increase use of skills to reduce anxiety and stress, client and wife will identify elements of CBT pattern they engage with when reacting to each other, increase use of self care and return for therapy in two weeks.        Isis Estrella MSKARAN, [facility] 1/9/2019  Note reviewed and clinical supervision by ARABELLA Staples [facility] 1/11/2019     ________________________________________________________________________    Treatment Plan    Client's Name: Marcel Melchor  YOB: 1970    Date: 7/30/2108; 11/28/2018    DSM-V Diagnoses: 300.02 (F41.1) Generalized Anxiety Disorder   Psychosocial / Contextual Factors: client reports conflict with family and anxiety  WHODAS: see in    Referral / Collaboration:  Referral to another professional/service is not indicated at this time..    Anticipated number of session or this episode of care: 8-12      MeasurableTreatment Goal(s) related to diagnosis / functional impairment(s)  Goal 1: Client will reduce symptoms of anxiety and irritability as evidenced by ASUNCION-7 reducing from 5 to 0 over the next four months and client reporting improved ability to maintain calm.    I will know I've met my goal when I don't have recurring issues with irritability.      Objective #A (Client Action)    Client will use cognitive strategies identified in therapy to challenge anxious thoughts.  Status: Continued - Date(s):11/28/2018     Intervention(s)  Therapist will assign homework to challenge distorted thinking  teach CBT skills.    Objective #B  Client will identify 3 initial signs or symptoms of anxiety.  Status: Continued - Date(s): 11/28/2018     Intervention(s)  Therapist will teach emotional  recognition/identification. DBT.    Objective #C  Client will use relaxation strategies 3 times per day to reduce the physical symptoms of anxiety.  Status: Continued - Date(s): 11/28/2018     Intervention(s)  Therapist will assign homework to use mindfulness  teach mindfulness skills.      Goal 2: Client will improve ability to communicate effectively with others as evidenced by client reporting use of 3-4 new communications skills on a weekly basis.    I will know I've met my goal when I can feel open to being genuine and have feelings come out.      Objective #A (Client Action)    Status: Continued - Date(s): 11/28/2018     Client will learn & utilize at least 3 assertive communication skills weekly.    Intervention(s)  Therapist will assign homework to practice communication skills  teach assertiveness skills. DEAR MINAL.    Objective #B  Client will pause and use skills before talking when irritable.    Status: Continued - Date(s): 11/28/2018     Intervention(s)  Therapist will teach mindfulness skills.    Objective #C  Client will identify and maintain motivation for changing communication skills.  Status: Continued - Date(s): 11/28/2018     Intervention(s)  Therapist will teach motivational skills.        Client has reviewed and agreed to the above plan.      Isis BELL, LGSW November 28, 2018                                                                                           Note reviewed and clinical supervision by ARABELLA Staples [facility] 12/6/2018

## 2019-01-10 ASSESSMENT — ANXIETY QUESTIONNAIRES: GAD7 TOTAL SCORE: 9

## 2019-01-30 ENCOUNTER — OFFICE VISIT (OUTPATIENT)
Dept: PSYCHOLOGY | Facility: CLINIC | Age: 49
End: 2019-01-30
Payer: COMMERCIAL

## 2019-01-30 DIAGNOSIS — F41.1 GAD (GENERALIZED ANXIETY DISORDER): Primary | ICD-10-CM

## 2019-01-30 PROCEDURE — 90834 PSYTX W PT 45 MINUTES: CPT | Performed by: SOCIAL WORKER

## 2019-01-30 ASSESSMENT — PATIENT HEALTH QUESTIONNAIRE - PHQ9
SUM OF ALL RESPONSES TO PHQ QUESTIONS 1-9: 7
5. POOR APPETITE OR OVEREATING: SEVERAL DAYS

## 2019-01-30 ASSESSMENT — ANXIETY QUESTIONNAIRES
6. BECOMING EASILY ANNOYED OR IRRITABLE: SEVERAL DAYS
1. FEELING NERVOUS, ANXIOUS, OR ON EDGE: SEVERAL DAYS
IF YOU CHECKED OFF ANY PROBLEMS ON THIS QUESTIONNAIRE, HOW DIFFICULT HAVE THESE PROBLEMS MADE IT FOR YOU TO DO YOUR WORK, TAKE CARE OF THINGS AT HOME, OR GET ALONG WITH OTHER PEOPLE: NOT DIFFICULT AT ALL
3. WORRYING TOO MUCH ABOUT DIFFERENT THINGS: SEVERAL DAYS
5. BEING SO RESTLESS THAT IT IS HARD TO SIT STILL: SEVERAL DAYS
7. FEELING AFRAID AS IF SOMETHING AWFUL MIGHT HAPPEN: NOT AT ALL
2. NOT BEING ABLE TO STOP OR CONTROL WORRYING: SEVERAL DAYS
GAD7 TOTAL SCORE: 6

## 2019-01-30 NOTE — PROGRESS NOTES
"                                           Progress Note    Client Name: Marcel Lauohjacinta  Date: 1/30/2019         Service Type: Individual      Session Start Time: 3:30  Session End Time: 4:15      Session Length: 45 min     Session #: 15     Attendees: Client attended alone    Treatment Plan Last Reviewed: 7/30/2018; 11/28/2018  PHQ-9 / ASUNCION-7 :1/30/2019     DATA      Progress Since Last Session (Related to Symptoms / Goals / Homework):   Symptoms: Improving client reported stress had reduced    Homework: Partially completed client reports trying to reduce irritability      Episode of Care Goals: Satisfactory progress - ACTION (Actively working towards change); Intervened by reinforcing change plan / affirming steps taken     Current / Ongoing Stressors and Concerns:   Ongoing: The client reports receiving feedback from others that he is irritable and difficult to work with. The client reports he may get irritable due to anxiety.              Current: The client reported that he had been getting along with his wife better lately and that they were trying to communicate more effectively. The client explored how he feels his wife struggles with \"control issues\" and how it negatively impacts him. Discussed how client cannot control his wife but he can take efforts to earn her trust. He stated that he had been feeling particularly irritable at work lately and described a conflict that he navigated ineffectively. He stated that while he hadn't used appropriate tone or language he still achieved his goal of having the other person change their behavior. Discussed the consequences of the client's communication and how he could have approached the situation differently and still achieved his goal.     Treatment Objective(s) Addressed in This Session:   learn & utilize at least 3 assertive communication skills weekly  pause and use skills before talking when irritable     Intervention:   DBT: Discussed how the client was or " was not upholding his values while navigating conflict at work and at home. Discussed how client can use mindfulness to reduce his irritability   Motivational Interviewing: identified barriers client has encountered to being effective        ASSESSMENT: Current Emotional / Mental Status (status of significant symptoms):   Risk status (Self / Other harm or suicidal ideation)   Client denies current fears or concerns for personal safety.   Client denies current or recent suicidal ideation or behaviors.   Client denies current or recent homicidal ideation or behaviors.   Client denies current or recent self injurious behavior or ideation.   Client denies other safety concerns.   Client Client reports there has been no change in risk factors since their last session.     Client Client reports there has been no change in protective factors since their last session.     A safety and risk management plan has not been developed at this time, however client was given the after-hours number / 911 should there be a change in any of these risk factors.     Appearance:   Appropriate    Eye Contact:   Good    Psychomotor Behavior: Normal    Attitude:   Cooperative    Orientation:   All   Speech    Rate / Production: Normal     Volume:  Normal    Mood:    Irritable  client presented with irritability talking about work   Affect:    Appropriate    Thought Content:  Clear    Thought Form:  Coherent  Logical    Insight:    Good      Medication Review:   No changes to current psychiatric medication(s)     Medication Compliance:   NA     Changes in Health Issues:   None reported     Chemical Use Review:   Substance Use: Chemical use reviewed, no active concerns identified      Tobacco Use: No current tobacco use.       Collateral Reports Completed:   Not Applicable    PLAN: (Client Tasks / Therapist Tasks / Other)  Client will research anger management courses, practice mindfulness daily, pause to slow down his reaction speed and  return for therapy in four weeks.Therapist will ask consult group for anger management recommendations.        Isistu Estrella MSW, SW 1/30/2019  Note reviewed and clinical supervision by ARABELLA Staples [facility] 2/[facility]/2019     ________________________________________________________________________    Treatment Plan    Client's Name: Marcel Melchor  YOB: 1970    Date: 7/30/2108; 11/28/2018    DSM-V Diagnoses: 300.02 (F41.1) Generalized Anxiety Disorder   Psychosocial / Contextual Factors: client reports conflict with family and anxiety  WHODAS: see in    Referral / Collaboration:  Referral to another professional/service is not indicated at this time..    Anticipated number of session or this episode of care: 8-12      MeasurableTreatment Goal(s) related to diagnosis / functional impairment(s)  Goal 1: Client will reduce symptoms of anxiety and irritability as evidenced by ASUNCION-7 reducing from 5 to 0 over the next four months and client reporting improved ability to maintain calm.    I will know I've met my goal when I don't have recurring issues with irritability.      Objective #A (Client Action)    Client will use cognitive strategies identified in therapy to challenge anxious thoughts.  Status: Continued - Date(s):11/28/2018     Intervention(s)  Therapist will assign homework to challenge distorted thinking  teach CBT skills.    Objective #B  Client will identify 3 initial signs or symptoms of anxiety.  Status: Continued - Date(s): 11/28/2018     Intervention(s)  Therapist will teach emotional recognition/identification. DBT.    Objective #C  Client will use relaxation strategies 3 times per day to reduce the physical symptoms of anxiety.  Status: Continued - Date(s): 11/28/2018     Intervention(s)  Therapist will assign homework to use mindfulness  teach mindfulness skills.      Goal 2: Client will improve ability to communicate effectively with others as evidenced by client reporting use of  3-4 new communications skills on a weekly basis.    I will know I've met my goal when I can feel open to being genuine and have feelings come out.      Objective #A (Client Action)    Status: Continued - Date(s): 11/28/2018     Client will learn & utilize at least 3 assertive communication skills weekly.    Intervention(s)  Therapist will assign homework to practice communication skills  teach assertiveness skills. DEAR MAN.    Objective #B  Client will pause and use skills before talking when irritable.    Status: Continued - Date(s): 11/28/2018     Intervention(s)  Therapist will teach mindfulness skills.    Objective #C  Client will identify and maintain motivation for changing communication skills.  Status: Continued - Date(s): 11/28/2018     Intervention(s)  Therapist will teach motivational skills.        Client has reviewed and agreed to the above plan.      Isis BELL, [facility] November 28, 2018                                                                                           Note reviewed and clinical supervision by ARABELLA Staples [facility] 12/6/2018

## 2019-01-31 ASSESSMENT — ANXIETY QUESTIONNAIRES: GAD7 TOTAL SCORE: 6

## 2019-03-05 ENCOUNTER — OFFICE VISIT (OUTPATIENT)
Dept: FAMILY MEDICINE | Facility: CLINIC | Age: 49
End: 2019-03-05
Payer: COMMERCIAL

## 2019-03-05 VITALS
RESPIRATION RATE: 18 BRPM | HEART RATE: 79 BPM | HEIGHT: 69 IN | BODY MASS INDEX: 28.14 KG/M2 | SYSTOLIC BLOOD PRESSURE: 118 MMHG | OXYGEN SATURATION: 98 % | WEIGHT: 190 LBS | TEMPERATURE: 98.1 F | DIASTOLIC BLOOD PRESSURE: 80 MMHG

## 2019-03-05 DIAGNOSIS — G56.02 CARPAL TUNNEL SYNDROME OF LEFT WRIST: Primary | ICD-10-CM

## 2019-03-05 PROCEDURE — 99213 OFFICE O/P EST LOW 20 MIN: CPT | Performed by: NURSE PRACTITIONER

## 2019-03-05 ASSESSMENT — MIFFLIN-ST. JEOR: SCORE: 1722.21

## 2019-03-05 NOTE — PROGRESS NOTES
SUBJECTIVE:   Marcel Melchor is a 49 year old male who presents to clinic today for the following health issues:      Joint Pain     Onset: x 1 week more intense     Description:   Location: left wrist  Character: Sharp, Dull ache, Stabbing, Gnawing, Burning, Fullness and Cramping    Intensity: moderate, severe    Progression of Symptoms: worse    Accompanying Signs & Symptoms:  Other symptoms: radiation of pain to fingers, numbness, tingling and weakness of hand    History:   Previous similar pain: YES- 2007 told he had an onset of carpal tunnel;      Precipitating factors:   Trauma or overuse: YES- overuse    Alleviating factors:  Improved by: nothing    Therapies Tried and outcome: ibuprofen, wrist brace at night, not helpful    Hand goes numb when riding snowmobile and using .  Has tingling in all the fingers.  He has seen his chiro and they have done adjustments in the wrist for him.  Using splints off and on; when he has pain will start using splints again.  Has been sleeping in the splints.  Has pain at night.  Tylenol not helpful.  Has a hx of crohn's.  Did try some ibuprofen yesterday and could tell was aggravating crohn's.         Problem list and histories reviewed & adjusted, as indicated.  Additional history: as documented    Current Outpatient Medications   Medication Sig Dispense Refill     anastrozole (ARIMIDEX) 0.5 mg half-tab Take 0.5 mg by mouth once a week       Armodafinil (NUVIGIL PO) Take 250 mg by mouth every morning        ASACOL  MG EC tablet TAKE 3 TABLETS BY MOUTH TWICE DAILY  0     clindamycin (CLEOCIN T) 1 % lotion APPLY TOPICALLY TO FACE AND CHEST TWICE A DAY AS NEEDED 60 mL 3     mesalamine (CANASA) 1000 MG Suppository Place 1,000 mg rectally 2 times daily       venlafaxine (EFFEXOR-XR) 150 MG 24 hr capsule Take 1 capsule (150 mg) by mouth daily 90 capsule 3     amphetamine-dextroamphetamine (ADDERALL) 10 MG per tablet TAKE 1 TABLET BY MOUTH IN THE AFTERNOON PRN  0  "    testosterone cypionate (DEPOTESTOTERONE) 200 MG/ML injection Inject 50 mg into the muscle every 14 days       Allergies   Allergen Reactions     Amoxicillin      itching       Reviewed and updated as needed this visit by clinical staff       Reviewed and updated as needed this visit by Provider         ROS:  Constitutional, HEENT, cardiovascular, pulmonary, gi and gu systems are negative, except as otherwise noted.    OBJECTIVE:     /80 (BP Location: Right arm, Patient Position: Sitting, Cuff Size: Adult Regular)   Pulse 79   Temp 98.1  F (36.7  C) (Oral)   Resp 18   Ht 1.753 m (5' 9\")   Wt 86.2 kg (190 lb)   SpO2 98%   BMI 28.06 kg/m    Body mass index is 28.06 kg/m .  GENERAL: healthy, alert and no distress  MS: no gross musculoskeletal defects noted, L wirst: +tinel and phalen's tests, normal ROM, no swelling or redness  SKIN: no suspicious lesions or rashes    Diagnostic Test Results:  none     ASSESSMENT/PLAN:   1. Carpal tunnel syndrome of left wrist  Avoid NSAIDs with hx of crohn's.  Continue night splints.  Will have him see ortho for possible steroid injection.   - ORTHO  REFERRAL      FAIZAN [facility]    "

## 2019-03-06 ENCOUNTER — TELEPHONE (OUTPATIENT)
Dept: FAMILY MEDICINE | Facility: CLINIC | Age: 49
End: 2019-03-06

## 2019-03-06 ENCOUNTER — OFFICE VISIT (OUTPATIENT)
Dept: PSYCHOLOGY | Facility: CLINIC | Age: 49
End: 2019-03-06
Payer: COMMERCIAL

## 2019-03-06 DIAGNOSIS — M47.816 LUMBAR SPONDYLOSIS: Primary | ICD-10-CM

## 2019-03-06 DIAGNOSIS — M54.16 LUMBAR RADICULITIS: ICD-10-CM

## 2019-03-06 DIAGNOSIS — F41.1 GAD (GENERALIZED ANXIETY DISORDER): Primary | ICD-10-CM

## 2019-03-06 PROCEDURE — 90834 PSYTX W PT 45 MINUTES: CPT | Performed by: SOCIAL WORKER

## 2019-03-06 NOTE — TELEPHONE ENCOUNTER
Left a detailed message on patients voice mail that referral has been placed and someone from their office will be calling him to schedule his appointment.    Penny Iverson RN

## 2019-03-06 NOTE — TELEPHONE ENCOUNTER
Patient calling stating he was seen yesterday for left wrist pain and given a referral to ortho for possible injection.  Aleja said at the visit when scheduling his appointment he could also have them see him for his low back pain that he mentioned to Aleja.  They will not see him for that problem without a referral.      See office visit on 2018 with Aleja Stron. Acute bilateral low back pain with right-sided sciatica  In the past found relief with steroid injection and would like to have another one.  Referral to ortho.  Flexeril as needed along with rest, heat and tylenol.  Avoid NSAIDs due to hx of crohn's.      Patient ended up getting a steroid injection on 2018 and has had good relief up until recently.  Would like to get another injection.    Procedure performed: Right L4-5 transforaminal epidural steroid injection with fluoroscopic guidance  Diagnosis: Lumbar spondylosis; Lumbar radiculitis/radiculopathy    Referral placed.  Please review and advise.    Penny Iverson RN

## 2019-03-07 ENCOUNTER — OFFICE VISIT (OUTPATIENT)
Dept: ORTHOPEDICS | Facility: CLINIC | Age: 49
End: 2019-03-07
Payer: COMMERCIAL

## 2019-03-07 ENCOUNTER — TELEPHONE (OUTPATIENT)
Dept: PALLIATIVE MEDICINE | Facility: CLINIC | Age: 49
End: 2019-03-07

## 2019-03-07 VITALS
BODY MASS INDEX: 28.14 KG/M2 | DIASTOLIC BLOOD PRESSURE: 76 MMHG | WEIGHT: 190 LBS | SYSTOLIC BLOOD PRESSURE: 122 MMHG | HEIGHT: 69 IN

## 2019-03-07 DIAGNOSIS — G56.02 LEFT CARPAL TUNNEL SYNDROME: Primary | ICD-10-CM

## 2019-03-07 PROCEDURE — 99243 OFF/OP CNSLTJ NEW/EST LOW 30: CPT | Mod: 25 | Performed by: FAMILY MEDICINE

## 2019-03-07 PROCEDURE — 76942 ECHO GUIDE FOR BIOPSY: CPT | Performed by: FAMILY MEDICINE

## 2019-03-07 PROCEDURE — 20526 THER INJECTION CARP TUNNEL: CPT | Mod: LT | Performed by: FAMILY MEDICINE

## 2019-03-07 RX ORDER — METHYLPREDNISOLONE ACETATE 40 MG/ML
40 INJECTION, SUSPENSION INTRA-ARTICULAR; INTRALESIONAL; INTRAMUSCULAR; SOFT TISSUE
Status: DISCONTINUED | OUTPATIENT
Start: 2019-03-07 | End: 2019-05-10

## 2019-03-07 RX ADMIN — METHYLPREDNISOLONE ACETATE 40 MG: 40 INJECTION, SUSPENSION INTRA-ARTICULAR; INTRALESIONAL; INTRAMUSCULAR; SOFT TISSUE at 09:10

## 2019-03-07 ASSESSMENT — MIFFLIN-ST. JEOR: SCORE: 1722.21

## 2019-03-07 NOTE — PROGRESS NOTES
"                                           Progress Note    Client Name: Marcel Lauohjacinta  Date: 3/6/2019       Service Type: Individual  Video Visit: No      Session Start Time: 3:30  Session End Time: 4:25      Session Length: 55 min     Session #: 16    Attendees: Client and Spouse / Significant Other     Treatment Plan Last Reviewed: 7/30/2018; 11/28/2018; to be reviewed  PHQ-9 / ASUNCION-7 :1/30/2019    DATA  Interactive Complexity: No  Crisis: No        Progress Since Last Session (Related to Symptoms / Goals / Homework):   Symptoms: No change client reported mood had been stable    Homework: Partially completed client reported trying to communicate more with wife      Episode of Care Goals: Satisfactory progress - ACTION (Actively working towards change); Intervened by reinforcing change plan / affirming steps taken     Current / Ongoing Stressors and Concerns:   Ongoing: The client reports receiving feedback from others that he is irritable and difficult to work with. The client reports he may get irritable due to anxiety.  Current: The client reported that he had felt he was doing better and that he and his wife were getting along. The client's wife reported that overall their stress had reduced but that the client was still not communicating as much as she would like. 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 Discussed the client's thought process and how he could slow down his thinking when he gets into \"problem solving mode\". He reported that he had been talking with a sister who is out of work and considering how he could help her, though he is unsure whether it is a good idea as she struggles with substance use. 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 Discussed how the client and his wife could talk about his sister.     Treatment Objective(s) " Addressed in This Session:   learn & utilize at least 3 assertive communication skills weekly  pause and use skills before talking when irritable     Intervention:   DBT: Reviewed how client loses insight into actions when he is solving a problem, discussed how he could improve communication with wife  Motivational Interviewing    MI Intervention: Expressed Empathy/Understanding, Supported Autonomy, Collaboration, Evocation, Permission to raise concern or advise and Open-ended questions     Change Talk Expressed by the Patient: Ability to change Reasons to change    Provider Response to Change Talk: E - Evoked more info from patient about behavior change and A - Affirmed patient's thoughts, decisions, or attempts at behavior change          ASSESSMENT: Current Emotional / Mental Status (status of significant symptoms):   Risk status (Self / Other harm or suicidal ideation)   Client denies current fears or concerns for personal safety.   Client denies current or recent suicidal ideation or behaviors.   Client denies current or recent homicidal ideation or behaviors.   Client denies current or recent self injurious behavior or ideation.   Client denies other safety concerns.   Client Client reports there has been no change in risk factors since their last session.     Client Client reports there has been no change in protective factors since their last session.     A safety and risk management plan has not been developed at this time, however client was given the after-hours number / 911 should there be a change in any of these risk factors.     Appearance:   Appropriate    Eye Contact:   Good    Psychomotor Behavior: Normal    Attitude:   Cooperative    Orientation:   All   Speech    Rate / Production: Normal     Volume:  Normal    Mood:    Normal client presented with irritability talking about work   Affect:    Appropriate    Thought Content:  Clear    Thought Form:  Coherent  Logical    Insight:    Good       Medication Review:   No changes to current psychiatric medication(s)     Medication Compliance:   NA     Changes in Health Issues:   None reported     Chemical Use Review:   Substance Use: Chemical use reviewed, no active concerns identified      Tobacco Use: No current tobacco use.      Diagnosis:  1. 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        Isis BELL, [facility] 3/7/2019  Note reviewed and clinical supervision by ARABELLA Staples [facility] 3/8/2019   ________________________________________________________________________    Treatment Plan    Client's Name: Marcel Melchor  YOB: 1970    Date: 7/30/2108; 11/28/2018; to be reviewed    DSM-V Diagnoses: 300.02 (F41.1) Generalized Anxiety Disorder   Psychosocial / Contextual Factors: client reports conflict with family and anxiety  WHODAS: see in    Referral / Collaboration:  Referral to another professional/service is not indicated at this time..    Anticipated number of session or this episode of care: 8-12      MeasurableTreatment Goal(s) related to diagnosis / functional impairment(s)  Goal 1: Client will reduce symptoms of anxiety and irritability as evidenced by ASUNCION-7 reducing from 5 to 0 over the next four months and client reporting improved ability to maintain calm.    I will know I've met my goal when I don't have recurring issues with irritability.      Objective #A (Client Action)    Client will use cognitive strategies identified in therapy to challenge anxious thoughts.  Status: Continued - Date(s):11/28/2018      Intervention(s)  Therapist will assign homework to challenge distorted thinking  teach CBT skills.    Objective #B  Client will identify 3 initial signs or symptoms of anxiety.  Status: Continued - Date(s): 11/28/2018     Intervention(s)  Therapist will teach emotional recognition/identification. DBT.    Objective #C  Client will use relaxation strategies 3 times per day to reduce the physical symptoms of anxiety.  Status: Continued - Date(s): 11/28/2018     Intervention(s)  Therapist will assign homework to use mindfulness  teach mindfulness skills.      Goal 2: Client will improve ability to communicate effectively with others as evidenced by client reporting use of 3-4 new communications skills on a weekly basis.    I will know I've met my goal when I can feel open to being genuine and have feelings come out.      Objective #A (Client Action)    Status: Continued - Date(s): 11/28/2018     Client will learn & utilize at least 3 assertive communication skills weekly.    Intervention(s)  Therapist will assign homework to practice communication skills  teach assertiveness skills. DEAR MAN.    Objective #B  Client will pause and use skills before talking when irritable.    Status: Continued - Date(s): 11/28/2018     Intervention(s)  Therapist will teach mindfulness skills.    Objective #C  Client will identify and maintain motivation for changing communication skills.  Status: Continued - Date(s): 11/28/2018     Intervention(s)  Therapist will teach motivational skills.        Client has reviewed and agreed to the above plan.      Isis BELL, [facility] November 28, 2018                                                                                           Note reviewed and clinical supervision by ARABELLA Staples [facility] 12/6/2018

## 2019-03-07 NOTE — LETTER
"    3/7/2019         RE: Marcel Melchor  [address] [facility] [address]        Dear Colleague,    Thank you for referring your patient, Marcel Melchor, to the [facility]. Please see a copy of my visit note below.    ASSESSMENT & PLAN  Patient Instructions     1. Left carpal tunnel syndrome      -Patient has left wrist pain due to carpal tunnel syndrome.  -Patient tolerated cortisone injection today without complications.  -Patient will start hand therapy and home exercise program.  -Patient will continue with his night splints.  -Patient will follow-up when symptoms return and depending on how long he can get relief from his cortisone injection, we will determine next step treatment options.  -Call direct clinic number [[phone]] at any time with questions or concerns.    Albert Yeo MD [facility]  [facility]          -----    SUBJECTIVE  Marcel Melchor is a/an 49 year old Right handed male who is seen in consultation at the request of  Aleja Cardoso C.N.P. for evaluation of left wrist pain. The patient is seen by themselves.    Onset: 1-2 week(s) ago. Reports insidious onset without acute precipitating event. Patient states he has had numbness and tingling on and off in his left hand since 2007 but pain and sensations have worsened over the last 1-2 weeks.  Location of Pain: left thumb and 2nd-4th fingers  Rating of Pain at worst: 9/10  Rating of Pain Currently: 0/10  Worsened by: gripping/grasping, riding a bike with lower handle bars  Better with: patient unable to identify treatments that have helped with pain  Treatments tried: rest/activity avoidance, Tylenol, ibuprofen and casting/splinting/bracing  Associated symptoms: numbness and tingling, \"electrical shock\"  Orthopedic history: YES - cervical injury - patient reports herniated disc Date: 2007  Relevant surgical history: NO  Social history: social history: " works at [employer] in [place]    Past Medical History:   Diagnosis Date     Anxiety 9/3/2013     AJAY (obstructive sleep apnea) 11/11/2014     Rotator cuff syndrome 1/19/2012     Social History     Socioeconomic History     Marital status: Single     Spouse name: Not on file     Number of children: Not on file     Years of education: Not on file     Highest education level: Not on file   Occupational History     Not on file   Social Needs     Financial resource strain: Not on file     Food insecurity:     Worry: Not on file     Inability: Not on file     Transportation needs:     Medical: Not on file     Non-medical: Not on file   Tobacco Use     Smoking status: Never Smoker     Smokeless tobacco: Never Used   Substance and Sexual Activity     Alcohol use: No     Alcohol/week: 0.0 oz     Drug use: No     Sexual activity: Yes     Partners: Female   Lifestyle     Physical activity:     Days per week: Not on file     Minutes per session: Not on file     Stress: Not on file   Relationships     Social connections:     Talks on phone: Not on file     Gets together: Not on file     Attends Islam service: Not on file     Active member of club or organization: Not on file     Attends meetings of clubs or organizations: Not on file     Relationship status: Not on file     Intimate partner violence:     Fear of current or ex partner: Not on file     Emotionally abused: Not on file     Physically abused: Not on file     Forced sexual activity: Not on file   Other Topics Concern     Parent/sibling w/ CABG, MI or angioplasty before 65F 55M? No   Social History Narrative     Not on file         Patient's past medical, surgical, social, and family histories were reviewed today and no changes are noted.    REVIEW OF SYSTEMS:  10 point ROS is negative other than symptoms noted above in HPI, Past Medical History or as stated below  Constitutional: NEGATIVE for fever, chills, change in weight  Skin: NEGATIVE for worrisome  "rashes, moles or lesions  GI/: NEGATIVE for bowel or bladder changes  Neuro: NEGATIVE for weakness, dizziness or paresthesias    OBJECTIVE:  /76   Ht 1.753 m (5' 9\")   Wt 86.2 kg (190 lb)   BMI 28.06 kg/m      General: healthy, alert and in no distress  HEENT: no scleral icterus or conjunctival erythema  Skin: no suspicious lesions or rash. No jaundice.  CV: regular rhythm by palpation  Resp: normal respiratory effort without conversational dyspnea   Psych: normal mood and affect  Gait: normal steady gait with appropriate coordination and balance  Neuro: Normal sensory exam of bilateral hands. Normal 2 pt discrimination.   MSK:  LEFT WRIST  Inspection:    No swelling or obvious deformity or asymmetry  Palpation:    of bony and ligamentous line marks are nontender.     Metacarpals: normal    Thumb: normal    Fingers: normal  Range of Motion:    Full (active and passive) flexion, extension, pronation/supination, and ulnar/radial deviation.  Strength:    No deficits in flexion, extension, ulnar/radial deviation, or  strength.. Normal pinch strength.  Special Tests:    Positive: Phalen's    Negative: Tinel's (carpal tunnel), Tinel's (cubital tunnel), Finkelstein's, thenar eminence wasting, hypothenar eminence wasting.     Independent visualization of the below image:  No results found for this or any previous visit (from the past 24 hour(s)).        Albert Yeo MD [facility]      Again, thank you for allowing me to participate in the care of your patient.        Sincerely,        Albert Yeo, MD    "

## 2019-03-07 NOTE — PATIENT INSTRUCTIONS
1. Left carpal tunnel syndrome      -Patient has left wrist pain due to carpal tunnel syndrome.  -Patient tolerated cortisone injection today without complications.  -Patient will start hand therapy and home exercise program.  -Patient will continue with his night splints.  -Patient will follow-up when symptoms return and depending on how long he can get relief from his cortisone injection, we will determine next step treatment options.  -Call direct clinic number [[phone]] at any time with questions or concerns.    Albert Yeo MD [facility]  [facility] [place]

## 2019-03-07 NOTE — PROGRESS NOTES
"ASSESSMENT & PLAN  Patient Instructions     1. Left carpal tunnel syndrome      -Patient has left wrist pain due to carpal tunnel syndrome.  -Patient tolerated cortisone injection today without complications.  -Patient will start hand therapy and home exercise program.  -Patient will continue with his night splints.  -Patient will follow-up when symptoms return and depending on how long he can get relief from his cortisone injection, we will determine next step treatment options.  -Call direct clinic number [[phone]] at any time with questions or concerns.    Albert Yeo MD [facility] Sports Medicine  [facility]          -----    SUBJECTIVE  Marcel Melchor is a/an 49 year old Right handed male who is seen in consultation at the request of  Aleja Cardoso C.N.P. for evaluation of left wrist pain. The patient is seen by themselves.    Onset: 1-2 week(s) ago. Reports insidious onset without acute precipitating event. Patient states he has had numbness and tingling on and off in his left hand since 2007 but pain and sensations have worsened over the last 1-2 weeks.  Location of Pain: left thumb and 2nd-4th fingers  Rating of Pain at worst: 9/10  Rating of Pain Currently: 0/10  Worsened by: gripping/grasping, riding a bike with lower handle bars  Better with: patient unable to identify treatments that have helped with pain  Treatments tried: rest/activity avoidance, Tylenol, ibuprofen and casting/splinting/bracing  Associated symptoms: numbness and tingling, \"electrical shock\"  Orthopedic history: YES - cervical injury - patient reports herniated disc Date: 2007  Relevant surgical history: NO  Social history: social history: works at [employer] in [place]    Past Medical History:   Diagnosis Date     Anxiety 9/3/2013     AJAY (obstructive sleep apnea) 11/11/2014     Rotator cuff syndrome 1/19/2012     Social History     Socioeconomic History     Marital status: Single " "    Spouse name: Not on file     Number of children: Not on file     Years of education: Not on file     Highest education level: Not on file   Occupational History     Not on file   Social Needs     Financial resource strain: Not on file     Food insecurity:     Worry: Not on file     Inability: Not on file     Transportation needs:     Medical: Not on file     Non-medical: Not on file   Tobacco Use     Smoking status: Never Smoker     Smokeless tobacco: Never Used   Substance and Sexual Activity     Alcohol use: No     Alcohol/week: 0.0 oz     Drug use: No     Sexual activity: Yes     Partners: Female   Lifestyle     Physical activity:     Days per week: Not on file     Minutes per session: Not on file     Stress: Not on file   Relationships     Social connections:     Talks on phone: Not on file     Gets together: Not on file     Attends Episcopalian service: Not on file     Active member of club or organization: Not on file     Attends meetings of clubs or organizations: Not on file     Relationship status: Not on file     Intimate partner violence:     Fear of current or ex partner: Not on file     Emotionally abused: Not on file     Physically abused: Not on file     Forced sexual activity: Not on file   Other Topics Concern     Parent/sibling w/ CABG, MI or angioplasty before 65F 55M? No   Social History Narrative     Not on file         Patient's past medical, surgical, social, and family histories were reviewed today and no changes are noted.    REVIEW OF SYSTEMS:  10 point ROS is negative other than symptoms noted above in HPI, Past Medical History or as stated below  Constitutional: NEGATIVE for fever, chills, change in weight  Skin: NEGATIVE for worrisome rashes, moles or lesions  GI/: NEGATIVE for bowel or bladder changes  Neuro: NEGATIVE for weakness, dizziness or paresthesias    OBJECTIVE:  /76   Ht 1.753 m (5' 9\")   Wt 86.2 kg (190 lb)   BMI 28.06 kg/m     General: healthy, alert and in no " distress  HEENT: no scleral icterus or conjunctival erythema  Skin: no suspicious lesions or rash. No jaundice.  CV: regular rhythm by palpation  Resp: normal respiratory effort without conversational dyspnea   Psych: normal mood and affect  Gait: normal steady gait with appropriate coordination and balance  Neuro: Normal sensory exam of bilateral hands. Normal 2 pt discrimination.   MSK:  LEFT WRIST  Inspection:    No swelling or obvious deformity or asymmetry  Palpation:    of bony and ligamentous line marks are nontender.     Metacarpals: normal    Thumb: normal    Fingers: normal  Range of Motion:    Full (active and passive) flexion, extension, pronation/supination, and ulnar/radial deviation.  Strength:    No deficits in flexion, extension, ulnar/radial deviation, or  strength.. Normal pinch strength.  Special Tests:    Positive: Phalen's    Negative: Tinel's (carpal tunnel), Tinel's (cubital tunnel), Finkelstein's, thenar eminence wasting, hypothenar eminence wasting.     Independent visualization of the below image:  No results found for this or any previous visit (from the past 24 hour(s)).    Medium Joint Injection/Arthrocentesis  Date/Time: 3/7/2019 9:10 AM  Performed by: Yeo, Albert, MD  Authorized by: Yeo, Albert, MD     Indications:  Pain  Needle Size:  25 G  Guidance: ultrasound    Approach:  Anterolateral  Location:  Wrist  Location comment:  Left carpal tunnel  Site comment:  Left carpal tunnel  Site comment:  Left carpal tunnel  Site comment:  Left carpal tunnel  Site comment:  Left carpal tunnel  Medications:  40 mg methylPREDNISolone 40 MG/ML  Outcome:  Tolerated well, no immediate complications  Procedure discussed: discussed risks, benefits, and alternatives    Consent Given by:  Patient  Timeout: timeout called immediately prior to procedure    Prep: patient was prepped and draped in usual sterile fashion            Albert Yeo MD [facility]

## 2019-03-07 NOTE — TELEPHONE ENCOUNTER
Pre-screening Questions for Radiology Injections:    Injection to be done at which interventional clinic site? [facility]    Instruct patient to arrive as directed prior to the scheduled appointment time:    [place] minutes before      [place]: 30 minutes before; if IV needed 1 hour before     Procedure ordered by strong    Procedure ordered? Lumbar Epidural Steroid Injection    What insurance would patient like us to bill for this procedure? bcbs      Worker's comp or MVA (motor vehicle accident) -Any injection DO NOT SCHEDULE and route to Sheron Franco.      [employer] insurance - For SI joint injections, DO NOT SCHEDULE and route Sheron Gamez.       [employer] - Any injection besides hip/shoulder/knee joint DO NOT SCHEDULE and route to Sheron Gamez. She will obtain PA and call pt back to schedule procedure or notify pt of denial.        [employer]-Route to Sheron for review        IF SCHEDULING IN [place] AND NEEDS A PA, IT IS OKAY TO SCHEDULE. [place] HANDLES THEIR OWN PA'S AFTER THE PATIENT IS SCHEDULED. PLEASE SCHEDULE AT LEAST 1 WEEK OUT SO A PA CAN BE OBTAINED.      Any chance of pregnancy? Not Applicable   If YES, do NOT schedule and route to RN pool    Is an  needed? No     Patient has a drive home? (mandatory) YES:     Is patient taking any blood thinners (plavix, coumadin, jantoven, warfarin, heparin, pradaxa or dabigatran )? No   If hold needed, do NOT schedule, route to RN pool     Is patient taking any aspirin products (includes Excedrin and Fiorinal)? No     If more than 325mg/day do NOT schedule; route to RN pool     For CERVICAL procedures, hold all aspirin products for 6 days.     Tell pt that if aspirin product is not held for 6 days, the procedure WILL BE cancelled.      Does the patient have a bleeding or clotting disorder? No     If YES, okay to schedule AND route to RN nurse pool    For any patients with platelet count <100, must be forwarded to provider    Is patient  diabetic?  No  If YES, have them bring their glucometer.    Does patient have an active infection or treated for one within the past week? No     Is patient currently taking any antibiotics?  No     For patients on chronic, preventative, or prophylactic antibiotics, procedures may be scheduled.     For patients on antibiotics for active or recent infection:    Ra Santiago Burton, Snitzer-antibiotic course must have been completed for 4 days    Is patient currently taking any steroid medications? (i.e. Prednisone, Medrol)  No     For patients on steroid medications:    Ra Santiago Burton, [place]-steroid course must have been completed for 4 days    Reviewed with patient:  If you are started on any steroids or antibiotics between now and your appointment, you must contact us because the procedure may need to be cancelled.  Yes    Is patient actively being treated for cancer or immunocompromised? No  If YES, do NOT schedule and route to RN pool     Are you able to get on and off an exam table with minimal or no assistance? Yes  If NO, do NOT schedule and route to RN pool    Are you able to roll over and lay on your stomach with minimal or no assistance? Yes  If NO, do NOT schedule and route to RN pool     Any allergies to contrast dye, iodine, shellfish, or numbing and steroid medications? No  If YES, route to RN pool AND add allergy information to appointment notes    Allergies: Amoxicillin      Has the patient had a flu shot or any other vaccinations within 7 days before or after the procedure.  No     Does patient have an MRI/CT?  YES:   (SI joint, hip injections, lumbar sympathetic blocks, and stellate ganglion blocks do not require an MRI)    Was the MRI done w/in the last 3 years?  Yes    Was MRI done at [place]? Yes      If not, where was it done? N/A       If MRI was not done at [place], [facility] or [facility] do NOT schedule and route to nursing.  If pt has an imaging disc, the  injection may be scheduled but pt has to bring disc to appt. If they show up w/out disc the injection cannot be done    Reminders (please tell patient if applicable):       Instructed pt to arrive 30 minutes early for IV start if this is for a cervical procedure, ALL sympathetic (stellate ganglion, hypogastric, or lumbar sympathetic block) and all sedation procedures (RFA, spinal cord stimulation trials).  Not Applicable   -IVs are not routinely placed for Dr. Johnson cervical cases   -Dr. Montesinos: IVs for cervical ESIs and cervical TBDs (not CMBBs/facet inj)      If NPO for sedation, informed patient that it is okay to take medications with sips of water (except if they are to hold blood thinners).  Not Applicable   *DO take blood pressure medication if it is prescribed*      If this is for a cervical ANDREY, informed patient that aspirin needs to be held for 6 days.   Not Applicable      For all patients not having spinal cord stimulator (SCS) trials or radiofrequency ablations (RFAs), informed patient:    IV sedation is not provided for this procedure.  If you feel that an oral anti-anxiety medication is needed, you can discuss this further with your referring provider or primary care provider.  The Pain Clinic provider will discuss specifics of what the procedure includes at your appointment.  Most procedures last 10-20 minutes.  We use numbing medications to help with any discomfort during the procedure.  Not Applicable      Do not schedule procedures requiring IV placement in the first appointment of the day or first appointment after lunch. Do NOT schedule at 0745, 0815 or 1245.       For patients 85 or older we recommend having an adult stay w/ them for the remainder of the day.       Does the patient have any questions?    Sheron Gamez  [facility]

## 2019-03-11 ENCOUNTER — THERAPY VISIT (OUTPATIENT)
Dept: OCCUPATIONAL THERAPY | Facility: CLINIC | Age: 49
End: 2019-03-11
Attending: FAMILY MEDICINE
Payer: COMMERCIAL

## 2019-03-11 DIAGNOSIS — R20.0 NUMBNESS AND TINGLING IN LEFT HAND: Primary | ICD-10-CM

## 2019-03-11 DIAGNOSIS — G56.02 LEFT CARPAL TUNNEL SYNDROME: ICD-10-CM

## 2019-03-11 DIAGNOSIS — R20.2 NUMBNESS AND TINGLING IN LEFT HAND: Primary | ICD-10-CM

## 2019-03-11 PROCEDURE — 97110 THERAPEUTIC EXERCISES: CPT | Mod: GO | Performed by: OCCUPATIONAL THERAPIST

## 2019-03-11 PROCEDURE — 97112 NEUROMUSCULAR REEDUCATION: CPT | Mod: GO | Performed by: OCCUPATIONAL THERAPIST

## 2019-03-11 PROCEDURE — 97165 OT EVAL LOW COMPLEX 30 MIN: CPT | Mod: GO | Performed by: OCCUPATIONAL THERAPIST

## 2019-03-11 NOTE — PROGRESS NOTES
[facility] - Procedure Note    Date of Service: 3/13/2019    Procedure performed: Right L4-5 transforaminal epidural steroid injection with fluoroscopic guidance  Diagnosis: Lumbar spondylosis; Lumbar radiculitis/radiculopathy  : Jodi Johnson MD & Mitch Gallagher DO (pain fellow)   Anesthesia: none    Indications: Marcel Melchor is a 49 year old male who is seen at the request of Dr. Aleja Cardoso for lumbar transforaminal epidural steroid injection. The patient describes axial low back and right buttock pain. The patient has been exhibiting symptoms consistent with lumbar intraspinal inflammation and radiculopathy. Symptoms have been persistent, disabling, and intermittently severe. The patient reports minimal improvement with conservative treatment, including PT and medications.    This is a repeat injection.  Previous Right L4-5 done by myself on 4/19/2018 provided good pain relief for a period of 10 months.       Lumbar MRI was done on 11/3/2016 which showed       Allergies:      Allergies   Allergen Reactions     Amoxicillin      itching        Vitals:  /65   Pulse 71   SpO2 99%     Review of Systems: The patient denies recent fever, chills, illness, use of antibiotics or anticoagulants. All other 10-point review of systems negative.     Procedure: The procedure and risks were explained, and informed written consent was obtained from the patient. Risks include but are not limited to: infection, bleeding, increased pain, and damage to soft tissue, nerve, muscle, and vasculature structures. After getting informed consent, patient was brought into the procedure suite and was placed in a prone position on the procedure table. A Pause for the Cause was performed. Patient was prepped and draped in sterile fashion.     After identifying the right L4 neuroforamen, the C-arm was rotated to a right lateral oblique angle.  A total of 4.5 mL of Lidocaine 1% was used to anesthetize the  skin and the needle track at a skin entry site coaxial with the fluoroscopy beam, and overriding the superior aspect of the neuroforamen.  A 22 gauge 5 inch spinal needle was advanced under intermittent fluoroscopy until it entered the foramen superiorly.    The position was then inspected from anteroposterior and lateral views, and the needle adjusted appropriately.  After negative aspiration, a total of 1 mL of Omnipaque-300 was injected using static and continuous fluoroscopy confirming appropriate position, with spread along the nerve root sheath and into the epidural space, with no intravascular or intrathecal uptake. 9 mL of Omnipaque-300 was wasted.    2mL of 1% lidocaine with 20 mg of dexamethasone was injected.  The needle was removed. Hemostasis was achieved, the area was cleaned, and bandaids were placed when appropriate. Images were saved to PACS.    The patient tolerated the procedure well, and was taken to the recovery room, and there was no evidence of procedural complications. No new sensory or motor deficits were noted following the procedure. The patient was stable and able to ambulate on discharge home. Post-procedure instructions were provided.     Pre-procedure pain score: 6/10 in the back, 3/10 in the leg  Post-procedure pain score: 3/10 in the back, 3/10 in the leg    Assessment/Plan: Marcel Melchor is a 49 year old male s/p right L4 transforaminal epidural steroid injection today for lumbar spondylosis, radiculitis/radiculopathy.     1. Following today's procedure, the patient was advised to contact the [facility] for any of the following:   Fever, chills, or night sweats   New onset of pain, numbness, or weakness   Any questions/concerns regarding the procedure  If unable to contact the Pain Center, the patient was instructed to go to a local Emergency Room for any complications.   2. The patient will receive a follow-up call in 1 week.  3. The patient should follow-up  with the referring provider in 2 weeks for post-procedure evaluation.      Jodi Johnson MD   [facility]

## 2019-03-11 NOTE — PROGRESS NOTES
Hand Therapy Initial Evaluation    Current Date:  3/11/2019    Diagnosis:  Left carpal tunnel syndrome   DOI/MD order:  3/7/19  Onset: on and off for 12 years   Referring MD: Yeo, Albert, MD       Subjective:  Marcel Melchor is a 49 year old R hand dominant male.    Patient reports symptoms of pain, stiffness/loss of motion, numbness and tingling  of the left hand which occurred due to CTS. Since onset symptoms are Gradually getting better with Cortizone injection.  Pt would like to know how to manage CTS symptoms at home. Will consider weekly therapy if symptoms do not improve.    Special tests:  none.  Previous treatment: Cortizone, OTC splints.    General health as reported by patient is excellent.  Pertinent medical history includes:None  Medical allergies:none.  Surgical history: none.  Medication history: None.    Occupational Profile Information:  Current occupation is   Currently working in normal job without restrictions  Job Tasks: Driving, Lifting, Carrying, Pushing, Pulling, Repetitive Tasks  Prior functional level:  no limitations  Barriers include:none  Mobility: No difficulty  Transportation: drives  Leisure activities/hobbies: snow mobilie, biking      Functional Outcome Measure:  See flowseet     Objective:  Pain Level Report  VAS(0-10)  Left 3/11/2019   At Rest: 3/10   With Use: 3/10     Paresthesias report 0/10  Left 3/11/2019   At Rest: 0/10   With Use: 9-10/10     Report of Pain:  Location:  hand  Pain Quality:  Tingling and Tiring  Frequency: With gripping and at night   Pain is worst:  daytime or nighttime  Exacerbated by:  gripping  Relieved by:  OTC splints have be helping a little per pt   Progression:  improving since the injection   Edema:  NONE  Sensation: Decreased Median Nerve distribution with gripping    ROM: Wrist and finger ROM B equal and WFL      Pain Report:  - none    + mild    ++ moderate    +++ severe   Date 3/11/2019   Side L   Median     Phalens +15  seconds     Carpal compression test  +15 seconds   Tinels at Carpal Tunnel  +5 seconds   Tinels at Pronator -   Candelario test -     Strength:   (Measured in pounds)  Pain Report:  - none    + mild    ++ moderate    +++ severe     3/11/2019   Trials R L   1   90 85     Lat Pinch  3/11/2019   Trials R L   1   18 19          3 Pt Pinch  3/11/2019   Trials R L   1   16 16          Assessment:  Patient presents with symptoms consistent with diagnosis of CTS,  with conservative intervention.     Patient's limitations or Problem List includes:  Pain and Sensory disturbance of the left hand which interferes with the patient's ability to perform Recreational Activities, Household Chores and Driving  as compared to previous level of function.    Rehab Potential:  Excellent - Return to full activity, no limitations    Patient will benefit from skilled Occupational Therapy to decrease pain and paresthesias to return to previous activity level and resume normal daily tasks and to reach their rehab potential.    Barriers to Learning:  No barrier    Communication Issues:  Patient appears to be able to clearly communicate and understand verbal and written communication and follow directions correctly.    Chart Review: Chart Review and Simple history review with patient    Identified Performance Deficits: driving and community mobility, meal preparation and cleanup and leisure activities    Assessment of Occupational Performance:  1-3 Performance Deficits    Clinical Decision Making (Complexity): Low complexity    Treatment Explanation:  The following has been discussed with the patient:  RX ordered/plan of care  Anticipated outcomes  Possible risks and side effects    Plan:  Frequency:  1 X week, once daily  Duration: NA X 1 visit     Treatment Plan:    Therapeutic Exercise:  Tendon Gliding  Neuromuscular re-education:  Nerve Gliding  Manual Techniques:  Myofascial release  Self Care:  Self Care Tasks and Ergonomic  Considerations  Discharge Plan:  Achieve all LTG.  Independent in home treatment program.  Reach maximal therapeutic benefit.    Home Exercise Program:  Tendon glides  Median nerve glides  Ball massage to flexors  Avoid prolonged grasp  OTC splints with wrist in neutral for night   Intrinsic stretch

## 2019-03-13 ENCOUNTER — RADIOLOGY INJECTION OFFICE VISIT (OUTPATIENT)
Dept: PALLIATIVE MEDICINE | Facility: CLINIC | Age: 49
End: 2019-03-13
Payer: COMMERCIAL

## 2019-03-13 ENCOUNTER — ANCILLARY PROCEDURE (OUTPATIENT)
Dept: GENERAL RADIOLOGY | Facility: CLINIC | Age: 49
End: 2019-03-13
Attending: ANESTHESIOLOGY
Payer: COMMERCIAL

## 2019-03-13 VITALS — DIASTOLIC BLOOD PRESSURE: 77 MMHG | OXYGEN SATURATION: 98 % | HEART RATE: 72 BPM | SYSTOLIC BLOOD PRESSURE: 126 MMHG

## 2019-03-13 DIAGNOSIS — M54.16 LUMBAR RADICULOPATHY: ICD-10-CM

## 2019-03-13 DIAGNOSIS — M54.16 LUMBAR RADICULOPATHY: Primary | ICD-10-CM

## 2019-03-13 PROCEDURE — 64483 NJX AA&/STRD TFRM EPI L/S 1: CPT | Mod: RT | Performed by: ANESTHESIOLOGY

## 2019-03-13 NOTE — NURSING NOTE
Discharge Information    IV Discontiued Time:  NA    Amount of Fluid Infused:  NA    Discharge Criteria = When patient returns to baseline or as per MD order    Consciousness:  Pt is fully awake    Circulation:  BP +/- 20% of pre-procedure level    Respiration:  Patient is able to breathe deeply    O2 Sat:  Patient is able to maintain O2 Sat >92% on room air    Activity:  Moves 4 extremities on command    Ambulation:  Patient is able to stand and walk or stand and pivot into wheelchair    Dressing:  Clean/dry or No Dressing    Notes:   Discharge instructions and AVS given to patient    Patient meets criteria for discharge?  YES    Admitted to PCU?  No    Responsible adult present to accompany patient home?  Yes    Signature/Title:    Cele Mandel RN Care Coordinator  [facility] [place]

## 2019-03-13 NOTE — PATIENT INSTRUCTIONS
[facility] Procedure Discharge Instructions    Today you saw:     Dr. Jodi Johnson      Your procedure:  Epidural steroid injection       Medications used:  Lidocaine (anesthetic) Dexamethasone (steroid)  Omnipaque (contrast)               Be cautious when walking as numbness and/or weakness in the legs may occur up to 6-8 hours after the procedure due to effect of the local anesthetic    Do not drive for 6 hours. The effect of the local anesthetic could slow your reflexes.     Avoid strenuous activity for the first 24 hours. You may resume your regular activities after that.     You may shower, however avoid swimming, tub baths or hot tubs for 24 hours following your procedure    If you were fasting, you can resume your regular diet and medications    You may have a mild to moderate increase in pain for several days following the injection.      You may use ice packs for 10-15 minutes, 3 to 4 times a day at the injection site for comfort    Do not use heat to painful areas for 6 to 8 hours. This will give the local anesthetic time to wear off and prevent you from accidentally burning your skin.    You may use anti-inflammatory medications (such as Ibuprofen/Advil or Aleve) or Tylenol for pain control if necessary    If you have diabetes, check your blood sugar more frequently than usual as your blood sugar may be higher than normal for 10-14 days following a steroid injection. Contact your doctor who manages your diabetes if your blood sugar is higher than usual    It may take up to 14 days for the steroid medication to start working although you may feel the effect as early as a few days after the procedure.     Follow up with your referring provider in 2 - 3 weeks.       If you experience any of the following, call the pain center  line during work hours at [phone] or on-call physician after hours at [phone]:  -Fever over 100 degree F  -Swelling, bleeding, redness, drainage, warmth at the  injection site  -Progressive weakness or numbness in your legs or arms  -Loss of bowel or bladder function  -Unusual headache that is not relieved by Tylenol  -Unusual new onset of pain that is not improving

## 2019-03-20 ENCOUNTER — TELEPHONE (OUTPATIENT)
Dept: PALLIATIVE MEDICINE | Facility: CLINIC | Age: 49
End: 2019-03-20

## 2019-03-20 NOTE — TELEPHONE ENCOUNTER
Patient had a Right L4-5 transforaminal epidural steroid injection on 3/13/19.  Called patient for an update.      Pt reported the following details:  Injection has been mildly helpful.   Told patient that the information will be forwarded to Dr. Johnson.  Also explained that, if a steroid medication was used, it could take up to 14 days to feel the full effect and if pt has any further questions or concerns pt should call the clinic at [phone].

## 2019-04-09 ENCOUNTER — TRANSFERRED RECORDS (OUTPATIENT)
Dept: HEALTH INFORMATION MANAGEMENT | Facility: CLINIC | Age: 49
End: 2019-04-09

## 2019-05-03 ENCOUNTER — OFFICE VISIT (OUTPATIENT)
Dept: FAMILY MEDICINE | Facility: CLINIC | Age: 49
End: 2019-05-03
Payer: COMMERCIAL

## 2019-05-03 VITALS
OXYGEN SATURATION: 98 % | RESPIRATION RATE: 18 BRPM | HEART RATE: 73 BPM | BODY MASS INDEX: 27.25 KG/M2 | HEIGHT: 69 IN | DIASTOLIC BLOOD PRESSURE: 72 MMHG | TEMPERATURE: 98.4 F | WEIGHT: 184 LBS | SYSTOLIC BLOOD PRESSURE: 114 MMHG

## 2019-05-03 DIAGNOSIS — R45.4 IRRITABILITY AND ANGER: ICD-10-CM

## 2019-05-03 DIAGNOSIS — F41.1 GAD (GENERALIZED ANXIETY DISORDER): Primary | ICD-10-CM

## 2019-05-03 DIAGNOSIS — R79.89 ELEVATED TESTOSTERONE LEVEL: ICD-10-CM

## 2019-05-03 DIAGNOSIS — R45.86 MOOD SWING: ICD-10-CM

## 2019-05-03 LAB
ALBUMIN SERPL-MCNC: 4.2 G/DL (ref 3.4–5)
ALP SERPL-CCNC: 103 U/L (ref 40–150)
ALT SERPL W P-5'-P-CCNC: 26 U/L (ref 0–70)
ANION GAP SERPL CALCULATED.3IONS-SCNC: 5 MMOL/L (ref 3–14)
AST SERPL W P-5'-P-CCNC: 17 U/L (ref 0–45)
BILIRUB SERPL-MCNC: 0.8 MG/DL (ref 0.2–1.3)
BUN SERPL-MCNC: 10 MG/DL (ref 7–30)
CALCIUM SERPL-MCNC: 9.3 MG/DL (ref 8.5–10.1)
CHLORIDE SERPL-SCNC: 105 MMOL/L (ref 94–109)
CO2 SERPL-SCNC: 30 MMOL/L (ref 20–32)
CREAT SERPL-MCNC: 0.89 MG/DL (ref 0.66–1.25)
CRP SERPL-MCNC: <2.9 MG/L (ref 0–8)
ERYTHROCYTE [DISTWIDTH] IN BLOOD BY AUTOMATED COUNT: 13.3 % (ref 10–15)
GFR SERPL CREATININE-BSD FRML MDRD: >90 ML/MIN/{1.73_M2}
GLUCOSE SERPL-MCNC: 87 MG/DL (ref 70–99)
HCT VFR BLD AUTO: 49 % (ref 40–53)
HGB BLD-MCNC: 17.9 G/DL (ref 13.3–17.7)
MCH RBC QN AUTO: 30.5 PG (ref 26.5–33)
MCHC RBC AUTO-ENTMCNC: 36.5 G/DL (ref 31.5–36.5)
MCV RBC AUTO: 84 FL (ref 78–100)
PLATELET # BLD AUTO: 223 10E9/L (ref 150–450)
POTASSIUM SERPL-SCNC: 4 MMOL/L (ref 3.4–5.3)
PROT SERPL-MCNC: 7.3 G/DL (ref 6.8–8.8)
RBC # BLD AUTO: 5.87 10E12/L (ref 4.4–5.9)
SODIUM SERPL-SCNC: 140 MMOL/L (ref 133–144)
TSH SERPL DL<=0.005 MIU/L-ACNC: 1.13 MU/L (ref 0.4–4)
WBC # BLD AUTO: 6.8 10E9/L (ref 4–11)

## 2019-05-03 PROCEDURE — 99214 OFFICE O/P EST MOD 30 MIN: CPT | Performed by: PHYSICIAN ASSISTANT

## 2019-05-03 PROCEDURE — 36415 COLL VENOUS BLD VENIPUNCTURE: CPT | Performed by: PHYSICIAN ASSISTANT

## 2019-05-03 PROCEDURE — 85027 COMPLETE CBC AUTOMATED: CPT | Performed by: PHYSICIAN ASSISTANT

## 2019-05-03 PROCEDURE — 86140 C-REACTIVE PROTEIN: CPT | Performed by: PHYSICIAN ASSISTANT

## 2019-05-03 PROCEDURE — 84443 ASSAY THYROID STIM HORMONE: CPT | Performed by: PHYSICIAN ASSISTANT

## 2019-05-03 PROCEDURE — 84403 ASSAY OF TOTAL TESTOSTERONE: CPT | Performed by: PHYSICIAN ASSISTANT

## 2019-05-03 PROCEDURE — 84270 ASSAY OF SEX HORMONE GLOBUL: CPT | Performed by: PHYSICIAN ASSISTANT

## 2019-05-03 PROCEDURE — 80053 COMPREHEN METABOLIC PANEL: CPT | Performed by: PHYSICIAN ASSISTANT

## 2019-05-03 RX ORDER — VENLAFAXINE HYDROCHLORIDE 75 MG/1
75 TABLET, EXTENDED RELEASE ORAL DAILY
Qty: 90 EACH | Refills: 0 | Status: SHIPPED | OUTPATIENT
Start: 2019-05-03 | End: 2019-05-06

## 2019-05-03 RX ORDER — QUETIAPINE FUMARATE 25 MG/1
25 TABLET, FILM COATED ORAL 3 TIMES DAILY
Qty: 90 TABLET | Refills: 0 | Status: SHIPPED | OUTPATIENT
Start: 2019-05-03 | End: 2019-05-10 | Stop reason: ALTCHOICE

## 2019-05-03 RX ORDER — VENLAFAXINE HYDROCHLORIDE 150 MG/1
150 CAPSULE, EXTENDED RELEASE ORAL DAILY
Qty: 90 CAPSULE | Refills: 0 | Status: SHIPPED | OUTPATIENT
Start: 2019-05-03 | End: 2019-05-28

## 2019-05-03 ASSESSMENT — MIFFLIN-ST. JEOR: SCORE: 1695

## 2019-05-03 NOTE — PROGRESS NOTES
"  SUBJECTIVE:   Marcel Melchor is a 49 year old male who presents to clinic today for the following   health issues:      Depression and Anxiety Follow-Up    Status since last visit: No change    Other associated symptoms:None    Complicating factors:     Significant life event: No     Current substance abuse: None    PHQ 1/9/2019 1/9/2019 1/30/2019   PHQ-9 Total Score 8 9 7   Q9: Thoughts of better off dead/self-harm past 2 weeks Not at all Not at all Not at all     ASUNCION-7 SCORE 12/21/2018 1/9/2019 1/30/2019   Total Score - - -   Total Score 10 9 6       PHQ-9  English  PHQ-9   Any Language  ASUNCION-7  Suicide Assessment Five-step Evaluation and Treatment (SAFE-T)    Amount of exercise or physical activity: 6-7 days/week for an average of greater than 60 minutes    Problems taking medications regularly: No    Medication side effects: \"screaming headache\"     Diet: regular (no restrictions)    Marcel is here to discuss his mood today.  He is feeling terrible today.  Everything lately is making him irritable and angry.  He recently had a Crohn's flare (now under control).  He reports headaches on and off for a couple weeks now as well.  He has been to the point of vomiting a couple times due to the Crohn's and headache.  He states that work has become unbearable.  He feels that everyone who works for him is incompetent.  They cannot do their job properly and that everyone is \"stupid\".  He has had concerns before that he could be bipolar.  He has seen [facility] for med management.  He is on Effexor for anxiety and this has done some good for mood symptoms but not enough for his anger.    He and is wife have attended therapy through [facility] and he has found this somewhat beneficial.  He has an appointment Monday but had to cancel two previous appointment's.  He says that his wife \"Dr. Rey\" thinks he may not be on the right medications.      Additional history: as documented    Reviewed  and updated as needed this visit by " "clinical staff         Reviewed and updated as needed this visit by Provider         Labs reviewed in EPIC    ROS:  Constitutional, HEENT, cardiovascular, pulmonary, gi and gu systems are negative, except as otherwise noted.    OBJECTIVE:     /72 (BP Location: Right arm, Patient Position: Sitting, Cuff Size: Adult Large)   Pulse 73   Temp 98.4  F (36.9  C) (Oral)   Resp 18   Ht 1.753 m (5' 9\")   Wt 83.5 kg (184 lb)   SpO2 98%   BMI 27.17 kg/m    Body mass index is 27.17 kg/m .  GENERAL: healthy, alert and no distress  PSYCH: mentation appears normal, anxious and appearance disheveled    Diagnostic Test Results:  Results for orders placed or performed in visit on 05/03/19   CBC with platelets   Result Value Ref Range    WBC 6.8 4.0 - 11.0 10e9/L    RBC Count 5.87 4.4 - 5.9 10e12/L    Hemoglobin 17.9 (H) 13.3 - 17.7 g/dL    Hematocrit 49.0 40.0 - 53.0 %    MCV 84 78 - 100 fl    MCH 30.5 26.5 - 33.0 pg    MCHC 36.5 31.5 - 36.5 g/dL    RDW 13.3 10.0 - 15.0 %    Platelet Count 223 150 - 450 10e9/L   TSH with free T4 reflex   Result Value Ref Range    TSH 1.13 0.40 - 4.00 mU/L   CRP inflammation   Result Value Ref Range    CRP Inflammation <2.9 0.0 - 8.0 mg/L   Comprehensive metabolic panel   Result Value Ref Range    Sodium 140 133 - 144 mmol/L    Potassium 4.0 3.4 - 5.3 mmol/L    Chloride 105 94 - 109 mmol/L    Carbon Dioxide 30 20 - 32 mmol/L    Anion Gap 5 3 - 14 mmol/L    Glucose 87 70 - 99 mg/dL    Urea Nitrogen 10 7 - 30 mg/dL    Creatinine 0.89 0.66 - 1.25 mg/dL    GFR Estimate >90 >60 mL/min/[1.73_m2]    GFR Estimate If Black >90 >60 mL/min/[1.73_m2]    Calcium 9.3 8.5 - 10.1 mg/dL    Bilirubin Total 0.8 0.2 - 1.3 mg/dL    Albumin 4.2 3.4 - 5.0 g/dL    Protein Total 7.3 6.8 - 8.8 g/dL    Alkaline Phosphatase 103 40 - 150 U/L    ALT 26 0 - 70 U/L    AST 17 0 - 45 U/L   Testosterone Free and Total   Result Value Ref Range    Testosterone Total 1,033 (H) 240 - 950 ng/dL    Sex Hormone Binding Globulin " 30 11 - 80 nmol/L    Free Testosterone Calculated 26.41 (H) 4.7 - 24.4 ng/dL       ASSESSMENT/PLAN:   1. ASUNCION (generalized anxiety disorder)  He would like to wean off Effexor right now.  He feels it is no long effective for his anger and mood swings.  ASUNCION-7 SCORE 12/21/2018 1/9/2019 1/30/2019   Total Score - - -   Total Score 10 9 6       PHQ-9 SCORE 1/9/2019 1/9/2019 1/30/2019   PHQ-9 Total Score - - -   PHQ-9 Total Score MyChart - - -   PHQ-9 Total Score 8 9 7       - venlafaxine (EFFEXOR-XR) 150 MG 24 hr capsule; Take 1 capsule (150 mg) by mouth daily To take with 75mg every other day to wean off.  Dispense: 90 capsule; Refill: 0    2. Mood swing  Will check labs today looking for metabolic reasons for his mood problems.  Recommend keeping up with therapist.  Can try seroquel at night and during the day for acute mood swing symptoms.  Will refer back to [facility], has seen Gregoria Castillo before.  - CBC with platelets  - TSH with free T4 reflex  - CRP inflammation  - Comprehensive metabolic panel  - QUEtiapine (SEROQUEL) 25 MG tablet; Take 1 tablet (25 mg) by mouth 3 times daily  Dispense: 90 tablet; Refill: 0  - MENTAL HEALTH REFERRAL  - Adult; Psychiatry and Medication Management; Psychiatry; G: [facility] [phone].  Medication management & future refills will be returned to [facility] PCP upon completion of evaluation; We amparo...    3. Irritability and anger  As above, testosterone level was above upper limit.  He receives these injections at another clinic.  - TSH with free T4 reflex  - Comprehensive metabolic panel  - Testosterone Free and Total  - venlafaxine (EFFEXOR-XR) 150 MG 24 hr capsule; Take 1 capsule (150 mg) by mouth daily To take with 75mg every other day to wean off.  Dispense: 90 capsule; Refill: 0  - QUEtiapine (SEROQUEL) 25 MG tablet; Take 1 tablet (25 mg) by mouth 3 times daily  Dispense: 90 tablet; Refill: 0  - MENTAL HEALTH REFERRAL  - Adult;  Psychiatry and Medication Management; Psychiatry; [facility]: [facility] [phone].  Medication management & future refills will be returned to [facility] PCP upon completion of evaluation; We amparo...    4. Elevated testosterone level  He will need to discuss with his clinic where he gets these injections.  Not sure if this is significant to current mood findings.          Wes Braga PA-C  [facility]

## 2019-05-03 NOTE — Clinical Note
Hi gubibiana!  Gregoria- I'm hoping you can see Marcel again soon.  He is still having so much anger.  I am wondering about mood stabilizers for him?  Miladys- testosterone is high.... Just a question about if you see mood swings with over replacement of testosterone?  I don't need you to see him, just a question??  Thanks!  Wes

## 2019-05-06 ENCOUNTER — OFFICE VISIT (OUTPATIENT)
Dept: PSYCHOLOGY | Facility: CLINIC | Age: 49
End: 2019-05-06
Payer: COMMERCIAL

## 2019-05-06 ENCOUNTER — TELEPHONE (OUTPATIENT)
Dept: FAMILY MEDICINE | Facility: CLINIC | Age: 49
End: 2019-05-06

## 2019-05-06 DIAGNOSIS — F41.1 GAD (GENERALIZED ANXIETY DISORDER): Primary | ICD-10-CM

## 2019-05-06 PROCEDURE — 90834 PSYTX W PT 45 MINUTES: CPT | Performed by: SOCIAL WORKER

## 2019-05-06 NOTE — TELEPHONE ENCOUNTER
venlafaxine (EFFEXOR-ER) 75 MG 24 hr tablet    Message from Pharm- Can you please change to the Effexor XR CAPS, less expensive, and it was what he had before. We do not have the XL tabs in stock.      Please note, pt has venlafaxine (EFFEXOR-XR) 150 MG 24 hr capsule on his chart.

## 2019-05-06 NOTE — PROGRESS NOTES
"                                           Progress Note    Client Name: Marcel Lauohjacinta  Date: 5/6/2019       Service Type: Individual  Video Visit: No      Session Start Time: 1:30  Session End Time: 2:15      Session Length: 45 min     Session #: 17    Attendees: Client     Treatment Plan Last Reviewed: 7/30/2018; 11/28/2018; 5/6/2019  PHQ-9 / ASUNCION-7 :5/6/2019    DATA  Interactive Complexity: No  Crisis: No        Progress Since Last Session (Related to Symptoms / Goals / Homework):   Symptoms: Worsening client reports recent increased anxiety    Homework: Partially completed client reported trying to navigate communication       Episode of Care Goals: Satisfactory progress - ACTION (Actively working towards change); Intervened by reinforcing change plan / affirming steps taken     Current / Ongoing Stressors and Concerns:   Ongoing: The client reports receiving feedback from others that he is irritable and difficult to work with. The client reports he may get irritable due to anxiety.  Current: The client reported that he had been struggling lately to navigate dynamics at work and with his wife. He explained that he recently had two employees quit for different reasons, but that one had felt he was a \"name caller\". He described the situation and stated that while he knew he was being unprofessional he had meant to be joking. The client discussed how he has continued having the same miscommunications with his wife and that he generally has been feeling really stressed lately. Discussed whether the client would benefit from couples counseling with his wife to address their communication issues. Reviewed and updated treatment plan.     Treatment Objective(s) Addressed in This Session:   learn & utilize at least 3 assertive communication skills weekly  pause and use skills before talking when irritable     Intervention:   DBT: Discussed coping skills, effective communication skills  Motivational Interviewing    MI " Intervention: Expressed Empathy/Understanding, Supported Autonomy, Collaboration, Evocation, Permission to raise concern or advise and Open-ended questions     Change Talk Expressed by the Patient: Desire to change Ability to change Reasons to change    Provider Response to Change Talk: E - Evoked more info from patient about behavior change, A - Affirmed patient's thoughts, decisions, or attempts at behavior change, R - Reflected patient's change talk and S - Summarized patient's change talk statements          ASSESSMENT: Current Emotional / Mental Status (status of significant symptoms):   Risk status (Self / Other harm or suicidal ideation)   Client denies current fears or concerns for personal safety.   Client denies current or recent suicidal ideation or behaviors.   Client denies current or recent homicidal ideation or behaviors.   Client denies current or recent self injurious behavior or ideation.   Client denies other safety concerns.   Client Client reports there has been no change in risk factors since their last session.     Client Client reports there has been no change in protective factors since their last session.     A safety and risk management plan has not been developed at this time, however client was given the after-hours number / 911 should there be a change in any of these risk factors.     Appearance:   Appropriate    Eye Contact:   Good    Psychomotor Behavior: Normal    Attitude:   Cooperative    Orientation:   All   Speech    Rate / Production: Normal     Volume:  Normal    Mood:    Anxious  client presented as anxious talking about conflict   Affect:    Appropriate    Thought Content:  Clear    Thought Form:  Coherent  Logical    Insight:    Good      Medication Review:   No changes to current psychiatric medication(s)     Medication Compliance:   NA     Changes in Health Issues:   None reported     Chemical Use Review:   Substance Use: Chemical use reviewed, no active concerns  identified      Tobacco Use: No current tobacco use.      Diagnosis:  1. ASUNCION (generalized anxiety disorder)       Collateral Reports Completed:   Not Applicable    PLAN: (Client Tasks / Therapist Tasks / Other)  Client will talk to his wife about couples counseling, use coping strategies for anxiety, practice acceptance when he feels unable to reduce his anxiety and return for therapy in one month.        Isis Sameer BELL, [facility] 5/6/2019  Note reviewed and clinical supervision by ARABELLA Staples [facility] 5/10/2019 ________________________________________________________________________    Treatment Plan    Client's Name: Marcel Melchor  YOB: 1970    Date: 7/30/2108; 11/28/2018; 5/6/2019    DSM-V Diagnoses: 300.02 (F41.1) Generalized Anxiety Disorder   Psychosocial / Contextual Factors: client reports conflict with family and anxiety  WHODAS: see in    Referral / Collaboration:  Referral to another professional/service is not indicated at this time..    Anticipated number of session or this episode of care: 8-12      MeasurableTreatment Goal(s) related to diagnosis / functional impairment(s)  Goal 1: Client will reduce symptoms of anxiety and irritability as evidenced by ASUNCION-7 reducing from 5 to 0 over the next four months and client reporting improved ability to communicate effectively.    I will know I've met my goal when I don't have recurring issues with irritability.      Objective #A (Client Action)    Client will use cognitive strategies identified in therapy to challenge anxious thoughts.  Status: Continued - Date(s): 5/6/2019    Intervention(s)  Therapist will assign homework to challenge distorted thinking  teach CBT skills.    Objective #B  Client will identify 3 initial signs or symptoms of anxiety.  Status: Continued - Date(s): 5/6/2019     Intervention(s)  Therapist will teach emotional recognition/identification. DBT.    Objective #C  Client will use relaxation strategies 3 times per  day to reduce the physical symptoms of anxiety.  Status: Continued - Date(s): 5/6/2019     Intervention(s)  Therapist will assign homework to use mindfulness  teach mindfulness skills.      Goal 2: Client will improve ability to communicate effectively with others as evidenced by client reporting use of 3-4 new communications skills on a weekly basis.    I will know I've met my goal when I can feel open to being genuine and have feelings come out.      Objective #A (Client Action)    Status: Continued - Date(s): 5/6/2019     Client will learn & utilize at least 3 assertive communication skills weekly.    Intervention(s)  Therapist will assign homework to practice communication skills  teach assertiveness skills. DEAR MAN.    Objective #B  Client will pause and use skills before talking when irritable.    Status: Continued - Date(s): 5/6/2019     Intervention(s)  Therapist will teach mindfulness skills.    Objective #C  Client will identify and maintain motivation for changing communication skills.  Status: Continued - Date(s): 5/6/2019     Intervention(s)  Therapist will teach motivational skills.        Client has reviewed and agreed to the above plan.      Isis BELL, LGSW May 6, 2019                                                                           Note reviewed and clinical supervision by ARABELLA Staples LICSW 5/10/2019

## 2019-05-06 NOTE — TELEPHONE ENCOUNTER
Script was resent in for capsules.     Leighann Choi RN -- [facility]

## 2019-05-07 LAB
SHBG SERPL-SCNC: 30 NMOL/L (ref 11–80)
TESTOST FREE SERPL-MCNC: 26.41 NG/DL (ref 4.7–24.4)
TESTOST SERPL-MCNC: 1033 NG/DL (ref 240–950)

## 2019-05-09 NOTE — PROGRESS NOTES
[facility] ([facility]),   Can we reach out to Patient and schedule a follow up appointment with me in the next 4-6 weeks or so please. Using a return spot is fine.     Thanks    Wes, good idea about the Seroquel (quetiapine). I can see him again

## 2019-05-09 NOTE — PROGRESS NOTES
"    Outpatient Psychiatric Progress Note    Name: Marcel Melchor   : 1970                    Primary Care Provider: Wes Braga PA-C - last visit 5/3/2019  Therapist: Isis Estrella - last visit 2019  [facility] Jodi Johnson  Sports medicine  [place] [facility]     PHQ-9 scores:  PHQ-9 SCORE 2019 2019 5/10/2019   PHQ-9 Total Score 9 7 14       ASUNCION-7 scores:  ASUNCION-7 SCORE 2019 2019 5/10/2019   Total Score 9 6 17     Patient Identification:  Patient is a 49 year old,   White American male  who presents for return visit with me.  Patient is currently employed full time. Patient attended the session alone. Patient prefers to be called: \"Marcel\".  Consent to communicate signed for Ermelinda Laupratima patient's Spouse/Partner    Interim History:    I last saw Marcel Melchor for outpatient psychiatry Consultation on 2018.     During that appointment, we Continue Effexor (venlafaxine)  mg by mouth daily for anxiety. May consider dose increase if needed. Try taking in the AM. Could consider dose increase to 225- 300 mg daily for anxiety.     Continue Adderall (amphetamine salts) and Nuvigil (armodafinil) per sleep specialists. May need to change medications or doses.    Current medications include:   Current Outpatient Medications   Medication Sig     Armodafinil (NUVIGIL PO) Take 250 mg by mouth every morning      ASACOL  MG EC tablet TAKE 3 TABLETS BY MOUTH TWICE DAILY     clindamycin (CLEOCIN T) 1 % lotion APPLY TOPICALLY TO FACE AND CHEST TWICE A DAY AS NEEDED     mesalamine (CANASA) 1000 MG Suppository Place 1,000 mg rectally 2 times daily     QUEtiapine (SEROQUEL) 25 MG tablet Take 1 tablet (25 mg) by mouth 3 times daily     venlafaxine (EFFEXOR-XR) 150 MG 24 hr capsule Take 1 capsule (150 mg) by mouth daily To take with 75mg every other day to wean off.     amphetamine-dextroamphetamine (ADDERALL) 10 MG tablet TAKE 1 TABLET BY MOUTH IN THE AFTERNOON     " testosterone cypionate (DEPOTESTOTERONE) 200 MG/ML injection Inject 50 mg into the muscle every 14 days     No current facility-administered medications for this visit.        The [place] Prescription Monitoring Program has been reviewed and there are no concerns about diversionary activity for controlled substances at this time.      PRESCRIPTIONS  Total Prescriptions: 15  Total Private Pay: 2  Fill Date ID Written Drug Qty Days Prescriber Rx # Pharmacy Refill Daily Dose * Pymt Type   03/06/2019 1 03/05/2019 Dextroamp-Amphetamin 10 Mg Tab   30 30 Gi Dah [id number] Gra (7340) 0  [place]  02/04/2019 1 02/04/2019 Armodafinil 250 Mg Tablet   80 90 Gi Dah [id number] Gra (7340) 0  [place]  02/04/2019 1 02/04/2019 Armodafinil 250 Mg Tablet   10 10 Gi Dah [id number] Gra (7340) 0  [place]  01/02/2019 1 08/03/2018 Armodafinil 250 Mg Tablet   27 30 Gi Dah [id number] Gra (7340) 5  [place]  01/02/2019 1 08/03/2018 Armodafinil 250 Mg Tablet   3 3 Gi Dah [id number] Gra (7340) 5  [place]  12/05/2018 1 08/03/2018 Armodafinil 250 Mg Tablet   30 30 Gi Dah [id number] Gra (7340) 4  [place]  11/04/2018 1 08/03/2018 Armodafinil 250 Mg Tablet   30 30 Gi Dah [id number] Gra (7340) 3  [place]  10/29/2018 1 10/29/2018 Dextroamp-Amphetamin 10 Mg Tab   30 30 Gi Dah [id number] Gra (7340) 0  [place]  10/07/2018 1 08/03/2018 Armodafinil 250 Mg Tablet   27 30 Gi Dah [id number] Gra (7340) 2  [place]  09/05/2018 1 09/05/2018 Dextroamp-Amphetamin 10 Mg Tab   30 30 Gi Dah [id number] Gra (7340) 0  [place]  09/04/2018 1 08/03/2018 Armodafinil 250 Mg Tablet   30 30 Gi Dah [id number] Gra (7340) 1  [place]  08/03/2018 1 08/03/2018 Dextroamp-Amphetamin 10 Mg Tab   30 30 Gi Da [id number] Gra (7338) 0  [place]  08/03/2018 1 08/03/2018 Armodafinil 250 Mg Tablet   30 30 Gi Da [id number] Gra (7384) 0  [place]  07/03/2018 1 02/14/2018 Armodafinil 250 Mg Tablet   30 30 Radha Wayland [id number] Gra (4137) 5  Comm  "[place]  06/06/2018 1 02/14/2018 Armodafinil 250 Mg Tablet   30 30 Radha Reza [id number] Gra (3946) 4  [place]    I was able to review most recent Primary Care Provider, specialty provider, and therapy visit notes that I have access to.     Last week, Primary Care Provider started to reduce Effexor (venlafaxine) medication and started Seroquel (quetiapine). He has not been able to reduce dosing of the Effexor (venlafaxine) yet because insurance will not allow him to  the lower dosing, possibly quantity limit issue.     Patient notes that he was looking into anger management and signed up online, but they never.   Feels more angry and \"every little thing sets me off\" even with people he cares about. Continues to stay angry and rageful for a long time. Has to be alone to do work or he will get mad and yell at others. He does feel bad about this and knows he should not be happy.     Patient notes \"my wife thinks I'm bipolar\".   Marcel Melchor reports mood has been: \"more down\"  No hypomania or kamille. Quick mood changes. Internalizing issues and stressors lately.  Anxiety has been: \"not much worry\" no panic.  Sleep has been: \"good\" has a diagnosis of narcolepsy but has not been using his stimulant or wakeful promoting agents recently per his sleep specialists- per pharmacy he needs an approval. Rare dreaming.   Anger has been ongoing.   PHQ9 and GAD7 scores were reviewed today.   Medication side effects: sedation from Seroquel (quetiapine) - taking the Effexor (venlafaxine) in the morning now  Current stressors include: Medical Comorbidities, Occupational Difficulties, Social Situations  Coping mechanisms and supports include: Exercise- has been going very early in the morning so no one else is around him, but now his staff are calling him for things at that time, Acupuncture, Avoidance    Previous medication trials include but not limited to:  Effexor (venlafaxine)  Nuvigil (armodafinil)   Adderall (amphetamine " "salts)   Wellbutrin (buproprion) side effects of increased sweating and stopped working  Celexa (citalopram)   Seroquel (quetiapine)  Neurontin (gabapentin) has been on this for nerve pain in the past and denies side effects    Past Medical History:   Diagnosis Date     Anxiety 9/3/2013     AJAY (obstructive sleep apnea) 2014     Rotator cuff syndrome 2012      has a past medical history of Anxiety (9/3/2013), AJAY (obstructive sleep apnea) (2014), and Rotator cuff syndrome (2012).    Social History:  Current Living situation: [place], [place] with Spouse/Partner and pet dogs. Feels safe at home.  Current use of drugs or alcohol: History of alcohol use and stopped at age 24  Tobacco use: No but quit chewing tobacco until mother . No cravings for nicotine.   Caffeine:  Yes  12 sodas/day of Mt. Dew - feels he has to have this- also uses pre workout    Vital Signs:   /64 (BP Location: Left arm, Patient Position: Chair, Cuff Size: Adult Large)   Pulse 66   Temp 98.6  F (37  C) (Oral)   Resp 14   Ht 1.753 m (5' 9\")   Wt 84.4 kg (186 lb)   SpO2 98%   BMI 27.47 kg/m      Labs:  Most recent laboratory results reviewed and the pertinent results include:   Office Visit on 2019   Component Date Value Ref Range Status     WBC 2019 6.8  4.0 - 11.0 10e9/L Final     RBC Count 2019 5.87  4.4 - 5.9 10e12/L Final     Hemoglobin 2019 17.9* 13.3 - 17.7 g/dL Final     Hematocrit 2019 49.0  40.0 - 53.0 % Final     MCV 2019 84  78 - 100 fl Final     MCH 2019 30.5  26.5 - 33.0 pg Final     MCHC 2019 36.5  31.5 - 36.5 g/dL Final     RDW 2019 13.3  10.0 - 15.0 % Final     Platelet Count 2019 223  150 - 450 10e9/L Final     TSH 2019 1.13  0.40 - 4.00 mU/L Final     CRP Inflammation 2019 <2.9  0.0 - 8.0 mg/L Final     Sodium 2019 140  133 - 144 mmol/L Final     Potassium 2019 4.0  3.4 - 5.3 mmol/L Final     Chloride 2019 " 105  94 - 109 mmol/L Final     Carbon Dioxide 05/03/2019 30  20 - 32 mmol/L Final     Anion Gap 05/03/2019 5  3 - 14 mmol/L Final     Glucose 05/03/2019 87  70 - 99 mg/dL Final     Urea Nitrogen 05/03/2019 10  7 - 30 mg/dL Final     Creatinine 05/03/2019 0.89  0.66 - 1.25 mg/dL Final     GFR Estimate 05/03/2019 >90  >60 mL/min/[1.73_m2] Final    Comment: Non  GFR Calc  Starting 12/18/2018, serum creatinine based estimated GFR (eGFR) will be   calculated using the Chronic Kidney Disease Epidemiology Collaboration   (CKD-EPI) equation.       GFR Estimate If Black 05/03/2019 >90  >60 mL/min/[1.73_m2] Final    Comment:  GFR Calc  Starting 12/18/2018, serum creatinine based estimated GFR (eGFR) will be   calculated using the Chronic Kidney Disease Epidemiology Collaboration   (CKD-EPI) equation.       Calcium 05/03/2019 9.3  8.5 - 10.1 mg/dL Final     Bilirubin Total 05/03/2019 0.8  0.2 - 1.3 mg/dL Final     Albumin 05/03/2019 4.2  3.4 - 5.0 g/dL Final     Protein Total 05/03/2019 7.3  6.8 - 8.8 g/dL Final     Alkaline Phosphatase 05/03/2019 103  40 - 150 U/L Final     ALT 05/03/2019 26  0 - 70 U/L Final     AST 05/03/2019 17  0 - 45 U/L Final     Testosterone Total 05/03/2019 1,033* 240 - 950 ng/dL Final    Comment: This test was developed and its performance characteristics determined by the   [facility],  Special Chemistry Laboratory. It has   not been cleared or approved by the FDA. The laboratory is regulated under   CLIA as qualified to perform high-complexity testing. This test is used for   clinical purposes. It should not be regarded as investigational or for   research.       Sex Hormone Binding Globulin 05/03/2019 30  11 - 80 nmol/L Final     Free Testosterone Calculated 05/03/2019 26.41* 4.7 - 24.4 ng/dL Final       Review of Systems:  10 systems (general, cardiovascular, respiratory, eyes, ENT, endocrine, GI, , M/S, neurological) were reviewed.  "Most pertinent finding(s) is/are:chronic low back pain and knee pain. The remaining systems are all unremarkable.    Mental Status Examination:  Appearance:  awake, alert, adequately groomed, appeared stated age, no apparent distress, normal weight and musclar build  Attitude:  cooperative   Eye Contact:  good  Gait and Station: Normal, No assistive Devices used and No dizziness or falls  Psychomotor Behavior:  no evidence of tardive dyskinesia, dystonia, or tics  Oriented to:  time, person, and place  Attention Span and Concentration:  Normal  Speech:  clear, coherent, regular rate, regular rhythm and fluent  Mood:  \"more down\"  Affect:  appropriate and in normal range  Associations:  no loose associations  Thought Process:  logical, linear and goal oriented  Thought Content:  no evidence of suicidal ideation or homicidal ideation and no evidence of psychotic thought, focused on occupational difficulties  Recent and Remote Memory:  intact to interview. Not formally assessed. No amnesia.  Fund of Knowledge: appropriate  Insight:  good  Judgment:  adequate for safety  Impulse Control:  limited to poor     Suicide Risk Assessment:  Today Marcel Melchor reports history of depression and no thoughts of wanting to die or harm self or others. In addition, there are notable risk factors for self-harm, including age and anxiety. However, risk is mitigated by commitment to family, sobriety, absence of past attempts, ability to volunteer a safety plan, history of seeking help when needed, future oriented, denies suicidal intent or plan, no family history of suicide and denies homicidal ideation, intent, or plan. Therefore, based on all available evidence including the factors cited above, Marcel Melchor does not appear to be at imminent risk for self-harm, does not meet criteria for a 72-hr hold, and therefore remains appropriate for ongoing outpatient level of care.  A thorough assessment of risk factors related to suicide " and self-harm have been reviewed and are noted above. The patient convincingly denies acute suicidality on several occasions. Local community safety resources reviewed and printed for patient to use if needed. There was no deceit detected, and the patient presented in a manner that was believable.      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     Priority: Medium     Narcolepsy 09/27/2018     Priority: Medium     Crohn's disease (H) 06/15/2015     Priority: Medium     AJAY (obstructive sleep apnea) 11/11/2014     Priority: Medium     ASUNCION (generalized anxiety disorder) 09/03/2013     Priority: Medium     Rotator cuff syndrome 01/19/2012     Priority: Medium     Motion sickness 07/14/2011     Priority: Medium     CARDIOVASCULAR SCREENING; LDL GOAL LESS THAN 160 10/31/2010     Priority: Medium     Attention deficit hyperactivity disorder (ADHD), combined type 12/04/2003     Priority: Medium     Problem list name updated by automated process. Provider to review       Dyspnea and respiratory abnormality 12/04/2003     Priority: Medium     Problem list name updated by automated process. Provider to review         Psychosocial & Contextual Factors:  Relationship Difficulties, Occupational Difficulties and Medical Comorbidites      Assessment:  Marcel Melchor reports worsening mood, focus, and anger. Medication side effects and alternatives were reviewed. Health promotion activities  "recommended and reviewed today. All questions addressed. Education and counseling completed regarding risks and benefits of medications and psychotherapy options. Recommend therapy for additional support.    I defer to sleep specialists about other options for narcolepsy such as dose adjustments of Adderall (amphetamine salts) and Nuvigil (armodafinil). May also need to try Provigil (modafinil). These are likely contributing to his feelings of anger. Has not been taking these medication much.     Effexor (venlafaxine)  mg has been on since 2015 reggie. He is not sure how this is affecting him and he still would like to try to get off of this.     Primary Care Provider added Seroquel (quetiapine) one week ago. Not appreciating any improvement at this time. Makes him too tired. We can stop.    Poor impulses lately. Is not on a stimulant for Attention Deficit Hyperactivity Disorder (ADHD) which can be contributing. He identifies Black and white thinking and \"all or nothing\" thinking. Will continue to monitor for bipolar type mood disorder or personality disorder.       Treatment Plan:    Stop Seroquel (quetiapine).     Start Neurontin (gabapentin) 300 mg by mouth 3 times per day for anger. After one week, can increase dose if needed to Neurontin (gabapentin) 600 mg by mouth 3 times per day.    Continue Effexor (venlafaxine)  mg by mouth daily for mood. Will plan to reduce this and add Prozac (fluoxetine).     Continue Adderall (amphetamine salts) and Nuvigil (armodafinil) per sleep specialists. May need to change medications or doses.    Continue all other medications as reviewed per electronic medical record today.     Safety plan reviewed. To the Emergency Department as needed or call after hours crisis line at [phone] or [phone]. [place] Crisis Text Line. Text [place] to 093315 or Suicide LifeLine Chat: suicidepreventionlifeline.org/chat    Continue individual therapy as planned with " Isis.    Schedule an appointment with me in 2-4 weeks or sooner as needed. Call [facility] at [phone] to schedule.    Follow up with primary care provider as planned or for acute medical concerns.    Call the psychiatric nurse line with medication questions or concerns at [phone].    Lingodahart may be used to communicate with your provider, but this is not intended to be used for emergencies.    Administrative Billing:   Time spent with patient was 30 minutes and greater than 50% of time or 20 minutes was spent in counseling and coordination of care regarding above diagnoses and treatment plan.    Patient Status:  Patient will continue to be seen for ongoing consultation and stabilization.    Signed:   Gregoria Castillo, PhD, APRN, CNP   Psychiatry

## 2019-05-09 NOTE — PROGRESS NOTES
I can see him again if needed. He did not want to change any medications when I saw him for an intake in the Fall.   His stimulant and wakeful promoting agent is likely causing anger and probably need to be reduced.   He needs to see a therapist or anger management.   Probably needs to add Neurontin (gabapentin) or Depakote for the anger.

## 2019-05-10 ENCOUNTER — OFFICE VISIT (OUTPATIENT)
Dept: PSYCHIATRY | Facility: CLINIC | Age: 49
End: 2019-05-10
Payer: COMMERCIAL

## 2019-05-10 VITALS
HEIGHT: 69 IN | HEART RATE: 66 BPM | BODY MASS INDEX: 27.55 KG/M2 | RESPIRATION RATE: 14 BRPM | TEMPERATURE: 98.6 F | OXYGEN SATURATION: 98 % | SYSTOLIC BLOOD PRESSURE: 102 MMHG | WEIGHT: 186 LBS | DIASTOLIC BLOOD PRESSURE: 64 MMHG

## 2019-05-10 DIAGNOSIS — F63.9 IMPULSE CONTROL DISORDER IN ADULT: Primary | ICD-10-CM

## 2019-05-10 DIAGNOSIS — F63.81 INTERMITTENT EXPLOSIVE DISORDER IN ADULT: ICD-10-CM

## 2019-05-10 DIAGNOSIS — F41.1 GAD (GENERALIZED ANXIETY DISORDER): ICD-10-CM

## 2019-05-10 PROCEDURE — 99214 OFFICE O/P EST MOD 30 MIN: CPT | Performed by: NURSE PRACTITIONER

## 2019-05-10 RX ORDER — GABAPENTIN 300 MG/1
300 CAPSULE ORAL 3 TIMES DAILY
Qty: 90 CAPSULE | Refills: 1 | Status: SHIPPED | OUTPATIENT
Start: 2019-05-10 | End: 2019-05-28

## 2019-05-10 RX ORDER — RIFAXIMIN 550 MG/1
TABLET ORAL
Refills: 1 | COMMUNITY
Start: 2019-04-10 | End: 2019-05-10

## 2019-05-10 RX ORDER — ANASTROZOLE 1 MG/1
TABLET ORAL
Refills: 3 | COMMUNITY
Start: 2019-04-18 | End: 2019-05-10

## 2019-05-10 RX ORDER — ARMODAFINIL 250 MG/1
TABLET ORAL
COMMUNITY
Start: 2019-05-09 | End: 2019-05-10

## 2019-05-10 RX ORDER — DEXTROAMPHETAMINE SACCHARATE, AMPHETAMINE ASPARTATE, DEXTROAMPHETAMINE SULFATE AND AMPHETAMINE SULFATE 2.5; 2.5; 2.5; 2.5 MG/1; MG/1; MG/1; MG/1
TABLET ORAL
Refills: 0 | COMMUNITY
Start: 2019-03-06 | End: 2019-05-28

## 2019-05-10 ASSESSMENT — MIFFLIN-ST. JEOR: SCORE: 1704.07

## 2019-05-10 ASSESSMENT — PATIENT HEALTH QUESTIONNAIRE - PHQ9
5. POOR APPETITE OR OVEREATING: NEARLY EVERY DAY
SUM OF ALL RESPONSES TO PHQ QUESTIONS 1-9: 14

## 2019-05-10 ASSESSMENT — ANXIETY QUESTIONNAIRES
1. FEELING NERVOUS, ANXIOUS, OR ON EDGE: NEARLY EVERY DAY
GAD7 TOTAL SCORE: 17
3. WORRYING TOO MUCH ABOUT DIFFERENT THINGS: MORE THAN HALF THE DAYS
6. BECOMING EASILY ANNOYED OR IRRITABLE: NEARLY EVERY DAY
5. BEING SO RESTLESS THAT IT IS HARD TO SIT STILL: SEVERAL DAYS
2. NOT BEING ABLE TO STOP OR CONTROL WORRYING: MORE THAN HALF THE DAYS
7. FEELING AFRAID AS IF SOMETHING AWFUL MIGHT HAPPEN: NEARLY EVERY DAY
IF YOU CHECKED OFF ANY PROBLEMS ON THIS QUESTIONNAIRE, HOW DIFFICULT HAVE THESE PROBLEMS MADE IT FOR YOU TO DO YOUR WORK, TAKE CARE OF THINGS AT HOME, OR GET ALONG WITH OTHER PEOPLE: SOMEWHAT DIFFICULT

## 2019-05-10 ASSESSMENT — PAIN SCALES - GENERAL: PAINLEVEL: MODERATE PAIN (4)

## 2019-05-10 NOTE — NURSING NOTE
"Chief Complaint   Patient presents with     Recheck Medication     pt feels symptoms are worse, easily annoyed or irritable.      initial /64 (BP Location: Left arm, Patient Position: Chair, Cuff Size: Adult Large)   Pulse 66   Temp 98.6  F (37  C) (Oral)   Resp 14   Ht 1.753 m (5' 9\")   Wt 84.4 kg (186 lb)   SpO2 98%   BMI 27.47 kg/m   Estimated body mass index is 27.47 kg/m  as calculated from the following:    Height as of this encounter: 1.753 m (5' 9\").    Weight as of this encounter: 84.4 kg (186 lb)..  bp completed using cuff size large    "

## 2019-05-10 NOTE — PATIENT INSTRUCTIONS
Treatment Plan:    Stop Seroquel (quetiapine).     Start Neurontin (gabapentin) 300 mg by mouth 3 times per day for anger. After one week, can increase dose if needed to Neurontin (gabapentin) 600 mg by mouth 3 times per day.    Continue Effexor (venlafaxine)  mg by mouth daily for mood. Will plan to reduce this and add Prozac (fluoxetine).     Continue Adderall (amphetamine salts) and Nuvigil (armodafinil) per sleep specialists. May need to change medications or doses.    Continue all other medications as reviewed per electronic medical record today.     Safety plan reviewed. To the Emergency Department as needed or call after hours crisis line at [phone] or [phone]. [place] Crisis Text Line. Text [place] to 060903 or Suicide LifeLine Chat: suicidepreventionlifeline.org/chat    Continue individual therapy as planned with Isis.    Schedule an appointment with me in 2-4 weeks or sooner as needed. Call [facility] at [phone] to schedule.    Follow up with primary care provider as planned or for acute medical concerns.    Call the psychiatric nurse line with medication questions or concerns at [phone].    MyChart may be used to communicate with your provider, but this is not intended to be used for emergencies.

## 2019-05-11 ASSESSMENT — ANXIETY QUESTIONNAIRES: GAD7 TOTAL SCORE: 17

## 2019-05-21 ENCOUNTER — FCC EXTENDED DOCUMENTATION (OUTPATIENT)
Dept: PSYCHOLOGY | Facility: CLINIC | Age: 49
End: 2019-05-21

## 2019-05-21 NOTE — PROGRESS NOTES
Case Consultation Record       Client Name: Marcel eMlchor   Date:  5/21/2019    Diagnosis: No diagnosis found.    Therapist: GERA Kolb      Team Members Present:  Leighann Simon; Nicole Cervantes; Emerson Powell; Jesus Cordon; Kim Patel; Trinidad Bland; Homer Vann; Savannah Bonilla; Yojana Song     Purpose:   Diagnostic Review    Recommendations:  1. Discuss possibility of MTM with PCP and psychiatry provider.   2. Consult with sleep clinic providers  3. Work on Radical Acceptance and Letting go skills       Consult note written by:  ARABELLA Bueno, LICSW

## 2019-05-25 DIAGNOSIS — R45.4 IRRITABILITY AND ANGER: ICD-10-CM

## 2019-05-25 DIAGNOSIS — R45.86 MOOD SWING: ICD-10-CM

## 2019-05-28 ENCOUNTER — OFFICE VISIT (OUTPATIENT)
Dept: PSYCHIATRY | Facility: CLINIC | Age: 49
End: 2019-05-28
Payer: COMMERCIAL

## 2019-05-28 VITALS
DIASTOLIC BLOOD PRESSURE: 60 MMHG | TEMPERATURE: 99.1 F | OXYGEN SATURATION: 97 % | SYSTOLIC BLOOD PRESSURE: 110 MMHG | BODY MASS INDEX: 28.44 KG/M2 | RESPIRATION RATE: 16 BRPM | HEIGHT: 69 IN | HEART RATE: 69 BPM | WEIGHT: 192 LBS

## 2019-05-28 DIAGNOSIS — R45.4 IRRITABILITY AND ANGER: ICD-10-CM

## 2019-05-28 DIAGNOSIS — F63.9 IMPULSE CONTROL DISORDER IN ADULT: ICD-10-CM

## 2019-05-28 DIAGNOSIS — F41.1 GAD (GENERALIZED ANXIETY DISORDER): ICD-10-CM

## 2019-05-28 DIAGNOSIS — F63.81 INTERMITTENT EXPLOSIVE DISORDER IN ADULT: Primary | ICD-10-CM

## 2019-05-28 PROCEDURE — 99214 OFFICE O/P EST MOD 30 MIN: CPT | Performed by: NURSE PRACTITIONER

## 2019-05-28 RX ORDER — GABAPENTIN 300 MG/1
300 CAPSULE ORAL 3 TIMES DAILY
Qty: 90 CAPSULE | Refills: 1 | Status: SHIPPED | OUTPATIENT
Start: 2019-05-28 | End: 2019-06-14

## 2019-05-28 RX ORDER — ARMODAFINIL 250 MG/1
TABLET ORAL
Refills: 1 | COMMUNITY
Start: 2019-05-09

## 2019-05-28 RX ORDER — VENLAFAXINE HYDROCHLORIDE 150 MG/1
150 CAPSULE, EXTENDED RELEASE ORAL DAILY
Qty: 30 CAPSULE | Refills: 1 | Status: SHIPPED | OUTPATIENT
Start: 2019-05-28 | End: 2019-06-14

## 2019-05-28 ASSESSMENT — ANXIETY QUESTIONNAIRES
5. BEING SO RESTLESS THAT IT IS HARD TO SIT STILL: NEARLY EVERY DAY
7. FEELING AFRAID AS IF SOMETHING AWFUL MIGHT HAPPEN: NOT AT ALL
3. WORRYING TOO MUCH ABOUT DIFFERENT THINGS: NOT AT ALL
GAD7 TOTAL SCORE: 8
1. FEELING NERVOUS, ANXIOUS, OR ON EDGE: SEVERAL DAYS
GAD7 TOTAL SCORE: 8
2. NOT BEING ABLE TO STOP OR CONTROL WORRYING: SEVERAL DAYS
6. BECOMING EASILY ANNOYED OR IRRITABLE: SEVERAL DAYS
GAD7 TOTAL SCORE: 8
4. TROUBLE RELAXING: MORE THAN HALF THE DAYS
7. FEELING AFRAID AS IF SOMETHING AWFUL MIGHT HAPPEN: NOT AT ALL

## 2019-05-28 ASSESSMENT — PATIENT HEALTH QUESTIONNAIRE - PHQ9
SUM OF ALL RESPONSES TO PHQ QUESTIONS 1-9: 8
10. IF YOU CHECKED OFF ANY PROBLEMS, HOW DIFFICULT HAVE THESE PROBLEMS MADE IT FOR YOU TO DO YOUR WORK, TAKE CARE OF THINGS AT HOME, OR GET ALONG WITH OTHER PEOPLE: NOT DIFFICULT AT ALL
SUM OF ALL RESPONSES TO PHQ QUESTIONS 1-9: 8

## 2019-05-28 ASSESSMENT — MIFFLIN-ST. JEOR: SCORE: 1731.29

## 2019-05-28 ASSESSMENT — PAIN SCALES - GENERAL: PAINLEVEL: MODERATE PAIN (4)

## 2019-05-28 NOTE — NURSING NOTE
"Chief Complaint   Patient presents with     Recheck Medication     pt feels the medications are helping, still on edge but not as bad, fatigue, pt feels may be better.     initial /60 (BP Location: Right arm, Patient Position: Chair, Cuff Size: Adult Large)   Pulse 69   Temp 99.1  F (37.3  C) (Oral)   Resp 16   Ht 1.753 m (5' 9\")   Wt 87.1 kg (192 lb)   SpO2 97%   BMI 28.35 kg/m   Estimated body mass index is 28.35 kg/m  as calculated from the following:    Height as of this encounter: 1.753 m (5' 9\").    Weight as of this encounter: 87.1 kg (192 lb)..  bp completed using cuff size large    "

## 2019-05-28 NOTE — PATIENT INSTRUCTIONS
Treatment Plan:    Continue Effexor (venlafaxine)  mg by mouth daily for mood. Will plan to reduce this and add Prozac (fluoxetine).     Continue Neurontin (gabapentin) 300 mg by mouth 3 times per day for anger. May consider dose increase if needed.     Continue Nuvigil (armodafinil) per sleep specialists.     Continue all other medications as reviewed per electronic medical record today.     Safety plan reviewed. To the Emergency Department as needed or call after hours crisis line at [phone] or [phone]. [place] Crisis Text Line. Text [place] to 706204 or Suicide LifeLine Chat: suicidepreventionlifeline.org/chat    Continue individual therapy as planned with Isis.    Schedule an appointment with me in 2-4 weeks or sooner as needed.    Follow up with primary care provider as planned or for acute medical concerns.    Call the psychiatric nurse line with medication questions or concerns at [phone].

## 2019-05-28 NOTE — PROGRESS NOTES
"    Outpatient Psychiatric Progress Note    Name: Marcel Melchor   : 1970                    Primary Care Provider: Wes Braga PA-C - last visit 5/3/2019  Therapist: Isis Estrella  - last visit 2019- next visit 6/3/2019    PHQ-9 scores:  PHQ-9 SCORE 2019 5/10/2019 2019   PHQ-9 Total Score 7 14 8       ASUNCION-7 scores:  ASUNCION-7 SCORE 2019 5/10/2019 2019   Total Score 6 17 8     Answers for HPI/ROS submitted by the patient on 2019   If you checked off any problems, how difficult have these problems made it for you to do your work, take care of things at home, or get along with other people?: Not difficult at all    Patient Identification:  Patient is a 49 year old,   White American male  who presents for return visit with me.  Patient is currently employed full time. Patient attended the session alone. Patient prefers to be called: \"aMrcel\".    Interim History:    I last saw Marcel Melchro for outpatient psychiatry Return Visit on 5/10/2019.     During that appointment, we Stop Seroquel (quetiapine).     Start Neurontin (gabapentin) 300 mg by mouth 3 times per day for anger. After one week, can increase dose if needed to Neurontin (gabapentin) 600 mg by mouth 3 times per day.    Continue Effexor (venlafaxine)  mg by mouth daily for mood. Will plan to reduce this and add Prozac (fluoxetine).     Continue Adderall (amphetamine salts) and Nuvigil (armodafinil) per sleep specialists. May need to change medications or doses.    Current medications include:   Current Outpatient Medications   Medication Sig     armodafinil (NUVIGIL) 250 MG TABS tablet TAKE 1 TABLET BY MOUTH EVERY DAY IN THE MORNING     ASACOL  MG EC tablet TAKE 3 TABLETS BY MOUTH TWICE DAILY     clindamycin (CLEOCIN T) 1 % lotion APPLY TOPICALLY TO FACE AND CHEST TWICE A DAY AS NEEDED     gabapentin (NEURONTIN) 300 MG capsule Take 1 capsule (300 mg) by mouth 3 times daily     mesalamine (CANASA) " 1000 MG Suppository Place 1,000 mg rectally 2 times daily     testosterone cypionate (DEPOTESTOTERONE) 200 MG/ML injection Inject 50 mg into the muscle every 14 days     venlafaxine (EFFEXOR-XR) 150 MG 24 hr capsule Take 1 capsule (150 mg) by mouth daily To take with 75mg every other day to wean off. (Patient taking differently: Take 150 mg by mouth daily )     No current facility-administered medications for this visit.        The [place] Prescription Monitoring Program has been reviewed and there are no concerns about diversionary activity for controlled substances at this time.      PRESCRIPTIONS  Total Prescriptions: 17  Total Private Pay: 2  Fill Date ID Written Drug Qty Days Prescriber Rx # Pharmacy Refill Daily Dose * Pymt Type   05/10/2019 1 05/10/2019 Gabapentin 300 Mg Capsule   90 30 Am [facility] [id number] Gra (7340) 0  [place]  05/09/2019 1 02/04/2019 Armodafinil 250 Mg Tablet   90 90 Gi Dah [id number] Gra (7340) 1  [place]  03/06/2019 1 03/05/2019 Dextroamp-Amphetamin 10 Mg Tab   30 30 Gi Dah [id number] Gra (7340) [place]  [place]  02/04/2019 1 02/04/2019 Armodafinil 250 Mg Tablet   10 10 Gi Dah [id number] Gra (7340) [place]  [place]  02/04/2019 1 02/04/2019 Armodafinil 250 Mg Tablet   80 90 Gi Dah [id number] Gra (7340[place]  [place]  01/02/2019 1 08/03/2018 Armodafinil 250 Mg Tablet   27 30 Gi Dah [id number] Gra (7340) 5  [place]  01/02/2019 1 08/03/2018 Armodafinil 250 Mg Tablet   3 3 Gi Dah [id number] Gra (7340) 5  [place]  12/05/2018 1 08/03/2018 Armodafinil 250 Mg Tablet   30 30 Gi Dah [id number] Gra (7340) 4  [place]  11/04/2018 1 08/03/2018 Armodafinil 250 Mg Tablet   30 30 Gi Dah [id number] Gra (7340) 3  [place]  10/29/2018 1 10/29/2018 Dextroamp-Amphetamin 10 Mg Tab   30 30 Gi Dah [id number] Gra (7340) 0  [place]  10/07/2018 1 08/03/2018 Armodafinil 250 Mg Tablet   27 30 Gi [facility] [id number] Gra (7340) 2  [place]  09/05/2018 1 09/05/2018 Dextroamp-Amphetamin 10 Mg Tab   30 30 Gi  "[facility] [id number] Gra (7340) 0  [place]  09/04/2018 1 08/03/2018 Armodafinil 250 Mg Tablet   30 30 Gi Dah [id number] Gra (7340) 1  [place]  08/03/2018 1 08/03/2018 Armodafinil 250 Mg Tablet   30 30 Gi Dah [id number] Gra (7340) [place]  [place]  08/03/2018 1 08/03/2018 Dextroamp-Amphetamin 10 Mg Tab   30 30 Gi Da [id number] Gra (7340) 0  [place]  07/03/2018 1 02/14/2018 Armodafinil 250 Mg Tablet   30 30 Radha Reza [id number] Gra (7340) 5  [place]  06/06/2018 1 02/14/2018 Armodafinil 250 Mg Tablet   30 30 Radha Reza [id number] Gra (7340) 4  [place]      I was able to review most recent Primary Care Provider, specialty provider, and therapy visit notes that I have access to.     Marcel Melchor reports mood has been: \"pretty calm\" no depression. No anger or aggression.  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 No panic.   Sleep has been: \"good\"   Per refill data, Patient is taking Nuvigil (armodafinil) again from specialists. He is not on Adderall (amphetamine salts). Patient notes that his specialists are giving him the Nuvigil (armodafinil) but not the Adderall (amphetamine salts). Patient has been trying to contact his specialists.   Anger has been much less at home and work.   Taking Neurontin (gabapentin) 300 mg 3 times per day.   PHQ9 and GAD7 scores were reviewed today.   Medication side effects: tired from Neurontin (gabapentin) and not sure this is getting better  Current stressors include: Medical Comorbidities, Occupational Difficulties, Social Situations  Coping mechanisms and supports include: Exercise- has been going very early in the morning so no one else is around him, but now his staff are calling him for things at that time, Acupuncture, Avoidance, Keeping hands busy     Previous medication trials include but not limited to:  Effexor (venlafaxine)  Nuvigil (armodafinil)   Adderall (amphetamine salts)   Wellbutrin (buproprion) side effects of increased sweating and " "stopped working  Celexa (citalopram)   Seroquel (quetiapine)  Neurontin (gabapentin) has been on this for nerve pain in the past and denies side effects    Past Medical History:   Diagnosis Date     Anxiety 9/3/2013     AJAY (obstructive sleep apnea) 2014     Rotator cuff syndrome 2012      has a past medical history of Anxiety (9/3/2013), AJAY (obstructive sleep apnea) (2014), and Rotator cuff syndrome (2012).    Social History:  Current Living situation: [place], [place] with Spouse/Partner and pet dogs. Feels safe at home.  Current use of drugs or alcohol: History of alcohol use and stopped at age 24 years old. Denies other drug use.   Tobacco use: No but quit chewing tobacco until mother . No cravings for nicotine.   Caffeine:  Yes 12 sodas/day of Mt. Dew - feels he has to have this- 2 Monster energy drinks every day    Vital Signs:   /60 (BP Location: Right arm, Patient Position: Chair, Cuff Size: Adult Large)   Pulse 69   Temp 99.1  F (37.3  C) (Oral)   Resp 16   Ht 1.753 m (5' 9\")   Wt 87.1 kg (192 lb)   SpO2 97%   BMI 28.35 kg/m      Labs:  Most recent laboratory results reviewed and no new labs.     Review of Systems:  10 systems (general, cardiovascular, respiratory, eyes, ENT, endocrine, GI, , M/S, neurological) were reviewed. Most pertinent finding(s) is/are:chronic low back pain and knee pain. The remaining systems are all unremarkable.     Mental Status Examination:  Appearance:  awake, alert, adequately groomed, appeared stated age, no apparent distress, normal weight, and musclar build  Attitude:  cooperative   Eye Contact:  good  Gait and Station: Normal, No assistive Devices used and No dizziness or falls  Psychomotor Behavior:  no evidence of tardive dyskinesia, dystonia, or tics  Oriented to:  time, person, and place  Attention Span and Concentration:  Normal  Speech:  clear, coherent, regular rate, regular rhythm and fluent  Mood:  \"pretty calm- " "stable\"  Affect:  appropriate and in normal range, calmer  Associations:  no loose associations  Thought Process:  logical, linear and goal oriented  Thought Content:  no evidence of suicidal ideation or homicidal ideation and no evidence of psychotic thought, focused on occupational difficulties  Recent and Remote Memory:  intact to interview. Not formally assessed. No amnesia.  Fund of Knowledge: appropriate  Insight:  good  Judgment:  adequate for safety  Impulse Control:  improving  Language: intact     Suicide Risk Assessment:  Today Marcel Melchor reports history of depression and no thoughts of wanting to die or harm self or others. In addition, there are notable risk factors for self-harm, including age and anxiety. However, risk is mitigated by commitment to family, sobriety, absence of past attempts, ability to volunteer a safety plan, history of seeking help when needed, future oriented, denies suicidal intent or plan, no family history of suicide and denies homicidal ideation, intent, or plan. Therefore, based on all available evidence including the factors cited above, Marcel Melchor does not appear to be at imminent risk for self-harm, does not meet criteria for a 72-hr hold, and therefore remains appropriate for ongoing outpatient level of care.  A thorough assessment of risk factors related to suicide and self-harm have been reviewed and are noted above. Local community safety resources reviewed and printed for patient to use if needed. There was no deceit detected, and the patient presented in a manner that was believable.     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              Rule out Personality Disorder    Medical comorbidities include:   Patient Active Problem List    " Diagnosis Date Noted     Impulse control disorder in adult 05/10/2019     Priority: Medium     Intermittent explosive disorder in adult 05/10/2019     Priority: Medium     Mood swing 05/03/2019     Priority: Medium     Irritability and anger 05/03/2019     Priority: Medium     Behavior concern in adult 12/13/2018     Priority: Medium     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     Priority: Medium     Narcolepsy 09/27/2018     Priority: Medium     Crohn's disease (H) 06/15/2015     Priority: Medium     AJAY (obstructive sleep apnea) 11/11/2014     Priority: Medium     ASUNCION (generalized anxiety disorder) 09/03/2013     Priority: Medium     Rotator cuff syndrome 01/19/2012     Priority: Medium     Motion sickness 07/14/2011     Priority: Medium     CARDIOVASCULAR SCREENING; LDL GOAL LESS THAN 160 10/31/2010     Priority: Medium     Attention deficit hyperactivity disorder (ADHD), combined type 12/04/2003     Priority: Medium     Problem list name updated by automated process. Provider to review       Dyspnea and respiratory abnormality 12/04/2003     Priority: Medium     Problem list name updated by automated process. Provider to review         Psychosocial & Contextual Factors:  Relationship Difficulties, Occupational Difficulties, Medical Comorbidites    Assessment:  Marcel Melchor reports stable mood, with less anxiety and much less anger. Tolerating medications well. Discussed plan to taper off Effexor (venlafaxine). We can pursue this and watch closely if we need to add Prozac (fluoxetine). Patient has much stressors coming up soon and wants to maintain for a few more weeks. Medication side effects and alternatives were reviewed. Health promotion activities recommended and reviewed today. All questions addressed. Education and counseling completed regarding risks and benefits of medications and psychotherapy options. Recommend ongoing therapy for additional support.       Treatment Plan:    Continue Effexor  (venlafaxine)  mg by mouth daily for mood. Will plan to reduce this and add Prozac (fluoxetine).     Continue Neurontin (gabapentin) 300 mg by mouth 3 times per day for anger. May consider dose increase if needed.     Continue Nuvigil (armodafinil) per sleep specialists.     Continue all other medications as reviewed per electronic medical record today.     Safety plan reviewed. To the Emergency Department as needed or call after hours crisis line at [phone] or [phone]. [place] Crisis Text Line. Text [place] to 642732 or Suicide LifeLine Chat: [email or web address]/chat    Continue individual therapy as planned with Isis.    Schedule an appointment with me in 2-4 weeks or sooner as needed.    Follow up with primary care provider as planned or for acute medical concerns.    Call the psychiatric nurse line with medication questions or concerns at [phone].    Administrative Billing:   Time spent with patient was 30 minutes and greater than 50% of time or 20 minutes was spent in counseling and coordination of care regarding above diagnoses and treatment plan.    Patient Status:  Patient will continue to be seen for ongoing consultation and stabilization.    Signed:   Gregoria Castillo, PhD, APRN, CNP   Psychiatry

## 2019-05-28 NOTE — TELEPHONE ENCOUNTER
Requested Prescriptions   Pending Prescriptions Disp Refills     QUEtiapine (SEROQUEL) 25 MG tablet [Pharmacy Med Name: QUETIAPINE FUMARATE 25 MG TAB] 90 tablet 0     Sig: TAKE 1 TABLET (25 MG) BY MOUTH 3 TIMES DAILY    (Discontinued)      Last Written Prescription Date:  5/3/19    END:5/10/19  Reason for Discontinue: Alternate therapy  Last Fill Quantity: 90,   # refills: 0  Last Office Visit with [facility] or [facility] prescribing provider: 5/3/2019  Future Office Visit:    Next 5 appointments (look out 90 days)    May 28, 2019 12:45 PM CDT  [facility] Gregoria [facility]illo NP  [facility] [address]  [address]  [facility] [id number] [phone]   Jun 03, 2019  1:30 PM CDT  Return Visit with Isis [facility] [address]  [facility] [id number] [phone]   Jun 17, 2019  4:30 PM CDT  Return Visit with Isis Estrella  [facility] ([facility]) [address]  [facility] [id number] [phone]               Antipsychotic Medications Failed - 5/25/2019 12:31 PM        Failed - Lipid panel on file within the past 12 months     No lab results found.            Failed - Medication is active on med list        Passed - Blood pressure under 140/90 in past 12 months     BP Readings from Last 3 Encounters:   05/10/19 102/64   05/03/19 114/72   03/13/19 126/77                 Passed - Patient is 12 years of age or older        Passed - CBC on file in past 12 months     Recent Labs   Lab Test 05/03/19  1400   WBC 6.8   RBC 5.87   HGB 17.9*   HCT 49.0                    Passed - Heart Rate on file within past 12 months     Pulse Readings from Last 3 Encounters:   05/10/19 66   05/03/19 73   03/13/19 72               Passed - A1c or Glucose on file in past 12 months     Recent Labs   Lab Test 05/03/19  1400   GLC 87       Please review patients last 3 weights. If a  "weight gain of >10 lbs exists, you may refill the prescription once after instructing the patient to schedule an appointment within the next 30 days.    Wt Readings from Last 3 Encounters:   05/10/19 84.4 kg (186 lb)   05/03/19 83.5 kg (184 lb)   03/07/19 86.2 kg (190 lb)             Passed - Recent (6 mo) or future (30 days) visit within the authorizing provider's specialty     Patient had office visit in the last 6 months or has a visit in the next 30 days with authorizing provider or within the authorizing provider's specialty.  See \"Patient Info\" tab in inbasket, or \"Choose Columns\" in Meds & Orders section of the refill encounter.            "

## 2019-05-29 RX ORDER — QUETIAPINE FUMARATE 25 MG/1
25 TABLET, FILM COATED ORAL 3 TIMES DAILY
Qty: 90 TABLET | Refills: 0 | OUTPATIENT
Start: 2019-05-29

## 2019-05-29 ASSESSMENT — ANXIETY QUESTIONNAIRES: GAD7 TOTAL SCORE: 8

## 2019-05-29 ASSESSMENT — PATIENT HEALTH QUESTIONNAIRE - PHQ9: SUM OF ALL RESPONSES TO PHQ QUESTIONS 1-9: 8

## 2019-06-03 ENCOUNTER — TRANSFERRED RECORDS (OUTPATIENT)
Dept: HEALTH INFORMATION MANAGEMENT | Facility: CLINIC | Age: 49
End: 2019-06-03

## 2019-06-03 ENCOUNTER — OFFICE VISIT (OUTPATIENT)
Dept: PSYCHOLOGY | Facility: CLINIC | Age: 49
End: 2019-06-03
Payer: COMMERCIAL

## 2019-06-03 DIAGNOSIS — F41.1 GAD (GENERALIZED ANXIETY DISORDER): Primary | ICD-10-CM

## 2019-06-03 PROCEDURE — 90834 PSYTX W PT 45 MINUTES: CPT | Performed by: SOCIAL WORKER

## 2019-06-03 ASSESSMENT — PATIENT HEALTH QUESTIONNAIRE - PHQ9: SUM OF ALL RESPONSES TO PHQ QUESTIONS 1-9: 3

## 2019-06-03 NOTE — PROGRESS NOTES
Progress Note    Client Name: Marcel Lauohjacinta  Date: 6/3/2019       Service Type: Individual  Video Visit: No      Session Start Time: 1:30  Session End Time: 2:15      Session Length: 45 min     Session #: 18    Attendees: Client     Treatment Plan Last Reviewed: 7/30/2018; 11/28/2018; 5/6/2019  PHQ-9 / ASUNCION-7 : 6/3/2019    DATA  Interactive Complexity: No  Crisis: No        Progress Since Last Session (Related to Symptoms / Goals / Homework):   Symptoms: Improving client reported mood improved with med change    Homework: Partially completed client reported using skills       Episode of Care Goals: Satisfactory progress - ACTION (Actively working towards change); Intervened by reinforcing change plan / affirming steps taken     Current / Ongoing Stressors and Concerns:   Ongoing: The client reports receiving feedback from others that he is irritable and difficult to work with. The client reports he may get irritable due to anxiety.  Current: The client reported that he felt his medication changes were helping his anxiety and impulsivity. He stated that he was feeling more calm, his thoughts weren't racing and that he was working to reduce his caffeine intake. Discussed the client's communication with his wife and how it was going better after being more mindful. 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     Treatment Objective(s) Addressed in This Session:   learn & utilize at least 3 assertive communication skills weekly  pause and use skills before talking when irritable     Intervention:   DBT: Reviewed effective communication and mindfulness skills  Motivational Interviewing    MI Intervention: Expressed Empathy/Understanding, Supported Autonomy, Collaboration, Evocation, Permission to raise concern or advise and Open-ended questions     Change Talk Expressed by the Patient: Desire to change Ability to  change Reasons to change    Provider Response to Change Talk: E - Evoked more info from patient about behavior change, A - Affirmed patient's thoughts, decisions, or attempts at behavior change, R - Reflected patient's change talk and S - Summarized patient's change talk statements          ASSESSMENT: Current Emotional / Mental Status (status of significant symptoms):   Risk status (Self / Other harm or suicidal ideation)   Client denies current fears or concerns for personal safety.   Client denies current or recent suicidal ideation or behaviors.   Client denies current or recent homicidal ideation or behaviors.   Client denies current or recent self injurious behavior or ideation.   Client denies other safety concerns.   Client Client reports there has been no change in risk factors since their last session.     Client Client reports there has been no change in protective factors since their last session.     A safety and risk management plan has not been developed at this time, however client was given the after-hours number / 911 should there be a change in any of these risk factors.     Appearance:   Appropriate    Eye Contact:   Good    Psychomotor Behavior: Normal    Attitude:   Cooperative    Orientation:   All   Speech    Rate / Production: Normal     Volume:  Normal    Mood:    Normal client appeared to be in a good mood   Affect:    Appropriate     Thought Content:  Clear    Thought Form:  Coherent  Logical    Insight:    Good      Medication Review:   No changes to current psychiatric medication(s)     Medication Compliance:   NA     Changes in Health Issues:   None reported     Chemical Use Review:   Substance Use: Chemical use reviewed, no active concerns identified      Tobacco Use: No current tobacco use.      Diagnosis:  1. ASUNCION (generalized anxiety disorder)       Collateral Reports Completed:   Not Applicable    PLAN: (Client Tasks / Therapist Tasks / Other)  Client will practice mindfulness, use  effective communication skills with his wife and while at work and return for therapy in one month.        Isis Sameer MSW, [facility] 6/3/2019  Note reviewed and clinical supervision by Savannah Bonilla, MSW [facility] 6/6/2019    ________________________________________________________________________    Treatment Plan    Client's Name: Marcel Melchor  YOB: 1970    Date: 7/30/2108; 11/28/2018; 5/6/2019    DSM-V Diagnoses: 300.02 (F41.1) Generalized Anxiety Disorder   Psychosocial / Contextual Factors: client reports conflict with family and anxiety  WHODAS: see in    Referral / Collaboration:  Referral to another professional/service is not indicated at this time..    Anticipated number of session or this episode of care: 8-12      MeasurableTreatment Goal(s) related to diagnosis / functional impairment(s)  Goal 1: Client will reduce symptoms of anxiety and irritability as evidenced by ASUNCION-7 reducing from 5 to 0 over the next four months and client reporting improved ability to communicate effectively.    I will know I've met my goal when I don't have recurring issues with irritability.      Objective #A (Client Action)    Client will use cognitive strategies identified in therapy to challenge anxious thoughts.  Status: Continued - Date(s): 5/6/2019    Intervention(s)  Therapist will assign homework to challenge distorted thinking  teach CBT skills.    Objective #B  Client will identify 3 initial signs or symptoms of anxiety.  Status: Continued - Date(s): 5/6/2019     Intervention(s)  Therapist will teach emotional recognition/identification. DBT.    Objective #C  Client will use relaxation strategies 3 times per day to reduce the physical symptoms of anxiety.  Status: Continued - Date(s): 5/6/2019     Intervention(s)  Therapist will assign homework to use mindfulness  teach mindfulness skills.      Goal 2: Client will improve ability to communicate effectively with others as evidenced by client reporting  use of 3-4 new communications skills on a weekly basis.    I will know I've met my goal when I can feel open to being genuine and have feelings come out.      Objective #A (Client Action)    Status: Continued - Date(s): 5/6/2019     Client will learn & utilize at least 3 assertive communication skills weekly.    Intervention(s)  Therapist will assign homework to practice communication skills  teach assertiveness skills. DEAR MAN.    Objective #B  Client will pause and use skills before talking when irritable.    Status: Continued - Date(s): 5/6/2019     Intervention(s)  Therapist will teach mindfulness skills.    Objective #C  Client will identify and maintain motivation for changing communication skills.  Status: Continued - Date(s): 5/6/2019     Intervention(s)  Therapist will teach motivational skills.        Client has reviewed and agreed to the above plan.      Isis BELL, LGSW May 6, 2019                                                                           Note reviewed and clinical supervision by ARABELLA Staples [facility] 5/10/2019

## 2019-06-05 DIAGNOSIS — F63.9 IMPULSE CONTROL DISORDER IN ADULT: ICD-10-CM

## 2019-06-05 DIAGNOSIS — F63.81 INTERMITTENT EXPLOSIVE DISORDER IN ADULT: ICD-10-CM

## 2019-06-05 RX ORDER — GABAPENTIN 300 MG/1
300 CAPSULE ORAL 3 TIMES DAILY
Qty: 90 CAPSULE | Refills: 0 | OUTPATIENT
Start: 2019-06-05

## 2019-06-13 NOTE — PROGRESS NOTES
"    Outpatient Psychiatric Progress Note    Name: Marcel Melchor   : 1970                    Primary Care Provider: Wes Braga PA-C - last visit 5/3/2019  Therapist: Isis Estrella - last visit 6/3/2019    PHQ-9 scores:  PHQ-9 SCORE 2019 6/3/2019 2019   PHQ-9 Total Score 8 3 1       ASUNCION-7 scores:  ASUNCION-7 SCORE 5/10/2019 2019 2019   Total Score 17 8 3       Patient Identification:  Patient is a 49 year old year old,   White American male  who presents for return visit with me.  Patient is currently employed full time. Patient attended the session alone. Patient prefers to be called: \"Marcel\".    Interim History:    I last saw Marcel Melchor for outpatient psychiatry Return Visit on 2019.     During that appointment, we Continue Effexor (venlafaxine)  mg by mouth daily for mood. Will plan to reduce this and add Prozac (fluoxetine).     Continue Neurontin (gabapentin) 300 mg by mouth 3 times per day for anger. May consider dose increase if needed.     Continue Nuvigil (armodafinil) per sleep specialists.     Current medications include:   Current Outpatient Medications   Medication Sig     armodafinil (NUVIGIL) 250 MG TABS tablet TAKE 1 TABLET BY MOUTH EVERY DAY IN THE MORNING     ASACOL  MG EC tablet TAKE 3 TABLETS BY MOUTH TWICE DAILY     clindamycin (CLEOCIN T) 1 % lotion APPLY TOPICALLY TO FACE AND CHEST TWICE A DAY AS NEEDED     gabapentin (NEURONTIN) 300 MG capsule Take 1 capsule (300 mg) by mouth 3 times daily     mesalamine (CANASA) 1000 MG Suppository Place 1,000 mg rectally 2 times daily     testosterone cypionate (DEPOTESTOTERONE) 200 MG/ML injection Inject 50 mg into the muscle every 14 days     venlafaxine (EFFEXOR-XR) 150 MG 24 hr capsule Take 1 capsule (150 mg) by mouth daily     No current facility-administered medications for this visit.        The [place] Prescription Monitoring Program has been reviewed and there are no concerns about " diversionary activity for controlled substances at this time.      PRESCRIPTIONS  Total Prescriptions: 18  Total Private Pay: 2  Fill Date ID Written Drug Qty Days Prescriber Rx # Pharmacy Refill Daily Dose * Pymt Type   06/05/2019 1 05/28/2019 Gabapentin 300 Mg Capsule   90 30 Am Kelin [id number] Gra (7340) 0  [place]  06/03/2019 1 06/03/2019 Dextroamp-Amphetamin 10 Mg Tab   30 30 Gi Dah [id number] Gra (7340) 0  [place]  05/10/2019 1 05/10/2019 Gabapentin 300 Mg Capsule   90 30 Am Kelin [id number] Gra (7340) 0  [place]  05/09/2019 1 02/04/2019 Armodafinil 250 Mg Tablet   90 90 Gi Dah [id number] Gra (7340) 1  [place]  03/06/2019 1 03/05/2019 Dextroamp-Amphetamin 10 Mg Tab   30 30 Gi Dah [id number] Gra (7340) [place]  [place]  02/04/2019 1 02/04/2019 Armodafinil 250 Mg Tablet   80 90 Gi Dah [id number] Gra (7340) [place]  [place]  02/04/2019 1 02/04/2019 Armodafinil 250 Mg Tablet   10 10 Gi Dah [id number] Gra (7340) 0  [place]  01/02/2019 1 08/03/2018 Armodafinil 250 Mg Tablet   27 30 Gi Dah [id number] Gra (7340) 5  [place]  01/02/2019 1 08/03/2018 Armodafinil 250 Mg Tablet   3 3 Gi Dah [id number] Gra (7340) 5  [place]  12/05/2018 1 08/03/2018 Armodafinil 250 Mg Tablet   30 30 Gi Dah [id number] Gra (7340) 4  [place]  11/04/2018 1 08/03/2018 Armodafinil 250 Mg Tablet   30 30 Gi Dah [id number] Gra (7340) 3  [place]  10/29/2018 1 10/29/2018 Dextroamp-Amphetamin 10 Mg Tab   30 30 Gi Dah [id number] Gra (7340) 0  [place]  10/07/2018 1 08/03/2018 Armodafinil 250 Mg Tablet   27 30 Gi Dah [id number] Gra (7340) 2  [place]  09/05/2018 1 09/05/2018 Dextroamp-Amphetamin 10 Mg Tab   30 30 Gi Dah [id number] Gra (7340) [place]  [place]  09/04/2018 1 08/03/2018 Armodafinil 250 Mg Tablet   30 30 Gi Dah [id number] Gra (7340) 1  [place]  08/03/2018 1 08/03/2018 Armodafinil 250 Mg Tablet   30 30 Gi Dah [id number] Gra (7340) 0  [place]  08/03/2018 1 08/03/2018 Dextroamp-Amphetamin 10 Mg Tab   30 30 Gi Dah [id number] Gra  "(5057) 0  [place]  2018 1 2018 Armodafinil 250 Mg Tablet   30 30 Radha Reza [id number] Gra (6201) 5  [place]    I was able to review most recent Primary Care Provider, specialty provider, and therapy visit notes that I have access to.     Marcel Melchor reports mood has been: \"good\" much less anger. No aggression. No hypomania or kamille. Feels calmer  Anxiety has been: \"less\" no panic.   Sleep has been: \"fine\" no nightmares.   Able to function better at home and work.   Focus and concentration has been good and has been put back on the Adderall (amphetamine salts) as needed.   PHQ9 and GAD7 scores were reviewed today.   Medication side effects: Denies  Current stressors include: Medical Comorbidities, Occupational Difficulties, Social Situations- much less stress with these lately  Coping mechanisms and supports include: Exercise- has been going very early in the morning so no one else is around him, Acupuncture, Avoidance, Keeping hands busy     Previous medication trials include but not limited to:  Effexor (venlafaxine)  Nuvigil (armodafinil)   Adderall (amphetamine salts)   Wellbutrin (buproprion) side effects of increased sweating and stopped working  Celexa (citalopram)   Seroquel (quetiapine)  Neurontin (gabapentin) has been on this for nerve pain in the past and denies side effects    Past Medical History:   Diagnosis Date     Anxiety 9/3/2013     AJAY (obstructive sleep apnea) 2014     Rotator cuff syndrome 2012      has a past medical history of Anxiety (9/3/2013), AJAY (obstructive sleep apnea) (2014), and Rotator cuff syndrome (2012).    Social History:  Current Living situation: [place], [place] with Spouse/Partner and pet dogs. Feels safe at home.  Current use of drugs or alcohol: History of alcohol use and stopped at age 24 years old. Denies other drug use.   Tobacco use: No but quit chewing tobacco until mother . No cravings for nicotine.   Caffeine:  Yes " "12 sodas/day of Mt. Dew - feels he has to have this- 2 Monster energy drinks every day    Vital Signs:   /69 (BP Location: Right arm, Patient Position: Chair, Cuff Size: Adult Large)   Pulse 74   Temp 97.6  F (36.4  C) (Oral)   Resp 18   Wt 86.6 kg (191 lb)   SpO2 97%   BMI 28.21 kg/m      Labs:  Most recent laboratory results reviewed and no new labs.     Review of Systems:  10 systems (general, cardiovascular, respiratory, eyes, ENT, endocrine, GI, , M/S, neurological) were reviewed. Most pertinent finding(s) is/are:chronic low back pain and knee pain, sciatica pain worse due to work. The remaining systems are all unremarkable.     Mental Status Examination:  Appearance:  awake, alert, adequately groomed, appeared stated age, no apparent distress, normal weight, and musclar build  Attitude:  cooperative   Eye Contact:  good  Gait and Station: Normal, No assistive Devices used and No dizziness or falls  Psychomotor Behavior:  no evidence of tardive dyskinesia, dystonia, or tics  Oriented to:  time, person, and place  Attention Span and Concentration:  Normal  Speech:  clear, coherent, regular rate, regular rhythm and fluent  Mood:  \"good\"  Affect:  appropriate and in normal range, calm, reactive as appropriate  Associations:  no loose associations  Thought Process:  logical, linear and goal oriented  Thought Content:  no evidence of suicidal ideation or homicidal ideation and no evidence of psychotic thought, focused on occupational difficulties  Recent and Remote Memory:  intact to interview. Not formally assessed. No amnesia.  Fund of Knowledge: appropriate  Insight:  good  Judgment:  adequate for safety  Impulse Control:  good  Language: intact     Suicide Risk Assessment:  Today Marcel Melchor reports history of depression and no thoughts of wanting to die or harm self or others. In addition, there are notable risk factors for self-harm, including age and anxiety. However, risk is mitigated " by commitment to family, sobriety, absence of past attempts, ability to volunteer a safety plan, history of seeking help when needed, future oriented, denies suicidal intent or plan, no family history of suicide and denies homicidal ideation, intent, or plan. Therefore, based on all available evidence including the factors cited above, Marcel Melchor does not appear to be at imminent risk for self-harm, does not meet criteria for a 72-hr hold, and therefore remains appropriate for ongoing outpatient level of care.  A thorough assessment of risk factors related to suicide and self-harm have been reviewed and are noted above. Local community safety resources reviewed and printed for patient to use if needed. There was no deceit detected, and the patient presented in a manner that was believable.      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              Rule out Personality Disorder      Medical comorbidities include:   Patient Active Problem List    Diagnosis Date Noted     Impulse control disorder in adult 05/10/2019     Priority: Medium     Intermittent explosive disorder in adult 05/10/2019     Priority: Medium     Mood swing 05/03/2019     Priority: Medium     Irritability and anger 05/03/2019     Priority: Medium     Behavior concern in adult 12/13/2018     Priority: Medium     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     Priority: Medium     Narcolepsy 09/27/2018     Priority: Medium     Crohn's disease (H) 06/15/2015     Priority: Medium     AJAY (obstructive sleep apnea) 11/11/2014     Priority: Medium     ASUNCION (generalized anxiety disorder) 09/03/2013     Priority: Medium     Rotator cuff syndrome 01/19/2012     Priority: Medium     Motion sickness 07/14/2011     Priority: Medium     CARDIOVASCULAR  SCREENING; LDL GOAL LESS THAN 160 10/31/2010     Priority: Medium     Attention deficit hyperactivity disorder (ADHD), combined type 12/04/2003     Priority: Medium     Problem list name updated by automated process. Provider to review       Dyspnea and respiratory abnormality 12/04/2003     Priority: Medium     Problem list name updated by automated process. Provider to review         Psychosocial & Contextual Factors:  Relationship Difficulties, Occupational Difficulties, Medical Comorbidites    Assessment:  Marcel Melchor reports stable mood and still wants to wait until we plan to taper Effexor (venlafaxine). We can pursue this and watch closely if we need to add Prozac (fluoxetine) for discontinuation syndrome symptoms. May consider dose increase of the Neurontin (gabapentin)  if needed.     Medication side effects and alternatives were reviewed. Health promotion activities recommended and reviewed today. All questions addressed. Education and counseling completed regarding risks and benefits of medications and psychotherapy options. Therapy should continue.    Patient also asked about CBD oil and I reviewed the lack of evidence for effectiveness and use. Reviewed that supplements and herbal remedies are not regulated by the FDA. Would recommend against oral ingestion of this and be cautious if patient tries it because it could affect symptoms and interact with prescription medications. Patient has just started this and has helped so far. We will note his chart.       Treatment Plan:    Continue Effexor (venlafaxine)  mg by mouth daily for mood.     Continue Neurontin (gabapentin) 300 mg by mouth 3 times per day for anger.     Continue Nuvigil (armodafinil) and Adderall (amphetamine salts) per sleep specialists.     Continue all other medications as reviewed per electronic medical record today.     Safety plan reviewed. To the Emergency Department as needed or call after hours crisis line at [phone]  or [phone]. [place] Crisis Text Line. Text [place] to 288508 or Suicide LifeLine Chat: suicidepreventionVasonomicsline.org/chat    Continue individual therapy as planned with Isis.    Follow with me in 8 weeks or sooner as needed.     Follow up with primary care provider as planned or for acute medical concerns.    Administrative Billing:   Time spent with patient was 30 minutes and greater than 50% of time or 20 minutes was spent in counseling and coordination of care regarding above diagnoses and treatment plan.    Patient Status:  The patient is being returned to the referring provider for ongoing care and medication prescribing.  The patient can be referred back to this service for further consultation as needed.    Signed:   Gregoria Castillo, PhD, APRN, CNP   Psychiatry

## 2019-06-14 ENCOUNTER — TELEPHONE (OUTPATIENT)
Dept: FAMILY MEDICINE | Facility: CLINIC | Age: 49
End: 2019-06-14

## 2019-06-14 ENCOUNTER — OFFICE VISIT (OUTPATIENT)
Dept: PSYCHIATRY | Facility: CLINIC | Age: 49
End: 2019-06-14
Payer: COMMERCIAL

## 2019-06-14 VITALS
WEIGHT: 191 LBS | TEMPERATURE: 97.6 F | SYSTOLIC BLOOD PRESSURE: 107 MMHG | HEART RATE: 74 BPM | BODY MASS INDEX: 28.21 KG/M2 | OXYGEN SATURATION: 97 % | DIASTOLIC BLOOD PRESSURE: 69 MMHG | RESPIRATION RATE: 18 BRPM

## 2019-06-14 DIAGNOSIS — F63.9 IMPULSE CONTROL DISORDER IN ADULT: ICD-10-CM

## 2019-06-14 DIAGNOSIS — R45.4 IRRITABILITY AND ANGER: ICD-10-CM

## 2019-06-14 DIAGNOSIS — F63.81 INTERMITTENT EXPLOSIVE DISORDER IN ADULT: Primary | ICD-10-CM

## 2019-06-14 DIAGNOSIS — F90.2 ATTENTION DEFICIT HYPERACTIVITY DISORDER (ADHD), COMBINED TYPE: ICD-10-CM

## 2019-06-14 DIAGNOSIS — F41.1 GAD (GENERALIZED ANXIETY DISORDER): ICD-10-CM

## 2019-06-14 DIAGNOSIS — L70.9 ACNE, UNSPECIFIED ACNE TYPE: ICD-10-CM

## 2019-06-14 PROCEDURE — 99214 OFFICE O/P EST MOD 30 MIN: CPT | Performed by: NURSE PRACTITIONER

## 2019-06-14 RX ORDER — VENLAFAXINE HYDROCHLORIDE 150 MG/1
150 CAPSULE, EXTENDED RELEASE ORAL DAILY
Qty: 90 CAPSULE | Refills: 0 | Status: SHIPPED | OUTPATIENT
Start: 2019-06-14 | End: 2019-07-29

## 2019-06-14 RX ORDER — GABAPENTIN 300 MG/1
300 CAPSULE ORAL 3 TIMES DAILY
Qty: 270 CAPSULE | Refills: 0 | Status: SHIPPED | OUTPATIENT
Start: 2019-06-14 | End: 2019-07-29

## 2019-06-14 RX ORDER — DEXTROAMPHETAMINE SACCHARATE, AMPHETAMINE ASPARTATE, DEXTROAMPHETAMINE SULFATE AND AMPHETAMINE SULFATE 2.5; 2.5; 2.5; 2.5 MG/1; MG/1; MG/1; MG/1
TABLET ORAL
Refills: 0 | COMMUNITY
Start: 2019-06-03 | End: 2021-04-19

## 2019-06-14 ASSESSMENT — ANXIETY QUESTIONNAIRES
1. FEELING NERVOUS, ANXIOUS, OR ON EDGE: SEVERAL DAYS
7. FEELING AFRAID AS IF SOMETHING AWFUL MIGHT HAPPEN: NOT AT ALL
5. BEING SO RESTLESS THAT IT IS HARD TO SIT STILL: SEVERAL DAYS
2. NOT BEING ABLE TO STOP OR CONTROL WORRYING: NOT AT ALL
3. WORRYING TOO MUCH ABOUT DIFFERENT THINGS: NOT AT ALL
IF YOU CHECKED OFF ANY PROBLEMS ON THIS QUESTIONNAIRE, HOW DIFFICULT HAVE THESE PROBLEMS MADE IT FOR YOU TO DO YOUR WORK, TAKE CARE OF THINGS AT HOME, OR GET ALONG WITH OTHER PEOPLE: NOT DIFFICULT AT ALL
6. BECOMING EASILY ANNOYED OR IRRITABLE: SEVERAL DAYS
GAD7 TOTAL SCORE: 3

## 2019-06-14 ASSESSMENT — PATIENT HEALTH QUESTIONNAIRE - PHQ9
SUM OF ALL RESPONSES TO PHQ QUESTIONS 1-9: 1
5. POOR APPETITE OR OVEREATING: NOT AT ALL

## 2019-06-14 ASSESSMENT — PAIN SCALES - GENERAL: PAINLEVEL: MODERATE PAIN (5)

## 2019-06-14 NOTE — TELEPHONE ENCOUNTER
"Requested Prescriptions   Pending Prescriptions Disp Refills     clindamycin (CLEOCIN T) 1 % external lotion 60 mL 3     Sig: APPLY TOPICALLY TO FACE AND CHEST TWICE A DAY AS NEEDED       Topical Acne Medications Protocol Passed - 6/14/2019 10:10 AM        Passed - Patient is 12 years of age or older        Passed - Recent (12 mo) or future (30 days) visit within the authorizing provider's specialty     Patient had office visit in the last 12 months or has a visit in the next 30 days with authorizing provider or within the authorizing provider's specialty.  See \"Patient Info\" tab in inbasket, or \"Choose Columns\" in Meds & Orders section of the refill encounter.              Passed - Medication is active on med list        Last Written Prescription Date:  7/22/2016  Last Fill Quantity: 60 ml ,  # refills: 3   Last office visit: 5/3/2019 with prescribing provider:  5/3/19   Future Office Visit:   Next 5 appointments (look out 90 days)    Jun 17, 2019  4:30 PM CDT  Return Visit with Isis Estrella  [facility] ([facility]) [address]  [facility] [address]  [phone]   Jul 15, 2019 11:00 AM CDT  Return Visit with Isis Estrella  [facility] ([facility]) [address]  [facility] [address]  [phone]   Aug 15, 2019 10:15 AM CDT  [facility] Visit with Gregoria Castillo NP  [facility] [address]  [address]  [facility] [address] [phone]           "

## 2019-06-14 NOTE — PATIENT INSTRUCTIONS
Treatment Plan:    Continue Effexor (venlafaxine)  mg by mouth daily for mood.     Continue Neurontin (gabapentin) 300 mg by mouth 3 times per day for anger.     Continue Nuvigil (armodafinil) and Adderall (amphetamine salts) per sleep specialists.     Continue all other medications as reviewed per electronic medical record today.     Safety plan reviewed. To the Emergency Department as needed or call after hours crisis line at [phone] or [phone]. [place] Crisis Text Line. Text [place] to 268630 or Suicide LifeLine Chat: suicidepreventionlifeline.org/chat    Continue individual therapy as planned with Isis.    Follow up with primary care provider as planned or for acute medical concerns.

## 2019-06-14 NOTE — TELEPHONE ENCOUNTER
Hima Kinney.  I'm just getting to this now.  Sorry it took me a bit.  I see that you saw Rex just today.  I let Rex Zamora know about Marcel so hopefully they can get hooked up soon.  Let me know if other concerns.    Wes

## 2019-06-14 NOTE — TELEPHONE ENCOUNTER
Hima Harvey  He is doing great actually and I plan to see him one more time in August and probably send back to you.

## 2019-06-14 NOTE — NURSING NOTE
"Chief Complaint   Patient presents with     Recheck Medication     pt feels the medications are working well, no new concerns.      initial /69 (BP Location: Right arm, Patient Position: Chair, Cuff Size: Adult Large)   Pulse 74   Temp 97.6  F (36.4  C) (Oral)   Resp 18   Wt 86.6 kg (191 lb)   SpO2 97%   BMI 28.21 kg/m   Estimated body mass index is 28.21 kg/m  as calculated from the following:    Height as of 5/28/19: 1.753 m (5' 9\").    Weight as of this encounter: 86.6 kg (191 lb)..  bp completed using cuff size large    "

## 2019-06-15 ASSESSMENT — ANXIETY QUESTIONNAIRES: GAD7 TOTAL SCORE: 3

## 2019-06-17 ENCOUNTER — OFFICE VISIT (OUTPATIENT)
Dept: PSYCHOLOGY | Facility: CLINIC | Age: 49
End: 2019-06-17
Payer: COMMERCIAL

## 2019-06-17 DIAGNOSIS — F41.1 GAD (GENERALIZED ANXIETY DISORDER): Primary | ICD-10-CM

## 2019-06-17 DIAGNOSIS — F63.9 IMPULSE CONTROL DISORDER IN ADULT: ICD-10-CM

## 2019-06-17 PROCEDURE — 90834 PSYTX W PT 45 MINUTES: CPT | Performed by: SOCIAL WORKER

## 2019-06-17 NOTE — PROGRESS NOTES
Progress Note    Client Name: Marcel Lauohjacinta  Date: 6/17/2019       Service Type: Individual  Video Visit: No      Session Start Time: 4:30  Session End Time: 5:15      Session Length: 45 min     Session #: 19    Attendees: Client     Treatment Plan Last Reviewed: 7/30/2018; 11/28/2018; 5/6/2019  PHQ-9 / ASUNCION-7 : 6/3/2019    DATA  Interactive Complexity: No  Crisis: No        Progress Since Last Session (Related to Symptoms / Goals / Homework):   Symptoms: No change client reported mood remained improved    Homework: Achieved / completed to satisfaction client reported continued use of skills       Episode of Care Goals: Satisfactory progress - ACTION (Actively working towards change); Intervened by reinforcing change plan / affirming steps taken     Current / Ongoing Stressors and Concerns:   Ongoing: The client reports receiving feedback from others that he is irritable and difficult to work with. The client reports he may get irritable due to anxiety.  Current: The client reported that he has continued feeling the benefit of his medication change. He discussed how his improved mood has benefited his relationships and that both his wife and his daughter. Client identified that he has been handling things differently at work as well and that his employees have noticed. Discussed how client can read book on ACT slowly and enact the exercises effectively.     Treatment Objective(s) Addressed in This Session:   learn & utilize at least 3 assertive communication skills weekly  pause and use skills before talking when irritable     Intervention:   DBT: Identified effective communication skills used by client  Motivational Interviewing    MI Intervention: Expressed Empathy/Understanding, Supported Autonomy, Collaboration, Evocation, Permission to raise concern or advise and Open-ended questions     Change Talk Expressed by the Patient: Desire to change Ability to change Reasons  to change    Provider Response to Change Talk: E - Evoked more info from patient about behavior change, A - Affirmed patient's thoughts, decisions, or attempts at behavior change, R - Reflected patient's change talk and S - Summarized patient's change talk statements          ASSESSMENT: Current Emotional / Mental Status (status of significant symptoms):   Risk status (Self / Other harm or suicidal ideation)   Client denies current fears or concerns for personal safety.   Client denies current or recent suicidal ideation or behaviors.   Client denies current or recent homicidal ideation or behaviors.   Client denies current or recent self injurious behavior or ideation.   Client denies other safety concerns.   Client Client reports there has been no change in risk factors since their last session.     Client Client reports there has been no change in protective factors since their last session.     A safety and risk management plan has not been developed at this time, however client was given the after-hours number / 911 should there be a change in any of these risk factors.     Appearance:   Appropriate    Eye Contact:   Good    Psychomotor Behavior: Normal    Attitude:   Cooperative    Orientation:   All   Speech    Rate / Production: Normal     Volume:  Normal    Mood:    Normal client appeared to be in a good mood   Affect:    Appropriate     Thought Content:  Clear    Thought Form:  Coherent  Logical    Insight:    Good      Medication Review:   No changes to current psychiatric medication(s)     Medication Compliance:   NA     Changes in Health Issues:   None reported     Chemical Use Review:   Substance Use: Chemical use reviewed, no active concerns identified      Tobacco Use: No current tobacco use.      Diagnosis:  1. ASUNCION (generalized anxiety disorder)    2. Impulse control disorder in adult       Collateral Reports Completed:   Not Applicable    PLAN: (Client Tasks / Therapist Tasks / Other)  Client will  continue practicing effective communication skills, pausing rather than reacting and while at work and return for therapy in one month.        Isis Sameer MSW, [facility] 6/17/2019  Note reviewed and clinical supervision by Savannah Bonilla, MSW [facility] 6/25/2019       ________________________________________________________________________    Treatment Plan    Client's Name: Marcel Melchor  YOB: 1970    Date: 7/30/2108; 11/28/2018; 5/6/2019    DSM-V Diagnoses: 300.02 (F41.1) Generalized Anxiety Disorder   Psychosocial / Contextual Factors: client reports conflict with family and anxiety  WHODAS: see in    Referral / Collaboration:  Referral to another professional/service is not indicated at this time..    Anticipated number of session or this episode of care: 8-12      MeasurableTreatment Goal(s) related to diagnosis / functional impairment(s)  Goal 1: Client will reduce symptoms of anxiety and irritability as evidenced by ASUNCION-7 reducing from 5 to 0 over the next four months and client reporting improved ability to communicate effectively.    I will know I've met my goal when I don't have recurring issues with irritability.      Objective #A (Client Action)    Client will use cognitive strategies identified in therapy to challenge anxious thoughts.  Status: Continued - Date(s): 5/6/2019    Intervention(s)  Therapist will assign homework to challenge distorted thinking  teach CBT skills.    Objective #B  Client will identify 3 initial signs or symptoms of anxiety.  Status: Continued - Date(s): 5/6/2019     Intervention(s)  Therapist will teach emotional recognition/identification. DBT.    Objective #C  Client will use relaxation strategies 3 times per day to reduce the physical symptoms of anxiety.  Status: Continued - Date(s): 5/6/2019     Intervention(s)  Therapist will assign homework to use mindfulness  teach mindfulness skills.      Goal 2: Client will improve ability to communicate effectively with  others as evidenced by client reporting use of 3-4 new communications skills on a weekly basis.    I will know I've met my goal when I can feel open to being genuine and have feelings come out.      Objective #A (Client Action)    Status: Continued - Date(s): 5/6/2019     Client will learn & utilize at least 3 assertive communication skills weekly.    Intervention(s)  Therapist will assign homework to practice communication skills  teach assertiveness skills. DEAR MAN.    Objective #B  Client will pause and use skills before talking when irritable.    Status: Continued - Date(s): 5/6/2019     Intervention(s)  Therapist will teach mindfulness skills.    Objective #C  Client will identify and maintain motivation for changing communication skills.  Status: Continued - Date(s): 5/6/2019     Intervention(s)  Therapist will teach motivational skills.        Client has reviewed and agreed to the above plan.      Isis BELL, SW May 6, 2019                                                                           Note reviewed and clinical supervision by ARABELLA Staples [facility] 5/10/2019

## 2019-06-18 RX ORDER — CLINDAMYCIN PHOSPHATE 10 UG/ML
LOTION TOPICAL
Qty: 60 ML | Refills: 3 | Status: SHIPPED | OUTPATIENT
Start: 2019-06-18 | End: 2022-04-04

## 2019-06-19 ENCOUNTER — TELEPHONE (OUTPATIENT)
Dept: FAMILY MEDICINE | Facility: CLINIC | Age: 49
End: 2019-06-19

## 2019-06-19 NOTE — TELEPHONE ENCOUNTER
MTM referral from: [facility] visit (referral by provider)    MTM referral outreach attempt #2 on June 19, 2019 at 3:39 PM      Outcome: Patient is not interested at this time because he said he just saw a psychiatrist and does not feel that he needs MTM and does not understand why this referral was placed , will route to MTM Pharmacist/Provider as an FYI. Thank you for the referral.     Manuela Burgos, MTM Coordinator

## 2019-07-15 ENCOUNTER — OFFICE VISIT (OUTPATIENT)
Dept: PSYCHOLOGY | Facility: CLINIC | Age: 49
End: 2019-07-15
Payer: COMMERCIAL

## 2019-07-15 DIAGNOSIS — F41.1 GAD (GENERALIZED ANXIETY DISORDER): Primary | ICD-10-CM

## 2019-07-15 PROCEDURE — 90834 PSYTX W PT 45 MINUTES: CPT | Performed by: SOCIAL WORKER

## 2019-07-17 NOTE — PROGRESS NOTES
Progress Note    Client Name: Marcel Lauohjacinta  Date: 7/15/2019       Service Type: Individual  Video Visit: No      Session Start Time: 11:00  Session End Time: 11:45      Session Length: 45 min     Session #: 20    Attendees: Client attended alone     Treatment Plan Last Reviewed: 7/30/2018; 11/28/2018; 5/6/2019  PHQ-9 / ASUNCION-7 : 6/3/2019    DATA  Interactive Complexity: No  Crisis: No        Progress Since Last Session (Related to Symptoms / Goals / Homework):   Symptoms: Worsening client reported increased anxiety and irritability    Homework: Achieved / completed to satisfaction client reported continued attempts with effective communication      Episode of Care Goals: Satisfactory progress - ACTION (Actively working towards change); Intervened by reinforcing change plan / affirming steps taken     Current / Ongoing Stressors and Concerns:   Ongoing: The client reports receiving feedback from others that he is irritable and difficult to work with. The client reports he may get irritable due to anxiety.  Current: The client stated that he doesn't feel the same level of benefit with his medication changes as he did originally. 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 Client described feeling frustrated that he is the one who is expected to change when he feels his wife has her own ineffective communication issues. Validated client's feelings and discussed how he can approach his wife.     Treatment Objective(s) Addressed in This Session:   learn & utilize at least 3 assertive communication skills weekly  pause and use skills before talking when irritable     Intervention:   DBT: Reviewed how client can be more present and use effective communication skills  Motivational Interviewing    MI Intervention: Expressed Empathy/Understanding, Supported Autonomy, Collaboration, Evocation,  Permission to raise concern or advise and Open-ended questions     Change Talk Expressed by the Patient: Desire to change Ability to change Reasons to change Committment to change    Provider Response to Change Talk: E - Evoked more info from patient about behavior change, A - Affirmed patient's thoughts, decisions, or attempts at behavior change, R - Reflected patient's change talk and S - Summarized patient's change talk statements          ASSESSMENT: Current Emotional / Mental Status (status of significant symptoms):   Risk status (Self / Other harm or suicidal ideation)   Client denies current fears or concerns for personal safety.   Client denies current or recent suicidal ideation or behaviors.   Client denies current or recent homicidal ideation or behaviors.   Client denies current or recent self injurious behavior or ideation.   Client denies other safety concerns.   Client Patient reports there has been no change in risk factors since their last session.     Client Patient reports there has been no change in protective factors since their last session.     Recommended that patient call 911 or go to the local ED should there be a change in any of these risk factors.     Appearance:   Appropriate    Eye Contact:   Good    Psychomotor Behavior: Normal    Attitude:   Cooperative    Orientation:   All   Speech    Rate / Production: Normal     Volume:  Normal    Mood:    Anxious  Irritable  client presented with irritability and anxiety talking about his relationship   Affect:    Appropriate     Thought Content:  Clear    Thought Form:  Coherent  Logical    Insight:    Good      Medication Review:   No changes to current psychiatric medication(s)     Medication Compliance:   NA     Changes in Health Issues:   None reported     Chemical Use Review:   Substance Use: Chemical use reviewed, no active concerns identified      Tobacco Use: No current tobacco use.      Diagnosis:  1. ASUNCION (generalized anxiety disorder)        Collateral Reports Completed:   Not Applicable    PLAN: (Client Tasks / Therapist Tasks / Other)  Client will continue slowing down his reaction to his wife, practice reducing personalization, talk to wife about couples counseling and return for therapy in two weeks.        Isis Estrella MSW, [facility] 7/15/2019  Note reviewed and clinical supervision by Savannah Bonilla, MSW [facility] 7/29/2019       ________________________________________________________________________    Treatment Plan    Client's Name: Marcel Melchor  YOB: 1970    Date: 7/30/2108; 11/28/2018; 5/6/2019    DSM-V Diagnoses: 300.02 (F41.1) Generalized Anxiety Disorder   Psychosocial / Contextual Factors: client reports conflict with family and anxiety  WHODAS: see in    Referral / Collaboration:  Referral to another professional/service is not indicated at this time..    Anticipated number of session or this episode of care: 8-12      MeasurableTreatment Goal(s) related to diagnosis / functional impairment(s)  Goal 1: Client will reduce symptoms of anxiety and irritability as evidenced by ASUNCION-7 reducing from 5 to 0 over the next four months and client reporting improved ability to communicate effectively.    I will know I've met my goal when I don't have recurring issues with irritability.      Objective #A (Client Action)    Client will use cognitive strategies identified in therapy to challenge anxious thoughts.  Status: Continued - Date(s): 5/6/2019    Intervention(s)  Therapist will assign homework to challenge distorted thinking  teach CBT skills.    Objective #B  Client will identify 3 initial signs or symptoms of anxiety.  Status: Continued - Date(s): 5/6/2019     Intervention(s)  Therapist will teach emotional recognition/identification. DBT.    Objective #C  Client will use relaxation strategies 3 times per day to reduce the physical symptoms of anxiety.  Status: Continued - Date(s): 5/6/2019     Intervention(s)  Therapist  will assign homework to use mindfulness  teach mindfulness skills.      Goal 2: Client will improve ability to communicate effectively with others as evidenced by client reporting use of 3-4 new communications skills on a weekly basis.    I will know I've met my goal when I can feel open to being genuine and have feelings come out.      Objective #A (Client Action)    Status: Continued - Date(s): 5/6/2019     Client will learn & utilize at least 3 assertive communication skills weekly.    Intervention(s)  Therapist will assign homework to practice communication skills  teach assertiveness skills. DEAR MAN.    Objective #B  Client will pause and use skills before talking when irritable.    Status: Continued - Date(s): 5/6/2019     Intervention(s)  Therapist will teach mindfulness skills.    Objective #C  Client will identify and maintain motivation for changing communication skills.  Status: Continued - Date(s): 5/6/2019     Intervention(s)  Therapist will teach motivational skills.        Client has reviewed and agreed to the above plan.      Isis BELL, [facility] May 6, 2019                                                                           Note reviewed and clinical supervision by ARABELLA Staples [facility] 5/10/2019

## 2019-07-24 DIAGNOSIS — R45.4 IRRITABILITY AND ANGER: ICD-10-CM

## 2019-07-24 DIAGNOSIS — F41.1 GAD (GENERALIZED ANXIETY DISORDER): ICD-10-CM

## 2019-07-24 NOTE — TELEPHONE ENCOUNTER
"Requested Prescriptions   Pending Prescriptions Disp Refills     venlafaxine (EFFEXOR-XR) 150 MG 24 hr capsule [Pharmacy Med Name: VENLAFAXINE HCL  MG CAP] 60 capsule 1     Sig: TAKE 1 CAPSULE (150 MG) BY MOUTH DAILY TO TAKE WITH 75MG EVERY OTHER DAY TO WEAN OFF.   Last Written Prescription Date:  6/14/19  Last Fill Quantity: 90,  # refills: 0   Last Office Visit: 5/3/2019 [place]      Return in about 1 month (around 6/3/2019) for for Specialty appointment.     Future Office Visit:    Next 5 appointments (look out 90 days)    Aug 05, 2019  4:30 PM CDT  Return [facility] with [facility] ([facility]) [address] [place] [address] [address]  [facility] [address]  [phone]   Aug 15, 2019 10:15 AM CDT  [facility] [address]  [address]  [facility] [address]  [phone]   Aug 22, 2019  2:00 PM CDT  Return Visit with Trinidad Solorio  [facility] ([facility]) [address]  [facility] [address]  [phone]   Aug 26, 2019  3:30 PM CDT  Return Visit with Isis [facility] ([facility]) [address]  [address]  [facility] [address]  [phone]             Serotonin-Norepinephrine Reuptake Inhibitors  Passed - 7/24/2019  1:04 AM        Passed - Blood pressure under 140/90 in past 12 months     BP Readings from Last 3 Encounters:   06/14/19 107/69   05/28/19 110/60   05/10/19 102/64                 Passed - Recent (12 mo) or future (30 days) visit within the authorizing provider's specialty     Patient had office visit in the last 12 months or has a visit in the next 30 days with authorizing provider or within the authorizing provider's specialty.  See \"Patient Info\" tab in inbasket, or \"Choose Columns\" in Meds & Orders section of the refill encounter.              Passed - Medication is active on med list        Passed - " Patient is age 18 or older        Passed - Normal serum creatinine on file in past 12 months     Recent Labs   Lab Test 05/03/19  1400   CR 0.89

## 2019-07-26 ENCOUNTER — TRANSFERRED RECORDS (OUTPATIENT)
Dept: HEALTH INFORMATION MANAGEMENT | Facility: CLINIC | Age: 49
End: 2019-07-26

## 2019-07-26 RX ORDER — VENLAFAXINE HYDROCHLORIDE 150 MG/1
150 CAPSULE, EXTENDED RELEASE ORAL DAILY
Qty: 60 CAPSULE | Refills: 1 | OUTPATIENT
Start: 2019-07-26

## 2019-07-26 NOTE — TELEPHONE ENCOUNTER
Can you review the chart before sending on RX requests that are not appropriate?    I sent in a 90 day supply of Effexor (venlafaxine)  mg when I saw Patient last month.   Also, all messages and refill requests for psychiatry patients need to be sent to [facility] RN pool

## 2019-07-26 NOTE — TELEPHONE ENCOUNTER
Review the chart next time. 90 day supply sent in in June. Send all requests to the [facility] RN pool in the future too.

## 2019-08-07 ENCOUNTER — TELEPHONE (OUTPATIENT)
Dept: PALLIATIVE MEDICINE | Facility: CLINIC | Age: 49
End: 2019-08-07

## 2019-08-07 DIAGNOSIS — M54.16 LUMBAR RADICULOPATHY: Primary | ICD-10-CM

## 2019-08-07 NOTE — TELEPHONE ENCOUNTER
Patient called, asking to get new order for injection. Pain Management in [place] told him he needs new order every time     Savannah Teague/CAROLE

## 2019-08-07 NOTE — TELEPHONE ENCOUNTER
Pt had injection done 3/2019. He is requesting a repeat LESI. Pt was told to contact he referring provider for a new order.      Xochilt ESPAÑA    [facility] [place]

## 2019-08-08 ENCOUNTER — OFFICE VISIT (OUTPATIENT)
Dept: PSYCHOLOGY | Facility: CLINIC | Age: 49
End: 2019-08-08
Payer: COMMERCIAL

## 2019-08-08 DIAGNOSIS — F41.1 GAD (GENERALIZED ANXIETY DISORDER): Primary | ICD-10-CM

## 2019-08-08 PROCEDURE — 90847 FAMILY PSYTX W/PT 50 MIN: CPT | Performed by: MARRIAGE & FAMILY THERAPIST

## 2019-08-12 ENCOUNTER — TELEPHONE (OUTPATIENT)
Dept: PSYCHIATRY | Facility: CLINIC | Age: 49
End: 2019-08-12

## 2019-08-12 DIAGNOSIS — F41.1 GAD (GENERALIZED ANXIETY DISORDER): Primary | ICD-10-CM

## 2019-08-12 NOTE — TELEPHONE ENCOUNTER
Reason for call:  Patient calling. He has to cx his appt with Gregoria this week due to going on vacation.   He states he is off of the Effexor and is having issues with ringing in his ears.   He is wondering if there is a medication he can start now that he is off the Effexor completely.     Phone number to reach patient:  Cell number on file:    Telephone Information:   Mobile [phone]       Best Time:  Anytime     Can we leave a detailed message on this number?  YES

## 2019-08-12 NOTE — TELEPHONE ENCOUNTER
"Last ov 6/14/19  Next ov 9/20/19    I spoke with Marcel. He tapered Effexor over two weeks. He stated he just has ear ringing and a dramatic increase in \"lack of patience.\"   He stopped the Gabapentin due to extreme fatigue. He stopped it abruptly.     He is still taking Nuvigil daily and Adderall PRN per sleep medicine. He is using less Adderall now that he's off Gabapentin.     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 This has been noticeable only the last 4-5 days. He agrees that this time-line correlates to running out of Adderall a week ago.    He didn't talk to the MTM because he didn't know what the appointment was for. He declined to meet with them. I explained their role. He was referred to the MTM by her PCP. I advised he call the MTM again and reschedule.     Patient requested to start Prozac as discussed at his last visit. He's going on a family vacation Wednesday and needs something to \"ease the tension.\" He stated he is unsure if Nuvigil and Adderall are worsening his irritability.     Last treatment plan from 6/14/19 reviewed and summarized below   Treatment Plan:    Continue Effexor (venlafaxine)  mg by mouth daily for mood.     Continue Neurontin (gabapentin) 300 mg by mouth 3 times per day for anger.     Continue Nuvigil (armodafinil) and Adderall (amphetamine salts) per sleep specialists.     Continue all other medications as reviewed per electronic medical record today.     Safety plan reviewed. To the Emergency Department as needed or call after hours crisis line at [phone] or [phone]. [place] Crisis Text Line. Text [place] to 725140 or Suicide LifeLine Chat: suicidepreventionlifeline.org/chat    Continue individual therapy as planned with Isis.    Follow with me in 8 weeks or sooner as needed.     Follow up with primary care provider as planned or for acute medical concerns.       "

## 2019-08-12 NOTE — TELEPHONE ENCOUNTER
"Reason for call:  Other   Patient called regarding (reason for call): prescription  Additional comments: Left VM after hours stating \"meds are a little bit.\" Please call.    Phone number to reach patient:  Home number on file [phone] (home)    Best Time:  anytime    Can we leave a detailed message on this number?  unknown as msg was on VM  "

## 2019-08-13 NOTE — TELEPHONE ENCOUNTER
Sorry to hear this.  He was doing really well at our last visit.   We can start Prozac (fluoxetine) 20 mg daily and have him check in with us in about 2 weeks to see how this is going.  We may consider dose increase of Prozac (fluoxetine) again if needed and tolerated before our next visit.

## 2019-08-13 NOTE — TELEPHONE ENCOUNTER
Spoke to patient and relayed message from provider. Prozac 20 mg sent it. Patient given education on Prozac and instructions to call back in two weeks for potential dose increase.

## 2019-08-22 ENCOUNTER — OFFICE VISIT (OUTPATIENT)
Dept: PSYCHOLOGY | Facility: CLINIC | Age: 49
End: 2019-08-22
Payer: COMMERCIAL

## 2019-08-22 DIAGNOSIS — F41.1 GAD (GENERALIZED ANXIETY DISORDER): Primary | ICD-10-CM

## 2019-08-22 PROCEDURE — 90847 FAMILY PSYTX W/PT 50 MIN: CPT | Performed by: MARRIAGE & FAMILY THERAPIST

## 2019-08-28 ENCOUNTER — OFFICE VISIT (OUTPATIENT)
Dept: PSYCHOLOGY | Facility: CLINIC | Age: 49
End: 2019-08-28
Payer: COMMERCIAL

## 2019-08-28 DIAGNOSIS — F41.1 GAD (GENERALIZED ANXIETY DISORDER): Primary | ICD-10-CM

## 2019-08-28 PROCEDURE — 90834 PSYTX W PT 45 MINUTES: CPT | Performed by: SOCIAL WORKER

## 2019-08-28 NOTE — PROGRESS NOTES
"                                           Progress Note    Client Name: Marcel Lauohjacinta  Date: 8/28/2019       Service Type: Individual  Video Visit: No      Session Start Time: 2:30  Session End Time: 3:15      Session Length: 45 min     Session #: 21    Attendees: Client attended alone     Treatment Plan Last Reviewed: 7/30/2018; 11/28/2018; 5/6/2019; 8/28/2019  PHQ-9 / ASUNCION-7 : 6/3/2019    DATA  Interactive Complexity: No  Crisis: No        Progress Since Last Session (Related to Symptoms / Goals / Homework):   Symptoms: Improving client reported increased sense of calm    Homework: Achieved / completed to satisfaction client reported going to couples counseling with his wife and practicing regulating his emotions      Episode of Care Goals: Satisfactory progress - ACTION (Actively working towards change); Intervened by reinforcing change plan / affirming steps taken     Current / Ongoing Stressors and Concerns:   Ongoing: The client reports receiving feedback from others that he is irritable and difficult to work with. The client reports he may get irritable due to anxiety.  Current: The client reported that he had been feeling calmer lately and that maybe his new medication was helping. He discussed his recent couples counseling session and stated that he felt it went better this time around. Client reported that he was having a hard time feeling he is the only one being asked to make changes in his relationship. Explored whether the client feels he can make changes that would make him feel happier in his life, regardless of whether someone else is asking him to make the changes. Client identified that he would like to be \"nicer\" and more patient, and acknowledged that it is worth working towards these qualities for his benefit and for his relationship. Reviewed and updated treatment plan.     Treatment Objective(s) Addressed in This Session:   learn & utilize at least 3 assertive communication skills " weekly  pause and use skills before talking when irritable     Intervention:     Motivational Interviewing    MI Intervention: Expressed Empathy/Understanding, Supported Autonomy, Collaboration, Evocation, Permission to raise concern or advise, Open-ended questions, Rolled with resistance: Emphasized patient autonomy and Reframed sustain talk in the direction of change, Change talk (evoked) and Reframe     Change Talk Expressed by the Patient: Desire to change Ability to change Reasons to change Committment to change    Provider Response to Change Talk: E - Evoked more info from patient about behavior change, A - Affirmed patient's thoughts, decisions, or attempts at behavior change, R - Reflected patient's change talk and S - Summarized patient's change talk statements          ASSESSMENT: Current Emotional / Mental Status (status of significant symptoms):   Risk status (Self / Other harm or suicidal ideation)   Client denies current fears or concerns for personal safety.   Client denies current or recent suicidal ideation or behaviors.   Client denies current or recent homicidal ideation or behaviors.   Client denies current or recent self injurious behavior or ideation.   Client denies other safety concerns.   Client Patient reports there has been no change in risk factors since their last session.     Client Patient reports there has been a chance in protective factors since their last session.  client reported medication was feeling effective   Recommended that patient call 911 or go to the local ED should there be a change in any of these risk factors.     Appearance:   Appropriate    Eye Contact:   Good    Psychomotor Behavior: Normal    Attitude:   Cooperative    Orientation:   All   Speech    Rate / Production: Normal     Volume:  Normal    Mood:    Irritable  Normal client presented as calm but with some irritability talking about his relationship   Affect:    Appropriate     Thought Content:  Clear     Thought Form:  Coherent  Logical    Insight:    Good      Medication Review:   Changes to psychiatric medications, see updated Medication List in EPIC.      Medication Compliance:   Yes     Changes in Health Issues:   None reported     Chemical Use Review:   Substance Use: Chemical use reviewed, no active concerns identified      Tobacco Use: No current tobacco use.      Diagnosis:  1. ASUNCION (generalized anxiety disorder)       Collateral Reports Completed:   Not Applicable    PLAN: (Client Tasks / Therapist Tasks / Other)  Client will re-frame his motivation for change when feeling resistance and return for therapy in two weeks.        Isis BELL, [facility] 8/28/2019  Note reviewed and clinical supervision by ARABELLA [facility] 9/5/2019       ________________________________________________________________________    Treatment Plan    Client's Name: Marcel Melchor  YOB: 1970    Date: 7/30/2108; 11/28/2018; 5/6/2019; 8/28/2019    DSM-V Diagnoses: 300.02 (F41.1) Generalized Anxiety Disorder   Psychosocial / Contextual Factors: client reported continued conflict in family and struggling with continued restlessness and sense of urgency  WHODAS: see intake    Referral / Collaboration:  Referral to another professional/service is not indicated at this time..    Anticipated number of session or this episode of care: 5-8      MeasurableTreatment Goal(s) related to diagnosis / functional impairment(s)  Goal 1: Client will continue reducing symptoms of anxiety and irritability as evidenced by ASUNCION-7 remaining from 5 to 0 over the next four months and client reporting increased mindfulness in communication.    I will know I've met my goal when I don't have recurring issues with irritability.      Objective #A (Client Action)    Client will use cognitive strategies identified in therapy to challenge anxious thoughts.  Status: Continued - Date(s): 8/28/2019    Intervention(s)  Therapist will assign  homework to challenge distorted thinking  teach CBT skills.    Objective #B  Client will identify 3 initial signs or symptoms of anxiety.  Status: Continued - Date(s): 8/28/2019    Intervention(s)  Therapist will teach emotional recognition/identification. DBT.    Objective #C  Client will use relaxation strategies 3 times per day to reduce the physical symptoms of anxiety.  Status: Continued - Date(s): 8/28/2019     Intervention(s)  Therapist will assign homework to use mindfulness  teach mindfulness skills.      Goal 2: Client will improve ability to communicate effectively with others as evidenced by client reporting use of 3-4 new communications skills on a weekly basis.    I will know I've met my goal when I can feel open to being genuine and have feelings come out.      Objective #A (Client Action)    Status: Continued - Date(s):  8/28/2019     Client will learn & utilize at least 3 assertive communication skills weekly.    Intervention(s)  Therapist will assign homework to practice communication skills  teach assertiveness skills. DEAR MAN.    Objective #B  Client will pause and use skills before talking when irritable.    Status: Continued - Date(s):  8/28/2019      Intervention(s)  Therapist will teach mindfulness skills.    Objective #C  Client will identify and maintain motivation for changing communication skills.  Status: Continued - Date(s):  8/28/2019     Intervention(s)  Therapist will teach motivational skills.        Client has reviewed and agreed to the above plan.      Isis BELL, LGSW August 28, 2019                                                                           Note reviewed and clinical supervision by ARABELLA [facility] 9/5/2019

## 2019-08-30 ENCOUNTER — TELEPHONE (OUTPATIENT)
Dept: PALLIATIVE MEDICINE | Facility: CLINIC | Age: 49
End: 2019-08-30

## 2019-09-05 DIAGNOSIS — F41.1 GAD (GENERALIZED ANXIETY DISORDER): ICD-10-CM

## 2019-09-05 NOTE — TELEPHONE ENCOUNTER
"Pharmacy requesting a 90 day supply.     Requested Prescriptions   Pending Prescriptions Disp Refills     FLUoxetine (PROZAC) 20 MG capsule 30 capsule 0     Sig: Take 1 capsule (20 mg) by mouth daily       SSRIs Protocol Passed - 9/5/2019 10:39 AM        Passed - Recent (12 mo) or future (30 days) visit within the authorizing provider's specialty     Patient had office visit in the last 12 months or has a visit in the next 30 days with authorizing provider or within the authorizing provider's specialty.  See \"Patient Info\" tab in inbasket, or \"Choose Columns\" in Meds & Orders section of the refill encounter.              Passed - Medication is active on med list        Passed - Patient is age 18 or older        Last Written Prescription Date:  8/13/2019  Last Fill Quantity: 30,  # refills: 0   Last office visit: 6/14/2019 with prescribing provider:  Gregoria Castillo   Future Office Visit:   Next 5 appointments (look out 90 days)    Sep 19, 2019  2:00 PM CDT  Return Visit with Trinidad Fritz [facility] ([facility]) [address] [place] [address] [address]  [place] [address] [phone]   Sep 20, 2019  9:15 AM CDT  Return Visit with Gregoria Castillo NP  [facility] ([facility]) [address]  [address]  [facility] [address] [phone]   Oct 03, 2019  2:30 PM CDT  Return Visit with Isis Estrella  [facility] [place] ([facility]) [address] [facility] [address]  [facility] [address] [phone]   Oct 10, 2019  2:00 PM CDT  Return Visit with Trinidad Fritz [facility] [place] ([facility] [place]) [address] [facility] [address]  [place] [address] [phone]   Oct 17, 2019  2:30 PM CDT  Return Visit with Isis Estrella  [facility] [place] ([facility] [place]) [address] [facility] [address]  [facility] [address] [phone]           "

## 2019-09-06 NOTE — PROGRESS NOTES
[facility] - Procedure Note    Date of Service: 9/9/2019    Procedure performed: Right L4-5 transforaminal epidural steroid injection with fluoroscopic guidance  Diagnosis: Lumbar spondylosis; Lumbar radiculitis/radiculopathy  : Julieta Steen MD  Anesthesia: none  Complications: None    Indications: Marcel Melchor is a 49 year old male who is seen at the request of Wes Braga PA-C for lumbar transforaminal epidural steroid injection. The patient describes axial low back and right buttock pain. The patient has been exhibiting symptoms consistent with lumbar intraspinal inflammation and radiculopathy. Symptoms have been persistent, disabling, and intermittently severe. The patient reports minimal improvement with conservative treatment, including PT and medications.    This is a repeat injection.  Previous Right L4-5 TFESI done by Dr. Johnson on 4/19/2018 which provided good pain relief for 10 months and on 3/13/2019 which provided about 5 month of relief.       Lumbar MRI was done on 11/3/2016 which showed       Allergies:      Allergies   Allergen Reactions     Amoxicillin      itching        Vitals:  /71   Pulse 77   SpO2 97%     Review of Systems: The patient denies recent fever, chills, illness, use of antibiotics or anticoagulants. All other 10-point review of systems negative.     Procedure: The procedure and risks were explained, and informed written consent was obtained from the patient. Risks include but are not limited to: infection, bleeding, increased pain, and damage to soft tissue, nerve, muscle, and vasculature structures. After getting informed consent, patient was brought into the procedure suite and was placed in a prone position on the procedure table. A Pause for the Cause was performed. Patient was prepped and draped in sterile fashion.     After identifying the right L4 neuroforamen, the C-arm was rotated to a right lateral oblique angle.  A total of 4  mL of Lidocaine 1% was used to anesthetize the skin and the needle track at a skin entry site coaxial with the fluoroscopy beam, and overriding the superior aspect of the neuroforamen.  A 22 gauge 5 inch spinal needle was advanced under intermittent fluoroscopy until it entered the foramen superiorly.    The position was then inspected from anteroposterior and lateral views, and the needle adjusted appropriately.  After negative aspiration, a total of 1 mL of Omnipaque-300 was injected using static and continuous fluoroscopy confirming appropriate position, with spread along the nerve root sheath and into the epidural space, with no intravascular or intrathecal uptake. 9 mL of Omnipaque-300 was wasted.    2mL of 1% lidocaine with 20 mg of dexamethasone was injected.  The needle was removed. Hemostasis was achieved, the area was cleaned, and bandaids were placed when appropriate. Images were saved to PACS.    The patient tolerated the procedure well, and was taken to the recovery room, and there was no evidence of procedural complications. No new sensory or motor deficits were noted following the procedure. The patient was stable and able to ambulate on discharge home. Post-procedure instructions were provided.     Pre-procedure pain score: 6/10 in the back, 3/10 in the leg  Post-procedure pain score: 2/10 in the back, 0/10 in the leg    Assessment/Plan: Marcel Melchor is a 49 year old male s/p right L4-5 transforaminal epidural steroid injection today for lumbar spondylosis, radiculitis/radiculopathy.     1. Following today's procedure, the patient was advised to contact the [facility] for any of the following:   Fever, chills, or night sweats   New onset of pain, numbness, or weakness   Any questions/concerns regarding the procedure  If unable to contact the Pain Center, the patient was instructed to go to a local Emergency Room for any complications.   2. The patient will receive a follow-up  call in 1 week.  3. The patient should follow-up with the referring provider in 2 weeks for post-procedure evaluation.      Julieta Steen MD   [facility]

## 2019-09-06 NOTE — TELEPHONE ENCOUNTER
Refill for: FLUoxetine (PROZAC) 20 MG capsule    Last Appointment: 6/14/19    Next Appointment: 9/20/19    No Shows/Cancellations since last appointment: cancelled: 8/15    Last Refill in Epic (date and amount/how many days):    Disp Refills Start End CLAY   FLUoxetine (PROZAC) 20 MG capsule 30 capsule 0 8/13/2019  --   Sig - Route: Take 1 capsule (20 mg) by mouth daily - Oral     Last office visit note reviewed and summarized below:  Treatment Plan:    Continue Effexor (venlafaxine)  mg by mouth daily for mood.     Continue Neurontin (gabapentin) 300 mg by mouth 3 times per day for anger.     Continue Nuvigil (armodafinil) and Adderall (amphetamine salts) per sleep specialists.     Continue all other medications as reviewed per electronic medical record today    Continue individual therapy as planned with [facility].    Follow with me in 8 weeks or sooner as needed  Patient Status:  The patient is being returned to the referring provider for ongoing care and medication prescribing.  The patient can be referred back to this service for further consultation as needed    Routing to provider-- unsure if patient is to be seen by [facility] anymore.       Macy Moore RN  09/06/19  8:49 AM

## 2019-09-09 ENCOUNTER — RADIOLOGY INJECTION OFFICE VISIT (OUTPATIENT)
Dept: PALLIATIVE MEDICINE | Facility: CLINIC | Age: 49
End: 2019-09-09
Payer: COMMERCIAL

## 2019-09-09 ENCOUNTER — ANCILLARY PROCEDURE (OUTPATIENT)
Dept: GENERAL RADIOLOGY | Facility: CLINIC | Age: 49
End: 2019-09-09
Attending: PHYSICAL MEDICINE & REHABILITATION
Payer: COMMERCIAL

## 2019-09-09 VITALS — OXYGEN SATURATION: 97 % | HEART RATE: 77 BPM | DIASTOLIC BLOOD PRESSURE: 71 MMHG | SYSTOLIC BLOOD PRESSURE: 130 MMHG

## 2019-09-09 DIAGNOSIS — M54.16 LUMBAR RADICULOPATHY: ICD-10-CM

## 2019-09-09 DIAGNOSIS — M54.16 LUMBAR RADICULOPATHY: Primary | ICD-10-CM

## 2019-09-09 PROCEDURE — 64483 NJX AA&/STRD TFRM EPI L/S 1: CPT | Mod: RT | Performed by: PHYSICAL MEDICINE & REHABILITATION

## 2019-09-09 NOTE — PATIENT INSTRUCTIONS
[facility] Procedure Discharge Instructions    Today you saw:    Dr. Julieta Steen      Your procedure:  Epidural steroid injection      Medications used:  Lidocaine (anesthetic)  Dexamethasone (steroid)  Omnipaque (contrast)             Be cautious when walking as numbness and/or weakness in the legs may occur up to 6-8 hours after the procedure due to effect of the local anesthetic    Do not drive for 6 hours. The effect of the local anesthetic could slow your reflexes.     Avoid strenuous activity for the first 24 hours. You may resume your regular activities after that.     You may shower, however avoid swimming, tub baths or hot tubs for 24 hours following your procedure    You may have a mild to moderate increase in pain for several days following the injection.      You may use ice packs for 10-15 minutes, 3 to 4 times a day at the injection site for comfort    Do not use heat to painful areas for 6 to 8 hours. This will give the local anesthetic time to wear off and prevent you from accidentally burning your skin.    Unless you have been directed to avoid the use of anti-inflammatory medications (NSAIDS-ibuprofen, Aleve, Motrin), you may use these medications or Tylenol for pain control if needed.     With diabetes, check your blood sugar more frequently than usual as your blood sugar may be higher than normal for 10-14 days following a steroid injection. Contact your doctor who manages your diabetes if your blood sugar is higher than usual    Possible side effects of steroids that you may experience include flushing, elevated blood pressure, increased appetite, mild headaches and restlessness.  All of these symptoms will get better with time.    It may take up to 14 days for the steroid medication to start working although you may feel the effect as early as a few days after the procedure.     Follow up with your referring provider in 2-3 weeks      If you experience any of the following, call the  pain center line during work hours at [phone] or on-call physician after hours at [phone]:  -Fever over 100 degree F  -Swelling, bleeding, redness, drainage, warmth at the injection site  -Progressive weakness or numbness in your legs or arms  -Loss of bowel or bladder function  -Unusual headache that is not relieved by Tylenol or your regular headache medication  -Unusual new onset of pain that is not improving

## 2019-09-09 NOTE — NURSING NOTE
Discharge Information    IV Discontiued Time:  NA    Amount of Fluid Infused:  NA    Discharge Criteria = When patient returns to baseline or as per MD order    Consciousness:  Pt is fully awake    Circulation:  BP +/- 20% of pre-procedure level    Respiration:  Patient is able to breathe deeply    O2 Sat:  Patient is able to maintain O2 Sat >92% on room air    Activity:  Moves 4 extremities on command    Ambulation:  Patient is able to stand and walk or stand and pivot into wheelchair    Dressing:  Clean/dry or No Dressing    Notes:   Discharge instructions and AVS given to patient    Patient meets criteria for discharge?  YES    Admitted to PCU?  No    Responsible adult present to accompany patient home?  Yes    Signature/Title:    Shelley Holcomb RN  RN Care Coordinator  [facility]

## 2019-09-16 ENCOUNTER — TELEPHONE (OUTPATIENT)
Dept: PALLIATIVE MEDICINE | Facility: CLINIC | Age: 49
End: 2019-09-16

## 2019-09-16 NOTE — TELEPHONE ENCOUNTER
Patient had a Right L4-5 transforaminal epidural steroid injection  injection on 9/9/19.  Called patient for an update.      Left message that we were calling for an update about how she was doing after the injection.  LM that if she has any problems or questions to call the clinic at [phone].

## 2019-09-19 ENCOUNTER — OFFICE VISIT (OUTPATIENT)
Dept: PSYCHOLOGY | Facility: CLINIC | Age: 49
End: 2019-09-19
Payer: COMMERCIAL

## 2019-09-19 DIAGNOSIS — F41.1 GAD (GENERALIZED ANXIETY DISORDER): Primary | ICD-10-CM

## 2019-09-19 PROCEDURE — 90847 FAMILY PSYTX W/PT 50 MIN: CPT | Performed by: MARRIAGE & FAMILY THERAPIST

## 2019-09-19 NOTE — PROGRESS NOTES
"    Outpatient Psychiatric Progress Note    Name: Marcel Melchor   : 1970                    Primary Care Provider: Wes Braga PA-C - last visit 5/3/2019  Therapist: Hailee Estrella - last visit 2019 individual   Hoa Solorio - last visit 2019 family therapy  [facility]    PHQ-9 scores:  PHQ-9 SCORE 6/3/2019 2019 2019   PHQ-9 Total Score 3 1 8       ASUNCION-7 scores:  ASUNCION-7 SCORE 2019   Total Score 8 3 6     Answers for HPI/ROS submitted by the patient on 2019   If you checked off any problems, how difficult have these problems made it for you to do your work, take care of things at home, or get along with other people?: Somewhat difficult  PHQ9 TOTAL SCORE: 8  ASUNCION 7 TOTAL SCORE: 6    Patient Identification:  Patient is a 49 year old,   White American male  who presents for return visit with me.  Patient is currently employed full time. Patient attended the session alone. Patient prefers to be called: \"Marcel\".    Interim History:    I last saw Marcel Melchor for outpatient psychiatry Return Visit on 2019.     During that appointment, we Continue Effexor (venlafaxine)  mg by mouth daily for mood.     Continue Neurontin (gabapentin) 300 mg by mouth 3 times per day for anger.     Continue Nuvigil (armodafinil) and Adderall (amphetamine salts) per sleep specialists.      Current medications include:   Current Outpatient Medications   Medication Sig     amphetamine-dextroamphetamine (ADDERALL) 10 MG tablet TAKE 1 TABLET BY MOUTH IN THE AFTERNOON PRN     armodafinil (NUVIGIL) 250 MG TABS tablet TAKE 1 TABLET BY MOUTH EVERY DAY IN THE MORNING     ASACOL  MG EC tablet TAKE 3 TABLETS BY MOUTH TWICE DAILY     clindamycin (CLEOCIN T) 1 % external lotion APPLY TOPICALLY TO FACE AND CHEST TWICE A DAY AS NEEDED     FLUoxetine (PROZAC) 20 MG capsule Take 1 capsule (20 mg) by mouth daily     mesalamine (CANASA) 1000 MG Suppository Place 1,000 mg " rectally 2 times daily     testosterone cypionate (DEPOTESTOTERONE) 200 MG/ML injection Inject 50 mg into the muscle every 14 days     No current facility-administered medications for this visit.        The [place] Prescription Monitoring Program has been reviewed and there are no concerns about diversionary activity for controlled substances at this time.      PRESCRIPTIONS  Total Prescriptions: 19  Total Private Pay: 3  Fill Date ID Written Drug Qty Days Prescriber Rx # Pharmacy Refill Daily Dose * Pymt Type   09/05/2019 1 09/05/2019 Armodafinil 250 Mg Tablet  84.00 90 Gi Dah [id number] Gra (7340) 0/1  [place]  09/05/2019 1 09/05/2019 Armodafinil 250 Mg Tablet  6.00 6 Gi Dah [id number] Gra (7340) 0/1  [place]  09/03/2019 1 09/03/2019 Dextroamp-Amphetamin 10 Mg Tab  30.00 30 Sa Bridger [id number] Gra (7340) 0/0  [place]  07/26/2019 1 07/26/2019 Armodafinil 250 Mg Tablet  45.00 45 Gi Dah [id number] Gra (7340) 0/[place]  [place]  07/17/2019 1 06/25/2019 Dextroamp-Amphetamin 10 Mg Tab  30.00 30 Va Ros [id number] Gra (7340) 0/0  [place]  06/27/2019 1 06/14/2019 Gabapentin 300 Mg Capsule  270.00 90 Am Kelin [id number] Gra (7340) 0/0  [place]  06/05/2019 1 05/28/2019 Gabapentin 300 Mg Capsule  90.00 30 Am Kelin [id number] Gra (7340) 0/1  [place]  06/03/2019 1 06/03/2019 Dextroamp-Amphetamin 10 Mg Tab  30.00 30 Gi Dah [id number] Gra (7340) 0/[place]  [place]  05/10/2019 1 05/10/2019 Gabapentin 300 Mg Capsule  90.00 30 Am Kelin [id number] Gra (7340) 0/1  [place]  05/09/2019 1 02/04/2019 Armodafinil 250 Mg Tablet  90.00 90 Gi Dah [id number] Gra (7340) 1/1  [place]  03/06/2019 1 03/05/2019 Dextroamp-Amphetamin 10 Mg Tab  30.00 30 Gi Da [id number] Gra (7340) 0/0  [place]  02/04/2019 1 02/04/2019 Armodafinil 250 Mg Tablet  80.00 90 Gi Da [id number] Gra (7340) 0/1  [place]  02/04/2019 1 02/04/2019 Armodafinil 250 Mg Tablet  10.00 10 Gi Da [id number] Gra (7340) 0/1  [place]  01/02/2019 1 08/03/2018 Armodafinil  "250 Mg Tablet  27.00 30 Gi Dah [id number] Gra (7340) 5/5  [place]  01/02/2019 1 08/03/2018 Armodafinil 250 Mg Tablet  3.00 3 Gi Dah [id number] Gra (7340) 5/5  [place]  12/05/2018 1 08/03/2018 Armodafinil 250 Mg Tablet  30.00 30 Gi Dah [id number] Gra (7340) 4/5  [place]  11/04/2018 1 08/03/2018 Armodafinil 250 Mg Tablet  30.00 30 Gi Dah [id number] Gra (7340) 3/5  [place]  10/29/2018 1 10/29/2018 Dextroamp-Amphetamin 10 Mg Tab  30.00 30 Gi Dah [id number] Gra (7340) 0/0  [place]  10/07/2018 1 08/03/2018 Armodafinil 250 Mg Tablet  27.00 30 Gi Dah [id number] Gra (7340) 2/5  [place]    I was able to review most recent Primary Care Provider, specialty provider, and therapy visit notes that I have access to.   Patient did not follow up with me as planned. He contacted the clinic in August noting that he self tapered himself off the Effexor (venlafaxine) and Neurontin (gabapentin) and wanted to change to Prozac (fluoxetine). This was started.     Marcel Melchor reports mood has been: \"maintaining\" less ager and irritability.  Anxiety has been: \"okay\" having episodes of talking and thinking about anxiety and having feelings of being overwhelmed. Denies worsening narcolepsy episodes.   Focus and concentration has been \"comes and goes\"  Sleep has been: \"good\"  PHQ9 and GAD7 scores were reviewed today. Taking Prozac (fluoxetine) in the morning.   Medication side effects: Denies  Current stressors include: Medical Comorbidities, Social Situations- much less stress with these lately  Coping mechanisms and supports include: Exercise- has been going very early in the morning so no one else is around him, Acupuncture, Avoidance, Keeping hands busy, therapy      Previous medication trials include but not limited to:  Effexor (venlafaxine)  Nuvigil (armodafinil)   Adderall (amphetamine salts)   Wellbutrin (buproprion) side effects of increased sweating and stopped working  Celexa (citalopram)   Seroquel " "(quetiapine)  Neurontin (gabapentin) has been on this for nerve pain in the past and denies side effects- caused sedation recently   Prozac (fluoxetine)     Past Medical History:   Diagnosis Date     Anxiety 9/3/2013     AJAY (obstructive sleep apnea) 2014     Rotator cuff syndrome 2012      has a past medical history of Anxiety (9/3/2013), AJAY (obstructive sleep apnea) (2014), and Rotator cuff syndrome (2012).    Social History:  Current Living situation: [place], [place] with Spouse/Partner and pet dogs. Feels safe at home.  Current use of drugs or alcohol: History of alcohol use and stopped at age 24 years old. Denies other drug use.   Tobacco use: No but quit chewing tobacco until mother . No cravings for nicotine.   Caffeine:  Yes 12 sodas/day of Mt. Dew - feels he has to have this- 2 Monster energy drinks every day    Vital Signs:   /60 (BP Location: Right arm, Patient Position: Sitting, Cuff Size: Adult Large)   Pulse 60   Temp 98.9  F (37.2  C) (Oral)   Resp 16   Ht 1.753 m (5' 9\")   Wt 85.7 kg (189 lb)   SpO2 98%   BMI 27.91 kg/m      Labs:  Most recent laboratory results reviewed and no new labs.     Review of Systems:  10 systems (general, cardiovascular, respiratory, eyes, ENT, endocrine, GI, , M/S, neurological) were reviewed. Most pertinent finding(s) is/are:chronic low back pain - less since injection with pain clinic, sciatica pain worse due to work, carpal tunnel but wears braces at night, bruxism at night, tinnitus at times still. The remaining systems are all unremarkable.     Mental Status Examination:  Appearance:  awake, alert, adequately groomed, appeared stated age, no apparent distress, normal weight, and musclar build  Attitude:  cooperative   Eye Contact:  good  Gait and Station: Normal, No assistive Devices used and No dizziness or falls  Psychomotor Behavior:  no evidence of tardive dyskinesia, dystonia, or tics  Oriented to:  time, person, and " "place  Attention Span and Concentration:  Normal  Speech:  clear, coherent, regular rate, regular rhythm and fluent  Mood:  \"maintaining\"  Affect:  appropriate and in normal range, calm, reactive as appropriate  Associations:  no loose associations  Thought Process:  logical, linear and goal oriented  Thought Content:  no evidence of suicidal ideation or homicidal ideation and no evidence of psychotic thought, focused on occupational difficulties  Recent and Remote Memory:  intact to interview. Not formally assessed. No amnesia.  Fund of Knowledge: appropriate  Insight:  good  Judgment:  adequate for safety  Impulse Control:  good  Language: intact     Suicide Risk Assessment:  Today Marcel Melchor reports history of depression and no thoughts of wanting to die or harm self or others. In addition, there are notable risk factors for self-harm, including age and anxiety. However, risk is mitigated by commitment to family, sobriety, absence of past attempts, ability to volunteer a safety plan, history of seeking help when needed, future oriented, denies suicidal intent or plan, no family history of suicide and denies homicidal ideation, intent, or plan. Therefore, based on all available evidence including the factors cited above, Marcel Melchor does not appear to be at imminent risk for self-harm, does not meet criteria for a 72-hr hold, and therefore remains appropriate for ongoing outpatient level of care.  A thorough assessment of risk factors related to suicide and self-harm have been reviewed and are noted above. Local community safety resources reviewed and printed for patient to use if needed. There was no deceit detected, and the patient presented in a manner that was believable.      DSM5  Diagnosis:  Attention-Deficit/Hyperactivity Disorder  314.01 (F90.2) Combined presentation   300.02 (F41.1) Generalized Anxiety Disorder              Rule out Social Anxiety Disorder  Impulse Control Disorder in Adults - " Intermittent Explosive Disorder              Rule out Personality Disorder    Medical comorbidities include:   Patient Active Problem List    Diagnosis Date Noted     Impulse control disorder in adult 05/10/2019     Priority: Medium     Intermittent explosive disorder in adult 05/10/2019     Priority: Medium     Mood swing 05/03/2019     Priority: Medium     Irritability and anger 05/03/2019     Priority: Medium     Behavior concern in adult 12/13/2018     Priority: Medium     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     Priority: Medium     Narcolepsy 09/27/2018     Priority: Medium     Crohn's disease (H) 06/15/2015     Priority: Medium     AJAY (obstructive sleep apnea) 11/11/2014     Priority: Medium     ASUNCION (generalized anxiety disorder) 09/03/2013     Priority: Medium     Rotator cuff syndrome 01/19/2012     Priority: Medium     Motion sickness 07/14/2011     Priority: Medium     CARDIOVASCULAR SCREENING; LDL GOAL LESS THAN 160 10/31/2010     Priority: Medium     Attention deficit hyperactivity disorder (ADHD), combined type 12/04/2003     Priority: Medium     Problem list name updated by automated process. Provider to review       Dyspnea and respiratory abnormality 12/04/2003     Priority: Medium     Problem list name updated by automated process. Provider to review         Psychosocial & Contextual Factors:  Relationship Difficulties, Occupational Difficulties, Medical Comorbidites    Assessment:  Marcel Melchor reports improved mood and overall anxiety but experiences episodes of anxiety and panic and we discussed adding trial of Inderal (propranolol) for this. Catapres (clonidine) or Intuniv (guanfacine) may be another future option for anger and anxiety. Medication side effects and alternatives were reviewed. Health promotion activities recommended and reviewed today. All questions addressed. Education and counseling completed regarding risks and benefits of medications and psychotherapy options. Recommend  therapy for additional support. 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 Patient is aware of this and we discussed other options for care if needed. We also reviewed the Collaborative Care Psychiatry Service.  Patient is doing well. Will return back to Primary Care Provider.     Treatment Plan:    Start Inderal (propranolol) 20 mg by mouth up to 3 times per day as needed for anxiety and panic.     Continue Prozac (fluoxetine) 20 mg by mouth daily for mood and anxiety. May consider dose increase if needed.     Continue Nuvigil (armodafinil) and Adderall (amphetamine salts) per sleep specialists.     Continue all other medications as reviewed per electronic medical record today.     Safety plan reviewed. To the Emergency Department as needed or call after hours crisis line at [phone] or [phone]. [place] Crisis Text Line. Text [place] to 328578 or Suicide LifeLine Chat: suicidepreventionlifeline.org/chat    Continue therapy as planned.  Thank you for our work together in the Psychiatry Collaborative Care Model at [facility]. This is our last visit and I am returning your care back to your Primary Care Provider Wes Braga PA-C . If you are not doing well, please contact your Primary Care Provider office.     Schedule an appointment with your Primary Care Provider 10-12 weeks for physical or sooner as needed.     Follow up with primary care provider as planned or for acute medical concerns.    Call the psychiatric nurse line with medication questions or concerns at [phone].    Administrative Billing:   Time spent with patient was 30 minutes and greater than 50% of time or 20 minutes was spent in counseling and coordination of care regarding above diagnoses and treatment plan.    Patient was seen with Dr. Kaylee Sevilla.    Patient Status:  The patient is being returned to the referring provider for ongoing care and  medication prescribing.  The patient can be referred back to this service for further consultation as needed.    Signed:   Gregoria Castillo, PhD, APRN, CNP   Psychiatry

## 2019-09-20 ENCOUNTER — OFFICE VISIT (OUTPATIENT)
Dept: PSYCHIATRY | Facility: CLINIC | Age: 49
End: 2019-09-20
Payer: COMMERCIAL

## 2019-09-20 VITALS
RESPIRATION RATE: 16 BRPM | TEMPERATURE: 98.9 F | DIASTOLIC BLOOD PRESSURE: 60 MMHG | OXYGEN SATURATION: 98 % | BODY MASS INDEX: 27.99 KG/M2 | HEIGHT: 69 IN | SYSTOLIC BLOOD PRESSURE: 120 MMHG | WEIGHT: 189 LBS | HEART RATE: 60 BPM

## 2019-09-20 DIAGNOSIS — F63.81 INTERMITTENT EXPLOSIVE DISORDER IN ADULT: Primary | ICD-10-CM

## 2019-09-20 DIAGNOSIS — F41.1 GAD (GENERALIZED ANXIETY DISORDER): ICD-10-CM

## 2019-09-20 PROCEDURE — 99214 OFFICE O/P EST MOD 30 MIN: CPT | Performed by: NURSE PRACTITIONER

## 2019-09-20 RX ORDER — PROPRANOLOL HYDROCHLORIDE 20 MG/1
20 TABLET ORAL 3 TIMES DAILY PRN
Qty: 60 TABLET | Refills: 1 | Status: SHIPPED | OUTPATIENT
Start: 2019-09-20 | End: 2020-01-20

## 2019-09-20 ASSESSMENT — ANXIETY QUESTIONNAIRES
GAD7 TOTAL SCORE: 6
GAD7 TOTAL SCORE: 6
7. FEELING AFRAID AS IF SOMETHING AWFUL MIGHT HAPPEN: NOT AT ALL
2. NOT BEING ABLE TO STOP OR CONTROL WORRYING: SEVERAL DAYS
5. BEING SO RESTLESS THAT IT IS HARD TO SIT STILL: SEVERAL DAYS
1. FEELING NERVOUS, ANXIOUS, OR ON EDGE: SEVERAL DAYS
6. BECOMING EASILY ANNOYED OR IRRITABLE: SEVERAL DAYS
GAD7 TOTAL SCORE: 6
4. TROUBLE RELAXING: SEVERAL DAYS
7. FEELING AFRAID AS IF SOMETHING AWFUL MIGHT HAPPEN: NOT AT ALL
3. WORRYING TOO MUCH ABOUT DIFFERENT THINGS: SEVERAL DAYS

## 2019-09-20 ASSESSMENT — PATIENT HEALTH QUESTIONNAIRE - PHQ9
SUM OF ALL RESPONSES TO PHQ QUESTIONS 1-9: 8
10. IF YOU CHECKED OFF ANY PROBLEMS, HOW DIFFICULT HAVE THESE PROBLEMS MADE IT FOR YOU TO DO YOUR WORK, TAKE CARE OF THINGS AT HOME, OR GET ALONG WITH OTHER PEOPLE: SOMEWHAT DIFFICULT
SUM OF ALL RESPONSES TO PHQ QUESTIONS 1-9: 8

## 2019-09-20 ASSESSMENT — MIFFLIN-ST. JEOR: SCORE: 1717.68

## 2019-09-20 ASSESSMENT — PAIN SCALES - GENERAL: PAINLEVEL: NO PAIN (0)

## 2019-09-20 NOTE — NURSING NOTE
"Chief Complaint   Patient presents with     Recheck Medication     pt off Gabapentin and Effexor, pt on prozac pt feel this is helping.      initial /60 (BP Location: Right arm, Patient Position: Sitting, Cuff Size: Adult Large)   Pulse 60   Temp 98.9  F (37.2  C) (Oral)   Resp 16   Ht 1.753 m (5' 9\")   Wt 85.7 kg (189 lb)   SpO2 98%   BMI 27.91 kg/m   Estimated body mass index is 27.91 kg/m  as calculated from the following:    Height as of this encounter: 1.753 m (5' 9\").    Weight as of this encounter: 85.7 kg (189 lb)..  bp completed using cuff size large    "

## 2019-09-20 NOTE — PATIENT INSTRUCTIONS
Treatment Plan:    Start Inderal (propranolol) 20 mg by mouth up to 3 times per day as needed for anxiety and panic.     Continue Prozac (fluoxetine) 20 mg by mouth daily for mood and anxiety. May consider dose increase if needed.     Continue Nuvigil (armodafinil) and Adderall (amphetamine salts) per sleep specialists.     Continue all other medications as reviewed per electronic medical record today.     Safety plan reviewed. To the Emergency Department as needed or call after hours crisis line at [phone] or [phone]. [place] Crisis Text Line. Text [place] to 984561 or Suicide LifeLine Chat: suicidepreventionlifeline.org/chat    Continue therapy as planned.  Thank you for our work together in the Psychiatry Collaborative Care Model at [facility]. This is our last visit and I am returning your care back to your Primary Care Provider Wes Braga PA-C . If you are not doing well, please contact your Primary Care Provider office.     Schedule an appointment with your Primary Care Provider 10-12 weeks for physical or sooner as needed.     Follow up with primary care provider as planned or for acute medical concerns.    Call the psychiatric nurse line with medication questions or concerns at [phone].    MyChart may be used to communicate with your provider, but this is not intended to be used for emergencies.

## 2019-09-20 NOTE — Clinical Note
Wes-Psychiatry Update. I am sending care back to you for ongoing care and medication prescribing.  Future medication refills will come from you. I recommended that the patient follow up with you in about 10 weeks. More details about treatment plan are in my note. If you have any questions or concerns, please let me know. The patient can be referred back to this service for further consultation as needed.Gregoria

## 2019-09-21 ASSESSMENT — ANXIETY QUESTIONNAIRES: GAD7 TOTAL SCORE: 6

## 2019-09-21 ASSESSMENT — PATIENT HEALTH QUESTIONNAIRE - PHQ9: SUM OF ALL RESPONSES TO PHQ QUESTIONS 1-9: 8

## 2019-09-22 DIAGNOSIS — F63.9 IMPULSE CONTROL DISORDER IN ADULT: ICD-10-CM

## 2019-09-22 DIAGNOSIS — F63.81 INTERMITTENT EXPLOSIVE DISORDER IN ADULT: ICD-10-CM

## 2019-09-22 RX ORDER — GABAPENTIN 300 MG/1
300 CAPSULE ORAL 3 TIMES DAILY
Qty: 270 CAPSULE | Refills: 0 | OUTPATIENT
Start: 2019-09-22

## 2019-09-22 NOTE — PROGRESS NOTES
"                                           Progress Note    Client Name: Marcel NG Tutewohl  Date: 9/20/2019       Service Type: Couple  Video Visit: No      Session Start Time: 3:00  Session End Time: 3:45      Session Length: 45 min     Session #: 3    Attendees: Client and Spouse / Significant Other     Treatment Plan Last Reviewed: 9/20/2019  PHQ-9 / ASUNCION-7 : Each session    DATA  Interactive Complexity: No  Crisis: No        Progress Since Last Session (Related to Symptoms / Goals / Homework):   Symptoms: No change first couples session    Homework: Did not complete       Episode of Care Goals: No improvement - PREPARATION (Decided to change - considering how); Intervened by negotiating a change plan and determining options / strategies for behavior change, identifying triggers, exploring social supports, and working towards setting a date to begin behavior change     Current / Ongoing Stressors and Concerns:  Couple report things have not been good this past week although they had 2 good weeks. Ermelinda reports this is typical with Marcel that they cannot seem to have more than 2 weeks that go well together. Couple individually share their recent conflict and both have very different stories. 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 Marcel states he was being mindful of them working on their relationship and questioning the logic in her doing side projects when they need more time and focus together. Client's wife is clearly very angry with her  and has been for years. Therapist asked her if she could forgive her  which she begins to cry about and says she isn't sure. She has been hurt repeatedly over the years. Marcel becomes defensive and states he has only ever tried to do the right thing and did not understand the idea that she would need to forgive him. Therapist attempted to re frame to Marcel the opportunity he has in helping her with this and although he did " not intend hurt, hurt was nonetheless experienced. Marcel became quiet and contemplative the balance of the session. Therapist also stated to client's wife that she has been the keeper of too many secrets in their marriage and this has been a boundary violation that she should not continue to have in her marriage.       Treatment Objective(s) Addressed in This Session:   client to learn how to be more emotionally available to wife  S/L Technique     Intervention:   Strategic couples counseling.     ASSESSMENT: Current Emotional / Mental Status (status of significant symptoms):   Risk status (Self / Other harm or suicidal ideation)   Client denies current fears or concerns for personal safety.   Client denies current or recent suicidal ideation or behaviors.   Client denies current or recent homicidal ideation or behaviors.   Client denies current or recent self injurious behavior or ideation.   Client denies other safety concerns.   Client Patient reports there has been no change in risk factors since their last session.     Client Patient reports there has been no change in protective factors since their last session.     Recommended that patient call 911 or go to the local ED should there be a change in any of these risk factors.     Appearance:   Appropriate    Eye Contact:   Good    Psychomotor Behavior: Normal    Attitude:   Cooperative    Orientation:   All   Speech    Rate / Production: Normal     Volume:  Normal    Mood:    Irritable     Affect:    Appropriate     Thought Content:  Clear    Thought Form:  Coherent  Logical    Insight:    Good      Medication Review:   Changes to psychiatric medications, see updated Medication List in EPIC.      Medication Compliance:   Yes     Changes in Health Issues:   Yes: Pain, Associated Psychological Distress     Chemical Use Review:   Substance Use: Chemical use reviewed, no active concerns identified      Tobacco Use: No current tobacco use.       Diagnosis:  ASUNCION     Collateral Reports Completed:   Not Applicable    PLAN: (Client Tasks / Therapist Tasks / Other)  Homework: Continue with weekly business meetings. Ermelinda to uncover what she needs to forgive Marcel and Marcel to be willing to help her in this process.        Trinidad Solorio, LMFT      _______________________________________________________________________                                                   Treatment Plan    Client's Name: Marcel Melchor  YOB: 1970    Date: 9/20/2019    DSM-V Diagnoses: 300.02 - Generalized Anxiety Disorder By History; V71.09 - No Diagnosis  Psychosocial / Contextual Factors: work stress, marital discord  WHODAS: See Chart    Referral / Collaboration:  Referral to another professional/service is not indicated at this time..    Anticipated number of session or this episode of care: 30      MeasurableTreatment Goal(s) related to diagnosis / functional impairment(s)  Goal 1: Client will lower his GAD7 score to 2 or below    I will know I've met my goal when I'm less anxious and angry especially in marriage.      Objective #A (Client Action)    Client will practice the use of timeouts.  Status: New - Date: 9/20/2019     Intervention(s)  Therapist will teach assertiveness skills. and how to time out in a functional manner..    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  Status: New - Date: 9/20/2019     Intervention(s)  Therapist will assign homework have weekly work/status meetings..    Objective #C  Client will better understand and seek his wife's forgiveness..  Status: New - Date: 9/20/2019     Intervention(s)  Therapist will help client in this process..      Patient has reviewed and agreed to the above plan.      Trinidad Solorio  September 22, 2019

## 2019-10-03 ENCOUNTER — OFFICE VISIT (OUTPATIENT)
Dept: PSYCHOLOGY | Facility: CLINIC | Age: 49
End: 2019-10-03
Payer: COMMERCIAL

## 2019-10-03 DIAGNOSIS — F41.1 GAD (GENERALIZED ANXIETY DISORDER): Primary | ICD-10-CM

## 2019-10-03 PROCEDURE — 90832 PSYTX W PT 30 MINUTES: CPT | Performed by: SOCIAL WORKER

## 2019-10-03 ASSESSMENT — ANXIETY QUESTIONNAIRES
3. WORRYING TOO MUCH ABOUT DIFFERENT THINGS: NOT AT ALL
2. NOT BEING ABLE TO STOP OR CONTROL WORRYING: NOT AT ALL
6. BECOMING EASILY ANNOYED OR IRRITABLE: SEVERAL DAYS
5. BEING SO RESTLESS THAT IT IS HARD TO SIT STILL: NOT AT ALL
7. FEELING AFRAID AS IF SOMETHING AWFUL MIGHT HAPPEN: NOT AT ALL
1. FEELING NERVOUS, ANXIOUS, OR ON EDGE: NOT AT ALL
GAD7 TOTAL SCORE: 2

## 2019-10-03 ASSESSMENT — PATIENT HEALTH QUESTIONNAIRE - PHQ9: 5. POOR APPETITE OR OVEREATING: SEVERAL DAYS

## 2019-10-03 NOTE — PROGRESS NOTES
Progress Note    Client Name: Marcel Lauohjacinta  Date: 10/3/2019       Service Type: Individual  Video Visit: No      Session Start Time: 2:40  Session End Time: 3:15      Session Length: 35 min     Session #: 22    Attendees: Client attended alone     Treatment Plan Last Reviewed: 7/30/2018; 11/28/2018; 5/6/2019; 8/28/2019  PHQ-9 / ASUNCION-7 : 10/3/2019    DATA  Interactive Complexity: No  Crisis: No        Progress Since Last Session (Related to Symptoms / Goals / Homework):   Symptoms: Improving client reported mood continued to improve    Homework: Achieved / completed to satisfaction client reported re-framing his motivation for change      Episode of Care Goals: Satisfactory progress - ACTION (Actively working towards change); Intervened by reinforcing change plan / affirming steps taken     Current / Ongoing Stressors and Concerns:   Ongoing: The client reports receiving feedback from others that he is irritable and difficult to work with. The client reports he may get irritable due to anxiety.  Current: The client stated that he had been feeling really good lately. He explained that his medication has remained effective and he and his wife were communicating well with each other. Client described how he had been trying to slow down his processing and be more communicative in general with others. 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 He reported that he had been working to be more upfront with his wife and that she was also doing her part in communicating more with him. Client discussed his brother and said he had been sober for three weeks. He said that while he was proud of his brother he felt like he was burned out on worrying about his brother's sobriety.     Treatment Objective(s) Addressed in This Session:   learn & utilize at least 3 assertive communication skills weekly  pause and use skills before talking when  irritable     Intervention:     Motivational Interviewing    MI Intervention: Expressed Empathy/Understanding, Supported Autonomy, Collaboration, Evocation, Permission to raise concern or advise, Open-ended questions, Rolled with resistance: Emphasized patient autonomy and Reframed sustain talk in the direction of change, Change talk (evoked) and Reframe     Change Talk Expressed by the Patient: Ability to change Committment to change Activation Taking steps    Provider Response to Change Talk: E - Evoked more info from patient about behavior change, A - Affirmed patient's thoughts, decisions, or attempts at behavior change, R - Reflected patient's change talk and S - Summarized patient's change talk statements          ASSESSMENT: Current Emotional / Mental Status (status of significant symptoms):   Risk status (Self / Other harm or suicidal ideation)   Client denies current fears or concerns for personal safety.   Client denies current or recent suicidal ideation or behaviors.   Client denies current or recent homicidal ideation or behaviors.   Client denies current or recent self injurious behavior or ideation.   Client denies other safety concerns.   Client Patient reports there has been no change in risk factors since their last session.     Client Patient reports there has been no change in protective factors since their last session.     Recommended that patient call 911 or go to the local ED should there be a change in any of these risk factors.     Appearance:   Appropriate    Eye Contact:   Good    Psychomotor Behavior: Normal Client presented as more physically calm than he has in the past   Attitude:   Cooperative    Orientation:   All   Speech    Rate / Production: Normal     Volume:  Normal    Mood:    Irritable  Normal client appeared to be in a good mood, some irritability talking about his brother   Affect:    Appropriate     Thought Content:  Clear    Thought Form:  Coherent  Logical     Insight:    Fair      Medication Review:   No changes to current psychiatric medication(s)     Medication Compliance:   Yes     Changes in Health Issues:   None reported     Chemical Use Review:   Substance Use: Chemical use reviewed, no active concerns identified      Tobacco Use: No current tobacco use.      Diagnosis:  1. ASUNCION (generalized anxiety disorder)       Collateral Reports Completed:   Not Applicable    PLAN: (Client Tasks / Therapist Tasks / Other)  Client will practice transparency with his wife and return for therapy in two weeks.        Isis BELL, [facility] 10/3/2019  Note reviewed and clinical supervision by ARABELLA [facility] 10/8/2019       ________________________________________________________________________    Treatment Plan    Client's Name: Marcel Melchor  YOB: 1970    Date: 7/30/2108; 11/28/2018; 5/6/2019; 8/28/2019    DSM-V Diagnoses: 300.02 (F41.1) Generalized Anxiety Disorder   Psychosocial / Contextual Factors: client reported continued conflict in family and struggling with continued restlessness and sense of urgency  WHODAS: see intake    Referral / Collaboration:  Referral to another professional/service is not indicated at this time..    Anticipated number of session or this episode of care: 5-8      MeasurableTreatment Goal(s) related to diagnosis / functional impairment(s)  Goal 1: Client will continue reducing symptoms of anxiety and irritability as evidenced by ASUNCION-7 remaining from 5 to 0 over the next four months and client reporting increased mindfulness in communication.    I will know I've met my goal when I don't have recurring issues with irritability.      Objective #A (Client Action)    Client will use cognitive strategies identified in therapy to challenge anxious thoughts.  Status: Continued - Date(s): 8/28/2019    Intervention(s)  Therapist will assign homework to challenge distorted thinking  teach CBT skills.    Objective #B  Client will  identify 3 initial signs or symptoms of anxiety.  Status: Continued - Date(s): 8/28/2019    Intervention(s)  Therapist will teach emotional recognition/identification. DBT.    Objective #C  Client will use relaxation strategies 3 times per day to reduce the physical symptoms of anxiety.  Status: Continued - Date(s): 8/28/2019     Intervention(s)  Therapist will assign homework to use mindfulness  teach mindfulness skills.      Goal 2: Client will improve ability to communicate effectively with others as evidenced by client reporting use of 3-4 new communications skills on a weekly basis.    I will know I've met my goal when I can feel open to being genuine and have feelings come out.      Objective #A (Client Action)    Status: Continued - Date(s):  8/28/2019     Client will learn & utilize at least 3 assertive communication skills weekly.    Intervention(s)  Therapist will assign homework to practice communication skills  teach assertiveness skills. DEAR MAN.    Objective #B  Client will pause and use skills before talking when irritable.    Status: Continued - Date(s):  8/28/2019      Intervention(s)  Therapist will teach mindfulness skills.    Objective #C  Client will identify and maintain motivation for changing communication skills.  Status: Continued - Date(s):  8/28/2019     Intervention(s)  Therapist will teach motivational skills.        Client has reviewed and agreed to the above plan.      Isis BELL, LGSW August 28, 2019                                                                           Note reviewed and clinical supervision by ARABELLA [place] [facility] 9/5/2019

## 2019-10-04 ASSESSMENT — ANXIETY QUESTIONNAIRES: GAD7 TOTAL SCORE: 2

## 2019-10-10 ENCOUNTER — OFFICE VISIT (OUTPATIENT)
Dept: PSYCHOLOGY | Facility: CLINIC | Age: 49
End: 2019-10-10
Payer: COMMERCIAL

## 2019-10-10 ENCOUNTER — TRANSFERRED RECORDS (OUTPATIENT)
Dept: HEALTH INFORMATION MANAGEMENT | Facility: CLINIC | Age: 49
End: 2019-10-10

## 2019-10-10 DIAGNOSIS — F41.1 GAD (GENERALIZED ANXIETY DISORDER): Primary | ICD-10-CM

## 2019-10-10 PROCEDURE — 90847 FAMILY PSYTX W/PT 50 MIN: CPT | Performed by: MARRIAGE & FAMILY THERAPIST

## 2019-10-16 NOTE — PROGRESS NOTES
Progress Note    Client Name: Marcel NG Tutewohl  Date: 10/10/2019       Service Type: Couple  Video Visit: No      Session Start Time: 3:00  Session End Time: 3:45      Session Length: 45 min     Session #: 4    Attendees: Client and Spouse / Significant Other     Treatment Plan Last Reviewed: 9/20/2019  PHQ-9 / ASUNCION-7 : Each session    DATA  Interactive Complexity: No  Crisis: No        Progress Since Last Session (Related to Symptoms / Goals / Homework):   Symptoms: Improving less conflict    Homework: Partially completed       Episode of Care Goals: Satisfactory progress - ACTION (Actively working towards change); Intervened by reinforcing change plan / affirming steps taken     Current / Ongoing Stressors and Concerns:  Couple have been doing better. They have had more than 2 weeks of peace and calm with Ermelinda states is unusual. Marcel has been in a better mental place with the prozac. He thinks it is helping him remain calm and better able to get along with his wife and daughter. 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 Marcel is trying to be home earlier in the day to spend time with his wife. Overall, they are doing well.      Treatment Objective(s) Addressed in This Session:   client to learn how to be more emotionally available to wife  S/L Technique     Intervention:   Strategic couples counseling.     ASSESSMENT: Current Emotional / Mental Status (status of significant symptoms):   Risk status (Self / Other harm or suicidal ideation)   Client denies current fears or concerns for personal safety.   Client denies current or recent suicidal ideation or behaviors.   Client denies current or recent homicidal ideation or behaviors.   Client denies current or recent self injurious behavior or ideation.   Client denies other safety concerns.   Client Patient reports there has been no change in risk factors since their last  session.     Client Patient reports there has been no change in protective factors since their last session.     Recommended that patient call 911 or go to the local ED should there be a change in any of these risk factors.     Appearance:   Appropriate    Eye Contact:   Good    Psychomotor Behavior: Normal    Attitude:   Cooperative    Orientation:   All   Speech    Rate / Production: Normal     Volume:  Normal    Mood:    Irritable     Affect:    Appropriate     Thought Content:  Clear    Thought Form:  Coherent  Logical    Insight:    Good      Medication Review:   Changes to psychiatric medications, see updated Medication List in EPIC.      Medication Compliance:   Yes     Changes in Health Issues:   Yes: Pain, Associated Psychological Distress     Chemical Use Review:   Substance Use: Chemical use reviewed, no active concerns identified      Tobacco Use: No current tobacco use.      Diagnosis:  ASUNCION     Collateral Reports Completed:   Not Applicable    PLAN: (Client Tasks / Therapist Tasks / Other)  Homework: Continue with weekly business meetings.         [employer]      _______________________________________________________________________                                                   Treatment Plan    Client's Name: Marcel Melchor  YOB: 1970    Date: 9/20/2019    DSM-V Diagnoses: 300.02 - Generalized Anxiety Disorder By History; V71.09 - No Diagnosis  Psychosocial / Contextual Factors: work stress, marital discord  WHODAS: See Chart    Referral / Collaboration:  Referral to another professional/service is not indicated at this time..    Anticipated number of session or this episode of care: 30      MeasurableTreatment Goal(s) related to diagnosis / functional impairment(s)  Goal 1: Client will lower his GAD7 score to 2 or below    I will know I've met my goal when I'm less anxious and angry especially in marriage.      Objective #A (Client Action)    Client will practice  the use of timeouts.  Status: New - Date: 9/20/2019     Intervention(s)  Therapist will teach assertiveness skills. and how to time out in a functional manner..    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  Status: New - Date: 9/20/2019     Intervention(s)  Therapist will assign homework have weekly work/status meetings..    Objective #C  Client will better understand and seek his wife's forgiveness..  Status: New - Date: 9/20/2019     Intervention(s)  Therapist will help client in this process..      Patient has reviewed and agreed to the above plan.      Trinidad Solorio  September 22, 2019

## 2019-10-17 ENCOUNTER — OFFICE VISIT (OUTPATIENT)
Dept: PSYCHOLOGY | Facility: CLINIC | Age: 49
End: 2019-10-17
Payer: COMMERCIAL

## 2019-10-17 DIAGNOSIS — F41.1 GAD (GENERALIZED ANXIETY DISORDER): Primary | ICD-10-CM

## 2019-10-17 PROCEDURE — 90834 PSYTX W PT 45 MINUTES: CPT | Performed by: SOCIAL WORKER

## 2019-10-17 ASSESSMENT — PATIENT HEALTH QUESTIONNAIRE - PHQ9: 5. POOR APPETITE OR OVEREATING: SEVERAL DAYS

## 2019-10-17 ASSESSMENT — ANXIETY QUESTIONNAIRES
2. NOT BEING ABLE TO STOP OR CONTROL WORRYING: NOT AT ALL
3. WORRYING TOO MUCH ABOUT DIFFERENT THINGS: NOT AT ALL
6. BECOMING EASILY ANNOYED OR IRRITABLE: SEVERAL DAYS
7. FEELING AFRAID AS IF SOMETHING AWFUL MIGHT HAPPEN: NOT AT ALL
5. BEING SO RESTLESS THAT IT IS HARD TO SIT STILL: NOT AT ALL
1. FEELING NERVOUS, ANXIOUS, OR ON EDGE: SEVERAL DAYS
GAD7 TOTAL SCORE: 3

## 2019-10-17 NOTE — PROGRESS NOTES
Progress Note    Client Name: Marcel Lauohjacinta  Date: 10/17/2019       Service Type: Individual  Video Visit: No      Session Start Time: 2:30  Session End Time: 3:15      Session Length: 45 min     Session #: 23    Attendees: Client attended alone     Treatment Plan Last Reviewed: 7/30/2018; 11/28/2018; 5/6/2019; 8/28/2019  PHQ-9 / ASUNCION-7 : 10/17/2019    DATA  Interactive Complexity: No  Crisis: No        Progress Since Last Session (Related to Symptoms / Goals / Homework):   Symptoms: Worsening client reported increased irritability    Homework: Achieved / completed to satisfaction client reported trying to transparent at home      Episode of Care Goals: Satisfactory progress - ACTION (Actively working towards change); Intervened by reinforcing change plan / affirming steps taken     Current / Ongoing Stressors and Concerns:   Ongoing: The client reports receiving feedback from others that he is irritable and difficult to work with. The client reports he may get irritable due to anxiety.  Current: The client reported that he had a moment of anger at work and that while he felt he could have been more effective, he felt like he was in the right. 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 Client identified that in the moment he tried to slow down his reaction speed and speed calmly, but he felt attacked by this other man. Discussed how the client could have approached the other contractor and brought in his supervisor earlier rather than getting angry. Identified how client experiences muscle tension when angry and how noting his muscle tension can give him clues about his mood.     Treatment Objective(s) Addressed in This Session:   learn & utilize at least 3 assertive communication skills weekly  pause and use skills before talking when irritable     Intervention:     Motivational  Interviewing    MI Intervention: Expressed Empathy/Understanding, Supported Autonomy, Collaboration, Evocation, Permission to raise concern or advise, Open-ended questions, Rolled with resistance: Emphasized patient autonomy, Complex reflection, Reframed sustain talk in the direction of change and Evoked patient agenda, Change talk (evoked) and Reframe     Change Talk Expressed by the Patient: Desire to change Ability to change Need to change Committment to change Activation Taking steps    Provider Response to Change Talk: E - Evoked more info from patient about behavior change, A - Affirmed patient's thoughts, decisions, or attempts at behavior change, R - Reflected patient's change talk and S - Summarized patient's change talk statements          ASSESSMENT: Current Emotional / Mental Status (status of significant symptoms):   Risk status (Self / Other harm or suicidal ideation)   Client denies current fears or concerns for personal safety.   Client denies current or recent suicidal ideation or behaviors.   Client denies current or recent homicidal ideation or behaviors.   Client denies current or recent self injurious behavior or ideation.   Client denies other safety concerns.   Client Patient reports there has been no change in risk factors since their last session.     Client Patient reports there has been no change in protective factors since their last session.     Recommended that patient call 911 or go to the local ED should there be a change in any of these risk factors.     Appearance:   Appropriate    Eye Contact:   Good    Psychomotor Behavior: Agitated  Normal Client appeared tense when irritable, relaxed throughout the session   Attitude:   Cooperative    Orientation:   All   Speech    Rate / Production: Hyperverbal  Normal Client was talkative    Volume:  Normal    Mood:    Angry  Irritable  client presented with anger and irritability talking about work   Affect:    Appropriate     Thought  Content:  Clear    Thought Form:  Coherent  Logical    Insight:    Fair      Medication Review:   No changes to current psychiatric medication(s)     Medication Compliance:   Yes     Changes in Health Issues:   None reported     Chemical Use Review:   Substance Use: Chemical use reviewed, no active concerns identified      Tobacco Use: No current tobacco use.      Diagnosis:  1. ASUNCION (generalized anxiety disorder)       Collateral Reports Completed:   Not Applicable    PLAN: (Client Tasks / Therapist Tasks / Other)  Client will practice mental noting with physical tension and return for therapy in two weeks.        Isis BELL, [facility] 10/17/2019  Note reviewed and clinical supervision by ARABELLA Cuba [facility] 11/18/2019    ________________________________________________________________________    Treatment Plan    Client's Name: Marcel Melchor  YOB: 1970    Date: 7/30/2108; 11/28/2018; 5/6/2019; 8/28/2019    DSM-V Diagnoses: 300.02 (F41.1) Generalized Anxiety Disorder   Psychosocial / Contextual Factors: client reported continued conflict in family and struggling with continued restlessness and sense of urgency  WHODAS: see intake    Referral / Collaboration:  Referral to another professional/service is not indicated at this time..    Anticipated number of session or this episode of care: 5-8      MeasurableTreatment Goal(s) related to diagnosis / functional impairment(s)  Goal 1: Client will continue reducing symptoms of anxiety and irritability as evidenced by ASUNCION-7 remaining from 5 to 0 over the next four months and client reporting increased mindfulness in communication.    I will know I've met my goal when I don't have recurring issues with irritability.      Objective #A (Client Action)    Client will use cognitive strategies identified in therapy to challenge anxious thoughts.  Status: Continued - Date(s): 8/28/2019    Intervention(s)  Therapist will assign homework to challenge  distorted thinking  teach CBT skills.    Objective #B  Client will identify 3 initial signs or symptoms of anxiety.  Status: Continued - Date(s): 8/28/2019    Intervention(s)  Therapist will teach emotional recognition/identification. DBT.    Objective #C  Client will use relaxation strategies 3 times per day to reduce the physical symptoms of anxiety.  Status: Continued - Date(s): 8/28/2019     Intervention(s)  Therapist will assign homework to use mindfulness  teach mindfulness skills.      Goal 2: Client will improve ability to communicate effectively with others as evidenced by client reporting use of 3-4 new communications skills on a weekly basis.    I will know I've met my goal when I can feel open to being genuine and have feelings come out.      Objective #A (Client Action)    Status: Continued - Date(s):  8/28/2019     Client will learn & utilize at least 3 assertive communication skills weekly.    Intervention(s)  Therapist will assign homework to practice communication skills  teach assertiveness skills. DEAR MAN.    Objective #B  Client will pause and use skills before talking when irritable.    Status: Continued - Date(s):  8/28/2019      Intervention(s)  Therapist will teach mindfulness skills.    Objective #C  Client will identify and maintain motivation for changing communication skills.  Status: Continued - Date(s):  8/28/2019     Intervention(s)  Therapist will teach motivational skills.        Client has reviewed and agreed to the above plan.      Isis BELL, LGSW August 28, 2019                                                                           Note reviewed and clinical supervision by ARABELLA Cuba [facility] 9/5/2019

## 2019-10-18 ASSESSMENT — ANXIETY QUESTIONNAIRES: GAD7 TOTAL SCORE: 3

## 2019-11-07 ENCOUNTER — OFFICE VISIT (OUTPATIENT)
Dept: PSYCHOLOGY | Facility: CLINIC | Age: 49
End: 2019-11-07
Payer: COMMERCIAL

## 2019-11-07 DIAGNOSIS — F41.1 GAD (GENERALIZED ANXIETY DISORDER): Primary | ICD-10-CM

## 2019-11-07 PROCEDURE — 90847 FAMILY PSYTX W/PT 50 MIN: CPT | Performed by: MARRIAGE & FAMILY THERAPIST

## 2019-11-08 NOTE — PROGRESS NOTES
Progress Note    Client Name: Marcel Lauohjacinta  Date: 11/7/2019       Service Type: Couple  Video Visit: No      Session Start Time: 3:00  Session End Time: 3:45      Session Length: 45 min     Session #: 5    Attendees: Client and Spouse / Significant Other     Treatment Plan Last Reviewed: 9/20/2019  PHQ-9 / ASUNCION-7 : Each session    DATA  Interactive Complexity: No  Crisis: No        Progress Since Last Session (Related to Symptoms / Goals / Homework):   Symptoms: Improving less conflict    Homework: Partially completed       Episode of Care Goals: Satisfactory progress - ACTION (Actively working towards change); Intervened by reinforcing change plan / affirming steps taken     Current / Ongoing Stressors and Concerns:  Couple have been doing better. Both shared they have been doing projects around the house together without any conflict. They attribute this to Marcel's improved mood which makes Ermelinda more receptive and open to him. Ermelinda says she is seeing her old  back. Couple talk today about new dynamics at home as they have moved their niece and her baby into their home. Her boyfriend broke up with her and they plan to help her through this difficult time.     Treatment Objective(s) Addressed in This Session:   client to learn how to be more emotionally available to wife  S[employer]ique     Intervention:   Strategic couples counseling.     ASSESSMENT: Current Emotional / Mental Status (status of significant symptoms):   Risk status (Self / Other harm or suicidal ideation)   Client denies current fears or concerns for personal safety.   Client denies current or recent suicidal ideation or behaviors.   Client denies current or recent homicidal ideation or behaviors.   Client denies current or recent self injurious behavior or ideation.   Client denies other safety concerns.   Client Patient reports there has been no change in risk factors since their last session.      Client Patient reports there has been no change in protective factors since their last session.     Recommended that patient call 911 or go to the local ED should there be a change in any of these risk factors.     Appearance:   Appropriate    Eye Contact:   Good    Psychomotor Behavior: Normal    Attitude:   Cooperative    Orientation:   All   Speech    Rate / Production: Normal     Volume:  Normal    Mood:    Normal    Affect:    Appropriate     Thought Content:  Clear    Thought Form:  Coherent  Logical    Insight:    Good      Medication Review:   No changes to current psychiatric medication(s)     Medication Compliance:   Yes     Changes in Health Issues:   Yes: Pain, Associated Psychological Distress     Chemical Use Review:   Substance Use: Chemical use reviewed, no active concerns identified      Tobacco Use: No current tobacco use.      Diagnosis:  ASUNCION     Collateral Reports Completed:   Not Applicable    PLAN: (Client Tasks / Therapist Tasks / Other)  Homework: Continue with weekly business meetings. Talk prior to session to ID goals.        DOT [employer]      _______________________________________________________________________                                                   Treatment Plan    Client's Name: Marcel Melchor  YOB: 1970    Date: 9/20/2019    DSM-V Diagnoses: 300.02 - Generalized Anxiety Disorder By History; V71.09 - No Diagnosis  Psychosocial / Contextual Factors: work stress, marital discord  WHODAS: See Chart    Referral / Collaboration:  Referral to another professional/service is not indicated at this time..    Anticipated number of session or this episode of care: 30      MeasurableTreatment Goal(s) related to diagnosis / functional impairment(s)  Goal 1: Client will lower his GAD7 score to 2 or below    I will know I've met my goal when I'm less anxious and angry especially in marriage.      Objective #A (Client Action)    Client will practice the use  of timeouts.  Status: New - Date: 9/20/2019     Intervention(s)  Therapist will teach assertiveness skills. and how to time out in a functional manner..    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  Status: New - Date: 9/20/2019     Intervention(s)  Therapist will assign homework have weekly work/status meetings..    Objective #C  Client will better understand and seek his wife's forgiveness..  Status: New - Date: 9/20/2019     Intervention(s)  Therapist will help client in this process..      Patient has reviewed and agreed to the above plan.      Trinidad Solorio  September 22, 2019

## 2019-11-14 ENCOUNTER — OFFICE VISIT (OUTPATIENT)
Dept: PSYCHOLOGY | Facility: CLINIC | Age: 49
End: 2019-11-14
Payer: COMMERCIAL

## 2019-11-14 DIAGNOSIS — F41.1 GAD (GENERALIZED ANXIETY DISORDER): Primary | ICD-10-CM

## 2019-11-14 PROCEDURE — 90834 PSYTX W PT 45 MINUTES: CPT | Performed by: SOCIAL WORKER

## 2019-11-14 ASSESSMENT — ANXIETY QUESTIONNAIRES
7. FEELING AFRAID AS IF SOMETHING AWFUL MIGHT HAPPEN: NOT AT ALL
6. BECOMING EASILY ANNOYED OR IRRITABLE: NOT AT ALL
5. BEING SO RESTLESS THAT IT IS HARD TO SIT STILL: NOT AT ALL
GAD7 TOTAL SCORE: 0
2. NOT BEING ABLE TO STOP OR CONTROL WORRYING: NOT AT ALL
1. FEELING NERVOUS, ANXIOUS, OR ON EDGE: NOT AT ALL
3. WORRYING TOO MUCH ABOUT DIFFERENT THINGS: NOT AT ALL

## 2019-11-14 ASSESSMENT — PATIENT HEALTH QUESTIONNAIRE - PHQ9: 5. POOR APPETITE OR OVEREATING: NOT AT ALL

## 2019-11-14 NOTE — PROGRESS NOTES
Progress Note    Client Name: Marcel Melchor  Date: 11/14/2019       Service Type: Individual  Video Visit: No      Session Start Time: 2:30  Session End Time: 3:15      Session Length: 45 min     Session #: 24    Attendees: Client attended alone     Treatment Plan Last Reviewed: 7/30/2018; 11/28/2018; 5/6/2019; 8/28/2019;11/18/2019  PHQ-9 / ASUNCION-7 : 10/17/2019    DATA  Interactive Complexity: No  Crisis: No        Progress Since Last Session (Related to Symptoms / Goals / Homework):   Symptoms: Worsening client reported increased irritability    Homework: Achieved / completed to satisfaction client reported working on muscle relaxation      Episode of Care Goals: Satisfactory progress - ACTION (Actively working towards change); Intervened by reinforcing change plan / affirming steps taken     Current / Ongoing Stressors and Concerns:   Ongoing: The client reports receiving feedback from others that he is irritable and difficult to work with. The client reports he may get irritable due to anxiety.  Current: The client reported that he had been trying to relax his muscles and that he has scheduled regular massages that help. He said that generally he had been feeling really good and in control of his emotions. Client described various interactions that he felt were successful and how he felt differently than before. Client reported that he and his wife were going on a vacation with a couple of their friends and he was excited. Discussed client's treatment plan and updated goals for therapy.     Treatment Objective(s) Addressed in This Session:   learn & utilize at least 3 assertive communication skills weekly       Intervention:     Motivational Interviewing    MI Intervention: Expressed Empathy/Understanding, Supported Autonomy, Collaboration, Evocation, Permission to raise concern or advise, Open-ended questions, Rolled with resistance: Emphasized patient autonomy, Complex  reflection, Reframed sustain talk in the direction of change and Evoked patient agenda, Change talk (evoked) and Reframe     Change Talk Expressed by the Patient: Desire to change Ability to change Need to change Committment to change Activation Taking steps    Provider Response to Change Talk: E - Evoked more info from patient about behavior change, A - Affirmed patient's thoughts, decisions, or attempts at behavior change, R - Reflected patient's change talk and S - Summarized patient's change talk statements          ASSESSMENT: Current Emotional / Mental Status (status of significant symptoms):   Risk status (Self / Other harm or suicidal ideation)   Client denies current fears or concerns for personal safety.   Client denies current or recent suicidal ideation or behaviors.   Client denies current or recent homicidal ideation or behaviors.   Client denies current or recent self injurious behavior or ideation.   Client denies other safety concerns.   Client Patient reports there has been no change in risk factors since their last session.     Client Patient reports there has been no change in protective factors since their last session.     Recommended that patient call 911 or go to the local ED should there be a change in any of these risk factors.     Appearance:   Appropriate    Eye Contact:   Good    Psychomotor Behavior: Agitated  Normal Client presented as physically calm   Attitude:   Cooperative    Orientation:   All   Speech    Rate / Production: Hyperverbal  Normal  Client was talkative    Volume:  Normal    Mood:    Anxious  Normal client appeared to be in a good mood   Affect:    Appropriate     Thought Content:  Clear    Thought Form:  Coherent  Logical    Insight:    Fair      Medication Review:   No changes to current psychiatric medication(s)     Medication Compliance:   Yes     Changes in Health Issues:   None reported     Chemical Use Review:   Substance Use: Chemical use reviewed, no active  concerns identified      Tobacco Use: No current tobacco use.      Diagnosis:  1. ASUNCION (generalized anxiety disorder)       Collateral Reports Completed:   Not Applicable    PLAN: (Client Tasks / Therapist Tasks / Other)  Client will practice good communication and return for therapy in two weeks.        Isis BELL, [facility] 11/14/2019  Note reviewed and clinical supervision by ARABELLA Cuba [facility] 11/18/2019       ________________________________________________________________________    Treatment Plan    Client's Name: Marcel Melchor  YOB: 1970    Date: 7/30/2108; 11/28/2018; 5/6/2019; 8/28/2019    DSM-V Diagnoses: 300.02 (F41.1) Generalized Anxiety Disorder   Psychosocial / Contextual Factors: client reported continued conflict in family and struggling with continued restlessness and sense of urgency  WHODAS: see intake    Referral / Collaboration:  Referral to another professional/service is not indicated at this time..    Anticipated number of session or this episode of care: 5-8      MeasurableTreatment Goal(s) related to diagnosis / functional impairment(s)  Goal 1: Client will continue reducing symptoms of anxiety and irritability as evidenced by ASUNCION-7 remaining from 5 to 0 over the next four months and client reporting increased mindfulness in communication.    I will know I've met my goal when I don't have recurring issues with irritability.      Objective #A (Client Action)    Client will use cognitive strategies identified in therapy to challenge anxious thoughts.  Status: Continued - Date(s): 8/28/2019    Intervention(s)  Therapist will assign homework to challenge distorted thinking  teach CBT skills.    Objective #B  Client will identify 3 initial signs or symptoms of anxiety.  Status: Continued - Date(s): 8/28/2019    Intervention(s)  Therapist will teach emotional recognition/identification. DBT.    Objective #C  Client will use relaxation strategies 3 times per day to reduce  the physical symptoms of anxiety.  Status: Continued - Date(s): 8/28/2019     Intervention(s)  Therapist will assign homework to use mindfulness  teach mindfulness skills.      Goal 2: Client will improve ability to communicate effectively with others as evidenced by client reporting use of 3-4 new communications skills on a weekly basis.    I will know I've met my goal when I can feel open to being genuine and have feelings come out.      Objective #A (Client Action)    Status: Continued - Date(s):  8/28/2019     Client will learn & utilize at least 3 assertive communication skills weekly.    Intervention(s)  Therapist will assign homework to practice communication skills  teach assertiveness skills. DEAR MAN.    Objective #B  Client will pause and use skills before talking when irritable.    Status: Continued - Date(s):  8/28/2019      Intervention(s)  Therapist will teach mindfulness skills.    Objective #C  Client will identify and maintain motivation for changing communication skills.  Status: Continued - Date(s):  8/28/2019     Intervention(s)  Therapist will teach motivational skills.        Client has reviewed and agreed to the above plan.      Isis BELL, LGSW August 28, 2019                                                                           Note reviewed and clinical supervision by ARABELLA Cuba LICSW 9/5/2019

## 2019-11-15 ASSESSMENT — ANXIETY QUESTIONNAIRES: GAD7 TOTAL SCORE: 0

## 2019-12-17 ENCOUNTER — TELEPHONE (OUTPATIENT)
Dept: PALLIATIVE MEDICINE | Facility: CLINIC | Age: 49
End: 2019-12-17

## 2019-12-17 DIAGNOSIS — M54.16 LUMBAR RADICULOPATHY: Primary | ICD-10-CM

## 2019-12-17 NOTE — TELEPHONE ENCOUNTER
Pt requesting repeat LESI. He wants this done before new year. Last injection done 9/9/2019 w/Celsa.      Xochilt ESPAÑA    [facility] [place]

## 2019-12-17 NOTE — TELEPHONE ENCOUNTER
Called patient. Mailbox is full-unable to LM.   Will route prepped order to PCP as patient is not followed clinically by pain management      Last injection note:  Indications: Marcel Melchor is a 49 year old male who is seen at the request of Wes Braga PA-C for lumbar transforaminal epidural steroid injection. The patient describes axial low back and right buttock pain. The patient has been exhibiting symptoms consistent with lumbar intraspinal inflammation and radiculopathy. Symptoms have been persistent, disabling, and intermittently severe. The patient reports minimal improvement with conservative treatment, including PT and medications.     This is a repeat injection.  Previous Right L4-5 TFESI done by Dr. Johnson on 4/19/2018 which provided good pain relief for 10 months and on 3/13/2019 which provided about 5 month of relief.       Shelley PHILLIP, RN Care Coordinator  [facility]  Pain Management

## 2019-12-18 NOTE — TELEPHONE ENCOUNTER
Please call patient when placed as he wants to get this injection scheduled before the end of the year.    Yael Stinson

## 2019-12-18 NOTE — TELEPHONE ENCOUNTER
Who actually manages his pain and these injections?  I'm not sure I should be the one (or he should be the one) deciding how often he is getting these?  Does he have a pain management provider or Ortho for guidance?    Wes

## 2019-12-19 ENCOUNTER — OFFICE VISIT (OUTPATIENT)
Dept: PSYCHOLOGY | Facility: CLINIC | Age: 49
End: 2019-12-19
Payer: COMMERCIAL

## 2019-12-19 DIAGNOSIS — F41.1 GAD (GENERALIZED ANXIETY DISORDER): Primary | ICD-10-CM

## 2019-12-19 PROCEDURE — 90847 FAMILY PSYTX W/PT 50 MIN: CPT | Performed by: MARRIAGE & FAMILY THERAPIST

## 2019-12-19 NOTE — TELEPHONE ENCOUNTER
Pam Aguilar CNP ordered injection and done by James Hastings DO on 3/23/2017.    Daniel Conde DO ordered injection and done by Jodi Johnson MD on 4/19/2018.    Aleja Cardoso CNP ordered injection and done by Jodi Johnson MD on 3/13/2019.    Wes Braga PA-C ordered injection and done by Julieta Steen MD on 9/9/2019.      Please review and advise to where you would like us to route the message to.    Penny Iverson RN

## 2019-12-20 NOTE — TELEPHONE ENCOUNTER
Spoke with Wes and she is not comfortable placing an order for the injection as she has not fully addressed this issue with patient.  She placed a referral for pain management to have patient evaluated and to determine appropriateness of injections.    Pt informed and was very upset and state he doesn't agree with assessment and will find someone else.  Pt hung up the phone    Efrain Grayson RN, BSN

## 2019-12-20 NOTE — TELEPHONE ENCOUNTER
Yes, I know this.  We didn't send this anywhere yet did we?  I thought we were going to see if he wanted to see Dr Conde again or go see Pain Management.      Wes

## 2019-12-20 NOTE — TELEPHONE ENCOUNTER
Patient called back.  When asked he wants to go to pain management clinic in [place] to try and get a cortisone shot before end of year.

## 2019-12-20 NOTE — TELEPHONE ENCOUNTER
"Patient is not followed by pain management so we would not sign order for patient to have injection. He has been sent to us for injection only in the past.    If you would like Pain Management to follow patient for injections, please place an order for patient to be seen clinically (\"interventional evaluation\"). The patient would be seen in the office and our provider would determine appropriateness of injections going forward.    If patient has pain management needs beyond just injections it may be more beneficial for patient to have an order for \"Comprehensive Management and Evaluation\".     Shelley PHILLIP, RN Care Coordinator  [facility]  Pain Management    "

## 2019-12-23 ENCOUNTER — TELEPHONE (OUTPATIENT)
Dept: PALLIATIVE MEDICINE | Facility: CLINIC | Age: 49
End: 2019-12-23

## 2019-12-23 NOTE — TELEPHONE ENCOUNTER
Pain Management Center Referral      1. Confirmed address with patient? Yes  2. Confirmed phone number with patient? Yes  3. Confirmed referring provider? Yes  4. Is the PCP the same as the referring provider? Yes  5. Has the patient been to any previous pain clinics? Yes  (If yes, send VANNESA with welcome letter)  6. Which insurance are we to bill for this appointment?  BCBS    7. Informed pt of cancellation (48 hour) policy? Yes    REGARDING OPIOID MEDICATIONS: We will always address appropriateness of opioid pain medications, but we generally will not automatically take on a prescribing role. When we do take on prescribing of opioids for chronic pain, it is in collaboration with the referring physician for an intermediate period of time (months), with an expectation that the primary physician or provider will assume the prescribing role if medications are effective at stable doses with demonstrated compliance. Therefore, please do not assume that your prescribing responsibilities end on the day of pain clinic consultation.  8. Informed pt of prescribing policy? Yes    9.Please be aware that once you are established with a pain provider and location, you will need to continue have all future visits with that provider and location. It is best to determine what location is the most convenient for you and schedule with that one.    ** PATIENT INFORMED OF THIS POLICY Yes      9. Referring Provider: Wes Braga    [facility]

## 2019-12-30 ENCOUNTER — OFFICE VISIT (OUTPATIENT)
Dept: PSYCHOLOGY | Facility: CLINIC | Age: 49
End: 2019-12-30
Payer: COMMERCIAL

## 2019-12-30 DIAGNOSIS — F41.1 GAD (GENERALIZED ANXIETY DISORDER): Primary | ICD-10-CM

## 2019-12-30 PROCEDURE — 90834 PSYTX W PT 45 MINUTES: CPT | Performed by: SOCIAL WORKER

## 2019-12-30 NOTE — PROGRESS NOTES
Progress Note    Client Name: Marcel Melchor  Date: 12/30/2019       Service Type: Individual  Video Visit: No      Session Start Time: 2:30  Session End Time: 3:15      Session Length: 45 min     Session #: 25    Attendees: Client attended alone     Treatment Plan Last Reviewed: 7/30/2018; 11/28/2018; 5/6/2019; 8/28/2019;11/18/2019  PHQ-9 / ASUNCION-7 : 10/17/2019    DATA  Interactive Complexity: No  Crisis: No        Progress Since Last Session (Related to Symptoms / Goals / Homework):   Symptoms: Improving improved irritability    Homework: Achieved / completed to satisfaction client reported using effective communication skills      Episode of Care Goals: Satisfactory progress - ACTION (Actively working towards change); Intervened by reinforcing change plan / affirming steps taken     Current / Ongoing Stressors and Concerns:   Ongoing: The client reports receiving feedback from others that he is irritable and difficult to work with. The client reports he may get irritable due to anxiety.  Current: The client reported that he has generally been doing okay except for back pain he has been unable to treat. He described being frustrated when he was unable to get his usual cortisone injection as his physician was no longer comfortable being his prescriber. Client reported that he has been in pain and unable to sleep due to not receiving his injection as planned, saying that he doesn't trust his PCP anymore as they did not help his pain. He said that he has increased irritability due to his pain and his physician not giving his injection. Client reported that otherwise he was continuing effective communication with his wife.        Treatment Objective(s) Addressed in This Session:   learn & utilize at least 3 assertive communication skills weekly       Intervention:     Motivational Interviewing    MI Intervention: Expressed Empathy/Understanding, Supported Autonomy,  Collaboration, Evocation, Permission to raise concern or advise, Open-ended questions, Rolled with resistance: Emphasized patient autonomy, Complex reflection, Reframed sustain talk in the direction of change and Evoked patient agenda, Change talk (evoked) and Reframe     Change Talk Expressed by the Patient: Desire to change Ability to change Need to change Committment to change Activation Taking steps    Provider Response to Change Talk: E - Evoked more info from patient about behavior change, A - Affirmed patient's thoughts, decisions, or attempts at behavior change, R - Reflected patient's change talk and S - Summarized patient's change talk statements          ASSESSMENT: Current Emotional / Mental Status (status of significant symptoms):   Risk status (Self / Other harm or suicidal ideation)   Client denies current fears or concerns for personal safety.   Client denies current or recent suicidal ideation or behaviors.   Client denies current or recent homicidal ideation or behaviors.   Client denies current or recent self injurious behavior or ideation.   Client denies other safety concerns.   Client Patient reports there has been no change in risk factors since their last session.     Client Patient reports there has been no change in protective factors since their last session.     Recommended that patient call 911 or go to the local ED should there be a change in any of these risk factors.     Appearance:   Appropriate    Eye Contact:   Good    Psychomotor Behavior: Agitated Client presented as tense and in pain   Attitude:   Cooperative    Orientation:   All   Speech    Rate / Production: Normal      Volume:  Normal    Mood:    Irritable  client presented with irritability   Affect:    Appropriate     Thought Content:  Clear    Thought Form:  Coherent  Logical    Insight:    Fair      Medication Review:   No changes to current psychiatric medication(s)     Medication Compliance:   Yes     Changes in Health  Issues:   None reported     Chemical Use Review:   Substance Use: Chemical use reviewed, no active concerns identified      Tobacco Use: No current tobacco use.      Diagnosis:  1. ASUNCION (generalized anxiety disorder)       Collateral Reports Completed:   Not Applicable    PLAN: (Client Tasks / Therapist Tasks / Other)  Client will practice good communication and return for therapy in two weeks.        Isistu BELL, [facility] 11/14/2019  Note reviewed and clinical supervision by ARABELLA [facility] 1/9/2020       ________________________________________________________________________    Treatment Plan    Client's Name: Marcel Melchor  YOB: 1970    Date: 7/30/2108; 11/28/2018; 5/6/2019; 8/28/2019; 11/18/2019    DSM-V Diagnoses: 300.02 (F41.1) Generalized Anxiety Disorder   Psychosocial / Contextual Factors: client reported continued conflict in family and struggling with continued restlessness and sense of urgency  WHODAS: see intake    Referral / Collaboration:  Referral to another professional/service is not indicated at this time..    Anticipated number of session or this episode of care: 5-8      MeasurableTreatment Goal(s) related to diagnosis / functional impairment(s)  Goal 1: Client will continue reducing symptoms of anxiety and irritability as evidenced by ASUNCION-7 remaining from 5 to 0 over the next four months and client reporting increased mindfulness in communication.    I will know I've met my goal when I don't have recurring issues with irritability.      Objective #A (Client Action)    Client will use cognitive strategies identified in therapy to challenge anxious thoughts.  Status: Continued - Date(s): 11/28/2019    Intervention(s)  Therapist will assign homework to challenge distorted thinking  teach CBT skills.    Objective #B  Client will identify 3 initial signs or symptoms of anxiety.  Status: Continued - Date(s):  11/28/2019    Intervention(s)  Therapist will teach emotional  recognition/identification. DBT.    Objective #C  Client will use relaxation strategies 3 times per day to reduce the physical symptoms of anxiety.  Status: Continued - Date(s): 11/28/2019     Intervention(s)  Therapist will assign homework to use mindfulness  teach mindfulness skills.      Goal 2: Client will improve ability to communicate effectively with others as evidenced by client reporting use of 3-4 new communications skills on a weekly basis.    I will know I've met my goal when I can feel open to being genuine and have feelings come out.      Objective #A (Client Action)    Status: Continued - Date(s): 11/28/2019     Client will learn & utilize at least 3 assertive communication skills weekly.    Intervention(s)  Therapist will assign homework to practice communication skills  teach assertiveness skills. DEAR MINAL.    Objective #B  Client will pause and use skills before talking when irritable.    Status: Continued - Date(s):  11/28/2019     Intervention(s)  Therapist will teach mindfulness skills.    Objective #C  Client will identify and maintain motivation for changing communication skills.  Status: Continued - Date(s):  11/28/2019    Intervention(s)  Therapist will teach motivational skills.        Client has reviewed and agreed to the above plan.      Isis BELL, LGSW November 28, 2019                                                                           Note reviewed and clinical supervision by [facility]

## 2019-12-31 ENCOUNTER — OFFICE VISIT (OUTPATIENT)
Dept: PALLIATIVE MEDICINE | Facility: CLINIC | Age: 49
End: 2019-12-31
Payer: COMMERCIAL

## 2019-12-31 VITALS — HEART RATE: 63 BPM | DIASTOLIC BLOOD PRESSURE: 75 MMHG | OXYGEN SATURATION: 98 % | SYSTOLIC BLOOD PRESSURE: 126 MMHG

## 2019-12-31 DIAGNOSIS — M51.369 LUMBAR DEGENERATIVE DISC DISEASE: ICD-10-CM

## 2019-12-31 DIAGNOSIS — M54.16 LUMBAR RADICULOPATHY: Primary | ICD-10-CM

## 2019-12-31 PROCEDURE — 99215 OFFICE O/P EST HI 40 MIN: CPT | Performed by: NURSE PRACTITIONER

## 2019-12-31 PROCEDURE — 99207 ZZC DOWN CODE DUE TO SUBSEQUENT EXAM: CPT | Performed by: NURSE PRACTITIONER

## 2019-12-31 ASSESSMENT — PAIN SCALES - GENERAL: PAINLEVEL: SEVERE PAIN (7)

## 2019-12-31 NOTE — PATIENT INSTRUCTIONS
1.  Pain Physical Therapy:     YES   Schedule first visit with Kenya Calderon PT and try to have 2-3 sessions prior to next visit.    2.  Pain Psychologist to address relaxation, behavioral change, coping style, and other factors important to improvement.    YES  Schedule first visit with Elisa Brown PsyD, LP.    3.  Medication Management:     1. Continue current medication regimen, including topicals.     4.  Potential procedures: lumbar epidural steroid injection ordered today, monitor pain benefit.     5.  Chiropractic care and acupuncture ordered today, monitor benefit.    6.  Follow up with FAIZAN Morrison CNP in 4 weeks.       ----------------------------------------------------------------  Clinic Number:  [phone]     Call with any questions about your care and for scheduling assistance.     Calls are returned Monday through Friday between 8 AM and 4:30 PM. We usually get back to you within 2 business days depending on the issue/request.    If we are prescribing your medications:    For opioid medication refills, call the clinic or send a [employer] message 7 days in advance.  Please include:    Name of requested medication    Name of the pharmacy.    For non-opioid medications, call your pharmacy directly to request a refill. Please allow 3-4 days to be processed.     Per [place] State Law:    All controlled substance prescriptions must be filled within 30 days of being written.      For those controlled substances allowing refills, pickup must occur within 30 days of last fill.      We believe regular attendance is key to your success in our program!      Any time you are unable to keep your appointment we ask that you call us at least 24 hours in advance to cancel.This will allow us to offer the appointment time to another patient.     Multiple missed appointments may lead to dismissal from the clinic.

## 2020-01-02 ENCOUNTER — OFFICE VISIT (OUTPATIENT)
Dept: PALLIATIVE MEDICINE | Facility: CLINIC | Age: 50
End: 2020-01-02
Payer: COMMERCIAL

## 2020-01-02 ENCOUNTER — TELEPHONE (OUTPATIENT)
Dept: PALLIATIVE MEDICINE | Facility: CLINIC | Age: 50
End: 2020-01-02

## 2020-01-02 DIAGNOSIS — M54.16 LUMBAR RADICULOPATHY: Primary | ICD-10-CM

## 2020-01-02 DIAGNOSIS — M51.369 LUMBAR DEGENERATIVE DISC DISEASE: ICD-10-CM

## 2020-01-02 DIAGNOSIS — G89.4 CHRONIC PAIN SYNDROME: ICD-10-CM

## 2020-01-02 PROCEDURE — 96156 HLTH BHV ASSMT/REASSESSMENT: CPT | Performed by: PSYCHOLOGIST

## 2020-01-02 NOTE — TELEPHONE ENCOUNTER
LM right L4-5 transforaminal epidural steroid injection.    Christine DUBON    [facility], [facility]

## 2020-01-02 NOTE — PROGRESS NOTES
[facility]     BEHAVIORAL DIAGNOSTIC ASSESSMENT    IDENTIFYING INFORMATION:IDENTIFYING INFORMATION: The patient is a 49 year old, , ,  Individual, who was seen today for a behavioral assessment as part of the evaluation process at the [facility].         This patient is referred for consultation by FAIZAN Morrison,  CNP; please see their notes for more details of their pain symptoms. This patient is referred to pain services by Wes Braga PA-C. Patient's primary complaint today is chronic pain.     Patient reports difficulty with function in relationship to their pain. Patient reports onset of their pain symptoms in 1990 hurt his back, previously was a physically active person through football and wrestling. Patient  believes the following factors contribute to their pain: DDD in neck and lower back. Their pain is generally located lower back.  Factors that increase their pain include working as a , everything he does including sitting, home improvement projects. Patient utilizes the following strategies to find relief from their pain: chiropractic, massages, acupuncture, TENS unit, ice, heat. Patient reports their pain ranges in intensity from 4 to 8. They describe their pain as super tight muscles and worn out, pinch that doesn't allow him to fully stand . 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 Patient spends little time thinking about their pain in a day. Patient struggles to be able to pace their activity or obey limitations their chronic pain places on them. Patient s pain negatively impacts their ability to relax. Patient has worked with a pain clinic in the past - for his neck and completed the program. As a result of their pain, they rate their stress level as medium to high. Patient reports current stressors include pain.    Patient reports the following as it relates to how their pain impacts their sleep hygiene (endorsed in BOLD):  Difficulty falling asleep / problems mid-awakenings / poor quality of sleep / daytime sleepiness or fatigue / napping    Patient reports obtaining approximately 7.75 hours of sleep per night. This sleep is aided by CPAP, patient states he has narcolepsy - if he isn't active he will fall asleep.   Patient reports their exercise regimen currently includes about 30 minutes daily - bike, gym to work out, stretching, occasional walks.    PSYCHOLOGY SYMPTOMS:     DEPRESSION: Yes, reported diagnosis of depression         Symptom List: ENDORSED in bold: Anhedonia / depressed or sad / hopelessness / sleep impairment / low energy / appetite changes / low self-esteem / concentration issues / restlessness or slowed down / suicidal ideation        Total PHQ-9 = 6 (5-9 mild, 10-14 mod, 15-19 mod sev,          >20 Sev). Note: The full PHQ-9 has been scanned - see media tab.      ANXIETY: Yes, reported possible diagnosis of anxiety            Symptom List :  ENDORSED in bold:  Nervous, anxious or on edge / can t stop worry / too much worry about different things / trouble relaxing / restlessness or hard to sit still / easily annoyed or irritable / fearful of  awful thing happening       Total ASUNCION-7= 4{ (5-9 mild, 10-14 mod, 15-21 severe)          Note the full ASUNCION-7 has been scanned-see media tab     MENTAL HEALTH HISTORY:        Patient reports being diagnosed with depression, ADHD, narcolepsy in the past. Patient reports no history of hospitalization for mental health reasons.     Patient reports the following psychotropic medications are currently prescribed: Prozac, Nu ricci, Adderall and the following have been prescribed in the past: Effexor. Patient reports no side effects from their medications. Patient s mental health history and support includes individual and couples therapy. Patient reports no history of suicidal ideation. Patient reports no homicidal ideation. Patient reports no history of  abuse. Patient denies symptoms of kamille, psychosis, disordered eating, PTSD. Other symptoms patient reports include narcolepsy as described above, believes he has OCD - when he likes something he tends to go all in.                 STRENGTHS INCLUDE:    Good , mechanically inclined, good provider, willing to do anything, not afraid of anything    SOCIAL HISTORY:  Patient currently resides in [place] with his wife and daughter (20). Patient has one children. Patient's social support network includes wife, daughter, brother occasionally, therapist. Patient was raised in [place] and has 1 brother (+1.5 years) 2 sisters (-4, -8). Patient states his parents  when he was about 6. States his father raised all 4 children alone. Mother was a  in a bar,  again and states she wasn't around. Father was a , worked a lot of hours - trained as an . Patient reports possible family history of mental health issues - maybe depression. Patient reports positive family history of substance abuse issues - sister, brother.          CHEMICAL HEALTH BEHAVIORS:        Patient reports no current alcohol use. Quit drinking age 24 -  couldn't stop after 1 drink.  Patient reports no recreational drug use. Patient reports no current tobacco use - copenhagen chew about 20 years ago. Patient reports current caffeine use - 6-10 mountain dews. Patient reports no previous chemical dependency or other addictive behavior treatment.            CAGE/ AID QUESTIONNAIRE:             1. Have you ever felt you should cut down on your drinking of drug use?   yes            2. Have people annoyed you by criticizing your drinking or drug use?  no            3. Have you ever felt bad or guilty about your drinking of drug use?  no            4. Have you ever had a drink of used drugs the first thing in the morning to steady      your nerves or get rid of a hangover?  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 Patient's highest level of education completed is high school. Patient has no history in the . Patient is not pursuing disability benefits.    PATIENT'S GOALS:   'Learn how to manage pain.'    MINI MENTAL STATUS EXAM  Assessment: Current Emotional / Mental Status    Appearance:   Appropriate   Eye Contact:   Good   Psychomotor Behavior: Normal   Attitude:   Cooperative   Orientation:   All  Speech Rate              Normal   Speech Volume:                     Normal   Mood:    Anxious  Normal  Affect:    Appropriate    Thought Content:  Clear   Thought Form:  Coherent  Goal Directed  Logical   Insight:               Good        Pain Diagnoses:  Per pain provider:   Lumbar radiculopathy, lumbar degenerative disc disease, chronic pain syndrome    PLAN:  The importance of pain treatment planning was briefly discussed with the  patient.            ASSESSMENT:   Patient is here today to determine whether pain psychology could be of benefit to their pain management services. Patient reports a history of longer-standing pain starting in 1990 when he felt something 'pop' when reaching for something. He has had intermittent flares with more chronic pain in the past few years. He attributes this to degenerative disc disease, as well as wear and tear from athletics as a teenager and adult, as well as wear and tear from his profession. Patient reports his chronic pain has impacted him emotionally, occupationally somewhat, and interpersonally/socially - he finds he is more irritable with others than he'd like, and recognizes the negative impact of this on his relationships, specifically with his wife. He reports he currently engages in individual and couples therapy about 1-2 times per month. He was strongly encouraged to continue to utilize these services. He reports a past diagnosis of depression, possibly some anxiety, narcolepsy and ADHD. He intermittently utilizes skills to manage his pain, frequently waiting until the end of the day to engage in pain relief strategies. He would likely benefit from brief pain psychology services to focus on increasing his self-soothing and self-comfort strategies to include relaxation techniques, exploration of impact of negative self-talk and worry thoughts on his pain, basic psychoeducation on the interplay between mood and pain, development of a more regular regimen of pain management to include pain flare plan.      The patient participated in a health and behavioral evaluation (billed 48671).  The limits of confidentiality and mandated reporting requirements were discussed.   Time spent with patient: 1 hour in direct patient contact for a psychological diagnostic assessment and pain evaluation.               Elisa Brown PsyD, LP ...............  January 2, 2020  Outpatient Clinic Therapist  [facility]  [facility]    Disclaimer: This note consists of symbols derived from keyboarding, dictation and/or voice recognition software. As a result, there may be errors in the script that have gone undetected. Please consider this when interpreting information found in this chart.

## 2020-01-02 NOTE — PROGRESS NOTES
Progress Note    Client Name: Marcel Lauohjacinta  Date: 12/19/2019       Service Type: Couple  Video Visit: No      Session Start Time: 3:00  Session End Time: 3:45      Session Length: 45 min     Session #: 6    Attendees: Client and Spouse / Significant Other     Treatment Plan Last Reviewed: 12/19/2019  PHQ-9 / ASUNCION-7 : Each session    DATA  Interactive Complexity: No  Crisis: No        Progress Since Last Session (Related to Symptoms / Goals / Homework):   Symptoms: Improving less conflict    Homework: Partially completed       Episode of Care Goals: Satisfactory progress - ACTION (Actively working towards change); Intervened by reinforcing change plan / affirming steps taken     Current / Ongoing Stressors and Concerns:  Couple continue to communicate much better.They attribute this to Marcel's improved mood which makes Ermelinda more receptive and open to him. Their niece is still staying with them which they say is going well. Ermelinda has been impressed with Marcel's kindness to her.      Treatment Objective(s) Addressed in This Session:   client to learn how to be more emotionally available to wife  S/L Technique     Intervention:   Strategic couples counseling.     ASSESSMENT: Current Emotional / Mental Status (status of significant symptoms):   Risk status (Self / Other harm or suicidal ideation)   Client denies current fears or concerns for personal safety.   Client denies current or recent suicidal ideation or behaviors.   Client denies current or recent homicidal ideation or behaviors.   Client denies current or recent self injurious behavior or ideation.   Client denies other safety concerns.   Client Patient reports there has been no change in risk factors since their last session.     Client Patient reports there has been no change in protective factors since their last session.     Recommended that patient call 911 or go to the local ED should there be a change in any of  these risk factors.     Appearance:   Appropriate    Eye Contact:   Good    Psychomotor Behavior: Normal    Attitude:   Cooperative    Orientation:   All   Speech    Rate / Production: Normal     Volume:  Normal    Mood:    Normal    Affect:    Appropriate     Thought Content:  Clear    Thought Form:  Coherent  Logical    Insight:    Good      Medication Review:   No changes to current psychiatric medication(s)     Medication Compliance:   Yes     Changes in Health Issues:   None reported     Chemical Use Review:   Substance Use: Chemical use reviewed, no active concerns identified      Tobacco Use: No current tobacco use.      Diagnosis:  ASUNCION     Collateral Reports Completed:   Not Applicable    PLAN: (Client Tasks / Therapist Tasks / Other)  Homework: Continue with weekly business meetings. Stay the course with positive communication. Talk prior to session to ID goals.        Trinidad Solorio, LMFT      _______________________________________________________________________                                                   Treatment Plan    Client's Name: Marcel Melchor  YOB: 1970    Date: 12/19/2019    DSM-V Diagnoses: 300.02 - Generalized Anxiety Disorder By History; V71.09 - No Diagnosis  Psychosocial / Contextual Factors: work stress, marital discord  WHODAS: See Chart    Referral / Collaboration:  Referral to another professional/service is not indicated at this time..    Anticipated number of session or this episode of care: 30      MeasurableTreatment Goal(s) related to diagnosis / functional impairment(s)  Goal 1: Client will lower his GAD7 score to 2 or below    I will know I've met my goal when I'm less anxious and angry especially in marriage.      Objective #A (Client Action)    Client will practice the use of timeouts.  Status: Continued - Date(s):12/19/2019     Intervention(s)  Therapist will teach assertiveness skills. and how to time out in a functional manner..    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  Status: Continued - Date(s):12/19/2019     Intervention(s)  Therapist will assign homework have weekly work/status meetings..    Objective #C  Client will better understand and seek his wife's forgiveness..  Status: Continued - Date(s):12/19/2019     Intervention(s)  Therapist will help client in this process..      Patient has reviewed and agreed to the above plan.      Trinidad Solorio  December 19, 2019

## 2020-01-06 ENCOUNTER — OFFICE VISIT (OUTPATIENT)
Dept: PALLIATIVE MEDICINE | Facility: CLINIC | Age: 50
End: 2020-01-06
Payer: COMMERCIAL

## 2020-01-06 DIAGNOSIS — G89.4 CHRONIC PAIN SYNDROME: ICD-10-CM

## 2020-01-06 DIAGNOSIS — M51.369 LUMBAR DEGENERATIVE DISC DISEASE: ICD-10-CM

## 2020-01-06 DIAGNOSIS — M54.16 LUMBAR RADICULOPATHY: Primary | ICD-10-CM

## 2020-01-06 PROCEDURE — 97162 PT EVAL MOD COMPLEX 30 MIN: CPT | Mod: GP | Performed by: PHYSICAL THERAPIST

## 2020-01-06 PROCEDURE — 97530 THERAPEUTIC ACTIVITIES: CPT | Mod: GP | Performed by: PHYSICAL THERAPIST

## 2020-01-06 NOTE — PROGRESS NOTES
PHYSICAL THERAPY INITIAL EVALUATION and PLAN OF CARE    Patient Name: Marcel Melchor     : 1970    MRN: [id number]   Pain Management Provider:  FAIZAN Quinn CNP    Diagnosis:    Lumbar radiculopathy  Lumbar degenerative disc disease  Chronic pain syndrome    SUBJECTIVE:    PRESENTATION AND ETIOLOGY    Chief Complaint: Low back pain, R > L; radiating right buttock pain       He first started having problems with low back pain in . He states he twisted his back wrong at work injuring his low back at that time. Of note, he had had an active lifestyle over the years that may have contributed (wrestling, snowmobiling, etc.). He established care with a non [facility]ist in , had three L3-4 lumbar epidural steroid injections without benefit. He wanted a second opinion, was referred to [facility] in , had a visit with Dr. Hastings. An injection was recommended. He had a right L4-5 transforaminal epidural steroid injection with Dr. Hastings on 3/3/17. He had nine months of pain relief with this. He repeated this injection with Dr. Johnson on 18 which was helpful for 10 months, on 3/13/19 with Dr. Johnson which was helpful for 5 months, most recently as on 19 with Dr. Steen. He states he had the best pain relief after this injection, however it started to wear off sooner, in November.  He has some relief in a sauna. He has tried cryotherapy with some benefit, has alternated with chiropractic care in [place] with some benefit. Hx of neck pain as well, has had cervical epidural steroid injections with benefit in the past, last 8 years ago. He did work with a pain clinic in [place] for a period of time. The pain is located in bilateral low back, radiates into right buttock only. Numbness and tingling in bilateral hands. Subjective weakness only.   Pain description:  Severity/Intensity: 4/10 at best, 8/10 at worst, 5/10 on average  Aggravating factors include:  sitting  Relieving factors include: temporary relief with laying down,yoga, sauna, massage, acupuncture, chiropractic care      LEVEL OF FUNCTION AT START OF CARE  Walking tolerance: not limited due to back pain  Sitting tolerance: 15-20'  Standing tolerance: 15-20'  Yard work: not able to mow lawn or shovel   Sleep: wakes due to wrist pain    CURRENT / PREVIOUS INTERVENTION(S):   2. Physical Therapy: in 1990- [facility];  traction- [facility]; PNB with primary focus on strengthening for neck pain- [facility]  3. Pain Psychology: no  4. Surgery: no  5. Injections: right L4-5 transforaminal epidural steroid injection with Dr. Steen on 9/9/19- H for 2-3 months  Right L4-5 transforaminal epidural steroid injection with Dr. Johnson on 3/13/19- H for 5 months   Right L4-5 transforaminal epidural steroid injection with Dr. Johnson on 4/19/18- H for 10 months     Right L4-5 transforaminal epidural steroid injection with Dr. Hastings on 3/3/17- 9 months   x3 L3-4 epidural steroid injections 2016- [place]  6. Chiropractic: yes going on a regular basis, goes to [facility] in [place]- [facility], cryotherapy- [facility]  7. Acupuncture: yes- going on a regular basis it does help, last had in 6 weeks  8. TENS Unit: yes- [facility], short term  Prior Functional Level: No functional limitations prior to onset of chief complaint. History of being very physically active with football, wrestling, golf, snowmobiling, 4-wheeling    DEMOGRAPHICS  Employment Status: works as ; owner  Pertinent Medical  / Surgical History: Epic Snapshot Reviewed, See provider's note; narco;epsy, rotator cuff syndrome, AJAY, right ankle ORIF, anxiety  Imaging: conclusion: multilevel spondylosis significant findings as follows:        Patient's goals for physical therapy: learn how to manage back pain and be able to do daily activities such as golf, snowmobile, 4-wheel, mow lawn, rake leaves,      ===============================================================  OBJECTIVE:  POSTURE:  Observation: Patient demonstrates forward head, protracted shoulders and thoracic kyphosis in standing and sitting posture; anterior pelvic tilt in standing; posterior pelvic tilt with spinal flexion in sitting   GAIT, LOCOMOTION, and BALANCE:  Gait and Locomotion: normal  RANGE OF MOTION:   Cervical: next  Lumbar: AROM 75% WFL; pain with FB, BB and bilateral SB; restricted rotation L > R  Hips: right hip flexion limited at end range due to increased right side low back pain  MUSCLE PERFORMANCE:   Strength: demonstrates good core stability  Flexibility: tightness noted in lumbar psp and left piriformis, bilateral hip flexors  FUNCTIONAL TESTING/OBSERVATION: uses arms to support sitting and getting out of chair; difficulty with prone lying using 1-2 pillows; restricted segmental mobility at L-3  Pain behaviors: None    ===============================================================  Today's Treatment:  Initial evaluation  Therapeutic Activity:   For 30 minutes including instruction in sitting posture using lumbar roll or support at T-L junction.  Beginning education in body awareness and pacing of activities  Focus next session: review gym program/stretches; body scan and instruction in SL hip/shoulder glides; consider yoga back postural support; discussed benefits of Pain PT for body awareness and self care strategies and benefits of referral to [facility] at [facility]   ===============================================================  ASSESSMENT:  Physical Therapy Diagnosis:Impaired Posture and Impaired Muscle Performance    Patient requires PT intervention for the following impairments: Limited knowledge of condition and / or self care - inability to control symptoms, Impaired functional mobility, Impaired posture / muscle imbalance, Joint hypomobility, Pain and ROM limitations    Anticipated Goals and Expected Outcomes:  8  weeks  Patient will report the use of 2 self care practices during their day.  Patient will report the participation in 20-30 minutes of aerobic activity daily and practicing stretching daily.  Patient will demonstrate the ability to find core strength in neutral posture.  Patient will demonstrate the ability to relax muscle group before stretching.  16 weeks  Patient will be independent with a home exercise program.  Patient will be independent with posture correction.  Patient will report independence with a self care/flare management program.  Patient will demonstrate improved functional strength and endurance as reports by increased tolerance for IADLs and more consistent participation in daily activity.     Rehab potential for achieving goals: good.    ===============================================================  PLAN:   Patient will benefit from skilled physical therapy consisting of:  neuromuscular reeducation of: kinesthetic sense and posture for sitting and/or standing activities, education in self care / home management training to include instruction in: symptom control techniques, therapeutic activities to achieve improved functional performance in: daily actvities and therapeutic exercise to develop: strength and endurance, flexibility and core stability    Assessment will be ongoing with changes in treatment as indicated.  Benefits/risks/alternatives to treatment have been reviewed and the patient has been instructed to contact this office if they have any questions or concerns.  This plan of care has been discussed with the patient and the patient is in agreement.     Frequency / Duration:  Patient will be seen for a total of 2-4 visits; 8 weeks    Total Visit Time: 45  minutes            Kenya Calderon PT, PT                                      Date:  1/6/2020      =====================================================  **  Referring Provider Certification: Referring Provider reviewing certifies  that the above treatment / plan of care is required and authorized, and that the patient's plan will be reviewed every thirty (30) days **.   ======================================================     PRESENT:  NA    MULTIDISCIPLINARY PATIENT / FAMILY EDUCATION RECORD  Department:  Physical Therapy    Readiness to Learn: Ability to understand verbal instructions, Ability to understand written instructions, Knowledge of educational needs / treatment plan  Specific Barriers to Learning: None  Referrals: None  Learning Needs: Rehabilitation techniques to improve functional independence Pain management education to improve daily activity tolerance.  Who: Patient  How: Demonstration, Verbal instructions, Written instructions  Response: Appropriate verbal response, Asked questions, Demonstrated ability, Verbalized recall / understanding

## 2020-01-09 ENCOUNTER — TRANSFERRED RECORDS (OUTPATIENT)
Dept: HEALTH INFORMATION MANAGEMENT | Facility: CLINIC | Age: 50
End: 2020-01-09

## 2020-01-14 ENCOUNTER — OFFICE VISIT (OUTPATIENT)
Dept: URGENT CARE | Facility: URGENT CARE | Age: 50
End: 2020-01-14
Payer: COMMERCIAL

## 2020-01-14 ENCOUNTER — ANCILLARY PROCEDURE (OUTPATIENT)
Dept: GENERAL RADIOLOGY | Facility: CLINIC | Age: 50
End: 2020-01-14
Attending: FAMILY MEDICINE
Payer: COMMERCIAL

## 2020-01-14 VITALS
WEIGHT: 189 LBS | SYSTOLIC BLOOD PRESSURE: 122 MMHG | HEART RATE: 67 BPM | TEMPERATURE: 98.6 F | OXYGEN SATURATION: 100 % | BODY MASS INDEX: 27.91 KG/M2 | DIASTOLIC BLOOD PRESSURE: 68 MMHG

## 2020-01-14 DIAGNOSIS — R07.9 CHEST PAIN, UNSPECIFIED TYPE: Primary | ICD-10-CM

## 2020-01-14 PROCEDURE — 71101 X-RAY EXAM UNILAT RIBS/CHEST: CPT | Mod: LT

## 2020-01-14 PROCEDURE — 99214 OFFICE O/P EST MOD 30 MIN: CPT | Performed by: FAMILY MEDICINE

## 2020-01-14 RX ORDER — OSELTAMIVIR PHOSPHATE 75 MG/1
75 CAPSULE ORAL 2 TIMES DAILY
Qty: 10 CAPSULE | Refills: 0 | Status: SHIPPED | OUTPATIENT
Start: 2020-01-14 | End: 2020-01-14

## 2020-01-14 RX ORDER — OSELTAMIVIR PHOSPHATE 75 MG/1
75 CAPSULE ORAL 2 TIMES DAILY
Qty: 10 CAPSULE | Refills: 0 | Status: SHIPPED | OUTPATIENT
Start: 2020-01-14 | End: 2020-03-10

## 2020-01-14 RX ORDER — OXYCODONE AND ACETAMINOPHEN 5; 325 MG/1; MG/1
1 TABLET ORAL EVERY 6 HOURS PRN
Qty: 12 TABLET | Refills: 0 | Status: SHIPPED | OUTPATIENT
Start: 2020-01-14 | End: 2020-03-10
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
## 2020-01-16 ENCOUNTER — OFFICE VISIT (OUTPATIENT)
Dept: PSYCHOLOGY | Facility: CLINIC | Age: 50
End: 2020-01-16
Payer: COMMERCIAL

## 2020-01-16 ENCOUNTER — OFFICE VISIT (OUTPATIENT)
Dept: PALLIATIVE MEDICINE | Facility: CLINIC | Age: 50
End: 2020-01-16
Payer: COMMERCIAL

## 2020-01-16 DIAGNOSIS — F41.1 GAD (GENERALIZED ANXIETY DISORDER): Primary | ICD-10-CM

## 2020-01-16 DIAGNOSIS — M51.369 LUMBAR DEGENERATIVE DISC DISEASE: ICD-10-CM

## 2020-01-16 DIAGNOSIS — G89.4 CHRONIC PAIN SYNDROME: ICD-10-CM

## 2020-01-16 DIAGNOSIS — M54.16 LUMBAR RADICULOPATHY: Primary | ICD-10-CM

## 2020-01-16 DIAGNOSIS — M54.16 RIGHT LUMBAR RADICULOPATHY: ICD-10-CM

## 2020-01-16 PROCEDURE — 96158 HLTH BHV IVNTJ INDIV 1ST 30: CPT | Performed by: PSYCHOLOGIST

## 2020-01-16 PROCEDURE — 90847 FAMILY PSYTX W/PT 50 MIN: CPT | Performed by: MARRIAGE & FAMILY THERAPIST

## 2020-01-16 PROCEDURE — 96159 HLTH BHV IVNTJ INDIV EA ADDL: CPT | Performed by: PSYCHOLOGIST

## 2020-01-16 NOTE — TELEPHONE ENCOUNTER
Pt had his last MRI Lumbar spine done 11/03/16.  Pt would like to proceed with the LESI.     Routing to provider to see if an updated MRI needs to be ordered.    Asmita Mckeon RN  Care Coordinator  [facility]

## 2020-01-16 NOTE — TELEPHONE ENCOUNTER
No PA required, okay to schedule      Sheron MARTINEZ    [facility]
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
## 2020-01-16 NOTE — TELEPHONE ENCOUNTER
His MRI is just 2 months over the recommended time frame and his symptoms are the same as before previous injections. This is a repeat injection.  I do not think we need to order an MRI before this injection. I did discuss with Dr. Johnson who agrees.    FAIZAN Morrison [facility]
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
## 2020-01-17 ENCOUNTER — ANCILLARY PROCEDURE (OUTPATIENT)
Dept: GENERAL RADIOLOGY | Facility: CLINIC | Age: 50
End: 2020-01-17
Attending: PHYSICAL MEDICINE & REHABILITATION
Payer: COMMERCIAL

## 2020-01-17 ENCOUNTER — RADIOLOGY INJECTION OFFICE VISIT (OUTPATIENT)
Dept: PALLIATIVE MEDICINE | Facility: CLINIC | Age: 50
End: 2020-01-17
Payer: COMMERCIAL

## 2020-01-17 ENCOUNTER — TELEPHONE (OUTPATIENT)
Dept: PSYCHIATRY | Facility: CLINIC | Age: 50
End: 2020-01-17

## 2020-01-17 VITALS — SYSTOLIC BLOOD PRESSURE: 126 MMHG | DIASTOLIC BLOOD PRESSURE: 76 MMHG | OXYGEN SATURATION: 97 % | HEART RATE: 64 BPM

## 2020-01-17 DIAGNOSIS — M54.16 LUMBAR RADICULOPATHY: ICD-10-CM

## 2020-01-17 DIAGNOSIS — F41.1 GAD (GENERALIZED ANXIETY DISORDER): ICD-10-CM

## 2020-01-17 DIAGNOSIS — M54.16 LUMBAR RADICULOPATHY: Primary | ICD-10-CM

## 2020-01-17 PROCEDURE — 64483 NJX AA&/STRD TFRM EPI L/S 1: CPT | Mod: RT | Performed by: PHYSICAL MEDICINE & REHABILITATION

## 2020-01-17 RX ORDER — PROPRANOLOL HYDROCHLORIDE 20 MG/1
20 TABLET ORAL 3 TIMES DAILY PRN
Qty: 60 TABLET | Refills: 1 | OUTPATIENT
Start: 2020-01-17

## 2020-01-17 NOTE — TELEPHONE ENCOUNTER
Reason for call:  Medication   If this is a refill request, has the caller requested the refill from the pharmacy already? Yes  Will the patient be using a [facility]? No  Name of the pharmacy and phone number for the current request:   [facility]/pharmacy #[id number] - [place], [place] - [address]  KNOB [phone] (Phone)  [phone] (Fax)       Name of the medication requested: Propanolol    Other request: Pt reports he is going out of town for a week and needs it filled right away.    Phone number to reach patient:  Cell number on file:    Telephone Information:   Mobile [phone]       Best Time:  Anytime    Can we leave a detailed message on this number?  YES

## 2020-01-17 NOTE — TELEPHONE ENCOUNTER
Injection scheduled for 01/17/20. Closing    Shelley ROMERON, RN Care Coordinator  [facility]  Pain [facility]

## 2020-01-17 NOTE — TELEPHONE ENCOUNTER
Refill for: propranolol (INDERAL) 20 MG tablet    Last Appointment: 9/20/19    Last Refill in Epic (date and amount/how many days):    Disp Refills Start End CLAY   propranolol (INDERAL) 20 MG tablet 60 tablet 1 9/20/2019  --   Sig - Route: Take 1 tablet (20 mg) by mouth 3 times daily as needed (anxiety) - Oral     Last office visit note reviewed and summarized below:  Treatment Plan:    Start Inderal (propranolol) 20 mg by mouth up to 3 times per day as needed for anxiety and panic.     Continue Prozac (fluoxetine) 20 mg by mouth daily for mood and anxiety. May consider dose increase if needed.     Continue Nuvigil (armodafinil) and Adderall (amphetamine salts) per sleep specialists.     Continue all other medications as reviewed per electronic medical record today.     Continue therapy as planned.    Thank you for our work together in the Psychiatry Collaborative Care Model at [facility]. This is our last visit and I am returning your care back to your Primary Care Provider Wes Braga PA-C . If you are not doing well, please contact your Primary Care Provider office.    Patient was returned to PCP for medication management and refills at last visit on 9/20/19.  Routing refill request to PCP for review.    Macy Moore RN  01/17/20  2:29 PM

## 2020-01-17 NOTE — PROGRESS NOTES
[facility] - Procedure Note    Date of Service: 1/17/2020    Procedure performed: Right L4-5 transforaminal epidural steroid injection with fluoroscopic guidance  Diagnosis: Lumbar spondylosis; Lumbar radiculitis/radiculopathy  : Julitea Steen MD  Anesthesia: none  Complications: None    Indications: Marcel Melchor is a 49 year old male who is seen at the request of FAIZAN Good CNP for lumbar transforaminal epidural steroid injection. The patient describes axial low back and right buttock pain. The patient has been exhibiting symptoms consistent with lumbar intraspinal inflammation and radiculopathy. Symptoms have been persistent, disabling, and intermittently severe. The patient reports minimal improvement with conservative treatment, including PT and medications.    This is a repeat injection.  Previous Right L4-5 TFESI done by Dr. Johnson on 4/19/2018 which provided good pain relief for 10 months, 3/13/2019 which provided about 5 month of relief and 9/9/2019 by myself which provided about 3 months of relief.       Lumbar MRI was done on 11/3/2016 which showed       Allergies:      Allergies   Allergen Reactions     Amoxicillin      itching        Vitals:  /76   Pulse 64   SpO2 97%     Review of Systems: The patient denies recent fever, chills, illness, use of antibiotics or anticoagulants. All other 10-point review of systems negative.     Procedure: The procedure and risks were explained, and informed written consent was obtained from the patient. Risks include but are not limited to: infection, bleeding, increased pain, and damage to soft tissue, nerve, muscle, and vasculature structures. After getting informed consent, patient was brought into the procedure suite and was placed in a prone position on the procedure table. A Pause for the Cause was performed. Patient was prepped and draped in sterile fashion.     After identifying the right L4  neuroforamen, the C-arm was rotated to a right lateral oblique angle.  A total of 3 mL of Lidocaine 1% was used to anesthetize the skin and the needle track at a skin entry site coaxial with the fluoroscopy beam, and overriding the superior aspect of the neuroforamen.  A 22 gauge 5 inch spinal needle was advanced under intermittent fluoroscopy until it entered the foramen superiorly.    The position was then inspected from anteroposterior and lateral views, and the needle adjusted appropriately.  After negative aspiration, a total of 0.5 mL of Omnipaque-300 was injected using static and continuous fluoroscopy confirming appropriate position, with spread along the nerve root sheath and into the epidural space, with no intravascular or intrathecal uptake. 9.5 mL of Omnipaque-300 was wasted.    2mL of 1% lidocaine with 10 mg of dexamethasone was injected.  The needle was removed. Hemostasis was achieved, the area was cleaned, and bandaids were placed when appropriate. Images were saved to PACS.    The patient tolerated the procedure well, and was taken to the recovery room, and there was no evidence of procedural complications. No new sensory or motor deficits were noted following the procedure. The patient was stable and able to ambulate on discharge home. Post-procedure instructions were provided.     Pre-procedure pain score: 7/10 in the back, 0/10 in the leg  Post-procedure pain score: 4/10 in the back, 2/10 in the leg    Assessment/Plan: Marcel Melchor is a 49 year old male s/p right L4-5 transforaminal epidural steroid injection today for lumbar spondylosis, radiculitis/radiculopathy.     1. Following today's procedure, the patient was advised to contact the [facility] for any of the following:   Fever, chills, or night sweats   New onset of pain, numbness, or weakness   Any questions/concerns regarding the procedure  If unable to contact the Pain Center, the patient was instructed to go to  a local Emergency Room for any complications.   2. The patient will receive a follow-up call in 1 week.  3. The patient should follow-up with the referring provider in 2 weeks for post-procedure evaluation.      Julieta Steen MD   [facility] [place]

## 2020-01-17 NOTE — PATIENT INSTRUCTIONS
[facility] Procedure Discharge Instructions    Today you saw:  Dr. Julieta Steen      Your procedure:  Epidural steroid injection      Medications used:  Lidocaine (anesthetic)   Dexamethasone (steroid)   Omnipaque (contrast)             Be cautious when walking as numbness and/or weakness in the legs may occur up to 6-8 hours after the procedure due to effect of the local anesthetic    Do not drive for 6 hours. The effect of the local anesthetic could slow your reflexes.     Avoid strenuous activity for the first 24 hours. You may resume your regular activities after that.     You may shower, however avoid swimming, tub baths or hot tubs for 24 hours following your procedure    You may have a mild to moderate increase in pain for several days following the injection.      You may use ice packs for 10-15 minutes, 3 to 4 times a day at the injection site for comfort    Do not use heat to painful areas for 6 to 8 hours. This will give the local anesthetic time to wear off and prevent you from accidentally burning your skin.    Unless you have been directed to avoid the use of anti-inflammatory medications (NSAIDS-ibuprofen, Aleve, Motrin), you may use these medications or Tylenol for pain control if needed.     With diabetes, check your blood sugar more frequently than usual as your blood sugar may be higher than normal for 10-14 days following a steroid injection. Contact your doctor who manages your diabetes if your blood sugar is higher than usual    Possible side effects of steroids that you may experience include flushing, elevated blood pressure, increased appetite, mild headaches and restlessness.  All of these symptoms will get better with time.    It may take up to 14 days for the steroid medication to start working although you may feel the effect as early as a few days after the procedure.     Follow up with your referring provider in 2-3 weeks      If you experience any of the following,  call the pain center line during work hours at [phone] or on-call physician after hours at [phone]:  -Fever over 100 degree F  -Swelling, bleeding, redness, drainage, warmth at the injection site  -Progressive weakness or numbness in your legs   -Loss of bowel or bladder function  -Unusual headache that is not relieved by Tylenol or your regular headache medication  -Unusual new onset of pain that is not improving

## 2020-01-17 NOTE — NURSING NOTE
Pre-procedure Intake    Have you been fasting? NA    If yes, for how long?     Are you taking a prescribed blood thinner such as coumadin, Plavix, Xarelto?    No    If yes, when did you take your last dose?     Do you take aspirin?  No    If cervical procedure, have you held aspirin for 6 days?   NA    Do you have any allergies to contrast dye, iodine, steroid and/or numbing medications?  NO    Are you currently taking antibiotics or have an active infection?  NO    Have you had a fever/elevated temperature within the past week? NO    Are you currently taking oral steroids? NO    Do you have a ? Yes       Are you pregnant or breastfeeding?  NA  Are the vital signs normal?  Yes

## 2020-01-17 NOTE — NURSING NOTE
Discharge Information    IV Discontiued Time:  NA    Amount of Fluid Infused:  NA    Discharge Criteria = When patient returns to baseline or as per MD order    Consciousness:  Pt is fully awake    Circulation:  BP +/- 20% of pre-procedure level    Respiration:  Patient is able to breathe deeply    O2 Sat:  Patient is able to maintain O2 Sat >92% on room air    Activity:  Moves 4 extremities on command    Ambulation:  Patient is able to stand and walk or stand and pivot into wheelchair    Dressing:  Clean/dry or No Dressing    Notes:   Discharge instructions and AVS given to patient    Patient meets criteria for discharge?  YES    Admitted to PCU?  No    Responsible adult present to accompany patient home?  Yes    Signature/Title:    Shelley Holcomb RN  RN Care Coordinator  [facility]

## 2020-01-20 RX ORDER — PROPRANOLOL HYDROCHLORIDE 20 MG/1
20 TABLET ORAL 3 TIMES DAILY PRN
Qty: 60 TABLET | Refills: 3 | Status: SHIPPED | OUTPATIENT
Start: 2020-01-20 | End: 2020-08-12

## 2020-01-20 ASSESSMENT — ANXIETY QUESTIONNAIRES
1. FEELING NERVOUS, ANXIOUS, OR ON EDGE: NOT AT ALL
GAD7 TOTAL SCORE: 1
6. BECOMING EASILY ANNOYED OR IRRITABLE: SEVERAL DAYS
IF YOU CHECKED OFF ANY PROBLEMS ON THIS QUESTIONNAIRE, HOW DIFFICULT HAVE THESE PROBLEMS MADE IT FOR YOU TO DO YOUR WORK, TAKE CARE OF THINGS AT HOME, OR GET ALONG WITH OTHER PEOPLE: NOT DIFFICULT AT ALL
7. FEELING AFRAID AS IF SOMETHING AWFUL MIGHT HAPPEN: NOT AT ALL
2. NOT BEING ABLE TO STOP OR CONTROL WORRYING: NOT AT ALL
5. BEING SO RESTLESS THAT IT IS HARD TO SIT STILL: NOT AT ALL
3. WORRYING TOO MUCH ABOUT DIFFERENT THINGS: NOT AT ALL

## 2020-01-20 ASSESSMENT — PATIENT HEALTH QUESTIONNAIRE - PHQ9: 5. POOR APPETITE OR OVEREATING: NOT AT ALL

## 2020-01-20 NOTE — TELEPHONE ENCOUNTER
"Propranolol 20mg  Last Written Prescription Date:  9/20/2019  Last Fill Quantity: 60,  # refills: 1   Last office visit: 9/20/2019 with prescribing provider:     Future Office Visit:   Next 5 appointments (look out 90 days)    Jan 27, 2020  8:45 AM CST  Return Visit with Kenya Calderon PT  [facility] ([facility]) [address]  [address]  [facility] [address]  [phone]   Jan 29, 2020 10:00 AM CST  [facility] [address] [place] [address] [address]  [facility] [address]  [phone]   Jan 31, 2020  9:00 AM CST  Return Visit with FAIZAN Luke CNP  [facility] ([facility]) [address]  [address]  [facility] [address]  [phone]   Feb 04, 2020  1:00 PM CST  [facility] Visit with Trinidad Solorio  [facility]) [address]  [facility] [address]  [phone]   Feb 12, 2020 10:00 AM CST  [facility] Visit with Isis Estrella  [facility] [address]  [facility] [address]  [phone]         Requested Prescriptions   Pending Prescriptions Disp Refills     propranolol (INDERAL) 20 MG tablet 60 tablet 1     Sig: Take 1 tablet (20 mg) by mouth 3 times daily as needed (anxiety)       Beta-Blockers Protocol Passed - 1/20/2020 10:10 AM        Passed - Blood pressure under 140/90 in past 12 months     BP Readings from Last 3 Encounters:   01/17/20 126/76   01/14/20 122/68   12/31/19 126/75                 Passed - Patient is age 6 or older        Passed - Recent (12 mo) or future (30 days) visit within the authorizing provider's specialty     Patient has had an office visit with the authorizing provider or a provider within the authorizing providers department within the previous 12 mos or has a future within next 30 days. See \"Patient Info\" tab in inbasket, or \"Choose " "Columns\" in Meds & Orders section of the refill encounter.              Passed - Medication is active on med list        Prescription approved per [facility] Refill Protocol.  Penny Iverson RN  "
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
## 2020-01-20 NOTE — TELEPHONE ENCOUNTER
Is this actually a refill request?  I can't tell.  There is certainly a lot of information here but all I see is the info for the September fill.    Can we get one t'd up if needed please?      Thanks,  Wes

## 2020-01-21 ASSESSMENT — ANXIETY QUESTIONNAIRES: GAD7 TOTAL SCORE: 1

## 2020-01-27 ENCOUNTER — THERAPY VISIT (OUTPATIENT)
Dept: CHIROPRACTIC MEDICINE | Facility: CLINIC | Age: 50
End: 2020-01-27
Payer: COMMERCIAL

## 2020-01-27 ENCOUNTER — OFFICE VISIT (OUTPATIENT)
Dept: PALLIATIVE MEDICINE | Facility: CLINIC | Age: 50
End: 2020-01-27
Payer: COMMERCIAL

## 2020-01-27 DIAGNOSIS — M54.16 LUMBAR RADICULOPATHY: ICD-10-CM

## 2020-01-27 DIAGNOSIS — M99.03 SOMATIC DYSFUNCTION OF LUMBAR REGION: Primary | ICD-10-CM

## 2020-01-27 DIAGNOSIS — M54.16 LUMBAR RADICULOPATHY: Primary | ICD-10-CM

## 2020-01-27 DIAGNOSIS — M54.6 PAIN IN THORACIC SPINE: ICD-10-CM

## 2020-01-27 DIAGNOSIS — M51.369 LUMBAR DEGENERATIVE DISC DISEASE: ICD-10-CM

## 2020-01-27 DIAGNOSIS — M54.50 CHRONIC BILATERAL LOW BACK PAIN WITHOUT SCIATICA: ICD-10-CM

## 2020-01-27 DIAGNOSIS — G89.4 CHRONIC PAIN SYNDROME: ICD-10-CM

## 2020-01-27 DIAGNOSIS — G89.29 CHRONIC BILATERAL LOW BACK PAIN WITHOUT SCIATICA: ICD-10-CM

## 2020-01-27 DIAGNOSIS — M54.2 CERVICALGIA: ICD-10-CM

## 2020-01-27 DIAGNOSIS — M99.02 THORACIC SEGMENT DYSFUNCTION: ICD-10-CM

## 2020-01-27 DIAGNOSIS — M99.04 SOMATIC DYSFUNCTION OF SACRAL REGION: ICD-10-CM

## 2020-01-27 PROCEDURE — 98941 CHIROPRACT MANJ 3-4 REGIONS: CPT | Mod: AT | Performed by: CHIROPRACTOR

## 2020-01-27 PROCEDURE — 97530 THERAPEUTIC ACTIVITIES: CPT | Mod: GP | Performed by: PHYSICAL THERAPIST

## 2020-01-27 PROCEDURE — 99203 OFFICE O/P NEW LOW 30 MIN: CPT | Mod: 25 | Performed by: CHIROPRACTOR

## 2020-01-27 PROCEDURE — 97810 ACUP 1/> WO ESTIM 1ST 15 MIN: CPT | Performed by: CHIROPRACTOR
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
## 2020-01-29 ENCOUNTER — OFFICE VISIT (OUTPATIENT)
Dept: PSYCHOLOGY | Facility: CLINIC | Age: 50
End: 2020-01-29
Payer: COMMERCIAL

## 2020-01-29 DIAGNOSIS — F41.1 GAD (GENERALIZED ANXIETY DISORDER): Primary | ICD-10-CM

## 2020-01-29 PROCEDURE — 90834 PSYTX W PT 45 MINUTES: CPT | Performed by: SOCIAL WORKER

## 2020-01-29 ASSESSMENT — ANXIETY QUESTIONNAIRES
1. FEELING NERVOUS, ANXIOUS, OR ON EDGE: NOT AT ALL
5. BEING SO RESTLESS THAT IT IS HARD TO SIT STILL: SEVERAL DAYS
7. FEELING AFRAID AS IF SOMETHING AWFUL MIGHT HAPPEN: NOT AT ALL
IF YOU CHECKED OFF ANY PROBLEMS ON THIS QUESTIONNAIRE, HOW DIFFICULT HAVE THESE PROBLEMS MADE IT FOR YOU TO DO YOUR WORK, TAKE CARE OF THINGS AT HOME, OR GET ALONG WITH OTHER PEOPLE: NOT DIFFICULT AT ALL
3. WORRYING TOO MUCH ABOUT DIFFERENT THINGS: NOT AT ALL
GAD7 TOTAL SCORE: 2
2. NOT BEING ABLE TO STOP OR CONTROL WORRYING: NOT AT ALL
6. BECOMING EASILY ANNOYED OR IRRITABLE: NOT AT ALL

## 2020-01-29 ASSESSMENT — PATIENT HEALTH QUESTIONNAIRE - PHQ9: 5. POOR APPETITE OR OVEREATING: SEVERAL DAYS
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
## 2020-01-30 ASSESSMENT — ANXIETY QUESTIONNAIRES: GAD7 TOTAL SCORE: 2
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
## 2020-02-03 ENCOUNTER — OFFICE VISIT (OUTPATIENT)
Dept: PALLIATIVE MEDICINE | Facility: CLINIC | Age: 50
End: 2020-02-03
Payer: COMMERCIAL

## 2020-02-03 VITALS — HEART RATE: 62 BPM | DIASTOLIC BLOOD PRESSURE: 73 MMHG | SYSTOLIC BLOOD PRESSURE: 126 MMHG | OXYGEN SATURATION: 99 %

## 2020-02-03 DIAGNOSIS — M54.16 LUMBAR RADICULOPATHY: Primary | ICD-10-CM

## 2020-02-03 PROCEDURE — 99214 OFFICE O/P EST MOD 30 MIN: CPT | Performed by: NURSE PRACTITIONER

## 2020-02-03 RX ORDER — CELECOXIB 100 MG/1
100 CAPSULE ORAL 2 TIMES DAILY PRN
Qty: 45 CAPSULE | Refills: 0 | Status: SHIPPED | OUTPATIENT
Start: 2020-02-03 | End: 2020-08-12

## 2020-02-03 ASSESSMENT — PAIN SCALES - GENERAL: PAINLEVEL: MODERATE PAIN (4)

## 2020-02-03 NOTE — PATIENT INSTRUCTIONS
1.  Pain Physical Therapy:     YES   Continue to see FINESSE Zambrano as recommended.    2.  Pain Psychologist to address relaxation, behavioral change, coping style, and other factors important to improvement.     YES  Continue to see Elisa Chery PsyD, LP as recommended.    3.  Medication Management:     1.  Prescription for Celebrex today, okay to take one capsule up to twice daily as needed with food. This would only be for 1-2 weeks as you continue to recover. Run this past GI specialist prior to starting.    2. Continue topicals.   4.  Potential procedures: continue to monitor benefit from injection.     5.  Referrals: continue chiropractic care and acupuncture as able.    6.  Follow up with FAIZAN Morrison CNP in 6 weeks.       ----------------------------------------------------------------  Clinic Number:  [phone]     Call with any questions about your care and for scheduling assistance.     Calls are returned Monday through Friday between 8 AM and 4:30 PM. We usually get back to you within 2 business days depending on the issue/request.    If we are prescribing your medications:    For opioid medication refills, call the clinic or send a Ebook Glue message 7 days in advance.  Please include:    Name of requested medication    Name of the pharmacy.    For non-opioid medications, call your pharmacy directly to request a refill. Please allow 3-4 days to be processed.     Per [place] State Law:    All controlled substance prescriptions must be filled within 30 days of being written.      For those controlled substances allowing refills, pickup must occur within 30 days of last fill.      We believe regular attendance is key to your success in our program!      Any time you are unable to keep your appointment we ask that you call us at least 24 hours in advance to cancel.This will allow us to offer the appointment time to another patient.     Multiple missed appointments may lead to dismissal from the clinic.

## 2020-02-04 ENCOUNTER — OFFICE VISIT (OUTPATIENT)
Dept: PSYCHOLOGY | Facility: CLINIC | Age: 50
End: 2020-02-04
Payer: COMMERCIAL

## 2020-02-04 DIAGNOSIS — F41.1 GAD (GENERALIZED ANXIETY DISORDER): Primary | ICD-10-CM

## 2020-02-04 PROCEDURE — 90847 FAMILY PSYTX W/PT 50 MIN: CPT | Performed by: MARRIAGE & FAMILY THERAPIST

## 2020-02-05 ASSESSMENT — ANXIETY QUESTIONNAIRES
GAD7 TOTAL SCORE: 0
7. FEELING AFRAID AS IF SOMETHING AWFUL MIGHT HAPPEN: NOT AT ALL
5. BEING SO RESTLESS THAT IT IS HARD TO SIT STILL: NOT AT ALL
2. NOT BEING ABLE TO STOP OR CONTROL WORRYING: NOT AT ALL
1. FEELING NERVOUS, ANXIOUS, OR ON EDGE: NOT AT ALL
IF YOU CHECKED OFF ANY PROBLEMS ON THIS QUESTIONNAIRE, HOW DIFFICULT HAVE THESE PROBLEMS MADE IT FOR YOU TO DO YOUR WORK, TAKE CARE OF THINGS AT HOME, OR GET ALONG WITH OTHER PEOPLE: NOT DIFFICULT AT ALL
6. BECOMING EASILY ANNOYED OR IRRITABLE: NOT AT ALL
3. WORRYING TOO MUCH ABOUT DIFFERENT THINGS: NOT AT ALL

## 2020-02-05 ASSESSMENT — PATIENT HEALTH QUESTIONNAIRE - PHQ9: 5. POOR APPETITE OR OVEREATING: NOT AT ALL

## 2020-02-06 ASSESSMENT — ANXIETY QUESTIONNAIRES: GAD7 TOTAL SCORE: 0

## 2020-02-06 NOTE — PROGRESS NOTES
Progress Note    Client Name: Marcel NG Tutewohl  Date: 2/4/2020       Service Type: Couple  Video Visit: No      Session Start Time: 3:00  Session End Time: 3:45      Session Length: 45 min     Session #: 8    Attendees: Client and Spouse / Significant Other     Treatment Plan Last Reviewed: 12/19/2019  PHQ-9 / ASUNCION-7 : Each session    DATA  Interactive Complexity: No  Crisis: No        Progress Since Last Session (Related to Symptoms / Goals / Homework):   Symptoms: Improving less conflict    Homework: Partially completed       Episode of Care Goals: Minimal progress - ACTION (Actively working towards change); Intervened by reinforcing change plan / affirming steps taken     Current / Ongoing Stressors and Concerns:  Couple have had a rough week with Ermelinda being overwhelmed with recent events at their home. 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 She complains that Marcel has not been coming home by 5 although before when he was, she didn't seem to be eager to spend time with him. Ermelinda says it's hard for her to ask for what she needs from him and/or talk about things related to the girls because Marcel's approach is very pragmatic and unemotional. She ends up feeling like it's not helpful. Therapist encouraged Marcel to be more of a listener and valid ator with Ermelinda when she needs to process. He doesn't always see the logic behind expressing feelings but therapist encourages him to play a supportive role this way. He wants to be able to help Ermelinda and this is a way he can be there for her. Ermelinda also needs to be more willing to ask for help as she ends up not and then expresses anger at Marcel which he is blind-sided by.      Treatment Objective(s) Addressed in This Session:   client to learn how to be more emotionally  available to wife     Intervention:   Strategic couples counseling.     ASSESSMENT: Current Emotional / Mental Status (status of significant symptoms):   Risk status (Self / Other harm or suicidal ideation)   Client denies current fears or concerns for personal safety.   Client denies current or recent suicidal ideation or behaviors.   Client denies current or recent homicidal ideation or behaviors.   Client denies current or recent self injurious behavior or ideation.   Client denies other safety concerns.   Client Patient reports there has been no change in risk factors since their last session.     Client Patient reports there has been no change in protective factors since their last session.     Recommended that patient call 911 or go to the local ED should there be a change in any of these risk factors.     Appearance:   Appropriate    Eye Contact:   Good    Psychomotor Behavior: Normal    Attitude:   Cooperative    Orientation:   All   Speech    Rate / Production: Normal     Volume:  Normal    Mood:    Normal    Affect:    Appropriate     Thought Content:  Clear    Thought Form:  Coherent  Logical    Insight:    Good      Medication Review:   No changes to current psychiatric medication(s)     Medication Compliance:   Yes     Changes in Health Issues:   None reported     Chemical Use Review:   Substance Use: Chemical use reviewed, no active concerns identified      Tobacco Use: No current tobacco use.      Diagnosis:  ASUNCION     Collateral Reports Completed:   Not Applicable    PLAN: (Client Tasks / Therapist Tasks / Other)  Homework: Recommit to weekly business meetings. Incorporate date time and Ermelinda to work on asking for what she needs.        DOT Guardado      _______________________________________________________________________                                                   Treatment Plan    Client's Name: Marcel Melchor  YOB: 1970    Date: 12/19/2019    DSM-V Diagnoses:  300.02 - Generalized Anxiety Disorder By History; V71.09 - No Diagnosis  Psychosocial / Contextual Factors: work stress, marital discord  WHODAS: See Chart    Referral / Collaboration:  Referral to another professional/service is not indicated at this time..    Anticipated number of session or this episode of care: 30      MeasurableTreatment Goal(s) related to diagnosis / functional impairment(s)  Goal 1: Client will lower his GAD7 score to 2 or below    I will know I've met my goal when I'm less anxious and angry especially in marriage.      Objective #A (Client Action)    Client will practice the use of timeouts.  Status: Continued - Date(s):12/19/2019     Intervention(s)  Therapist will teach assertiveness skills. and how to time out in a functional manner..    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  Status: Continued - Date(s):12/19/2019     Intervention(s)  Therapist will assign homework have weekly work/status meetings..    Objective #C  Client will better understand and seek his wife's forgiveness..  Status: Continued - Date(s):12/19/2019     Intervention(s)  Therapist will help client in this process..      Patient has reviewed and agreed to the above plan.      Trinidad Solorio  December 19, 2019

## 2020-02-12 ENCOUNTER — OFFICE VISIT (OUTPATIENT)
Dept: PSYCHOLOGY | Facility: CLINIC | Age: 50
End: 2020-02-12
Payer: COMMERCIAL

## 2020-02-12 DIAGNOSIS — F41.1 GAD (GENERALIZED ANXIETY DISORDER): Primary | ICD-10-CM

## 2020-02-12 PROCEDURE — 90834 PSYTX W PT 45 MINUTES: CPT | Performed by: SOCIAL WORKER

## 2020-02-12 ASSESSMENT — ANXIETY QUESTIONNAIRES
IF YOU CHECKED OFF ANY PROBLEMS ON THIS QUESTIONNAIRE, HOW DIFFICULT HAVE THESE PROBLEMS MADE IT FOR YOU TO DO YOUR WORK, TAKE CARE OF THINGS AT HOME, OR GET ALONG WITH OTHER PEOPLE: NOT DIFFICULT AT ALL
2. NOT BEING ABLE TO STOP OR CONTROL WORRYING: NOT AT ALL
3. WORRYING TOO MUCH ABOUT DIFFERENT THINGS: NOT AT ALL
7. FEELING AFRAID AS IF SOMETHING AWFUL MIGHT HAPPEN: NOT AT ALL
GAD7 TOTAL SCORE: 1
1. FEELING NERVOUS, ANXIOUS, OR ON EDGE: SEVERAL DAYS
6. BECOMING EASILY ANNOYED OR IRRITABLE: NOT AT ALL
5. BEING SO RESTLESS THAT IT IS HARD TO SIT STILL: NOT AT ALL

## 2020-02-12 ASSESSMENT — PATIENT HEALTH QUESTIONNAIRE - PHQ9: 5. POOR APPETITE OR OVEREATING: NOT AT ALL

## 2020-02-12 NOTE — PROGRESS NOTES
Progress Note    Client Name: Marcel Melchor  Date: 2/12/2020       Service Type: Individual  Video Visit: No      Session Start Time: 10:00  Session End Time: 10:45      Session Length: 45 min     Session #: 27    Attendees: Client attended alone     Treatment Plan Last Reviewed: 7/30/2018; 11/28/2018; 5/6/2019; 8/28/2019;11/18/2019; will review at next session, complete [place]  PHQ-9 / ASUNCION-7 : 2/12/2020    DATA  Interactive Complexity: No  Crisis: No        Progress Since Last Session (Related to Symptoms / Goals / Homework):   Symptoms: Worsening increased irritability    Homework: Achieved / completed to satisfaction client reported challenging assumptions about others in conflict      Episode of Care Goals: Satisfactory progress - ACTION (Actively working towards change); Intervened by reinforcing change plan / affirming steps taken     Current / Ongoing Stressors and Concerns:   Ongoing: The client reports receiving feedback from others that he is irritable and difficult to work with. The client reports he may get irritable due to anxiety.  Current: The client reported that things had generally been going well but that he had a frustrating time while visiting with his father-in-law in [place]. He explained that when he took a nap while others were also sleeping, he was woken up before he was ready and despite a note he had posted on his door. He stated that he was angry for being woken 20 minutes early to go to dinner as he felt his boundaries were not respected, resulting in him staying home from dinner out of anger. 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 Client described an interaction and how he didn't understand his employees decision making. He stated that generally he gets mad when someone else seems to make an illogical decision that doesn't align with his own thinking. Client explored whether there would be value  or reason to increase his cognitive flexibility with others. Identified how client struggles to understand when someone else's emotions impacts their decision making.         Treatment Objective(s) Addressed in This Session:   learn & utilize at least 3 assertive communication skills weekly       Intervention:   DBT: Introduced wise mind and how client can identify when he is only valuing his reasonable mind, practice giving value to his emotion mind.   CBT: reviewed cognitive flexibility versus rigidity, the pros and cons of each and whether client would benefit from having flexible thinking  Motivational Interviewing     MI Intervention: Expressed Empathy/Understanding, Supported Autonomy, Collaboration, Evocation, Permission to raise concern or advise, Open-ended questions, Rolled with resistance: Emphasized patient autonomy, Simple reflection, Complex reflection, Double-sided reflection: (sustain talk) My way is the right way and yet my way doesn't always work  (change talk), Reframed sustain talk in the direction of change and Evoked patient agenda, Change talk (evoked) and Reframe     Change Talk Expressed by the Patient: Reasons to change Committment to change Activation    Provider Response to Change Talk: E - Evoked more info from patient about behavior change, A - Affirmed patient's thoughts, decisions, or attempts at behavior change, R - Reflected patient's change talk and S - Summarized patient's change talk statements          ASSESSMENT: Current Emotional / Mental Status (status of significant symptoms):   Risk status (Self / Other harm or suicidal ideation)   Client denies current fears or concerns for personal safety.   Client denies current or recent suicidal ideation or behaviors.   Client denies current or recent homicidal ideation or behaviors.   Client denies current or recent self injurious behavior or ideation.   Client denies other safety concerns.   Client reports there has been a change in  risk factors since their last session.  increased irritability   Client reports there has been no change in protective factors since their last session.     Recommended that patient call 911 or go to the local ED should there be a change in any of these risk factors.     Appearance:   Appropriate    Eye Contact:   Good    Psychomotor Behavior: Tense Client reported having back pain   Attitude:   Cooperative    Orientation:   All   Speech    Rate / Production: Hyperverbal   Client was talkative    Volume:  Normal    Mood:    Irritable  client presented with irritability describing recent conflicts   Affect:    Appropriate     Thought Content:  Clear    Thought Form:  Coherent  Logical    Insight:    Poor      Medication Review:   No changes to current psychiatric medication(s)     Medication Compliance:   Yes     Changes in Health Issues:   None reported     Chemical Use Review:   Substance Use: Chemical use reviewed, no active concerns identified      Tobacco Use: No current tobacco use.      Diagnosis:  1. ASUNCION (generalized anxiety disorder)       Collateral Reports Completed:   Not Applicable    PLAN: (Client Tasks / Therapist Tasks / Other)  Client will consider whether his relationships would benefit from increased cognitive flexibility, identify when he is not in wise mind and return for therapy in two weeks.        Isis BELL, [facility] 2/12/2020  Note reviewed and clinical supervision by ARABELLA Cuba [facility] 2/24/2020    ________________________________________________________________________    Treatment Plan    Client's Name: Marcel Melchor  YOB: 1970    Date: 7/30/2108; 11/28/2018; 5/6/2019; 8/28/2019; 11/18/2019    DSM-V Diagnoses: 300.02 (F41.1) Generalized Anxiety Disorder   Psychosocial / Contextual Factors: client reported continued conflict in family and struggling with continued restlessness and sense of urgency  WHODAS: see intake    Referral / Collaboration:  Referral to  another professional/service is not indicated at this time..    Anticipated number of session or this episode of care: 5-8      MeasurableTreatment Goal(s) related to diagnosis / functional impairment(s)  Goal 1: Client will continue reducing symptoms of anxiety and irritability as evidenced by ASUNCION-7 remaining from 5 to 0 over the next four months and client reporting increased mindfulness in communication.    I will know I've met my goal when I don't have recurring issues with irritability.      Objective #A (Client Action)    Client will use cognitive strategies identified in therapy to challenge anxious thoughts.  Status: Continued - Date(s): 11/28/2019    Intervention(s)  Therapist will assign homework to challenge distorted thinking  teach CBT skills.    Objective #B  Client will identify 3 initial signs or symptoms of anxiety.  Status: Continued - Date(s):  11/28/2019    Intervention(s)  Therapist will teach emotional recognition/identification. DBT.    Objective #C  Client will use relaxation strategies 3 times per day to reduce the physical symptoms of anxiety.  Status: Continued - Date(s): 11/28/2019     Intervention(s)  Therapist will assign homework to use mindfulness  teach mindfulness skills.      Goal 2: Client will improve ability to communicate effectively with others as evidenced by client reporting use of 3-4 new communications skills on a weekly basis.    I will know I've met my goal when I can feel open to being genuine and have feelings come out.      Objective #A (Client Action)    Status: Continued - Date(s): 11/28/2019     Client will learn & utilize at least 3 assertive communication skills weekly.    Intervention(s)  Therapist will assign homework to practice communication skills  teach assertiveness skills. EILEEN FONTAINE.    Objective #B  Client will pause and use skills before talking when irritable.    Status: Continued - Date(s):  11/28/2019     Intervention(s)  Therapist will teach  mindfulness skills.    Objective #C  Client will identify and maintain motivation for changing communication skills.  Status: Continued - Date(s):  11/28/2019    Intervention(s)  Therapist will teach motivational skills.        Client has reviewed and agreed to the above plan.      Isis BELL, LGSW November 28, 2019                                                                           Note reviewed and clinical supervision by [facility]LLA [facility]

## 2020-02-13 ASSESSMENT — ANXIETY QUESTIONNAIRES: GAD7 TOTAL SCORE: 1

## 2020-02-27 ENCOUNTER — OFFICE VISIT (OUTPATIENT)
Dept: PSYCHOLOGY | Facility: CLINIC | Age: 50
End: 2020-02-27
Payer: COMMERCIAL

## 2020-02-27 DIAGNOSIS — F41.1 GAD (GENERALIZED ANXIETY DISORDER): Primary | ICD-10-CM

## 2020-02-27 PROCEDURE — 90847 FAMILY PSYTX W/PT 50 MIN: CPT | Performed by: MARRIAGE & FAMILY THERAPIST

## 2020-02-27 NOTE — PROGRESS NOTES
Progress Note    Client Name: Marcel NG Tutewohl  Date: 2/27/2020       Service Type: Couple  Video Visit: No      Session Start Time: 3:00  Session End Time: 3:45      Session Length: 45 min     Session #: 9    Attendees: Client and Spouse / Significant Other     Treatment Plan Last Reviewed: 12/19/2019  PHQ-9 / ASUNCION-7 : Each session    DATA  Interactive Complexity: No  Crisis: No        Progress Since Last Session (Related to Symptoms / Goals / Homework):   Symptoms: Improving less conflict    Homework: Did not complete       Episode of Care Goals: Satisfactory progress - ACTION (Actively working towards change); Intervened by reinforcing change plan / affirming steps taken     Current / Ongoing Stressors and Concerns:  Ermelinda shares about recent events involving their live-in niece Nina who learned her SO has been cheating on her. As difficult as this has been, it has propelled her forward and Ermelinda and Marcel are proud of her efforts. The couple have been very instrumental in helping her and Ermelinda has been more willing to ask Marcel for help which he is very amenable to helping when he can. 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 As often as their lives seem to become chaotic, it is important to have some structured time to discuss business matters. Marcel continues to feel better although his back will always be painful for him.     Treatment Objective(s) Addressed in This Session:   client to learn how to be more emotionally available to wife     Intervention:   Strategic couples counseling.     ASSESSMENT: Current Emotional / Mental Status (status of significant symptoms):   Risk status (Self / Other harm or suicidal ideation)   Client denies current fears or concerns for personal safety.   Client denies current or recent suicidal ideation or behaviors.   Client denies current or recent homicidal ideation  or behaviors.   Client denies current or recent self injurious behavior or ideation.   Client denies other safety concerns.   Client Patient reports there has been no change in risk factors since their last session.     Client Patient reports there has been no change in protective factors since their last session.     Recommended that patient call 911 or go to the local ED should there be a change in any of these risk factors.     Appearance:   Appropriate    Eye Contact:   Good    Psychomotor Behavior: Normal    Attitude:   Cooperative    Orientation:   All   Speech    Rate / Production: Normal     Volume:  Normal    Mood:    Normal    Affect:    Appropriate     Thought Content:  Clear    Thought Form:  Coherent  Logical    Insight:    Good      Medication Review:   No changes to current psychiatric medication(s)     Medication Compliance:   Yes     Changes in Health Issues:   None reported     Chemical Use Review:   Substance Use: Chemical use reviewed, no active concerns identified      Tobacco Use: No current tobacco use.      Diagnosis:  ASUNCION     Collateral Reports Completed:   Not Applicable    PLAN: (Client Tasks / Therapist Tasks / Other)  Homework: Recommit to weekly business meetings. Incorporate date time and Ermelinda to work on asking for what she needs.        Trinidad Solorio LMFT      _______________________________________________________________________                                                   Treatment Plan    Client's Name: Marcel Melchor  YOB: 1970    Date: 12/19/2019    DSM-V Diagnoses: 300.02 - Generalized Anxiety Disorder By History; V71.09 - No Diagnosis  Psychosocial / Contextual Factors: work stress, marital discord  WHODAS: See Chart    Referral / Collaboration:  Referral to another professional/service is not indicated at this time..    Anticipated number of session or this episode of care: 30      MeasurableTreatment Goal(s) related to diagnosis / functional  impairment(s)  Goal 1: Client will lower his GAD7 score to 2 or below    I will know I've met my goal when I'm less anxious and angry especially in marriage.      Objective #A (Client Action)    Client will practice the use of timeouts.  Status: Continued - Date(s):12/19/2019     Intervention(s)  Therapist will teach assertiveness skills. and how to time out in a functional manner..    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  Status: Continued - Date(s):12/19/2019     Intervention(s)  Therapist will assign homework have weekly work/status meetings..    Objective #C  Client will better understand and seek his wife's forgiveness..  Status: Continued - Date(s):12/19/2019     Intervention(s)  Therapist will help client in this process..      Patient has reviewed and agreed to the above plan.      Trinidad Solorio  December 19, 2019

## 2020-03-09 NOTE — PROGRESS NOTES
[facility] Pain Management     Date of visit: 3/10/2020    Chief complaint:   Chief Complaint   Patient presents with     Back Pain     right low side, going down into leg       Interval history:  Marcel Melchor is a 50 year old male last seen by me on 2/3/2020.      Recommendations/plan at the last visit included:   1.  Pain Physical Therapy:     YES   Continue to see FINESSE Zambrano as recommended.    2.  Pain Psychologist to address relaxation, behavioral change, coping style, and other factors important to improvement.     YES  Continue to see Elisa Chery PsyD, LP as recommended.    3.  Medication Management:     1.  We discussed a short term courses of an NSAID given recent injury. He would like this sent to pharmacy and will start if approved by his GI specialist. Prescription for Celebrex 100mg BID prn with food for 1-2 weeks only while he continues to recover from injury.     2. Continue topicals lidocaine patches, Aspercreme and Tiger balm   4.  Potential procedures: continue to monitor benefit from injection. I am hopeful that he will have additional benefit as he continues to recover from injury.    5.  Referrals: continue chiropractic care and acupuncture with Amaris Boyd DC as able.    6.  Follow up with FAIZAN Morrison CNP in 6 weeks.        Since his last visit, Marcel Melchor reports:  -His pain is somewhat worse than it was at last visit.  -His pain is located in bilateral low back, right > left, radiating down right buttock, posterior thigh, ending in lateral calf. Notes previously pain hadn't travelled into his hamstring.  Denies new numbness or tingling, weakness, bladder or bowel incontinence.  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 Notes he has been trying to practice more self care, including stretching.  -He tried Celebrex BID prn for a couple of weeks without significant improvement in pain.   -He hasn't had chiropractic  "with Amaris Boyd in a while, states this is because she has been booked out. He has been seeing his own chiropractor with short term benefit only.   -He hasn't seen Kenya Calderon PT in a while as pain physical therapy is completed. She recommended further visits with Elisa Brown PsyD, LP and BRETT PT with Faviola Leary.       Pain Information:   Pain quality: penetrating    Pain timing: constant     Pain rating: intensity ranges from 3/10 to 9/10, and averages 7/10 on a 0-10 scale.   Aggravating factors include: overactivity    Relieving factors include: chiropractic care, stretching, ice    Current pain medications:    Celebrex 100mg BID prn- [facility]/? stopped taking              Prozac 20mg daily- [facility] for mood, \"I feel like a normal person\"               Adderall 10mg daily prn- [facility] for ADD/narcolepsy, Nuvigil 250mg daily- [facility], prescribed by sleep medicine provider              Propanol 20mg TID prn- [facility] for anxiety              Ibuprofen 400mg prn- [facility], hx of crohns, stomach upset              Lidocaine patches- [facility], alternates with Aspercreme and Tiger balm    Current MME: 0    Review of [place] Prescription Monitoring Program (): No concern for abuse or misuse of controlled medications based on this report.     Annual Controlled Substance Agreement/UDS due date: NA    Past pain treatments:     1. Previous Pain Relevant Medications:  NOTE: This medication information taken from patient's intake form, not medical records.               Opiates: Tylenol #3- ?, Norco- ?              NSAIDS: hx of crohns               Muscle Relaxants: Flexeril- [facility],SE, narcolepsy makes very tired              Anti-migraine mediations: no              Anti-depressants: Prozac- [facility], Effexor- ?              Sleep aids: no              Anxiolytics: no              Neuropathics: gabapentin- ?/SE, narcolepsy, makes sleepy                          Topicals: lidocaine patches- [facility]              Other medications not covered above: Tylenol- " [facility]     2. Physical Therapy: in 1990- [facility], traction- [facility], pain physical therapy- [facility]/?  3. Pain Psychology: x1 visit with Elisa Brown PsyD, LP, sees a therapist weekly  4. Surgery: no  5. Injections: right L4-5 transforaminal epidural steroid injection with Dr. Steen on 1/17/2020- [facility]  right L4-5 transforaminal epidural steroid injection with Dr. Steen on 9/9/19- [facility] for 2-3 months  Right L4-5 transforaminal epidural steroid injection with Dr. Johnson on 3/13/19- [facility] for 5 months   Right L4-5 transforaminal epidural steroid injection with Dr. Johnson on 4/19/18- [facility] for 10 months     Right L4-5 transforaminal epidural steroid injection with Dr. Hastings on 3/3/17- 9 months   x3 L3-4 epidural steroid injections 2016- [facility]  6. Chiropractic: yes going on a regular basis, goes to [facility] in [place]- [facility], cryotherapy- [facility]  7. Acupuncture: yes- going on a regular basis it does help, last had in 6 weeks  8. TENS Unit: yes- [facility], short term    Medications:  Current Outpatient Medications   Medication Sig Dispense Refill     amphetamine-dextroamphetamine (ADDERALL) 10 MG tablet TAKE 1 TABLET BY MOUTH IN THE AFTERNOON PRN  0     armodafinil (NUVIGIL) 250 MG TABS tablet TAKE 1 TABLET BY MOUTH EVERY DAY IN THE MORNING  1     ASACOL  MG EC tablet TAKE 3 TABLETS BY MOUTH TWICE DAILY  0     clindamycin (CLEOCIN T) 1 % external lotion APPLY TOPICALLY TO FACE AND CHEST TWICE A DAY AS NEEDED 60 mL 3     FLUoxetine (PROZAC) 20 MG capsule Take 1 capsule (20 mg) by mouth daily 90 capsule 1     mesalamine (CANASA) 1000 MG Suppository Place 1,000 mg rectally 2 times daily       propranolol (INDERAL) 20 MG tablet Take 1 tablet (20 mg) by mouth 3 times daily as needed (anxiety) 60 tablet 3     testosterone cypionate (DEPOTESTOTERONE) 200 MG/ML injection Inject 50 mg into the muscle every 14 days       celecoxib (CELEBREX) 100 MG capsule Take 1 capsule (100 mg) by mouth 2 times daily as needed for moderate pain (Patient not taking:  "Reported on 3/10/2020) 45 capsule 0       Medical History: any changes in medical history since they were last seen? No    Review of Systems:  The 14 system ROS was reviewed from the intake questionnaire, and is positive for:  back pain  Any bowel or bladder problems: denies  Mood: \"its basically the same\"     Physical Exam:  Blood pressure 128/86, pulse 57, weight 87.1 kg (192 lb), SpO2 100 %.  General: A&O x4, no signs of distress.  Gait: Normal.   Neuro exam: 5/5 upper and lower extremity strength.    Imaging:  MRI of lumbar spine was completed on 11/3/16 and shows:           Assessment:   Marcel Melchor is a 50 year old male with a past medical history significant for narcolepsy, rotator cuff syndrome, AJAY, and anxiety who presents with complaints of low back pain .      1. Low back pain- etiology likely L4-5 disc extrusion, abutting L5 nerve root.   2. Mental Health - the patient's mental health concerns, specifically anxiety, affect his experience of pain and contribute to his clinically significant distress.    1. Lumbar radiculopathy         Plan:     1.  Pain Physical Therapy:    Pain PT completed with unclear benefit. He feels like he needs a more aggressive approach to make progress. Kenya Calderon PT recommended BRETT PT with Faviola Leary PT for back specific exercises and home exercise regimen. We discussed this today and Marcel is interested, ordered. Cautioned he moderate his activity and avoid additional injury.    2.  Pain Psychologist to address relaxation, behavioral change, coping style, and other factors important to improvement.     NO  We discussed today but Marcel doesn't feel the need as he already meets with a therapist weekly. Hold off on for now. May consider in the future, continue with therapist.    3.  Diagnostic Studies: we discussed repeating a lumbar MRI given progression of pain (now into leg and calf) and outdated imaging. Of note, no red flag findings. He is very interested in " repeating. Ordered today. I will call to discuss the results.    4.  Medication Management:      1.  Continue topicals lidocaine patches, Aspercreme and Meridian balm. Celebrex not helpful. No new medication changes today.    5.  Potential procedures: Marcel is interested in repeating an injection. As his last epidural steroid injection was only 7-8 weeks ago, discussed that this is relatively soon. He understands he can only have 3-4 injections a year but is interested in repeating. I am okay with scheduling one week from now but advised he hold off until he has MRI completed.  Lumbar epidural steroid injection ordered today. They will call to schedule.    6.  Continue chiropractic care as planned.   7.  Follow up with FAZIAN Morrison CNP in 4-6 weeks.     I spent 30 minutes of time face to face with the patient.  Greater than 50% of this time was spent in patient counseling and/or coordination of care.    Celsa GLORIA CNP  [facility] Pain Management

## 2020-03-10 ENCOUNTER — THERAPY VISIT (OUTPATIENT)
Dept: PHYSICAL THERAPY | Facility: CLINIC | Age: 50
End: 2020-03-10
Payer: COMMERCIAL

## 2020-03-10 ENCOUNTER — OFFICE VISIT (OUTPATIENT)
Dept: PALLIATIVE MEDICINE | Facility: CLINIC | Age: 50
End: 2020-03-10
Payer: COMMERCIAL

## 2020-03-10 ENCOUNTER — TELEPHONE (OUTPATIENT)
Dept: PALLIATIVE MEDICINE | Facility: CLINIC | Age: 50
End: 2020-03-10

## 2020-03-10 ENCOUNTER — HOSPITAL ENCOUNTER (OUTPATIENT)
Dept: MRI IMAGING | Facility: CLINIC | Age: 50
Discharge: HOME | End: 2020-03-10
Attending: NURSE PRACTITIONER | Admitting: NURSE PRACTITIONER
Payer: COMMERCIAL

## 2020-03-10 VITALS
DIASTOLIC BLOOD PRESSURE: 86 MMHG | HEART RATE: 57 BPM | OXYGEN SATURATION: 100 % | BODY MASS INDEX: 28.35 KG/M2 | SYSTOLIC BLOOD PRESSURE: 128 MMHG | WEIGHT: 192 LBS

## 2020-03-10 DIAGNOSIS — M54.16 LUMBAR RADICULOPATHY: Primary | ICD-10-CM

## 2020-03-10 DIAGNOSIS — F41.1 GAD (GENERALIZED ANXIETY DISORDER): ICD-10-CM

## 2020-03-10 DIAGNOSIS — M54.16 LUMBAR RADICULOPATHY: ICD-10-CM

## 2020-03-10 PROCEDURE — 97530 THERAPEUTIC ACTIVITIES: CPT | Mod: GP | Performed by: PHYSICAL THERAPIST

## 2020-03-10 PROCEDURE — 99214 OFFICE O/P EST MOD 30 MIN: CPT | Performed by: NURSE PRACTITIONER

## 2020-03-10 PROCEDURE — 97110 THERAPEUTIC EXERCISES: CPT | Mod: GP | Performed by: PHYSICAL THERAPIST

## 2020-03-10 PROCEDURE — 97162 PT EVAL MOD COMPLEX 30 MIN: CPT | Mod: GP | Performed by: PHYSICAL THERAPIST

## 2020-03-10 PROCEDURE — 72148 MRI LUMBAR SPINE W/O DYE: CPT

## 2020-03-10 NOTE — PATIENT INSTRUCTIONS
1.  Pain Physical Therapy:    Start [facility] PT with Faviola Leary for back specific exercises and home exercise regimen.    2.  Pain Psychologist to address relaxation, behavioral change, coping style, and other factors important to improvement.     NO  Hold off on for now. May consider in the future, continue with therapist.    3.  Diagnostic Studies: lumbar MRI ordered today. I will call to schedule the results.    4.  Medication Management:      1.  Continue topicals lidocaine patches, Aspercreme and [place] balm.   5.  Potential procedures: lumbar epidural steroid injection ordered today. They will call to schedule.    6.  Continue chiropractic care as planned.   7.  Follow up with FAIZAN Morrison CNP in 4-6 weeks.       ----------------------------------------------------------------  Clinic Number:  [phone]     Call with any questions about your care and for scheduling assistance.     Calls are returned Monday through Friday between 8 AM and 4:30 PM. We usually get back to you within 2 business days depending on the issue/request.    If we are prescribing your medications:    For opioid medication refills, call the clinic or send a [facility] message 7 days in advance.  Please include:    Name of requested medication    Name of the pharmacy.    For non-opioid medications, call your pharmacy directly to request a refill. Please allow 3-4 days to be processed.     Per [place] State Law:    All controlled substance prescriptions must be filled within 30 days of being written.      For those controlled substances allowing refills, pickup must occur within 30 days of last fill.      We believe regular attendance is key to your success in our program!      Any time you are unable to keep your appointment we ask that you call us at least 24 hours in advance to cancel.This will allow us to offer the appointment time to another patient.     Multiple missed appointments may lead to dismissal from the clinic.

## 2020-03-10 NOTE — PROGRESS NOTES
"[facility] Initial Evaluation -- Lumbar    Date: March 10, 2020  Marcel Melchor is a 50 year old male with a lumbar condition.   Referral: Dr. 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     Symptoms (improving/unchanging/worsening):  worsening.     Symptoms commenced as a result of: no apparent reason (normal work demands/working out)   Condition occurred in the following environment:   Home/work     Symptoms at onset (back/thigh/leg): back January 2020, onset of leg February 2020  Constant symptoms (back/thigh/leg): back, leg  Intermittent symptoms (back/thigh/leg):     Symptoms are made worse with the following: Always Bending, Always Sitting, Sometimes Rising, Sometimes Standing, Time of day - Always as the day progresses and Always PM and Always When still   Symptoms are made better with the following: Always Walking, Always Lying and Always On the move    Disturbed sleep (yes/no):  no   Sleeping postures (prone/sup/side R/L): back    Previous episodes (0/1-5/6-10/11+): 11+ Year of first episode:     Previous history: chronic, multiple interventions  Previous treatments: chiro --recently not helpful      Specific Questions:  Cough/Sneeze/Strain (pos/neg): occasionally  Bowel/Bladder (normal/abnormal): normal  Gait (normal/abnormal): normal  Medications (nil/NSAIDS/analg/steroids/anticoag/other):  Other - Anti-depressants and Adderall  Medical allergies:  " none  General health (excellent/good/fair/poor):  good  Pertinent medical history:  Anxiety, Crohn's  Imaging (None/Xray/MRI/Other):  Undergoing MRI today  Recent or major surgery (yes/no):  no  Night pain (yes/no): no  Accidents (yes/no): none  Unexplained weight loss (yes/no): none  Barriers at home: none  Other red flags: none    EXAMINATION    Posture:   Sitting (good/fair/poor): fair  Standing (good/fair/poor):fair  Lordosis (red/acc/normal): red  Correction of posture (better/worse/no effect): no effect    Lateral Shift (right/left/nil): L shift  Relevant (yes/no):  yes  Other Observations: none    Neurological:    Motor deficit:  L5 EHL=5-/  Reflexes:    Sensory deficit:    Dural signs:  +Slump R    Movement Loss:   Williams Mod Min Nil Pain   Flexion   x x pdm   Extension  x   incr R LBP/P buttocks   Side Gliding R   x  pdm   Side Gliding L   x x erp     Test Movements:   During: produces, abolishes, increases, decreases, no effect, centralizing, peripheralizing   After: better, worse, no better, no worse, no effect, centralized, peripheralized    Pretest symptoms standing:    Symptoms During Symptoms After ROM increased ROM decreased No Effect   FIS        Rep FIS        EIS        Rep EIS        Pretest symptoms lying: central, radiating low back R>L, R calf (intermittent)    Symptoms During Symptoms After ROM increased ROM decreased No Effect   BERTRAM        Rep BERTRAM        EIL + R SG Centralising    Better         Rep EIL + RSG Centralising    Better    X, improved Slump, incr R SG, EXT     If required, pretest symptoms:    Symptoms During Symptoms After ROM increased ROM decreased No Effect   SGIS - R        Rep SGIS - R        SGIS - L        Rep SGIS - L          Static Tests:  Sitting slouched:    Sitting erect:    Standing slouched   Standing erect:    Lying prone in extension:   Long sitting:      Other Tests:     Provisional Classification:  Derangement - Asymmetrical, unilateral, symptoms below  knee    Principle of Management:  Education:  Posture, therapeutic dose of exercise, centralization, avoidance of lifting/flexion/squatting     Equipment provided:    Mechanical therapy (Y/N):  Y   Extension principle:  Rep EIL + R SG x 10/2 hrs  Lateral Principle:   Flexion principle:    Other:      ASSESSMENT/PLAN:    Patient is a 50 year old male with lumbar complaints.    Patient has the following significant findings with corresponding treatment plan.                Diagnosis 1:  Lumbar radiculopathy  Pain -  hot/cold therapy, manual therapy, self management, education, directional preference exercise and home program  Decreased ROM/flexibility - manual therapy and therapeutic exercise  Decreased strength - therapeutic exercise and therapeutic activities  Decreased function - therapeutic activities  Impaired posture - neuro re-education    Therapy Evaluation Codes:   1) History comprised of:   Personal factors that impact the plan of care:      Anxiety.    Comorbidity factors that impact the plan of care are:      Sleep disorder/apnea and Crohn's, anxiety.     Medications impacting care: Anti-depressant and Pain.  2) Examination of Body Systems comprised of:   Body structures and functions that impact the plan of care:      Lumbar spine.   Activity limitations that impact the plan of care are:      Driving, Lifting, Sitting, Squatting/kneeling, Standing and Working.  3) Clinical presentation characteristics are:   Evolving/Changing.  4) Decision-Making    Moderate complexity using standardized patient assessment instrument and/or measureable assessment of functional outcome.  Cumulative Therapy Evaluation is: Moderate complexity.    Previous and current functional limitations:  (See Goal Flow Sheet for this information)    Short term and Long term goals: (See Goal Flow Sheet for this information)     Communication ability:  Patient appears to be able to clearly communicate and understand verbal and written  communication and follow directions correctly.  Treatment Explanation - The following has been discussed with the patient:   RX ordered/plan of care  Anticipated outcomes  Possible risks and side effects  This patient would benefit from PT intervention to resume normal activities.   Rehab potential is good.    Frequency:  1 X week, once daily  Duration:  for 6 weeks  Discharge Plan:  Achieve all LTG.  Independent in home treatment program.  Reach maximal therapeutic benefit.    Please refer to the daily flowsheet for treatment today, total treatment time and time spent performing 1:1 timed codes.

## 2020-03-10 NOTE — TELEPHONE ENCOUNTER
[facility] to schedule right L4-5 transforaminal epidural steroid injection.    Christine DUBON    [facility]

## 2020-03-11 DIAGNOSIS — F41.1 GAD (GENERALIZED ANXIETY DISORDER): ICD-10-CM

## 2020-03-11 NOTE — TELEPHONE ENCOUNTER
Patient was dismissed from the [facility] clinic by Gregoria Castillo, PhD, APRN, CNP.    I am not sure who denied the refill yesterday, but we are declining it today. Patient is no longer under the care of Gregoria Castillo.    From 9/20/2019:  Patient Status:  The patient is being returned to the referring provider for ongoing care and medication prescribing.  The patient can be referred back to this service for further consultation as needed.     Signed:   Gregoria Castillo, PhD, APRN, CNP   Psychiatry

## 2020-03-11 NOTE — TELEPHONE ENCOUNTER
Called patient, mailbox full, if patient calls back needs to schedule OV with Wes VALENTINE or next opening for any ongoing refills    Savannah Teague/

## 2020-03-11 NOTE — TELEPHONE ENCOUNTER
It has been a long time since I have seen him.  And 6 months since he was seen by Psych.  He should have an appointment with me now ASAP.  I'll refill short supply.    Wes    PHQ 6/3/2019 6/14/2019 9/20/2019   PHQ-9 Total Score 3 1 8   Q9: Thoughts of better off dead/self-harm past 2 weeks Not at all Not at all Not at all     ASUNCION-7 SCORE 1/29/2020 2/5/2020 2/12/2020   Total Score - - -   Total Score 2 0 1

## 2020-03-11 NOTE — TELEPHONE ENCOUNTER
Reason for Call:  Refill of Fluoxetine 20mg. Patient reports pharmacy said refill of fluoxetine was denied. Has 2 doses left.    Do you use a [facility]?  Name of the pharmacy and phone number for the current request:    [facility]/pharmacy #[id number] - [place], [place] - [address]  [address] [phone] (Phone)  [phone] (Fax)     Name of the medication requested: Fluoxetine 20mg and and Propranolol 20mg.      Other request: And refill propranolol 20mg.    Can we leave a detailed message on this number? yes    Phone number patient can be reached at: [phone]    Best Time: any    Call taken on 3/11/2020 at 11:37 AM by Sumaya Gordon

## 2020-03-12 ENCOUNTER — TELEPHONE (OUTPATIENT)
Dept: PALLIATIVE MEDICINE | Facility: CLINIC | Age: 50
End: 2020-03-12

## 2020-03-12 DIAGNOSIS — M51.16 HERNIATION OF LUMBAR INTERVERTEBRAL DISC WITH RADICULOPATHY: Primary | ICD-10-CM

## 2020-03-12 NOTE — TELEPHONE ENCOUNTER
No PA required, okay to schedule        Sheron MARTINEZ    [facility]

## 2020-03-12 NOTE — TELEPHONE ENCOUNTER
Thank you, information noted. When I called to review lumbar MRI I offered neurosurgery referral so am fine with this. Order placed. He could have injection before or after appointment.    FAIZAN Morrison CNP  [facility] [place]

## 2020-03-12 NOTE — TELEPHONE ENCOUNTER
Called Marcel.  Mailbox is full.   Unable to leave a message. Neurosurgery referral placed. He can call [phone] to set up that appointment.  Okay to keep epidural injection scheduled for 03/13/2020.    Asmita Mckeon RN  Care Coordinator  [facility]

## 2020-03-12 NOTE — TELEPHONE ENCOUNTER
Scheduled for appointment, aware of location in [place].    If becomes sick/ill/infection or started on ABX or steroids that we will need to be contacted for rescheduling.  States no further questions.      Shellye PHILLIP, RN Care Coordinator  [facility]  Pain [facility]

## 2020-03-12 NOTE — TELEPHONE ENCOUNTER
Reason for call:  Other   Patient called regarding (reason for call): call back  Additional comments: Pt asking for a call back from Celsa Dao.    Phone number to reach patient:  Home number on file [phone] (home)    Can we leave a detailed message on this number?  YES    Travel screening: Not Applicable         Xochilt ESPAÑA    [facility] [place]

## 2020-03-12 NOTE — TELEPHONE ENCOUNTER
"Pre-screening Questions for Radiology Injections:    Injection to be done at which interventional clinic site? [facility]    Instruct patient to arrive as directed prior to the scheduled appointment time:    [place] minutes before      [place]: 30 minutes before; if IV needed 1 hour before     Dr. Arboleda-no IV needed for Cervical ANDREY; please instruct to arrive 30\" early    Procedure ordered by Celsa Dao    Procedure ordered? right L4-5 transforaminal epidural steroid injection      Transforaminal Cervical ANDREY - no pain provider currently performing    What insurance would patient like us to bill for this procedure? BCBS      Worker's comp or MVA (motor vehicle accident) -Any injection DO NOT SCHEDULE and route to Sheron Gamez.      [employer] insurance - For SI joint injections, DO NOT SCHEDULE and route Sheron Gamez.       [employer] - Any injection besides hip/shoulder/knee joint DO NOT SCHEDULE and route to Sheron Gamez. She will obtain PA and call pt back to schedule procedure or notify pt of denial.       [employer]-Route to [place] for review      **BCBS- ALL need to be routed to [place] for review if a PA is needed**      IF SCHEDULING IN [place] AND NEEDS A PA, IT IS OKAY TO SCHEDULE. [place] HANDLES THEIR OWN PA'S AFTER THE PATIENT IS SCHEDULED. PLEASE SCHEDULE AT LEAST 1 WEEK OUT SO A PA CAN BE OBTAINED.    Any chance of pregnancy? Not Applicable   If YES, do NOT schedule and route to RN pool    Is an  needed? No     Patient has a drive home? (mandatory) YES: Informed    Is patient taking any blood thinners (i.e. plavix, coumadin, jantoven, warfarin, heparin, pradaxa or dabigatran, etc)? No   If hold needed, do NOT schedule, route to RN pool     Is patient taking any aspirin products (includes Excedrin and Fiorinal)? No     If more than 325mg/day, OK to schedule; Instruct pt to decrease to less than 325 mg for 7 days AND route to RN pool    For CERVICAL procedures, hold all " aspirin products for 6 days.     Tell pt that if aspirin product is not held for 6 days, the procedure WILL BE cancelled.      Does the patient have a bleeding or clotting disorder? No     If YES, okay to schedule AND route to RN nurse pool    For any patients with platelet count <100, must be forwarded to provider    Is patient diabetic?  No  If YES, instruct them to bring their glucometer.    Does patient have an active infection or treated for one within the past week? No     Is patient currently taking any antibiotics?  No     For patients on chronic, preventative, or prophylactic antibiotics, procedures may be scheduled.     For patients on antibiotics for active or recent infection:antibiotic course must have been completed for 4 days    Is patient currently taking any steroid medications? (i.e. Prednisone, Medrol)  No     For patients on steroid medications, course must have been completed for 4 days    Is patient actively being treated for cancer or immunocompromised? No  If YES, do NOT schedule and route to RN pool     Are you able to get on and off an exam table with minimal or no assistance? Yes  If NO, do NOT schedule and route to RN pool    Are you able to roll over and lay on your stomach with minimal or no assistance? Yes  If NO, do NOT schedule and route to RN pool     Any allergies to contrast dye, iodine, shellfish, or numbing and steroid medications? No  If YES, route to RN pool AND add allergy information to appointment notes    Allergies: Amoxicillin      Has the patient had a flu shot or any other vaccinations within 7 days before or after the procedure.  No     Does patient have an MRI/CT?  YES: 2020  Check Procedure Scheduling Grid to see if required.      Was the MRI done within the last 3 years?  Yes    If yes, where was the MRI done i.e.[facility], [facility], [facility], [facility] etc? [facility]      If no, do not schedule and route to RN pool    If MRI was not done at [facility], [facility] or  [facility] do NOT schedule and route to RN pool.      If pt has an imaging disc, the injection MAY be scheduled but pt has to bring disc to appt.     If they show up without the disc the injection cannot be done    Procedure Specific Instructions:      If celiac plexus block, informed patient NPO for 6 hours and that it is okay to take medications with sips of water, especially blood pressure medications  Not Applicable         If this is for a cervical procedure, informed patient that aspirin needs to be held for 6 days.   Not Applicable      If IV needed:    Do not schedule procedures requiring IV placement in the first appointment of the day or first appointment after lunch. Do NOT schedule at 0745, 0815 or 1245.     Instructed pt to arrive 30 minutes early for IV start if required. (Check Procedure Scheduling Grid)  Not Applicable    Reminders:      If you are started on any steroids or antibiotics between now and your appointment, you must contact us because the procedure may need to be cancelled.  Yes      For all procedures except radiofrequency ablations (RFAs) and spinal cord stimulator (SCS) trials, informed patient:    IV sedation is not provided for this procedure.  If you feel that an oral anti-anxiety medication is needed, you can discuss this further with your referring provider or primary care provider.  The Pain Clinic provider will discuss specifics of what the procedure includes at your appointment.  Most procedures last 10-20 minutes.  We use numbing medications to help with any discomfort during the procedure.  Not Applicable      For patients 85 or older we recommend having an adult stay w/ them for the remainder of the day.       Does the patient have any questions?  NO  Christine Ayers  [facility]

## 2020-03-12 NOTE — TELEPHONE ENCOUNTER
Called patient. Scheduled for appointment, aware of location in [place].   Patient also inquiring about neurosurgery referral. Advised would inquire with provider to see if would like neurosurg referral now vs referral if injecton not helpful. Nursing to advise patient accordingly.    Shelley PHILLIP, RN Care Coordinator  [facility]  Pain Management

## 2020-03-13 ENCOUNTER — ANCILLARY PROCEDURE (OUTPATIENT)
Dept: RADIOLOGY | Facility: CLINIC | Age: 50
End: 2020-03-13
Attending: PAIN MEDICINE
Payer: COMMERCIAL

## 2020-03-13 ENCOUNTER — RADIOLOGY INJECTION OFFICE VISIT (OUTPATIENT)
Dept: PALLIATIVE MEDICINE | Facility: CLINIC | Age: 50
End: 2020-03-13
Payer: COMMERCIAL

## 2020-03-13 VITALS — SYSTOLIC BLOOD PRESSURE: 108 MMHG | HEART RATE: 54 BPM | DIASTOLIC BLOOD PRESSURE: 79 MMHG | RESPIRATION RATE: 100 BRPM

## 2020-03-13 DIAGNOSIS — M54.16 LUMBAR RADICULOPATHY: Primary | ICD-10-CM

## 2020-03-13 DIAGNOSIS — M54.16 LUMBAR RADICULOPATHY: ICD-10-CM

## 2020-03-13 PROCEDURE — 64483 NJX AA&/STRD TFRM EPI L/S 1: CPT | Mod: RT | Performed by: PAIN MEDICINE

## 2020-03-13 PROCEDURE — 64484 NJX AA&/STRD TFRM EPI L/S EA: CPT | Mod: RT | Performed by: PAIN MEDICINE

## 2020-03-13 ASSESSMENT — PAIN SCALES - GENERAL: PAINLEVEL: EXTREME PAIN (9)

## 2020-03-13 NOTE — NURSING NOTE
Discharge Information    IV Discontiued Time:  NA    Amount of Fluid Infused:  NA    Discharge Criteria = When patient returns to baseline or as per MD order    Consciousness:  Pt is fully awake    Circulation:  BP +/- 20% of pre-procedure level    Respiration:  Patient is able to breathe deeply    O2 Sat:  Patient is able to maintain O2 Sat >92% on room air    Activity:  Moves 4 extremities on command    Ambulation:  Patient is able to stand and walk or stand and pivot into wheelchair    Dressing:  Clean/dry or No Dressing    Notes:   Discharge instructions and AVS given to patient    Patient meets criteria for discharge?  YES    Admitted to PCU?  No    Responsible adult present to accompany patient home?  Yes    Signature/Title:    rubén stewart RN  RN Care Coordinator  [facility] [place]

## 2020-03-13 NOTE — NURSING NOTE
Pre-procedure Intake    Have you been fasting? NA    If yes, for how long? NA    Are you taking a prescribed blood thinner such as coumadin, Plavix, Xarelto?    No    If yes, when did you take your last dose? NA    Do you take aspirin?  No    If cervical procedure, have you held aspirin for 6 days?   NA    Do you have any allergies to contrast dye, iodine, steroid and/or numbing medications?  NO    Are you currently taking antibiotics or have an active infection?  NO    Have you had a fever/elevated temperature within the past week? NO    Are you currently taking oral steroids? NO    Do you have a ? Yes       Are you pregnant or breastfeeding?  NO    Are the vital signs normal?  Yes      Irina Harley CMA ([facility])

## 2020-03-13 NOTE — PROGRESS NOTES
Pre procedure Diagnosis: lumbar radiculopathy, lumbar degenerative disc disease   Post procedure Diagnosis: Same  Procedure performed: lumbar transforaminal epidural steroid injection at right L4-5, L5-S1, fluoroscopically guided, contrast controlled  Anesthesia: none  Complications: None  Operators: John Montesinos MD     Indications:   Marcel Melchor is a 50 year old male.  They have a history of right-sided low back pain radiating to his posterior thigh and anterior lateral leg.  Exam shows positive slump med/PT/prior injections.  Of note patient had significant benefit with L4-5 transforaminal although diminishing duration of relief.  And they have tried conservative treatment including given radiation to posterior thigh and calf we will perform both the L4-5 and L5-S1 transforaminal epidural steroid injection today.    MRI     Options/alternatives, benefits and risks were discussed with the patient including bleeding, infection, tissue trauma, numbness, weakness, paralysis, spinal cord injury, radiation exposure, headache and reaction to medications. Questions were answered to his satisfaction and he agrees to proceed. Voluntary informed consent was obtained and signed.     Vitals were reviewed: Yes  There were no vitals taken for this visit.  Allergies were reviewed:  Yes   Medications were reviewed:  Yes   Pre-procedure pain score: 8/10    Procedure:  After getting informed consent, patient was brought into the procedure suite and was placed in a prone position on the procedure table.   A Pause for the Cause was performed.  Patient was prepped and draped in sterile fashion.     After identifying the right L 4-5, L5-S1 neuroforamen, the C-arm was rotated to a right lateral oblique angle.  A total of 4ml of Lidocaine 1% was used to anesthetize the skin and the needle track at a skin entry site coaxial with the fluoroscopy beam, and overriding the superior aspect of the neuroforamen.  A 22 gauge 3.5 inch  spinal needle was advanced under intermittent fluoroscopy until it entered the foramen superiorly.    The position was then inspected from anteroposterior and lateral views, and the needle adjusted appropriately.  A total of 1ml of Omnipaque-300 was injected, confirming appropriate position, with spread into the nerve root sheath and the epidural space, with no intravascular uptake. 9ml was wasted    Then, after repeated negative aspiration, a combination of Decadron 10 mg, 0.25% bupivacaine 2 ml, diluted with 3ml of normal saline was injected.     Hemostasis was achieved, the area was cleaned, and bandaids were placed when appropriate.  The patient tolerated the procedure well, and was taken to the recovery room.    Images were saved to PACS.    Post-procedure pain score: 4/10  Follow-up includes:   -f/u phone call in one week  -f/u with referring provider    John Montesinos MD  [facility] [place]

## 2020-03-13 NOTE — PATIENT INSTRUCTIONS
[facility]   Procedure Discharge Instructions    Today you saw:   Dr. John Montesinos      You had an:  Epidural steroid injection    Medications used:  Lidocaine   Bupivacaine   Dexamethasone Omnipaque Normal saline          If you were holding your blood thinning medication, please restart taking it: N/A    Be cautious when walking. Numbness and/or weakness in the lower extremities may occur for up to 6-8 hours after the procedure due to effect of the local anesthetic    Do not drive for 6 hours. The effect of the local anesthetic could slow your reflexes.     You may resume your regular activities after 24 hours    Avoid strenuous activity for the first 24 hours    You may shower, however avoid swimming, tub baths or hot tubs for 24 hours following your procedure    You may have a mild to moderate increase in pain for several days following the injection.    It may take up to 14 days for the steroid medication to start working although you may feel the effect as early as a few days after the procedure.       You may use ice packs for 10-15 minutes, 3 to 4 times a day at the injection site for comfort    Do not use heat to painful areas for 6 to 8 hours. This will give the local anesthetic time to wear off and prevent you from accidentally burning your skin.     Unless you have been directed to avoid the use of anti-inflammatory medications (NSAIDS), you may use medications such as ibuprofen, Aleve or Tylenol for pain control if needed.     If you were fasting, you may resume your normal diet and medications after the procedure    Possible side effects of steroids that you may experience include flushing, elevated blood pressure, increased appetite, mild headaches and restlessness.  All of these symptoms will get better with time.    If you experience any of the following, call the Pain Clinic during work hours (Mon-Friday 8-4:30 pm) at [phone] or the Provider Line after hours  at [phone]:  -Fever over 100 degree F  -Swelling, bleeding, redness, drainage, warmth at the injection site  -Progressive weakness or numbness in your legs  -Loss of bowel or bladder function  -Unusual headache that is not relieved by Tylenol or other pain reliever  -Unusual new onset of pain that is not improving

## 2020-03-17 NOTE — TELEPHONE ENCOUNTER
Called patient, advised per below. He will call to schedule neurosurgery appt    Shelley PHILLIP, RN Care Coordinator  [facility]  Pain Management

## 2020-03-30 ENCOUNTER — TELEPHONE (OUTPATIENT)
Dept: PALLIATIVE MEDICINE | Facility: CLINIC | Age: 50
End: 2020-03-30

## 2020-03-30 NOTE — TELEPHONE ENCOUNTER
Patient's return visit will be converted to Telephone Visit due to COVID-19.    Called patient and explained:    We re taking every precaution to prevent the spread of COVID-19. Our top priority is to protect and care for our patients.    After review by your provider, we are changing your visit to a telephone appointment.    Your visit is at 8:00 on Tues April 7.  We will be calling you 15-20 min early for check-in with the MA. Please be available at 7:45 (20 min prior to appointment) for this check in.    Confirm number that the patient wants us to call for this appointment: [phone]      If you have concerns about this plan, please let us know, and we will send a message to the nurses to further discuss your concerns.    Radha Levy CMA on 3/30/2020 at 10:10 AM

## 2020-04-03 ENCOUNTER — TELEPHONE (OUTPATIENT)
Dept: PHYSICAL THERAPY | Facility: CLINIC | Age: 50
End: 2020-04-03

## 2020-04-03 DIAGNOSIS — F41.1 GAD (GENERALIZED ANXIETY DISORDER): ICD-10-CM

## 2020-04-03 DIAGNOSIS — M54.16 LUMBAR RADICULOPATHY: ICD-10-CM

## 2020-04-03 NOTE — TELEPHONE ENCOUNTER
Courtesy call #2 today - no answer. Called and LM 4-1-20 re: option for virtual PT visit if interested/needed and gave [facility] #.  If no response from patient will call again next week.

## 2020-04-10 ENCOUNTER — TELEPHONE (OUTPATIENT)
Dept: PHYSICAL THERAPY | Facility: CLINIC | Age: 50
End: 2020-04-10

## 2020-04-10 DIAGNOSIS — M54.16 LUMBAR RADICULOPATHY: ICD-10-CM

## 2020-04-10 NOTE — TELEPHONE ENCOUNTER
3rd courtesy call - cancelled in-person PT visit for 4-16 due to COVID restrictions and offered virtual PT visit or being on list for a call for in-person visit when restrictions lifted.  Left [facility] # if would like to schedule or be on list.

## 2020-04-30 ENCOUNTER — TRANSFERRED RECORDS (OUTPATIENT)
Dept: HEALTH INFORMATION MANAGEMENT | Facility: CLINIC | Age: 50
End: 2020-04-30

## 2020-04-30 LAB
ALT SERPL-CCNC: 35 U/L (ref 0–78)
AST SERPL-CCNC: 26 U/L (ref 0–37)
CREAT SERPL-MCNC: 0.93 MG/DL (ref 0.7–1.3)

## 2020-05-11 ENCOUNTER — VIRTUAL VISIT (OUTPATIENT)
Dept: FAMILY MEDICINE | Facility: CLINIC | Age: 50
End: 2020-05-11
Payer: COMMERCIAL

## 2020-05-11 DIAGNOSIS — F41.1 GAD (GENERALIZED ANXIETY DISORDER): ICD-10-CM

## 2020-05-11 PROCEDURE — 99214 OFFICE O/P EST MOD 30 MIN: CPT | Mod: 95 | Performed by: PHYSICIAN ASSISTANT

## 2020-05-11 RX ORDER — FLUOXETINE 40 MG/1
40 CAPSULE ORAL DAILY
Qty: 90 CAPSULE | Refills: 0 | Status: SHIPPED | OUTPATIENT
Start: 2020-05-11 | End: 2020-08-10

## 2020-05-11 ASSESSMENT — PATIENT HEALTH QUESTIONNAIRE - PHQ9
5. POOR APPETITE OR OVEREATING: NOT AT ALL
SUM OF ALL RESPONSES TO PHQ QUESTIONS 1-9: 9

## 2020-05-11 ASSESSMENT — ANXIETY QUESTIONNAIRES
IF YOU CHECKED OFF ANY PROBLEMS ON THIS QUESTIONNAIRE, HOW DIFFICULT HAVE THESE PROBLEMS MADE IT FOR YOU TO DO YOUR WORK, TAKE CARE OF THINGS AT HOME, OR GET ALONG WITH OTHER PEOPLE: NOT DIFFICULT AT ALL
GAD7 TOTAL SCORE: 3
2. NOT BEING ABLE TO STOP OR CONTROL WORRYING: NOT AT ALL
6. BECOMING EASILY ANNOYED OR IRRITABLE: MORE THAN HALF THE DAYS
1. FEELING NERVOUS, ANXIOUS, OR ON EDGE: SEVERAL DAYS
5. BEING SO RESTLESS THAT IT IS HARD TO SIT STILL: NOT AT ALL
7. FEELING AFRAID AS IF SOMETHING AWFUL MIGHT HAPPEN: NOT AT ALL
3. WORRYING TOO MUCH ABOUT DIFFERENT THINGS: NOT AT ALL

## 2020-05-11 NOTE — PROGRESS NOTES
"Marcel Melchor is a 50 year old male who is being evaluated via a billable telephone visit.      The patient has been notified of following:     \"This telephone visit will be conducted via a call between you and your physician/provider. We have found that certain health care needs can be provided without the need for a physical exam.  This service lets us provide the care you need with a short phone conversation.  If a prescription is necessary we can send it directly to your pharmacy.  If lab work is needed we can place an order for that and you can then stop by our lab to have the test done at a later time.    Telephone visits are billed at different rates depending on your insurance coverage. During this emergency period, for some insurers they may be billed the same as an in-person visit.  Please reach out to your insurance provider with any questions.    If during the course of the call the physician/provider feels a telephone visit is not appropriate, you will not be charged for this service.\"    Patient has given verbal consent for Telephone visit?  Yes    What phone number would you like to be contacted at? [phone]    How would you like to obtain your AVS? E-Mail (inform patient AVS not encrypted)  [email or web address]     Subjective     Marcel Melchor is a 50 year old male who presents to clinic today for the following health issues:    HPI  Anxiety Follow-Up    How are you doing with your anxiety since your last visit? No change    Are you having other symptoms that might be associated with anxiety? No    Have you had a significant life event? OTHER: COVID-19     Are you feeling depressed? No    Do you have any concerns with your use of alcohol or other drugs? No    Social History     Tobacco Use     Smoking status: Never Smoker     Smokeless tobacco: Never Used   Substance Use Topics     Alcohol use: No     Alcohol/week: 0.0 standard drinks     Drug use: No       Last PHQ-9 5/11/2020   1.  Little " interest or pleasure in doing things 0   2.  Feeling down, depressed, or hopeless 0   3.  Trouble falling or staying asleep, or sleeping too much 0   4.  Feeling tired or having little energy 3   5.  Poor appetite or overeating 3   6.  Feeling bad about yourself 0   7.  Trouble concentrating 3   8.  Moving slowly or restless 0   Q9: Thoughts of better off dead/self-harm past 2 weeks 0   PHQ-9 Total Score 9   Difficulty at work, home, or with people Not difficult at all     ASUNCION-7  5/11/2020   1. Feeling nervous, anxious, or on edge 1   2. Not being able to stop or control worrying 0   3. Worrying too much about different things 0   4. Trouble relaxing 0   5. Being so restless that it is hard to sit still 0   6. Becoming easily annoyed or irritable 2   7. Feeling afraid, as if something awful might happen 0   ASUNCION-7 Total Score 3   If you checked any problems, how difficult have they made it for you to do your work, take care of things at home, or get along with other people? Not difficult at all         How many servings of fruits and vegetables do you eat daily?  0-1    On average, how many sweetened beverages do you drink each day (Examples: soda, juice, sweet tea, etc.  Do NOT count diet or artificially sweetened beverages)?   8    How many days per week do you exercise enough to make your heart beat faster? 3 or less    How many minutes a day do you exercise enough to make your heart beat faster? 30 - 60    How many days per week do you miss taking your medication? 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  Wondering possibly increasing the dose of the Prozac for the short term during this time of increased stress.  Then maybe he could go back down later.    Back pain- has been back in pain mangement due to his back issue.  He thinks he is looking at surgery for next option.    Reviewed and updated as needed this  visit by Provider       Review of Systems   Constitutional, HEENT, cardiovascular, pulmonary, gi and gu systems are negative, except as otherwise noted.       Objective   Reported vitals:  There were no vitals taken for this visit.   alert and no distress  PSYCH: Alert and oriented times 3; coherent speech, normal   rate and volume, able to articulate logical thoughts, able   to abstract reason, no tangential thoughts, no hallucinations   or delusions  His affect is normal and pleasant  RESP: No cough, no audible wheezing, able to talk in full sentences  Remainder of exam unable to be completed due to telephone visits    Diagnostic Test Results:  none         Assessment/Plan:  1. ASUNCION (generalized anxiety disorder)  Will increase does to 40mg.  He can let me know in 3 months how he is doing.  If he wants to stay on 40mg that is fine, if he would like to go back to 20mg this would be ok as well.  Follow up sooner if needed.  - FLUoxetine (PROZAC) 40 MG capsule; Take 1 capsule (40 mg) by mouth daily  Dispense: 90 capsule; Refill: 0    Return in about 3 months (around 8/11/2020) for depression/anxiety med check.      Phone call duration:  12 minutes    Wes Braga PA-C

## 2020-05-12 ASSESSMENT — ANXIETY QUESTIONNAIRES: GAD7 TOTAL SCORE: 3

## 2020-08-06 DIAGNOSIS — F41.1 GAD (GENERALIZED ANXIETY DISORDER): ICD-10-CM

## 2020-08-06 NOTE — TELEPHONE ENCOUNTER
Routing refill request to provider for review/approval because:  Elevated phq9    Aleja Sanz RN on 8/6/2020 at 8:49 AM

## 2020-08-10 RX ORDER — FLUOXETINE 40 MG/1
CAPSULE ORAL
Qty: 90 CAPSULE | Refills: 0 | Status: SHIPPED | OUTPATIENT
Start: 2020-08-10 | End: 2020-08-12

## 2020-08-12 ENCOUNTER — VIRTUAL VISIT (OUTPATIENT)
Dept: FAMILY MEDICINE | Facility: CLINIC | Age: 50
End: 2020-08-12
Payer: COMMERCIAL

## 2020-08-12 DIAGNOSIS — M25.511 ACUTE PAIN OF RIGHT SHOULDER: Primary | ICD-10-CM

## 2020-08-12 DIAGNOSIS — F41.1 GAD (GENERALIZED ANXIETY DISORDER): ICD-10-CM

## 2020-08-12 PROCEDURE — 99213 OFFICE O/P EST LOW 20 MIN: CPT | Mod: 95 | Performed by: PHYSICIAN ASSISTANT

## 2020-08-12 RX ORDER — FLUOXETINE 40 MG/1
40 CAPSULE ORAL DAILY
Qty: 90 CAPSULE | Refills: 0 | Status: SHIPPED | OUTPATIENT
Start: 2020-08-12 | End: 2020-11-12

## 2020-08-12 RX ORDER — PROPRANOLOL HYDROCHLORIDE 20 MG/1
20 TABLET ORAL 3 TIMES DAILY PRN
Qty: 60 TABLET | Refills: 3 | Status: SHIPPED | OUTPATIENT
Start: 2020-08-12 | End: 2020-11-12

## 2020-08-12 ASSESSMENT — ANXIETY QUESTIONNAIRES
3. WORRYING TOO MUCH ABOUT DIFFERENT THINGS: NEARLY EVERY DAY
1. FEELING NERVOUS, ANXIOUS, OR ON EDGE: MORE THAN HALF THE DAYS
IF YOU CHECKED OFF ANY PROBLEMS ON THIS QUESTIONNAIRE, HOW DIFFICULT HAVE THESE PROBLEMS MADE IT FOR YOU TO DO YOUR WORK, TAKE CARE OF THINGS AT HOME, OR GET ALONG WITH OTHER PEOPLE: SOMEWHAT DIFFICULT
5. BEING SO RESTLESS THAT IT IS HARD TO SIT STILL: NOT AT ALL
GAD7 TOTAL SCORE: 9
6. BECOMING EASILY ANNOYED OR IRRITABLE: NEARLY EVERY DAY
2. NOT BEING ABLE TO STOP OR CONTROL WORRYING: NOT AT ALL
7. FEELING AFRAID AS IF SOMETHING AWFUL MIGHT HAPPEN: NOT AT ALL

## 2020-08-12 ASSESSMENT — PATIENT HEALTH QUESTIONNAIRE - PHQ9
5. POOR APPETITE OR OVEREATING: SEVERAL DAYS
SUM OF ALL RESPONSES TO PHQ QUESTIONS 1-9: 13

## 2020-08-12 NOTE — PROGRESS NOTES
"Marcel Melchor is a 50 year old male who is being evaluated via a billable telephone visit.      The patient has been notified of following:     \"This telephone visit will be conducted via a call between you and your physician/provider. We have found that certain health care needs can be provided without the need for a physical exam.  This service lets us provide the care you need with a short phone conversation.  If a prescription is necessary we can send it directly to your pharmacy.  If lab work is needed we can place an order for that and you can then stop by our lab to have the test done at a later time.    Telephone visits are billed at different rates depending on your insurance coverage. During this emergency period, for some insurers they may be billed the same as an in-person visit.  Please reach out to your insurance provider with any questions.    If during the course of the call the physician/provider feels a telephone visit is not appropriate, you will not be charged for this service.\"    Patient has given verbal consent for Telephone visit?  Yes    What phone number would you like to be contacted at? [phone]    How would you like to obtain your AVS? 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  He " was doing yard work and also jet skiing over a weekend and noticed that both shoulder were bothering him.  The one shoulder improved but the other has not.  Cannot lift the arm above 90 degrees abduction.  Has Crohn's so cannot use NSAID's     Mood- extra stress from the summer getting him?  Also pandemic issues.  Feeling irritable and on edge.    PHQ 9/20/2019 5/11/2020 8/12/2020   PHQ-9 Total Score 8 9 13   Q9: Thoughts of better off dead/self-harm past 2 weeks Not at all Not at all Not at all     ASUNCION-7 SCORE 2/12/2020 5/11/2020 8/12/2020   Total Score - - -   Total Score 1 3 9       Reviewed and updated as needed this visit by Provider         Review of Systems   Constitutional, HEENT, cardiovascular, pulmonary, gi and gu systems are negative, except as otherwise noted.       Objective          Vitals:  No vitals were obtained today due to virtual visit.    healthy, alert and no distress  PSYCH: Alert and oriented times 3; coherent speech, normal   rate and volume, able to articulate logical thoughts, able   to abstract reason, no tangential thoughts, no hallucinations   or delusions  His affect is normal and pleasant  RESP: No cough, no audible wheezing, able to talk in full sentences  Remainder of exam unable to be completed due to telephone visits    Diagnostic Test Results:  none         Assessment & Plan     1. ASUNCION (generalized anxiety disorder)  Increase prozac dose to 60mg.  Follow up 3 months- can be repeat PHQ/ASUNCION- virtual visit if needed.    - propranolol (INDERAL) 20 MG tablet; Take 1 tablet (20 mg) by mouth 3 times daily as needed (anxiety)  Dispense: 60 tablet; Refill: 3  - FLUoxetine (PROZAC) 40 MG capsule; Take 1 capsule (40 mg) by mouth daily To take with 20mg capsule to equal 60mg daily  Dispense: 90 capsule; Refill: 0  - FLUoxetine (PROZAC) 20 MG capsule; Take 1 capsule (20 mg) by mouth daily To take with 40mg capsule to equal 60mg daily  Dispense: 90 capsule; Refill: 0    2. Acute pain of right  shoulder  Referral for eval and possible injection if appropriate  - Orthopedic & Spine  Referral; Future       Depression Screening Follow Up    PHQ 8/12/2020   PHQ-9 Total Score 13   Q9: Thoughts of better off dead/self-harm past 2 weeks Not at all       Follow Up Actions Taken  Follow up recommended: 3 month check in after increase in medication     Time of visit: 12 minutes      Return in about 3 months (around 11/12/2020) for depression/anxiety med check.    Wes Braga PA-C  [facility]

## 2020-08-13 ASSESSMENT — ANXIETY QUESTIONNAIRES: GAD7 TOTAL SCORE: 9

## 2020-08-18 ENCOUNTER — VIRTUAL VISIT (OUTPATIENT)
Dept: PSYCHOLOGY | Facility: CLINIC | Age: 50
End: 2020-08-18
Payer: COMMERCIAL

## 2020-08-18 DIAGNOSIS — F41.1 GAD (GENERALIZED ANXIETY DISORDER): Primary | ICD-10-CM

## 2020-08-18 PROCEDURE — 90847 FAMILY PSYTX W/PT 50 MIN: CPT | Mod: 95 | Performed by: MARRIAGE & FAMILY THERAPIST

## 2020-08-19 ENCOUNTER — ANCILLARY PROCEDURE (OUTPATIENT)
Dept: GENERAL RADIOLOGY | Facility: CLINIC | Age: 50
End: 2020-08-19
Attending: FAMILY MEDICINE
Payer: COMMERCIAL

## 2020-08-19 ENCOUNTER — OFFICE VISIT (OUTPATIENT)
Dept: ORTHOPEDICS | Facility: CLINIC | Age: 50
End: 2020-08-19
Attending: PHYSICIAN ASSISTANT
Payer: COMMERCIAL

## 2020-08-19 VITALS
SYSTOLIC BLOOD PRESSURE: 130 MMHG | DIASTOLIC BLOOD PRESSURE: 62 MMHG | HEIGHT: 69 IN | BODY MASS INDEX: 26.66 KG/M2 | WEIGHT: 180 LBS

## 2020-08-19 DIAGNOSIS — M25.511 ACUTE PAIN OF RIGHT SHOULDER: ICD-10-CM

## 2020-08-19 PROCEDURE — 99203 OFFICE O/P NEW LOW 30 MIN: CPT | Mod: 25 | Performed by: FAMILY MEDICINE

## 2020-08-19 PROCEDURE — 20611 DRAIN/INJ JOINT/BURSA W/US: CPT | Mod: RT | Performed by: FAMILY MEDICINE

## 2020-08-19 PROCEDURE — 73030 X-RAY EXAM OF SHOULDER: CPT | Mod: RT

## 2020-08-19 RX ORDER — METHYLPREDNISOLONE ACETATE 40 MG/ML
40 INJECTION, SUSPENSION INTRA-ARTICULAR; INTRALESIONAL; INTRAMUSCULAR; SOFT TISSUE
Status: DISCONTINUED | OUTPATIENT
Start: 2020-08-19 | End: 2022-07-01

## 2020-08-19 RX ADMIN — METHYLPREDNISOLONE ACETATE 40 MG: 40 INJECTION, SUSPENSION INTRA-ARTICULAR; INTRALESIONAL; INTRAMUSCULAR; SOFT TISSUE at 16:41

## 2020-08-19 ASSESSMENT — MIFFLIN-ST. JEOR: SCORE: 1671.85

## 2020-08-19 NOTE — PATIENT INSTRUCTIONS
1. Acute pain of right shoulder      -Patient has recurrent pain likely due to degeneration of the cartilage in the shoulder joint  -Patient requested a cortisone injection today.  Patient tolerated intra-articular cortisone injection today without complications.  Patient was given postprocedure instructions  -Patient was given a home exercise program handout today.  -Patient will follow-up when pain returns  -Call direct clinic number [[phone]] at any time with questions or concerns.    Albert Yeo MD [facility]  [facility] [place]

## 2020-08-19 NOTE — LETTER
8/19/2020         RE: Marcel Melchor  [address] [facility] [address]        Dear Colleague,    Thank you for referring your patient, Marcel Melchor, to the [facility]. Please see a copy of my visit note below.    ASSESSMENT & PLAN  Patient Instructions     1. Acute pain of right shoulder      -Patient has recurrent pain likely due to degeneration of the cartilage in the shoulder joint  -Patient requested a cortisone injection today.  Patient tolerated intra-articular cortisone injection today without complications.  Patient was given postprocedure instructions  -Patient was given a home exercise program handout today.  -Patient will follow-up when pain returns  -Call direct clinic number [[phone]] at any time with questions or concerns.    Albert Yeo MD [facility]  [facility]          -----    SUBJECTIVE  Marcel Melchor is a/an 50 year old Right handed male who is seen in consultation at the request of  Wes Braga PA-C for evaluation of right shoulder pain. The patient is seen by themselves.    Onset: 3 week(s) ago. Patient describes injury as jetskiing, and has been doing a lot of yard work.   Location of Pain: right anterior, deep joint pain.   Rating of Pain at worst: 7/10  Rating of Pain Currently: 4/10  Worsened by: lifting arm above 90's degrees, lifting things, putting on the shirt, sleeping.   Better with: more sleep  Treatments tried: ice, heat, Tylenol and biofreeze  Associated symptoms: locking or catching  Orthopedic history: YES - Date: previous history of shoulder pain, had a cortisone injection 10 years ago.   Relevant surgical history: NO  Social history: social history: works at [employer] cover/.     Past Medical History:   Diagnosis Date     Anxiety 9/3/2013     AJAY (obstructive sleep apnea) 11/11/2014     Rotator cuff syndrome 1/19/2012     Social History     Socioeconomic  "History     Marital status: Single     Spouse name: Not on file     Number of children: Not on file     Years of education: Not on file     Highest education level: Not on file   Occupational History     Not on file   Social Needs     Financial resource strain: Not on file     Food insecurity     Worry: Not on file     Inability: Not on file     Transportation needs     Medical: Not on file     Non-medical: Not on file   Tobacco Use     Smoking status: Never Smoker     Smokeless tobacco: Never Used   Substance and Sexual Activity     Alcohol use: No     Alcohol/week: 0.0 standard drinks     Drug use: No     Sexual activity: Yes     Partners: Female   Lifestyle     Physical activity     Days per week: Not on file     Minutes per session: Not on file     Stress: Not on file   Relationships     Social connections     Talks on phone: Not on file     Gets together: Not on file     Attends [employer] service: Not on file     Active member of club or organization: Not on file     Attends meetings of clubs or organizations: Not on file     Relationship status: Not on file     Intimate partner violence     Fear of current or ex partner: Not on file     Emotionally abused: Not on file     Physically abused: Not on file     Forced sexual activity: Not on file   Other Topics Concern     Parent/sibling w/ CABG, MI or angioplasty before 65F 55M? No   Social History Narrative     Not on file         Patient's past medical, surgical, social, and family histories were reviewed today and no changes are noted.    REVIEW OF SYSTEMS:  10 point ROS is negative other than symptoms noted above in HPI, Past Medical History or as stated below  Constitutional: NEGATIVE for fever, chills, change in weight  Skin: NEGATIVE for worrisome rashes, moles or lesions  GI/: NEGATIVE for bowel or bladder changes  Neuro: NEGATIVE for weakness, dizziness or paresthesias    OBJECTIVE:  /62   Ht 1.753 m (5' 9\")   Wt 81.6 kg (180 lb)   BMI 26.58 " kg/m     General: healthy, alert and in no distress  HEENT: no scleral icterus or conjunctival erythema  Skin: no suspicious lesions or rash. No jaundice.  CV: regular rhythm by palpation  Resp: normal respiratory effort without conversational dyspnea   Psych: normal mood and affect  Gait: normal steady gait with appropriate coordination and balance  Neuro: normal light touch sensory exam of the bilateral upper extremities.    MSK:  RIGHT SHOULDER  Inspection:    no swelling, bruising, discoloration, or obvious deformity or asymmetry  Palpation:    Tender about the anterior capsule and proximal biceps tendon. Remainder of bony and tendinous landmarks are nontender.  Active Range of Motion:     Abduction 1650, FF normal0, ER normal0, IR L4.      Scapular dyskinesis absent  Strength:    Scapular plane abduction grossly intact,  ER grossly intact, IR grossly intact, biceps grossly intact, triceps grossly intact  Special Tests:    Positive: Neer's, pain with slow controlled rotation and adduction from an abducted position    Negative: Clayton', supraspinatus (empty can), drop arm/painful arc, crossed arm adduction, Pima's, Speed's and Yergason's        Independent visualization of the below image:  Recent Results (from the past 24 hour(s))   XR Shoulder Right G/E 3 Views    Narrative    Examination:  XR SHOULDER RT G/E 3 VW    Date:  8/19/2020 4:14 PM     Clinical Information: Acute pain of right shoulder     Additional Information: none    Comparison: none      Impression    Impression:  Normal right shoulder. No fracture or bone lesion. Normal glenohumeral  and acromioclavicular joint spacing and alignment.    GRACE RAMOS MD       Large Joint Injection/Arthocentesis: R glenohumeral joint    Date/Time: 8/19/2020 4:41 PM  Performed by: Yeo, Albert, MD  Authorized by: Yeo, Albert, MD     Indications:  Pain and osteoarthritis  Needle Size:  22 G  Guidance: ultrasound    Approach:  Posterior  Location:   Shoulder      Site:  R glenohumeral joint  Medications:  40 mg methylPREDNISolone 40 MG/ML  Outcome:  Tolerated well, no immediate complications  Procedure discussed: discussed risks, benefits, and alternatives    Consent Given by:  Patient  Timeout: timeout called immediately prior to procedure    Prep: patient was prepped and draped in usual sterile fashion            Albert Yeo MD [facility]      Again, thank you for allowing me to participate in the care of your patient.        Sincerely,        Albert Yeo, MD

## 2020-08-19 NOTE — PROGRESS NOTES
ASSESSMENT & PLAN  Patient Instructions     1. Acute pain of right shoulder      -Patient has recurrent pain likely due to degeneration of the cartilage in the shoulder joint  -Patient requested a cortisone injection today.  Patient tolerated intra-articular cortisone injection today without complications.  Patient was given postprocedure instructions  -Patient was given a home exercise program handout today.  -Patient will follow-up when pain returns  -Call direct clinic number [[phone]] at any time with questions or concerns.    Albert Yeo MD [facility]  [facility]          -----    SUBJECTIVE  Marcel Melchor is a/an 50 year old Right handed male who is seen in consultation at the request of  Wes Braga PA-C for evaluation of right shoulder pain. The patient is seen by themselves.    Onset: 3 week(s) ago. Patient describes injury as jetskiing, and has been doing a lot of yard work.   Location of Pain: right anterior, deep joint pain.   Rating of Pain at worst: 7/10  Rating of Pain Currently: 4/10  Worsened by: lifting arm above 90's degrees, lifting things, putting on the shirt, sleeping.   Better with: more sleep  Treatments tried: ice, heat, Tylenol and biofreeze  Associated symptoms: locking or catching  Orthopedic history: YES - Date: previous history of shoulder pain, had a cortisone injection 10 years ago.   Relevant surgical history: NO  Social history: social history: works at floor cover/.     Past Medical History:   Diagnosis Date     Anxiety 9/3/2013     AJAY (obstructive sleep apnea) 11/11/2014     Rotator cuff syndrome 1/19/2012     Social History     Socioeconomic History     Marital status: Single     Spouse name: Not on file     Number of children: Not on file     Years of education: Not on file     Highest education level: Not on file   Occupational History     Not on file   Social Needs     Financial resource  "strain: Not on file     Food insecurity     Worry: Not on file     Inability: Not on file     Transportation needs     Medical: Not on file     Non-medical: Not on file   Tobacco Use     Smoking status: Never Smoker     Smokeless tobacco: Never Used   Substance and Sexual Activity     Alcohol use: No     Alcohol/week: 0.0 standard drinks     Drug use: No     Sexual activity: Yes     Partners: Female   Lifestyle     Physical activity     Days per week: Not on file     Minutes per session: Not on file     Stress: Not on file   Relationships     Social connections     Talks on phone: Not on file     Gets together: Not on file     Attends Muslim service: Not on file     Active member of club or organization: Not on file     Attends meetings of clubs or organizations: Not on file     Relationship status: Not on file     Intimate partner violence     Fear of current or ex partner: Not on file     Emotionally abused: Not on file     Physically abused: Not on file     Forced sexual activity: Not on file   Other Topics Concern     Parent/sibling w/ CABG, MI or angioplasty before 65F 55M? No   Social History Narrative     Not on file         Patient's past medical, surgical, social, and family histories were reviewed today and no changes are noted.    REVIEW OF SYSTEMS:  10 point ROS is negative other than symptoms noted above in HPI, Past Medical History or as stated below  Constitutional: NEGATIVE for fever, chills, change in weight  Skin: NEGATIVE for worrisome rashes, moles or lesions  GI/: NEGATIVE for bowel or bladder changes  Neuro: NEGATIVE for weakness, dizziness or paresthesias    OBJECTIVE:  /62   Ht 1.753 m (5' 9\")   Wt 81.6 kg (180 lb)   BMI 26.58 kg/m     General: healthy, alert and in no distress  HEENT: no scleral icterus or conjunctival erythema  Skin: no suspicious lesions or rash. No jaundice.  CV: regular rhythm by palpation  Resp: normal respiratory effort without conversational dyspnea "   Psych: normal mood and affect  Gait: normal steady gait with appropriate coordination and balance  Neuro: normal light touch sensory exam of the bilateral upper extremities.    MSK:  RIGHT SHOULDER  Inspection:    no swelling, bruising, discoloration, or obvious deformity or asymmetry  Palpation:    Tender about the anterior capsule and proximal biceps tendon. Remainder of bony and tendinous landmarks are nontender.  Active Range of Motion:     Abduction 1650, FF normal0, ER normal0, IR L4.      Scapular dyskinesis absent  Strength:    Scapular plane abduction grossly intact,  ER grossly intact, IR grossly intact, biceps grossly intact, triceps grossly intact  Special Tests:    Positive: Neer's, pain with slow controlled rotation and adduction from an abducted position    Negative: Clayton', supraspinatus (empty can), drop arm/painful arc, crossed arm adduction, Latimer's, Speed's and Yergason's        Independent visualization of the below image:  Recent Results (from the past 24 hour(s))   XR Shoulder Right G/E 3 Views    Narrative    Examination:  XR SHOULDER RT G/E 3 VW    Date:  8/19/2020 4:14 PM     Clinical Information: Acute pain of right shoulder     Additional Information: none    Comparison: none      Impression    Impression:  Normal right shoulder. No fracture or bone lesion. Normal glenohumeral  and acromioclavicular joint spacing and alignment.    GRACE RAMOS MD       Large Joint Injection/Arthocentesis: R glenohumeral joint    Date/Time: 8/19/2020 4:41 PM  Performed by: Yeo, Albert, MD  Authorized by: Yeo, Albert, MD     Indications:  Pain and osteoarthritis  Needle Size:  22 G  Guidance: ultrasound    Approach:  Posterior  Location:  Shoulder      Site:  R glenohumeral joint  Medications:  40 mg methylPREDNISolone 40 MG/ML  Outcome:  Tolerated well, no immediate complications  Procedure discussed: discussed risks, benefits, and alternatives    Consent Given by:  Patient  Timeout: timeout  called immediately prior to procedure    Prep: patient was prepped and draped in usual sterile fashion            Albert Yeo MD [facility]

## 2020-08-19 NOTE — PROGRESS NOTES
Progress Note    Client Name: Marcel NG Tutewohl  Date: 8/18/2020       Service Type: Couple  Video Visit: Yes, the patient's condition can be safely assessed and treated via synchronous audio and visual telemedicine encounter.      Reason for Video Visit: Patient has requested telehealth visit    Location of Originating Site: Patient's home    Distant Site: Provider Remote Setting      Session Start Time: 3:00  Session End Time: 3:45      Session Length: 45 min     Session #: 10    Attendees: Client and Spouse / Significant Other     Treatment Plan Last Reviewed: 8/18/2020  PHQ-9 / ASUNCION-7 : Each session    DATA  Interactive Complexity: No  Crisis: No        Progress Since Last Session (Related to Symptoms / Goals / Homework):   Symptoms: Worsening increased irritability    Homework: Did not complete       Episode of Care Goals: Relapse in behaviors     Current / Ongoing Stressors and Concerns:  Ermelinda and Marcel are arguing more and Marcel acknowledges that he has felt more edgy and irritable as of late. Says he is not managing his stress well. Talked about getting back into see his individual therapist. He has already increased his prozac. Also discussed stress management. He and his wife are not communicating well and Marcel lashed out at their daughter recently which prompted a call for counseling. Marcel feels that his wife is not meeting his needs sexually and expressed frustration with this and his wife stated she does not feel close to him and will not have sex to appease him. Therapist tried to educate Marcel and core differences between men and women regarding intimacy. Encouraged couple to schedule date night, weekly business meetings and for Ermelinda to not get in the middle of Marcel's relationship with their daughter.      Treatment Objective(s) Addressed in This Session:   client to learn how to be more emotionally available to wife     Intervention:   Strategic couples  counseling.     ASSESSMENT: Current Emotional / Mental Status (status of significant symptoms):   Risk status (Self / Other harm or suicidal ideation)   Client denies current fears or concerns for personal safety.   Client denies current or recent suicidal ideation or behaviors.   Client denies current or recent homicidal ideation or behaviors.   Client denies current or recent self injurious behavior or ideation.   Client denies other safety concerns.   Client Patient reports there has been no change in risk factors since their last session.     Client Patient reports there has been no change in protective factors since their last session.     Recommended that patient call 911 or go to the local ED should there be a change in any of these risk factors.     Appearance:   Appropriate    Eye Contact:   Good    Psychomotor Behavior: Normal    Attitude:   Cooperative    Orientation:   All   Speech    Rate / Production: Normal     Volume:  Normal    Mood:    Normal    Affect:    Appropriate     Thought Content:  Clear    Thought Form:  Coherent  Logical    Insight:    Good      Medication Review:   No changes to current psychiatric medication(s)     Medication Compliance:   Yes     Changes in Health Issues:   None reported     Chemical Use Review:   Substance Use: Chemical use reviewed, no active concerns identified      Tobacco Use: No current tobacco use.      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 Marcel to schedule therapy.        Trinidad Solorio LMFT   August 19, 2020      _______________________________________________________________________                                                   Treatment Plan    Client's Name: Marcel NG Jill  YOB: 1970    Date: 8/19/2020    DSM-V Diagnoses: 300.02 - Generalized Anxiety Disorder  By History; V71.09 - No Diagnosis  Psychosocial / Contextual Factors: work stress, marital discord  WHODAS: See Chart    Referral / Collaboration:  Referral to another professional/service is not indicated at this time.    Anticipated number of session or this episode of care: 30      MeasurableTreatment Goal(s) related to diagnosis / functional impairment(s)  Goal 1: Client will lower his GAD7 score to 2 or below    I will know I've met my goal when I'm less anxious and angry especially in marriage.      Objective #A (Client Action)    Client will practice the use of timeouts.  Status: Continued - Date(s): 8/19/2020    Intervention(s)  Therapist will teach assertiveness skills. and how to time out in a functional manner.    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  Status: Continued - Date(s): 8/19/2020    Intervention(s)  Therapist will assign homework have weekly work/status meetings.    Objective #C  Client will better understand and seek his wife's forgiveness.  Status: Continued - Date(s): 8/19/2020     Intervention(s)  Therapist will help client in this process.      Patient has reviewed and agreed to the above plan.      Trinidad Solorio  August 19, 2020

## 2020-09-24 PROBLEM — M54.16 LUMBAR RADICULOPATHY: Status: RESOLVED | Noted: 2020-03-10 | Resolved: 2020-09-24

## 2020-09-28 ENCOUNTER — VIRTUAL VISIT (OUTPATIENT)
Dept: PSYCHOLOGY | Facility: CLINIC | Age: 50
End: 2020-09-28
Payer: COMMERCIAL

## 2020-09-28 DIAGNOSIS — F41.1 GAD (GENERALIZED ANXIETY DISORDER): Primary | ICD-10-CM

## 2020-09-28 PROCEDURE — 90834 PSYTX W PT 45 MINUTES: CPT | Mod: 95 | Performed by: SOCIAL WORKER

## 2020-09-28 NOTE — PROGRESS NOTES
Progress Note    Client Name: Marcel Melchor  Date: 9/28/2020       Service Type: Individual     Session Start Time: 11:00  Session End Time: 11:45      Session Length: 45 min     Session #: 27    Attendees: Client attended alone     Telemedicine Visit: The patient's condition can be safely assessed and treated via synchronous audio and visual telemedicine encounter.      Reason for Telemedicine Visit: Services only offered telehealth    Originating Site (Patient Location): Patient's home    Distant Site (Provider Location): Provider Remote Setting home office    Consent:  The patient/guardian has verbally consented to: the potential risks and benefits of telemedicine (video visit) versus in person care; bill my insurance or make self-payment for services provided; and responsibility for payment of non-covered services.     Mode of Communication:  Video Conference via [employer]    As the provider I attest to compliance with applicable laws and regulations related to telemedicine.     Treatment Plan Last Reviewed: 9/28/2020  PHQ-9 / ASUNCION-7 : 2/12/2020    DATA  Interactive Complexity: No  Crisis: No        Progress Since Last Session (Related to Symptoms / Goals / Homework):   Symptoms: Improving reduced irritability    Homework: Achieved / completed to satisfaction client reported attempting effective communication with wife      Episode of Care Goals: Satisfactory progress - ACTION (Actively working towards change); Intervened by reinforcing change plan / affirming steps taken     Current / Ongoing Stressors and Concerns:   Ongoing: The client reports receiving feedback from others that he is irritable and difficult to work with. The client reports he may get irritable due to anxiety.  Current: The client reported he and his wife had mostly been able to get along well through the past few months with moments of conflict. He described a recent argument and how they  continue with miscommunication. Reviewed client's treatment plan and updated his goals for therapy.        Treatment Objective(s) Addressed in This Session:   learn & utilize at least 3 assertive communication skills weekly       Intervention:   DBT: Reviewed client's efficacy with interpersonal skills, identified areas for improvement and areas of success  Motivational Interviewing   Reviewed and updated treatment plan  MI Intervention: Expressed Empathy/Understanding, Supported Autonomy, Collaboration, Evocation, Permission to raise concern or advise, Open-ended questions, Change talk (evoked) and Reframe     Change Talk Expressed by the Patient: Desire to change Ability to change Reasons to change Committment to change Activation    Provider Response to Change Talk: E - Evoked more info from patient about behavior change, A - Affirmed patient's thoughts, decisions, or attempts at behavior change, R - Reflected patient's change talk and S - Summarized patient's change talk statements          ASSESSMENT: Current Emotional / Mental Status (status of significant symptoms):   Risk status (Self / Other harm or suicidal ideation)   Client denies current fears or concerns for personal safety.   Client denies current or recent suicidal ideation or behaviors.   Client denies current or recent homicidal ideation or behaviors.   Client denies current or recent self injurious behavior or ideation.   Client denies other safety concerns.   Client reports there has been no change in risk factors since their last session.     Client reports there has been no change in protective factors since their last session.     Recommended that patient call 911 or go to the local ED should there be a change in any of these risk factors.     Appearance:   cannot assess over phone    Eye Contact:   cannot assess over phone    Psychomotor Behavior: cannot assess over phone    Attitude:   Cooperative  Interested  Pleasant   Orientation:   All   Speech    Rate / Production: Talkative     Volume:  Normal    Mood:    Anxious  Irritable  client sounded frustrated   Affect:    cannot assess over phone     Thought Content:  Clear    Thought Form:  Coherent  Logical    Insight:    Fair      Medication Review:   No changes to current psychiatric medication(s)     Medication Compliance:   Yes     Changes in Health Issues:   None reported     Chemical Use Review:   Substance Use: Chemical use reviewed, no active concerns identified      Tobacco Use: No current tobacco use.      Diagnosis:  1. ASUNCION (generalized anxiety disorder)       Collateral Reports Completed:   Not Applicable    PLAN: (Client Tasks / Therapist Tasks / Other)  Client will practice slowing down his thinking and return for therapy in two weeks.        Isis BELL, [facility] 9/28/2020  Note reviewed and clinical supervision by ARABELLA [facility] 10/5/2020    ________________________________________________________________________    Treatment Plan    Client's Name: Marcel Melchor  YOB: 1970    Date: 9/28/2020    DSM-V Diagnoses: 300.02 (F41.1) Generalized Anxiety Disorder   Psychosocial / Contextual Factors: client reported continued conflict in family and struggling with continued restlessness and sense of urgency  WHODAS: see intake    Referral / Collaboration:  Referral to another professional/service is not indicated at this time..    Anticipated number of session or this episode of care: 5-8      MeasurableTreatment Goal(s) related to diagnosis / functional impairment(s)  Goal 1: Client will continue reducing symptoms of anxiety and irritability as evidenced by ASUNCION-7 remaining from 5 to 0 over the next four months and client reporting increased mindfulness in communication.    I will know I've met my goal when I don't have recurring issues with irritability.      Objective #A (Client Action)    Client will use cognitive strategies  identified in therapy to challenge anxious thoughts.  Status: Continued - Date(s): 9/28/2020    Intervention(s)  Therapist will assign homework to challenge distorted thinking  teach CBT skills.    Objective #B  Client will identify 3 initial signs or symptoms of anxiety.  Status: Continued - Date(s):  9/28/2020    Intervention(s)  Therapist will teach emotional recognition/identification. DBT.    Objective #C  Client will use relaxation strategies 3 times per day to reduce the physical symptoms of anxiety.  Status: Continued - Date(s): 9/28/2020    Intervention(s)  Therapist will assign homework to use mindfulness  teach mindfulness skills.      Goal 2: Client will improve ability to communicate effectively with others as evidenced by client reporting use of 3-4 new communications skills on a weekly basis.    I will know I've met my goal when I can feel open to being genuine and have feelings come out.      Objective #A (Client Action)    Status: Continued - Date(s): 9/28/2020    Client will learn & utilize at least 3 assertive communication skills weekly.    Intervention(s)  Therapist will assign homework to practice communication skills  teach assertiveness skills. DEAR MAN.    Objective #B  Client will pause and use skills before talking when irritable.    Status: Continued - Date(s):  9/28/2020    Intervention(s)  Therapist will teach mindfulness skills.    Objective #C  Client will identify and maintain motivation for changing communication skills.  Status: Continued - Date(s):  9/28/2020    Intervention(s)  Therapist will teach motivational skills.        Client has reviewed and agreed to the above plan.      Isis BELL, [facility] September 28, 2020  Note reviewed and clinical supervision by ARABELLA [facility] 10/5/2020

## 2020-09-29 ENCOUNTER — VIRTUAL VISIT (OUTPATIENT)
Dept: PSYCHOLOGY | Facility: CLINIC | Age: 50
End: 2020-09-29
Payer: COMMERCIAL

## 2020-09-29 DIAGNOSIS — F41.1 GAD (GENERALIZED ANXIETY DISORDER): Primary | ICD-10-CM

## 2020-09-29 PROCEDURE — 90847 FAMILY PSYTX W/PT 50 MIN: CPT | Mod: 95 | Performed by: MARRIAGE & FAMILY THERAPIST

## 2020-09-29 NOTE — PROGRESS NOTES
"                                           Progress Note    Client Name: Marcel NG Tutewohl  Date: 9/29/2020       Service Type: Couple      Video Visit: Yes, the patient's condition can be safely assessed and treated via synchronous audio and visual telemedicine  encounter.       Reason for Video Visit: Patient has requested telehealth visit      Location of Originating Site: Patient's home      Distant Site: Provider Remote Setting      Session Start Time: 9:00AM Session End Time: 9:45AM      Session Length: 45 min     Session #: 111    Attendees: Client and Spouse / Significant Other     Treatment Plan Last Reviewed: 8/18/2020  PHQ-9 / ASUNCION-7 : Each session    DATA  Interactive Complexity: No  Crisis: No        Progress Since Last Session (Related to Symptoms / Goals / Homework):   Symptoms: Improving increased communication    Homework: Partially completed       Episode of Care Goals: Satisfactory progress - ACTION (Actively working towards change); Intervened by reinforcing change plan / affirming steps taken     Current / Ongoing Stressors and Concerns:  Marcel  Is back in individual therapy and couple are doing better. Marcel seems less agitated today and more open. The couple process a \"hiccup\" they had 2 weeks ago where Ermelinda became upset with being questioned. Marcel also talks frequently about how he feels he has to be perfect and how everything is his fault. Ermelinda says she does not know why he feels this way and really does not expect much from Marcel or anyone. She said she does have a list of things on her phone that he said to her that were disrespectful. She does expect to be treated respectfully and does not like when Marcel talks to her like his sisters. 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 Talk about switching roles with them. 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 Ermelinda has a tendency to make bigger things out of miscommunications. Marcel wants to minimize things and move on. He will apologize but does know that he can be intolerant of being questioned or criticized.      Treatment Objective(s) Addressed in This Session:   client to learn how to be more emotionally available to wife     Intervention:   Strategic couples counseling.     ASSESSMENT: Current Emotional / Mental Status (status of significant symptoms):   Risk status (Self / Other harm or suicidal ideation)   Client denies current fears or concerns for personal safety.   Client denies current or recent suicidal ideation or behaviors.   Client denies current or recent homicidal ideation or behaviors.   Client denies current or recent self injurious behavior or ideation.   Client denies other safety concerns.   Client Patient reports there has been no change in risk factors since their last session.     Client Patient reports there has been no change in protective factors since their last session.     Recommended that patient call 911 or go to the local ED should there be a change in any of these risk factors.     Appearance:   Appropriate    Eye Contact:   Good    Psychomotor Behavior: Normal    Attitude:   Cooperative    Orientation:   All   Speech    Rate / Production: Normal     Volume:  Normal    Mood:    Normal    Affect:    Appropriate     Thought Content:  Clear    Thought Form:  Coherent  Logical    Insight:    Good      Medication Review:   No changes to current psychiatric medication(s)     Medication Compliance:   Yes     Changes in Health Issues:   None reported     Chemical Use Review:   Substance Use: Chemical use reviewed, no active concerns identified      Tobacco Use: No current tobacco use.      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 Ermelinda likes quick resolution but Marcel needs (too much) time to process and be less defensive.        Trinidad Solorio, LMFT   September 29, 2020      _______________________________________________________________________                                                   Treatment Plan    Client's Name: Marcel Melchor  YOB: 1970    Date: 8/19/2020    DSM-V Diagnoses: 300.02 - Generalized Anxiety Disorder By History; V71.09 - No Diagnosis  Psychosocial / Contextual Factors: work stress, marital discord  WHODAS: See Chart    Referral / Collaboration:  Referral to another professional/service is not indicated at this time.    Anticipated number of session or this episode of care: 30      MeasurableTreatment Goal(s) related to diagnosis / functional impairment(s)  Goal 1: Client will lower his GAD7 score to 2 or below    I will know I've met my goal when I'm less anxious and angry especially in marriage.      Objective #A (Client Action)    Client will practice the use of timeouts.  Status: Continued - Date(s): 8/19/2020    Intervention(s)  Therapist will teach assertiveness skills. and how to time out in a functional manner.    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  Status: Continued - Date(s): 8/19/2020    Intervention(s)  Therapist will assign homework have weekly work/status meetings.    Objective #C  Client will better understand and seek his wife's forgiveness.  Status: Continued - Date(s): 8/19/2020     Intervention(s)  Therapist will help client in this process.      Patient has reviewed and agreed to the above plan.      Trinidad Solorio  August 19, 2020                                                                                                                                                                                                                         "

## 2020-09-30 ASSESSMENT — COLUMBIA-SUICIDE SEVERITY RATING SCALE - C-SSRS
ATTEMPT LIFETIME: NO
1. IN THE PAST MONTH, HAVE YOU WISHED YOU WERE DEAD OR WISHED YOU COULD GO TO SLEEP AND NOT WAKE UP?: NO
1. IN THE PAST MONTH, HAVE YOU WISHED YOU WERE DEAD OR WISHED YOU COULD GO TO SLEEP AND NOT WAKE UP?: NO
ATTEMPT PAST THREE MONTHS: NO

## 2020-10-01 ENCOUNTER — VIRTUAL VISIT (OUTPATIENT)
Dept: PALLIATIVE MEDICINE | Facility: CLINIC | Age: 50
End: 2020-10-01
Payer: COMMERCIAL

## 2020-10-01 ENCOUNTER — TELEPHONE (OUTPATIENT)
Dept: PALLIATIVE MEDICINE | Facility: CLINIC | Age: 50
End: 2020-10-01

## 2020-10-01 DIAGNOSIS — M54.16 LUMBAR RADICULOPATHY: Primary | ICD-10-CM

## 2020-10-01 PROCEDURE — 99213 OFFICE O/P EST LOW 20 MIN: CPT | Mod: 95 | Performed by: NURSE PRACTITIONER

## 2020-10-01 ASSESSMENT — PAIN SCALES - GENERAL: PAINLEVEL: EXTREME PAIN (8)

## 2020-10-01 NOTE — PATIENT INSTRUCTIONS
1. Continue regular stretches and exercises, moderate activity as able and avoid additional injury.    2.  Pain Psychologist to address relaxation, behavioral change, coping style, and other factors important to improvement.  NO   3.  Medication Management:     1. Continue topicals lidocaine patches, Aspercreme and [place] balm.     4.  Potential procedures: repeat epidural steroid injection ordered today. They will call to schedule.     5.  Referrals: continue regular massage and monitor benefit.    6.  Follow up with FAIZAN Morrison CNP in 4-6 weeks and as needed.       ----------------------------------------------------------------  Clinic Number:  [phone]     Call with any questions about your care and for scheduling assistance.     Calls are returned Monday through Friday between 8 AM and 4:30 PM. We usually get back to you within 2 business days depending on the issue/request.    If we are prescribing your medications:    For opioid medication refills, call the clinic or send a [employer] message 7 days in advance.  Please include:    Name of requested medication    Name of the pharmacy.    For non-opioid medications, call your pharmacy directly to request a refill. Please allow 3-4 days to be processed.     Per [place] State Law:    All controlled substance prescriptions must be filled within 30 days of being written.      For those controlled substances allowing refills, pickup must occur within 30 days of last fill.      We believe regular attendance is key to your success in our program!      Any time you are unable to keep your appointment we ask that you call us at least 24 hours in advance to cancel.This will allow us to offer the appointment time to another patient.     Multiple missed appointments may lead to dismissal from the clinic.

## 2020-10-01 NOTE — PROGRESS NOTES
"Marcel Melchor is a 50 year old male who is being evaluated via a billable telephone visit.      The patient has been notified of following:     \"This telephone visit will be conducted via a call between you and your physician/provider. We have found that certain health care needs can be provided without the need for a physical exam.  This service lets us provide the care you need with a short phone conversation.  If a prescription is necessary we can send it directly to your pharmacy.  If lab work is needed we can place an order for that and you can then stop by our lab to have the test done at a later time.    Telephone visits are billed at different rates depending on your insurance coverage. During this emergency period, for some insurers they may be billed the same as an in-person visit.  Please reach out to your insurance provider with any questions.    If during the course of the call the physician/provider feels a telephone visit is not appropriate, you will not be charged for this service.\"    Patient has given verbal consent for Telephone visit?  Yes    What phone number would you like to be contacted at? [phone]    How would you like to obtain your AVS? Mail a copy     Shirlene Levy [facility]   Pain Management Center      Recommendations at last vist on 3/10/2020:         1.  Pain Physical Therapy:               Pain PT completed with unclear benefit. He feels like he needs a more aggressive approach to make progress. Kenya Calderon PT recommended BRETT PT with Faviola Leary PT for back specific exercises and home exercise regimen. We discussed this today and Marcel is interested, ordered. Cautioned he moderate his activity and avoid additional injury.               2.  Pain Psychologist to address relaxation, behavioral change, coping style, and other factors important to improvement.                NO  We discussed today but Marcel doesn't feel the need as he already meets with a therapist " "weekly. Hold off on for now. May consider in the future, continue with therapist.               3.  Diagnostic Studies: we discussed repeating a lumbar MRI given progression of pain (now into leg and calf) and outdated imaging. Of note, no red flag findings. He is very interested in repeating. Ordered today. I will call to discuss the results.               4.  Medication Management:                            1.  Continue topicals lidocaine patches, Aspercreme and [place] balm. Celebrex not helpful. No new medication changes today.               5.  Potential procedures: Marcel is interested in repeating an injection. As his last epidural steroid injection was only 7-8 weeks ago, discussed that this is relatively soon. He understands he can only have 3-4 injections a year but is interested in repeating. I am okay with scheduling one week from now but advised he hold off until he has MRI completed.  Lumbar epidural steroid injection ordered today. They will call to schedule.               6.  Continue chiropractic care as planned.              7.  Follow up with FAIZAN Morrison CNP in 4-6 weeks.     Additional provider notes:  -His pain is worse than as it was at last visit.   -He attributes pain to be due to recent manual labor at the [facility], wonders if it started to worsen at the beginning of August.   -His pain is located in bilateral low back, right > left, radiating down right buttock, posterior thigh, in lateral calf, and occasional into his toes. Denies new numbness or tingling, weakness, bladder or bowel incontinence.  -He had a right L4-5 and L5-S1 transforaminal epidural steroid injection with Dr. Montesinos on 3/13/2020. He reports significant pain relief but is unsure of how long this lasted, did feel better in the summer time. Of note, because of COVID-19 he had less physical activity this summer and thinks that this has helped.  -He started massage, \"a stretching massage\" with excellent pain benefit " "and continues on a regular basis. States with this he had 1.5 months of no pain.     Current pain medications:               Prozac 20mg daily- H for mood, \"I feel like a normal person\"               Adderall 10mg daily prn- H for ADD/narcolepsy, Nuvigil 250mg daily- H, prescribed by sleep medicine provider              Propanol 20mg TID prn- H for anxiety              Ibuprofen 400mg prn- SW, hx of crohns, stomach upset              Lidocaine patches- [facility], alternates with Aspercreme and [place] balm    1. Previous Pain Relevant Medications:  NOTE: This medication information taken from patient's intake form, not medical records.               Opiates: Tylenol #3- ?, Norco- ?              NSAIDS: hx of crohns               Muscle Relaxants: Flexeril- [facility],SE, narcolepsy makes very tired              Anti-migraine mediations: no              Anti-depressants: Prozac- H, Effexor- ?              Sleep aids: no              Anxiolytics: no              Neuropathics: gabapentin- ?/SE, narcolepsy, makes sleepy                          Topicals: lidocaine patches- [facility]              Other medications not covered above: Tylenol- [facility]     2. Physical Therapy: in 1990- [place], traction- [facility], pain physical therapy- [facility]/?  3. Pain Psychology: x1 visit with Elisa Brown PsyD, LP, sees a therapist weekly  4. Surgery: no  5. Injections: right L4-5 and L5-S1 transforaminal epidural steroid injection with Dr. Montesinos on 3/13/2020- [facility], more so than injection in January   right L4-5 transforaminal epidural steroid injection with Dr. Steen on 1/17/2020- [facility]  right L4-5 transforaminal epidural steroid injection with Dr. Steen on 9/9/19- H for 2-3 months  right L4-5 transforaminal epidural steroid injection with Dr. Johnson on 3/13/19- H for 5 months   right L4-5 transforaminal epidural steroid injection with Dr. Johnson on 4/19/18- H for 10 months     right L4-5 transforaminal epidural steroid injection with Dr. Hastings on 3/3/17- 9 months   x3 " L3-4 epidural steroid injections 2016- [facility]  6. Chiropractic: yes going on a regular basis, goes to [facility] in [place]- [facility], cryotherapy- [facility]  7. Acupuncture: yes- going on a regular basis it does help, does massage regularly with excellent pain benefit  8. TENS Unit: yes- [facility], short term    Imaging:  MRI of lumbar spine was completed on 3/10/2020 and shows:  T12-L1:  Loss of T2 signal from the disc. Mild annular disc bulge.  Central canal and neural foramina are normal.      L1-L2:  Minimal annular disc bulge. Central canal and neural foramina  are patent.     L2-L3:  Loss of T2 signal from the disc. Minimal annular disc bulge.  Central canal normal. Mild right foraminal stenosis. Left neural  foramen is patent.     L3-L4:  Loss of T2 signal from the disc. Loss of disc space height.  Small left central disc herniation/protrusion causing mild mass effect  on the dural sac. The central canal is still normal. Mild right  foraminal stenosis due to a bulging disc.     L4-L5:  Loss of T2 signal from the disc. Loss of disc space height.  Moderate-sized right central extruded disc herniation. This is causing  mass effect on the dural sac and causing displacement of the right L5  nerve root. This is best seen on sagittal image 7. It is also well  seen on axial image 34. Central canal is mildly narrowed. Mild  bilateral foraminal stenosis due to the bulging disc.     L5-S1:  The disc has a normal appearance. There are mild-moderate  degenerative changes in the facet joints.                                                                      IMPRESSION:  Moderate-sized right central L4-L5 extruded disc  herniation. This is causing displacement of the right L5 nerve root    Assessment:   Marcel Melchor is a 50 year old male with a past medical history significant for narcolepsy, rotator cuff syndrome, AJAY, and anxiety who presents with complaints of low back pain .      1. Low back pain- etiology likely L4-5  "disc extrusion, abutting L5 nerve root.   2. Mental Health - the patient's mental health concerns, specifically anxiety, affect his experience of pain and contribute to his clinically significant distress.     1. Lumbar radiculopathy          Plan:     1. Continue regular stretches and exercises, moderate activity as able and avoid additional injury.    2.  Pain Psychologist to address relaxation, behavioral change, coping style, and other factors important to improvement.  NO   3.  Medication Management:     1. Continue topicals lidocaine patches, Aspercreme and [place] balm.     4.  Potential procedures: after discussion, it sounds like the last two epidural steroid injections have been very helpful. The most recent injection was the most helpful yet. Repeat epidural steroid injection ordered today. They will call to schedule.     5.  Referrals: it sounds like regular \"stretching\" massage was enormously helpful, had no pain for 1.5 months after starting. Recommended he continue and monitor benefit.    6.  Follow up with FAIZAN Morrison CNP in 4-6 weeks and as needed.     Phone call duration: 18 minutes    FAIZAN Quinn CNP      "

## 2020-10-01 NOTE — TELEPHONE ENCOUNTER
"Screening Questions for Radiology Injections:    Injection to be done at which interventional clinic site? [facility]    If [facility], tell patient that this procedure requires a COVID-19 lab test be done within 4 days of the procedure. Would you still like to move forward with scheduling the procedure?  Not Applicable   If YES, let patient know that someone will call them to schedule the COVID-19 test. Route to nursing to enter order.     Instruct patient to arrive as directed prior to the scheduled appointment time:    [place] minutes before      [place]: 30 minutes before; if IV needed 1 hour before     Dr. Arboleda-no IV needed for Cervical ANDREY; please instruct to arrive 30\" early    Procedure ordered by Sylwia    Procedure ordered? right L4-5 and L5-S1 transforaminal epidural steroid injection   As a reminder, receiving steroids can decrease your body's ability to fight infection.   Would you still like to move forward with scheduling the injection?  Yes      Transforaminal Cervical ANDREY - no pain provider currently performing    What insurance would patient like us to bill for this procedure? BC- NO PA      Worker's comp or MVA (motor vehicle accident) -Any injection DO NOT SCHEDULE and route to Sheron Gamez.      [employer] insurance - For SI joint injections, DO NOT SCHEDULE and route Sheron Gamez.       [employer] - Any injection besides hip/shoulder/knee joint DO NOT SCHEDULE and route to Sheron Gamez. She will obtain PA and call pt back to schedule procedure or notify pt of denial.       [employer]-Route to Sheron for review      **BCBS- ALL need to be routed to [place] for review if a PA is needed**      IF SCHEDULING IN [place] AND NEEDS A PA, IT IS OKAY TO SCHEDULE. [place] HANDLES THEIR OWN PA'S AFTER THE PATIENT IS SCHEDULED. PLEASE SCHEDULE AT LEAST 1 WEEK OUT SO A PA CAN BE OBTAINED.    Any chance of pregnancy? Not Applicable   If YES, do NOT schedule and route to RN " pool    Is an  needed? No     Patient has a drive home? (mandatory) YES    Is patient taking any blood thinners (i.e. plavix, coumadin, jantoven, warfarin, heparin, pradaxa or dabigatran, etc)? No   If hold needed, do NOT schedule, route to RN pool     Is patient taking any aspirin products (includes Excedrin and Fiorinal)? No     If more than 325mg/day, OK to schedule; Instruct pt to decrease to less than 325 mg for 7 days AND route to RN pool    For CERVICAL procedures, hold all aspirin products for 6 days.     Tell pt that if aspirin product is not held for 6 days, the procedure WILL BE cancelled.      Does the patient have a bleeding or clotting disorder? No     If YES, okay to schedule AND route to RN nurse pool    For any patients with platelet count <100, must be forwarded to provider    Is patient diabetic?  No  If YES, instruct them to bring their glucometer.    Does patient have an active infection or treated for one within the past week? No     Is patient currently taking any antibiotics?  No     For patients on chronic, preventative, or prophylactic antibiotics, procedures may be scheduled.     For patients on antibiotics for active or recent infection:antibiotic course must have been completed for 4 days    Is patient currently taking any steroid medications? (i.e. Prednisone, Medrol)  No     For patients on steroid medications, course must have been completed for 4 days    Is patient actively being treated for cancer or immunocompromised? No  If YES, do NOT schedule and route to RN pool     Are you able to get on and off an exam table with minimal or no assistance? Yes  If NO, do NOT schedule and route to RN pool    Are you able to roll over and lay on your stomach with minimal or no assistance? Yes  If NO, do NOT schedule and route to RN pool     Any allergies to contrast dye, iodine, shellfish, or numbing and steroid medications? No  If YES, route to RN pool AND add allergy information to  appointment notes    Allergies: Amoxicillin      Has the patient had a flu shot or any other vaccinations within 7 days before or after the procedure.  YES-9/28     Does patient have an MRI/CT?  YES: MRI  Check Procedure Scheduling Grid to see if required.      Was the MRI done within the last 3 years?  Yes    If yes, where was the MRI done i.e.[facility], [facility], [facility], [facility] etc? [facility]      If no, do not schedule and route to RN pool    If MRI was not done at [facility], [facility] or [facility] do NOT schedule and route to RN pool.      If pt has an imaging disc, the injection MAY be scheduled but pt has to bring disc to appt.     If they show up without the disc the injection cannot be done    Procedure Specific Instructions:      If celiac plexus block, informed patient NPO for 6 hours and that it is okay to take medications with sips of water, especially blood pressure medications  Not Applicable         If this is for a cervical procedure, informed patient that aspirin needs to be held for 6 days.   NO      If IV needed:    Do not schedule procedures requiring IV placement in the first appointment of the day or first appointment after lunch. Do NOT schedule at 0745, 0815 or 1245. ok    Instructed pt to arrive 30 minutes early for IV start if required. (Check Procedure Scheduling Grid)  YES: ok    Reminders:      If you are started on any steroids or antibiotics between now and your appointment, you must contact us because the procedure may need to be cancelled.  Yes      For all procedures except radiofrequency ablations (RFAs) and spinal cord stimulator (SCS) trials, informed patient:    IV sedation is not provided for this procedure.  If you feel that an oral anti-anxiety medication is needed, you can discuss this further with your referring provider or primary care provider.  The Pain Clinic provider will discuss specifics of what the procedure includes at your appointment.  Most  procedures last 10-20 minutes.  We use numbing medications to help with any discomfort during the procedure.  Not Applicable      For patients 85 or older we recommend having an adult stay w/ them for the remainder of the day.   ok    Does the patient have any questions?  NO  Odalis Garcia  [facility]

## 2020-10-05 NOTE — PROGRESS NOTES
[facility] - Procedure Note    Date of Service: 10/7/2020    Procedure performed: RIGHT L4-5 & L5-S1 transforaminal epidural steroid injection with fluoroscopic guidance  Diagnosis: Lumbar spondylosis; Lumbar radiculitis/radiculopathy  : Jodi Johnson MD  Anesthesia: none    Indications: Marcel Melchor is a 50 year old male who is seen at the request of Celsa Dao CNP for lumbar transforaminal epidural steroid injection. The patient describes right sided low back pain with radiation to the right lateral thigh and calf to the level of the ankle. The patient has been exhibiting symptoms consistent with lumbar intraspinal inflammation and radiculopathy. Symptoms have been persistent, disabling, and intermittently severe. The patient reports minimal improvement with conservative treatment, including previous injection, medications and PT.    This is a repeat injection.  Previous RIGHT L4-5 & L5-S1 transforaminal epidural steroid injection done by Dr. Montesinos on 3/13/2020 provided good pain relief for a period of 3-6 months.     Lumbar MRI: 3/10/2020  FINDINGS: The report is dictated assuming five lumbar-type vertebral  bodies, and radiographic correlation may be necessary.  The distal  spinal cord and cauda equina appear normal.  The tip of the conus is  at the L1 level.  The bone marrow appears normal.  The paraspinal soft  tissues appear normal.     T12-L1:  Loss of T2 signal from the disc. Mild annular disc bulge.  Central canal and neural foramina are normal.      L1-L2:  Minimal annular disc bulge. Central canal and neural foramina  are patent.     L2-L3:  Loss of T2 signal from the disc. Minimal annular disc bulge.  Central canal normal. Mild right foraminal stenosis. Left neural  foramen is patent.     L3-L4:  Loss of T2 signal from the disc. Loss of disc space height.  Small left central disc herniation/protrusion causing mild mass effect  on the dural sac. The central  canal is still normal. Mild right  foraminal stenosis due to a bulging disc.     L4-L5:  Loss of T2 signal from the disc. Loss of disc space height.  Moderate-sized right central extruded disc herniation. This is causing  mass effect on the dural sac and causing displacement of the right L5  nerve root. This is best seen on sagittal image 7. It is also well  seen on axial image 34. Central canal is mildly narrowed. Mild  bilateral foraminal stenosis due to the bulging disc.     L5-S1:  The disc has a normal appearance. There are mild-moderate  degenerative changes in the facet joints.                                                                   IMPRESSION:  Moderate-sized right central L4-L5 extruded disc  herniation. This is causing displacement of the right L5 nerve root.      Allergies:      Allergies   Allergen Reactions     Amoxicillin      itching        Vitals:  /75 (BP Location: Left arm, Cuff Size: Adult Regular)   Pulse 55   SpO2 99%     Review of Systems: The patient denies recent fever, chills, illness, use of antibiotics or anticoagulants. All other 10-point review of systems negative.     Procedure: The procedure and risks were explained, and informed written consent was obtained from the patient. Risks include but are not limited to: infection, bleeding, increased pain, and damage to soft tissue, nerve, muscle, and vasculature structures. After getting informed consent, patient was brought into the procedure suite and was placed in a prone position on the procedure table. A Pause for the Cause was performed. Patient was prepped and draped in sterile fashion.     After identifying the right L4-5 & L5-S1 neuroforamen, the C-arm was rotated to a right lateral oblique angle.  A total of 4.5 mL of Lidocaine 1% was used to anesthetize the skin and the needle track at a skin entry site coaxial with the fluoroscopy beam, and overriding the superior aspect of the neuroforamen.  A 22 gauge 5 inch  spinal needle was advanced under intermittent fluoroscopy until it entered the foramen superiorly.    The position was then inspected from anteroposterior and lateral views, and the needle adjusted appropriately.  After negative aspiration, a total of 1 mL of Omnipaque-300 was injected using static and continuous fluoroscopy confirming appropriate position, with spread along the nerve root sheath and into the epidural space, with no intravascular or intrathecal uptake. 9 mL of Omnipaque-300 was wasted.    2mL of 1% lidocaine with 20 mg of dexamethasone was injected, divided evenly between the 2 sites.  The needle was removed. Hemostasis was achieved, the area was cleaned, and bandaids were placed when appropriate. Images were saved to PACS.    The patient tolerated the procedure well, and was taken to the recovery room, and there was no evidence of procedural complications. No new sensory or motor deficits were noted following the procedure. The patient was stable and able to ambulate on discharge home. Post-procedure instructions were provided.     Pre-procedure pain score: 8/10 in the back, 8/10 in the leg  Post-procedure pain score: 7/10 in the back, 7/10 in the leg    Assessment/Plan: Marcel Melchor is a 50 year old male s/p Right L4-5 & L5-S1 transforaminal epidural steroid injection today for lumbar spondylosis, radiculitis/radiculopathy.     1. Following today's procedure, the patient was advised to contact the Pain Management Center for any of the following:   Fever, chills, or night sweats   New onset of pain, numbness, or weakness   Any questions/concerns regarding the procedure  If unable to contact the Pain Center, the patient was instructed to go to a local Emergency Room for any complications.   2. The patient will receive a follow-up call in 1 week.  3. The patient should follow-up with the referring provider in 2 weeks for post-procedure evaluation.      LALITA GORDON MD   Pain Management &  Addiction Medicine

## 2020-10-06 ENCOUNTER — TELEPHONE (OUTPATIENT)
Dept: PALLIATIVE MEDICINE | Facility: CLINIC | Age: 50
End: 2020-10-06

## 2020-10-06 NOTE — TELEPHONE ENCOUNTER
Spoke to patient,  Reminded of date, time and location of appointment.     Reminded patient to reschedule appointment if been in contact with some who has been suspected or confirmed of covid-19. New or worsening symptoms of cough, fever, rash or shortness of breath.     Reminded patient to eat and drink as usual, hold any blood thinners or pain medications that they were asked to hold per nurse.    Reminded patient they will need a  for this appointment and requested that the  stays out of the clinic unless its medically necessary.    Reminded patient that both patient and  must wear a mask during their visit.      Denice Harrison MA  [facility]

## 2020-10-07 ENCOUNTER — VIRTUAL VISIT (OUTPATIENT)
Dept: PSYCHOLOGY | Facility: CLINIC | Age: 50
End: 2020-10-07
Payer: COMMERCIAL

## 2020-10-07 ENCOUNTER — RADIOLOGY INJECTION OFFICE VISIT (OUTPATIENT)
Dept: PALLIATIVE MEDICINE | Facility: CLINIC | Age: 50
End: 2020-10-07
Attending: NURSE PRACTITIONER
Payer: COMMERCIAL

## 2020-10-07 ENCOUNTER — ANCILLARY PROCEDURE (OUTPATIENT)
Dept: GENERAL RADIOLOGY | Facility: CLINIC | Age: 50
End: 2020-10-07
Attending: ANESTHESIOLOGY
Payer: COMMERCIAL

## 2020-10-07 VITALS — OXYGEN SATURATION: 99 % | SYSTOLIC BLOOD PRESSURE: 113 MMHG | DIASTOLIC BLOOD PRESSURE: 75 MMHG | HEART RATE: 55 BPM

## 2020-10-07 DIAGNOSIS — M54.16 LUMBAR RADICULOPATHY: ICD-10-CM

## 2020-10-07 DIAGNOSIS — M54.16 LUMBAR RADICULOPATHY: Primary | ICD-10-CM

## 2020-10-07 DIAGNOSIS — F41.1 GAD (GENERALIZED ANXIETY DISORDER): Primary | ICD-10-CM

## 2020-10-07 PROCEDURE — 99207 PR DROP WITH A PROCEDURE: CPT | Performed by: ANESTHESIOLOGY

## 2020-10-07 PROCEDURE — 90847 FAMILY PSYTX W/PT 50 MIN: CPT | Mod: 95 | Performed by: MARRIAGE & FAMILY THERAPIST

## 2020-10-07 NOTE — PROGRESS NOTES
Progress Note    Client Name: Marcel NG Tutewohl  Date: 10/7/2020       Service Type: Couple      Video Visit: Yes, the patient's condition can be safely assessed and treated via synchronous audio and visual telemedicine  encounter.       Reason for Video Visit: Patient has requested telehealth visit      Location of Originating Site: Patient's home      Distant Site: Provider Remote Setting      Session Start Time: 9:00AM Session End Time: 9:45AM      Session Length: 45 min     Session #: 111    Attendees: Client and Spouse / Significant Other     Treatment Plan Last Reviewed: 8/18/2020  PHQ-9 / ASUNCION-7 : Each session    DATA  Interactive Complexity: No  Crisis: No        Progress Since Last Session (Related to Symptoms / Goals / Homework):   Symptoms: Improving increased communication    Homework: Partially completed       Episode of Care Goals: Satisfactory progress - ACTION (Actively working towards change); Intervened by reinforcing change plan / affirming steps taken     Current / Ongoing Stressors and Concerns:  Marcel reached out to his brother and set a boundary due to his brothers drug use. Couple are looking for a townhouse for their niece. They report doing well. No communication issues since we met last.     Treatment Objective(s) Addressed in This Session:   client to learn how to be more emotionally available to wife     Intervention:   Strategic couples counseling.     ASSESSMENT: Current Emotional / Mental Status (status of significant symptoms):   Risk status (Self / Other harm or suicidal ideation)   Client denies current fears or concerns for personal safety.   Client denies current or recent suicidal ideation or behaviors.   Client denies current or recent homicidal ideation or behaviors.   Client denies current or recent self injurious behavior or ideation.   Client denies other safety concerns.   Client Patient reports there has been no change in risk  "factors since their last session.     Client Patient reports there has been no change in protective factors since their last session.     Recommended that patient call 911 or go to the local ED should there be a change in any of these risk factors.     Appearance:   Appropriate    Eye Contact:   Good    Psychomotor Behavior: Normal    Attitude:   Cooperative    Orientation:   All   Speech    Rate / Production: Normal     Volume:  Normal    Mood:    Normal    Affect:    Appropriate     Thought Content:  Clear    Thought Form:  Coherent  Logical    Insight:    Good      Medication Review:   No changes to current psychiatric medication(s)     Medication Compliance:   Yes     Changes in Health Issues:   None reported     Chemical Use Review:   Substance Use: Chemical use reviewed, no active concerns identified      Tobacco Use: No current tobacco use.      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 Ermelinda likes quick resolution but Marcel needs (too much) time to process and be less defensive.        Trinidad Solorio, LMFT   October 7, 2020      _______________________________________________________________________                                                   Treatment Plan    Client's Name: Marcel Melchor  YOB: 1970    Date: 8/19/2020    DSM-V Diagnoses: 300.02 - Generalized Anxiety Disorder By History; V71.09 - No Diagnosis  Psychosocial / Contextual Factors: work stress, marital discord  WHODAS: See Chart    Referral / Collaboration:  Referral to another professional/service is not indicated at this time.    Anticipated number of session or this episode of care: 30      MeasurableTreatment Goal(s) related to diagnosis / functional impairment(s)  Goal 1: Client will lower his GAD7 " score to 2 or below    I will know I've met my goal when I'm less anxious and angry especially in marriage.      Objective #A (Client Action)    Client will practice the use of timeouts.  Status: Continued - Date(s): 8/19/2020    Intervention(s)  Therapist will teach assertiveness skills. and how to time out in a functional manner.    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  Status: Continued - Date(s): 8/19/2020    Intervention(s)  Therapist will assign homework have weekly work/status meetings.    Objective #C  Client will better understand and seek his wife's forgiveness.  Status: Continued - Date(s): 8/19/2020     Intervention(s)  Therapist will help client in this process.      Patient has reviewed and agreed to the above plan.      Trinidad Solorio  August 19, 2020

## 2020-10-07 NOTE — NURSING NOTE
Discharge Information    IV Discontiued Time:  NA    Amount of Fluid Infused:  NA    Discharge Criteria = When patient returns to baseline or as per MD order    Consciousness:  Pt is fully awake    Circulation:  BP +/- 20% of pre-procedure level    Respiration:  Patient is able to breathe deeply    O2 Sat:  Patient is able to maintain O2 Sat >92% on room air    Activity:  Moves 4 extremities on command    Ambulation:  Patient is able to stand and walk or stand and pivot into wheelchair    Dressing:  Clean/dry or No Dressing    Notes:   Discharge instructions and AVS given to patient    Patient meets criteria for discharge?  YES    Admitted to PCU?  No    Responsible adult present to accompany patient home?  Yes    Signature/Title:    Asmita Mckeon RN Care Coordinator  [facility] [place]

## 2020-10-07 NOTE — PROGRESS NOTES
Pre-procedure Intake    Have you been fasting? NA    If yes, for how long?     Are you taking a prescribed blood thinner such as coumadin, Plavix, Xarelto?    No    If yes, when did you take your last dose?     Do you take aspirin?  No    If cervical procedure, have you held aspirin for 6 days?   NA    Do you have any allergies to contrast dye, iodine, steroid and/or numbing medications?  NO    Are you currently taking antibiotics or have an active infection?  NO    Have you had a fever/elevated temperature within the past week? NO    Are you currently taking oral steroids? NO    Do you have a ? Yes       Are you pregnant or breastfeeding?  Not Applicable    Are the vital signs normal?  Yes

## 2020-10-07 NOTE — PATIENT INSTRUCTIONS
[facility] Procedure Discharge Instructions    Today you saw:   Dr. Jodi Johnson      Your procedure:  Epidural steroid injection       Medications used:  Lidocaine (anesthetic)      Dexamethasone (steroid)       Omnipaque (contrast)                 Be cautious when walking as numbness and/or weakness in the legs may occur up to 6-8 hours after the procedure due to effect of the local anesthetic    Do not drive for 6 hours. The effect of the local anesthetic could slow your reflexes.     Avoid strenuous activity for the first 24 hours. You may resume your regular activities after that.     You may shower, however avoid swimming, tub baths or hot tubs for 24 hours following your procedure    You may have a mild to moderate increase in pain for several days following the injection.      You may use ice packs for 10-15 minutes, 3 to 4 times a day at the injection site for comfort    Do not use heat to painful areas for 6 to 8 hours. This will give the local anesthetic time to wear off and prevent you from accidentally burning your skin.    Unless you have been directed to avoid the use of anti-inflammatory medications (NSAIDS-ibuprofen, Aleve, Motrin), you may use these medications or Tylenol for pain control if needed.     With diabetes, check your blood sugar more frequently than usual as your blood sugar may be higher than normal for 10-14 days following a steroid injection. Contact your doctor who manages your diabetes if your blood sugar is higher than usual    Possible side effects of steroids that you may experience include flushing, elevated blood pressure, increased appetite, mild headaches and restlessness.  All of these symptoms will get better with time.    It may take up to 14 days for the steroid medication to start working although you may feel the effect as early as a few days after the procedure.     Follow up with your referring provider in 2-3 weeks      If you experience any of  the following, call the pain center line during work hours at [phone] or on-call physician after hours at [phone]:  -Fever over 100 degree F  -Swelling, bleeding, redness, drainage, warmth at the injection site  -Progressive weakness or numbness in your legs   -Loss of bowel or bladder function  -Unusual headache that is not relieved by Tylenol or your regular headache medication  -Unusual new onset of pain that is not improving

## 2020-10-12 ENCOUNTER — TELEPHONE (OUTPATIENT)
Dept: PALLIATIVE MEDICINE | Facility: CLINIC | Age: 50
End: 2020-10-12

## 2020-10-12 DIAGNOSIS — M62.838 MUSCLE SPASM: ICD-10-CM

## 2020-10-12 DIAGNOSIS — M54.16 LUMBAR RADICULOPATHY: Primary | ICD-10-CM

## 2020-10-12 NOTE — TELEPHONE ENCOUNTER
Thank you, information noted.      Unfortunately this sounds like a pain flare from the epidural steroid injection. I would expect things to improve in the next few days. I recommend he very closely monitor his symptoms and with any b/b incontinence, fevers, saddle anesthesia, or weakness, he seek emergent medical attention.     Lidocaine is a good idea. Would he like to try a muscle relaxant to see if this helps with the spasm? I am aware he has narcolepsy but it may be appropriate to use for a few days while the flare subsides. Robaxin is effective and the least sedating of the whole class (though mild sedation still possible).      FAIZAN Morrison [facility]

## 2020-10-12 NOTE — TELEPHONE ENCOUNTER
Called Alexander Thompson asking for a return call.    On 10/07/2020 he had the RIGHT L4-5 & L5-S1 transforaminal epidural steroid injection with fluoroscopic guidance.      Asmita Mckeon RN  Care Coordinator  [facility]

## 2020-10-12 NOTE — TELEPHONE ENCOUNTER
"Marcel returned my call. Pt had his injection last Wednesday. RIGHT L4-5 & L5-S1 transforaminal epidural steroid injection with fluoroscopic guidance.  Felt okay on the drive home, but the numbing medication wore off and he has been in a lot of pain.  Did advised it can take up to 14 days to see the benefit from the injection  Pain flares can be common.  Advised to use ice to the painful areas, take Tylenol, advil, aleve or ibuprofen.  Pt states he has a high tolerance for pain and he is ready to \"tap out\".  Pain is to the lower back and goes into the right sciatic area down the leg and into the foot.  Pain is shooting and throbbing.  He has tried gentle stretching and used a foam roller, but it is not helping. The injection site looks clean and dry. No redness, drainage, or swelling. No fever. No loss of bowel or bladder function. Uisng Tylenol and biofreeze.  Pt states sitting makes the pain worse.  He does try to stand and walk.  Pain goes into the calf like a zing and then goes into the foot.  3 toes are now numb (that is new).  Sometimes when he walks the pain does get better and sometimes it gets worse.  He has found a comfortable position laying down, but does say he is not sleeping very well due to pain. He usually has no issue sleeping but is struggling now due to pain.  He feels this is his usual pain but more intense.  Past injections have worked for him, but this one not so much.  Feels that the pain that goes into his calf is more like a horrible spasm or cramp.  Going to buy Lidocaine patch to see if that helps. He     Routing to provider to review and advise    Asmita Mckeon RN  Care Coordinator  [facility]                          "

## 2020-10-12 NOTE — TELEPHONE ENCOUNTER
Reason for call:  Other   Patient called regarding (reason for call): call back  Additional comments: Pt calling to speak to care team. Pt states they are having an increase in pain since the injection    Phone number to reach patient:  Cell number on file:    Telephone Information:   Mobile [phone]       Best Time:  anytime    Can we leave a detailed message on this number?  YES    Travel screening: Not Applicable     Christine DUBON    [facility]

## 2020-10-13 ENCOUNTER — ALLIED HEALTH/NURSE VISIT (OUTPATIENT)
Dept: PALLIATIVE MEDICINE | Facility: CLINIC | Age: 50
End: 2020-10-13
Payer: COMMERCIAL

## 2020-10-13 DIAGNOSIS — M54.16 LUMBAR RADICULOPATHY: Primary | ICD-10-CM

## 2020-10-13 PROCEDURE — 96372 THER/PROPH/DIAG INJ SC/IM: CPT

## 2020-10-13 PROCEDURE — 99207 PR NO CHARGE NURSE ONLY: CPT

## 2020-10-13 RX ORDER — METHOCARBAMOL 500 MG/1
500-1000 TABLET, FILM COATED ORAL 3 TIMES DAILY PRN
Qty: 45 TABLET | Refills: 0 | Status: SHIPPED | OUTPATIENT
Start: 2020-10-13 | End: 2021-04-19

## 2020-10-13 RX ORDER — KETOROLAC TROMETHAMINE 30 MG/ML
30 INJECTION, SOLUTION INTRAMUSCULAR; INTRAVENOUS ONCE
Status: COMPLETED | OUTPATIENT
Start: 2020-10-13 | End: 2020-10-13

## 2020-10-13 RX ADMIN — KETOROLAC TROMETHAMINE 30 MG: 30 INJECTION, SOLUTION INTRAMUSCULAR; INTRAVENOUS at 10:40

## 2020-10-13 NOTE — TELEPHONE ENCOUNTER
Marcel calling this morning.  Thought he missed my call this morning, but I had not called yet.  Advised Celsa did review the call from yesterday.  Advised this sounds like a pain flare from the injection.  Things should get better over the next few days.  Pain flares can be common after injections.  It can take up to 14 days before you can get the full benefit from the injection.  Pt did go out and get the lidocane and applied it to the painful area, but is not sure if it helped or not because he is in so much pain right now.  Did advised her monitor his symptoms closely. If he develops  b/b incontinence, fevers, saddle anesthesia, weakness, he needs to seek medical attention as soon as possible.  Advised even though he does have Narcolepsy, Celsa feels it would be appropriate to use a muscle relanxant for a few days to get him through the pain flare.  She would recommend Robaxin due to it being the least sedating of the whole muscle relaxant drug class, but even though it is mild it can still cause sedation.  Pt would like to try this.      He is concerned that even though he did have a minor amount of numbness in one toe, now he is having numbness in 3 toes and they are constantly numb, no relief. He has be careful when walking.  When he steps down on the leg he gets the immediate spasms to the sciatica, calf and foot foot and calf.     Routing to provider for new prescription for Robaxin to be [facility] [place] Mckeon RN  Care Coordinator  [facility]

## 2020-10-13 NOTE — NURSING NOTE
Toradol 30 mg given IM to the Right Deltoid at 1040am.  Site secured with band aide.  Pt tolerated the injection without complications.      Asmita Mckeon RN  Care Coordinator  [facility]

## 2020-10-13 NOTE — TELEPHONE ENCOUNTER
Called Marcel.  Informed him we sent over the presccription for the Robaxin. Advised he needs to take this at home to see how it will make him feel due to the risk of sedation and his narcolepsy.  He states he does understand and will take it as needed not scheduled.  He is also interested in the Toradol Injection.  He will come into the clinic today at 1030 to have this done.  He will seek medical attention as soon as possible if his symptoms worsen.  Advised that Celsa did think his symptoms are related to a pain flare from the injection and should get better as time goes on.      Routing to provider to sign Toradol order    Asmita Mckeon RN  Care Coordinator  [facility]

## 2020-10-13 NOTE — TELEPHONE ENCOUNTER
Agree with plan as laid out by nursing.    Order placed.    FAIZAN Morrison [facility]

## 2020-10-13 NOTE — TELEPHONE ENCOUNTER
Thank you, information noted.     Should his numbness progress any further, he can seek emergent medical attention.     We can try Robaxin 500-1000mg TID prn severe pain or spasm. If he is interested, we could also try a Toradol injection in clinic. This may help him get through the flare a bit faster.    Signed Prescriptions:                        Disp   Refills    methocarbamol (ROBAXIN) 500 MG tablet      45 tab*0        Sig: Take 1-2 tablets (500-1,000 mg) by mouth 3 times           daily as needed for muscle spasms  Authorizing Provider: MARILOU EVANS, FAIZAN [facility]

## 2020-10-14 ENCOUNTER — VIRTUAL VISIT (OUTPATIENT)
Dept: PSYCHOLOGY | Facility: CLINIC | Age: 50
End: 2020-10-14
Payer: COMMERCIAL

## 2020-10-14 DIAGNOSIS — F41.1 GAD (GENERALIZED ANXIETY DISORDER): Primary | ICD-10-CM

## 2020-10-14 PROCEDURE — 90847 FAMILY PSYTX W/PT 50 MIN: CPT | Mod: 95 | Performed by: MARRIAGE & FAMILY THERAPIST

## 2020-10-14 NOTE — PROGRESS NOTES
Progress Note    Client Name: Marcel NG Tutdannyohl  Date: 10/14/2020       Service Type: Couple      Video Visit: Yes, the patient's condition can be safely assessed and treated via synchronous audio and visual telemedicine  encounter.       Reason for Video Visit: Patient has requested telehealth visit      Location of Originating Site: Patient's home      Distant Site: Provider Remote Setting      Session Start Time: 12:00PM Session End Time: 12:45PM      Session Length: 45 min     Session #: Return    Attendees: Client and Spouse / Significant Other     Treatment Plan Last Reviewed: 8/18/2020  PHQ-9 / ASUNCION-7 : Each session    DATA  Interactive Complexity: No  Crisis: No        Progress Since Last Session (Related to Symptoms / Goals / Homework):   Symptoms: Improving increased communication    Homework: Partially completed       Episode of Care Goals: Satisfactory progress - ACTION (Actively working towards change); Intervened by reinforcing change plan / affirming steps taken     Current / Ongoing Stressors and Concerns:  Marcel talks about his back pain and how he is managing it. The couple have had a good week. They are hiring an  for their niece because her ex is moving to [facility] and there are concerns about child custody. 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 Ermelinda feels it is enough information for her.     Treatment Objective(s) Addressed in This Session:   client to learn how to be more emotionally available to wife     Intervention:   Strategic couples counseling.     ASSESSMENT: Current Emotional / Mental Status (status of significant symptoms):   Risk status (Self / Other harm or suicidal ideation)   Client denies current fears or concerns for personal safety.   Client denies current or recent suicidal ideation or behaviors.   Client denies current or recent homicidal ideation or behaviors.   Client denies current or recent  self injurious behavior or ideation.   Client denies other safety concerns.   Client Patient reports there has been no change in risk factors since their last session.     Client Patient reports there has been no change in protective factors since their last session.     Recommended that patient call 911 or go to the local ED should there be a change in any of these risk factors.     Appearance:   Appropriate    Eye Contact:   Good    Psychomotor Behavior: Normal    Attitude:   Cooperative    Orientation:   All   Speech    Rate / Production: Normal     Volume:  Normal    Mood:    Normal    Affect:    Appropriate     Thought Content:  Clear    Thought Form:  Coherent  Logical    Insight:    Good      Medication Review:   No changes to current psychiatric medication(s)     Medication Compliance:   Yes     Changes in Health Issues:   None reported     Chemical Use Review:   Substance Use: Chemical use reviewed, no active concerns identified      Tobacco Use: No current tobacco use.      Diagnosis:  300.02 Generalized Anxiety Disorder     Collateral Reports Completed:   Not Applicable    PLAN: (Client Tasks / Therapist Tasks / Other)  Homework: Marcel to pursue treatment for his back. Ermelinda to help Nina with her housing and child custody.      Trinidad Solorio, LMFT   October 14, 2020    _______________________________________________________________________                                                   Treatment Plan    Client's Name: Marcel Melchor  YOB: 1970    Date: 8/19/2020    DSM-V Diagnoses: 300.02 - Generalized Anxiety Disorder By History; V71.09 - No Diagnosis  Psychosocial / Contextual Factors: work stress, marital discord  WHODAS: See Chart    Referral / Collaboration:  Referral to another professional/service is not indicated at this time.    Anticipated number of session or this episode of care: 30      MeasurableTreatment Goal(s) related to diagnosis / functional  impairment(s)  Goal 1: Client will lower his GAD7 score to 2 or below    I will know I've met my goal when I'm less anxious and angry especially in marriage.      Objective #A (Client Action)    Client will practice the use of timeouts.  Status: Continued - Date(s): 8/19/2020    Intervention(s)  Therapist will teach assertiveness skills. and how to time out in a functional manner.    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  Status: Continued - Date(s): 8/19/2020    Intervention(s)  Therapist will assign homework have weekly work/status meetings.    Objective #C  Client will better understand and seek his wife's forgiveness.  Status: Continued - Date(s): 8/19/2020     Intervention(s)  Therapist will help client in this process.      Patient has reviewed and agreed to the above plan.      Trinidad Solorio  August 19, 2020

## 2020-10-15 ENCOUNTER — TELEPHONE (OUTPATIENT)
Dept: PALLIATIVE MEDICINE | Facility: CLINIC | Age: 50
End: 2020-10-15

## 2020-10-15 DIAGNOSIS — M51.16 LUMBAR DISC HERNIATION WITH RADICULOPATHY: Primary | ICD-10-CM

## 2020-10-19 NOTE — TELEPHONE ENCOUNTER
Patient called and stated that the injection did not work and he has been in great pain. He would like to know his other options           Klaudia Garcia    [facility]

## 2020-10-20 NOTE — TELEPHONE ENCOUNTER
Thank you, information noted.      Unfortunately it is not uncommon to have decreasing benefit or no benefit at all with epidural steroid injections as we continue to repeat them. It doesn't sound as though there was a complication, instead that he has had a pain flare and no pain benefit from the injection. Again, should he develop new numbness, tingling, bladder or bowel incontinence, or weakness, he should seek emergent medical attention.    I believe I may have mentioned in March, he could have a visit with neurosurgery to determine if any intervention is recommended. If he is interested I would be happy to place the order.     Other options to manage radicular pain would be a repeat trial of gabapentin or a trial of Lyrica (though of note both would have potential to worsen fatigue he already struggles with due to narcolepsy). Nortriptyline could potentially be helpful for nerve pain but interacts with Adderall and Prozac and he would need to discuss with psychiatry first prior to starting. If he would like to talk about these options in more detail he could make a follow up visit to discuss.    FAIZAN Morrison [facility]

## 2020-10-20 NOTE — TELEPHONE ENCOUNTER
Called Marcel.  Informed him it is not uncommon to have a decrease or no benefit from the epidural injection.  This is not a complication form the injection but must likely a pain flare.  Advised him to seek medical attention if he develops new numbness, tingling, bladder or bowel incontinence or weakness.   He would like to proceed with the neurosurgical referral.  He will consult with a new psychiatrist since he current one is no longer available to see.  He will ask about the Gabapentin vs Lyrica due to the interactions with his Adderall and Prozac.  He will call back to set up a follow up with Celsa Dao.    States his pain is at a 7 at best pushing a 9 most of the time now. He is using ice , ibuprofen and the muscle relaxant.  He is willing to try anything at this point.    Routing to provider for neurosurgical orders    Asimta Mckeon RN  Care Coordinator  [facility]

## 2020-10-20 NOTE — TELEPHONE ENCOUNTER
"Called Marcle.  Each day he is huritng worse and worse.  Not sleeping at all now.  He can't get comfortable at all. Cant move forward with how much discomfort he is having    Pain to calf and now the whole foot is  numb. Middle 3 toes are constantly numb.  Big toe and 5th toe numbness comes and goes. Feels like he is getting worse and not better.  The calf pain comes and goes.  He did get a massage yesterday and it did nothing for his pain.  Did not help.  Pain shoots up the leg and down into the foot.  States the previous injections did help some but has never been pain free at all.  Toradahl did help.  Then next day he was back to his normal pain.  He can't take this any more. He feels like he has to \"tap out\".      Asmita Mckeon RN  Care Coordinator  [facility]     "

## 2020-10-23 ENCOUNTER — TELEPHONE (OUTPATIENT)
Dept: PALLIATIVE MEDICINE | Facility: CLINIC | Age: 50
End: 2020-10-23

## 2020-10-23 DIAGNOSIS — M54.16 LUMBAR RADICULOPATHY: Primary | ICD-10-CM

## 2020-10-23 RX ORDER — METHYLPREDNISOLONE 4 MG
TABLET, DOSE PACK ORAL
Qty: 21 TABLET | Refills: 0 | Status: SHIPPED | OUTPATIENT
Start: 2020-10-23 | End: 2021-04-19

## 2020-10-23 NOTE — TELEPHONE ENCOUNTER
Called pt. Had injection 2 weeks ago and the pain is worse than before the injection.     States that he has pain radiating down to toes, his feet go numb and he is unable to get sleep.     Pain goes into glut; only the right leg.     Constant throbbing pain.     States that he is really going down hill and can't imagine going through another weekend without any relief of pain or sleep.     He is not supposed to take ibuprofen due to Crohn's but has been because he is trying anything to help the pain    Muscle relaxer just makes him drowsy but doesn't address the pain. States he is out of the methocarbamol    Pt received a toradol injection on 10/13. states that he felt some relief it only lasted a few hours    In regard to topical medications, pt states that he would need to apply 6 lidocaine patches down his leg to cover the pain area.     Let him know that he has a complex pain problem and that there are no easy answers to managing it.     Pt would like provider to consider some pain pills to get him through the weekend. When asked if he had something in mind, he said no. [facility] for any prescriptions.     Let him know that I would have Celsa Dao CNP review and would get back to him with recommendations.     Micaela Hendrickson, NICKN, RN-BC  Patient Care Supervisor  [facility] [place]

## 2020-10-23 NOTE — TELEPHONE ENCOUNTER
Signed Prescriptions:                        Disp   Refills    methylPREDNISolone (MEDROL DOSEPAK) 4 MG t*21 tab*0        Sig: Follow Package Directions  Authorizing Provider: MARILOU EVANS    I am hopeful that the Medrol dose pack will be enough to get his pain and inflammation to settle down some.     I agree that if he were to develop new symptoms he should seek emergent medical attention. As previously discussed with him with last few phone calls- new numbness, weakness, bladder or bowel incontinence, are also reasons to seek emergent medical attention.     FAIZAN Morrison [facility]

## 2020-10-23 NOTE — TELEPHONE ENCOUNTER
"Called pt. Relayed message from Celsa Dao CNP re: options.     Not interested in gabapentin since he has not had good experiences with it and it did not help    Will hold off on lyrica for now; would like something that will work more quickly    Would like to have the Medrol dose pack sent to his pharmacy    States that he will talk with his wife about filling it because he has had prednisone before and had a very hard time getting off of it.    Agrees that an antiinflammatory medication would likely be helpful    Pt states that he is \"Absolutely miserable\"   Pt asking to speak with Celsa to discuss. Let him know that she does not provide opioid medications for chronic pain flares.     Pt states that the pain that is going down his leg is a new pain and not a flare of his chronic pain. Let him know that if it is a new pain, it should be evaluated and Celsa Dao CNP is not the person to do that since she manages his chronic pain.     Let him know that he may go to an urgent care if he feels that the symptoms he has needs to be evaluated emergently.     Will route to Celsa Dao CNP for review and processing of Medrol Rx.     NICK SandyN, RN-BC  Patient Care Supervisor  [facility]      "

## 2020-10-23 NOTE — TELEPHONE ENCOUNTER
I am not going to prescribe opioids for management of an exacerbation of chronic pain. That being said, we can consider a few options. We could try a low dose trial of gabapentin with a slow titration. I know that he has had some side effects of worsening fatigue with his narcolepsy but prescribed in this way he may tolerate fine. Alternatively we could try Lyrica. Another option would be a Medrol dose pack to hopefully get things to calm down.     Sometimes repeating an epidural steroid injection can be helpful, as likely he has ongoing inflammation in his lumbar spine. Of course his most recent injection was not helpful so again not a guarantee.     FAIZAN Morrison CNP  [facility]

## 2020-10-23 NOTE — TELEPHONE ENCOUNTER
Pt asking for a call back. He wants to discuss the plan and next options with the chronic pain he is having now.    .  Xochilt ESPAÑA    [facility] [place]

## 2020-10-26 ENCOUNTER — TELEPHONE (OUTPATIENT)
Dept: PALLIATIVE MEDICINE | Facility: CLINIC | Age: 50
End: 2020-10-26

## 2020-10-26 NOTE — TELEPHONE ENCOUNTER
Called Marcel. He has not heard from the Neurology Department. Advised that I was sorry he had not been contacted yet.  The order was placed last week. Gave him the number [phone] to set up that appointment.  He also noted he was placed on Prednisone on Saturday.  Had some relief when he first started it, but [place] started to return.  On day 3 of a 6 day pack.  Advised he take the dose pack as prescribed. That is a medication he should not stop abruptly. He states he understands, he has taken that before.      Routing to provider as an JUAN RAMON Mckeon RN  Care Coordinator  [facility]

## 2020-10-27 ENCOUNTER — TRANSFERRED RECORDS (OUTPATIENT)
Dept: HEALTH INFORMATION MANAGEMENT | Facility: CLINIC | Age: 50
End: 2020-10-27

## 2020-10-28 ENCOUNTER — TRANSFERRED RECORDS (OUTPATIENT)
Dept: HEALTH INFORMATION MANAGEMENT | Facility: CLINIC | Age: 50
End: 2020-10-28

## 2020-10-30 ENCOUNTER — TELEPHONE (OUTPATIENT)
Dept: PALLIATIVE MEDICINE | Facility: CLINIC | Age: 50
End: 2020-10-30

## 2020-10-30 NOTE — TELEPHONE ENCOUNTER
Pt called to get the number for the order placed to Neurosurgery. He called back and stated he is not happy and is frustrated that no one called him from Neurosurgery to get him schedule and when he called them it was mentioned about him needing an MRI. Pt stated he is in pain and keeps getting the run around.         Xochilt ESPAÑA    [facility] [place]

## 2020-10-30 NOTE — TELEPHONE ENCOUNTER
Called Marcel.  Pt was very fraustrated with this situation. Neurosurgery order was placed and he was never called to get set up. He had to call her to get their number to get scheduled. Advised that is why we usually say if you do not hear from scheduling in a few days to call us back so we can give you their number to call yourself.  Now he has an appointment for Monday Nov 2nd with a PA and not an MD. He was then told he needed an MRI that was 6 months or newer. His MRI is 7 months old. He wishes he was told he would have needed a more recent MRI.  Feels this is next appointment is going to be a waste of time.  Advised we were not aware if policies had changed on the time of an MRI being done.  In the past a 7 month old MRI would have been fine.  Now we are aware of this.  Apologized that he feels he is getting the run around. He feels he is just getting pushed off to the side and he is in horrible pain. Advised usually the system works and we place an order, scheduling calls, images are looked at, recommendations are given.  Unfortunately, this did not go smooth at all for him.  He will see neurology on Monday.    Routing to provider as an JUAN RAMON Mckeon RN  Care Coordinator  [facility]

## 2020-11-01 DIAGNOSIS — F41.1 GAD (GENERALIZED ANXIETY DISORDER): ICD-10-CM

## 2020-11-02 NOTE — TELEPHONE ENCOUNTER
Thank you, information noted. It is unfortunate that he was not contacted sooner to schedule this appointment. On chart review, it looks like he cancelled neurosurgery evaluation with [facility] and is having a lumbar fusion with Dr. Venegas at [facility].     FAIZAN Morrison [facility]

## 2020-11-03 NOTE — TELEPHONE ENCOUNTER
Routing refill request to provider for review/approval because:  Labs out of range:  PHQ, ASUNCION    PHQ 9/20/2019 5/11/2020 8/12/2020   PHQ-9 Total Score 8 9 13   Q9: Thoughts of better off dead/self-harm past 2 weeks Not at all Not at all Not at all     ASUNCION-7 SCORE 2/12/2020 5/11/2020 8/12/2020   Total Score - - -   Total Score 1 3 9       Yandy VILLARREAL, RN, BSN

## 2020-11-04 NOTE — TELEPHONE ENCOUNTER
Pt scheduled with Celsa Dao CNP tomorrow. Further updates can be given at that time.     NICK SandyN, RN-BC  Patient Care Supervisor  [facility] [place]

## 2020-11-09 ENCOUNTER — VIRTUAL VISIT (OUTPATIENT)
Dept: PSYCHOLOGY | Facility: CLINIC | Age: 50
End: 2020-11-09
Payer: COMMERCIAL

## 2020-11-09 DIAGNOSIS — F41.1 GAD (GENERALIZED ANXIETY DISORDER): Primary | ICD-10-CM

## 2020-11-09 PROCEDURE — 90832 PSYTX W PT 30 MINUTES: CPT | Mod: 95 | Performed by: SOCIAL WORKER

## 2020-11-09 NOTE — PROGRESS NOTES
Progress Note    Client Name: Marcel Melchor  Date: 11/9/2020       Service Type: Individual     Session Start Time: 10:00  Session End Time: 10:30      Session Length: 30 min     Session #: 28    Attendees: Client attended alone     Telemedicine Visit: The patient's condition can be safely assessed and treated via synchronous audio and visual telemedicine encounter.      Reason for Telemedicine Visit: Services only offered telehealth    Originating Site (Patient Location): Patient's home    Distant Site (Provider Location): Provider Remote Setting home office    Consent:  The patient/guardian has verbally consented to: the potential risks and benefits of telemedicine (video visit) versus in person care; bill my insurance or make self-payment for services provided; and responsibility for payment of non-covered services.     Mode of Communication:  Video Conference via [employer]    As the provider I attest to compliance with applicable laws and regulations related to telemedicine.     Treatment Plan Last Reviewed: 9/28/2020  PHQ-9 / ASUNCION-7 : 2/12/2020    DATA  Interactive Complexity: No  Crisis: No        Progress Since Last Session (Related to Symptoms / Goals / Homework):   Symptoms: No change mood is stable    Homework: Achieved / completed to satisfaction client reported being mindful with wife      Episode of Care Goals: Satisfactory progress - ACTION (Actively working towards change); Intervened by reinforcing change plan / affirming steps taken     Current / Ongoing Stressors and Concerns:   Ongoing: The client reports receiving feedback from others that he is irritable and difficult to work with. The client reports he may get irritable due to anxiety.  Current: The client reported he and his wife were getting along well lately and he was feeling mostly in control of his mood. He described how they have very similar values and end up agreeing most of the time but  he is frustrated with how the process goes. Client explained that if he asks his wife directly about getting something she reacts emotionally and rejects his idea, but that after time passes she ends up agreeing with him. He said it is frustrating to feel like he has no control over financial decisions and has had to wait months for his wife to see his logic. Client reported his back pain has significantly increased and has been scheduled for surgery in a month, though he is nervous to agree to a big surgery without exploring all of his options.        Treatment Objective(s) Addressed in This Session:   learn & utilize at least 3 assertive communication skills weekly       Intervention:   DBT: Reviewed how client engages with emotions differently than his wife, practiced effective communication skills.  Motivational Interviewing     MI Intervention: Expressed Empathy/Understanding, Supported Autonomy, Collaboration, Evocation, Permission to raise concern or advise, Open-ended questions, Reflections: simple and complex, Change talk (evoked) and Reframe     Change Talk Expressed by the Patient: Desire to change Ability to change Reasons to change Committment to change Activation    Provider Response to Change Talk: E - Evoked more info from patient about behavior change, A - Affirmed patient's thoughts, decisions, or attempts at behavior change, R - Reflected patient's change talk and S - Summarized patient's change talk statements          ASSESSMENT: Current Emotional / Mental Status (status of significant symptoms):   Risk status (Self / Other harm or suicidal ideation)   Client denies current fears or concerns for personal safety.   Client denies current or recent suicidal ideation or behaviors.   Client denies current or recent homicidal ideation or behaviors.   Client denies current or recent self injurious behavior or ideation.   Client denies other safety concerns.   Client reports there has been no change in  risk factors since their last session.     Client reports there has been no change in protective factors since their last session.     Recommended that patient call 911 or go to the local ED should there be a change in any of these risk factors.     Appearance:   cannot assess over phone    Eye Contact:   cannot assess over phone    Psychomotor Behavior: cannot assess over phone    Attitude:   Cooperative  Interested Pleasant   Orientation:   All   Speech    Rate / Production: Talkative     Volume:  Normal    Mood:    Anxious  Irritable  client sounded nervous   Affect:    cannot assess over phone     Thought Content:  Clear    Thought Form:  Coherent  Logical    Insight:    Fair      Medication Review:   No changes to current psychiatric medication(s)     Medication Compliance:   Yes     Changes in Health Issues:   None reported     Chemical Use Review:   Substance Use: Chemical use reviewed, no active concerns identified      Tobacco Use: No current tobacco use.      Diagnosis:  1. ASUNCION (generalized anxiety disorder)       Collateral Reports Completed:   Not Applicable    PLAN: (Client Tasks / Therapist Tasks / Other)  Client will talk about dynamic in couples therapy, get information he needs to make an educated decision about surgery and return for therapy in two weeks.        Isis BELL, [facility] 11/9/2020  Note reviewed and clinical supervision by ARABELLA Cuba [facility] 12/4/2020    ________________________________________________________________________    Treatment Plan    Client's Name: Marcel Melchor  YOB: 1970    Date: 9/28/2020    DSM-V Diagnoses: 300.02 (F41.1) Generalized Anxiety Disorder   Psychosocial / Contextual Factors: client reported continued conflict in family and struggling with continued restlessness and sense of urgency  WHODAS: see intake    Referral / Collaboration:  Referral to another professional/service is not indicated at this time..    Anticipated number of  session or this episode of care: 5-8      MeasurableTreatment Goal(s) related to diagnosis / functional impairment(s)  Goal 1: Client will continue reducing symptoms of anxiety and irritability as evidenced by ASUNCION-7 remaining from 5 to 0 over the next four months and client reporting increased mindfulness in communication.    I will know I've met my goal when I don't have recurring issues with irritability.      Objective #A (Client Action)    Client will use cognitive strategies identified in therapy to challenge anxious thoughts.  Status: Continued - Date(s): 9/28/2020    Intervention(s)  Therapist will assign homework to challenge distorted thinking  teach CBT skills.    Objective #B  Client will identify 3 initial signs or symptoms of anxiety.  Status: Continued - Date(s):  9/28/2020    Intervention(s)  Therapist will teach emotional recognition/identification. DBT.    Objective #C  Client will use relaxation strategies 3 times per day to reduce the physical symptoms of anxiety.  Status: Continued - Date(s): 9/28/2020    Intervention(s)  Therapist will assign homework to use mindfulness  teach mindfulness skills.      Goal 2: Client will improve ability to communicate effectively with others as evidenced by client reporting use of 3-4 new communications skills on a weekly basis.    I will know I've met my goal when I can feel open to being genuine and have feelings come out.      Objective #A (Client Action)    Status: Continued - Date(s): 9/28/2020    Client will learn & utilize at least 3 assertive communication skills weekly.    Intervention(s)  Therapist will assign homework to practice communication skills  teach assertiveness skills. DEAR MAN.    Objective #B  Client will pause and use skills before talking when irritable.    Status: Continued - Date(s):  9/28/2020    Intervention(s)  Therapist will teach mindfulness skills.    Objective #C  Client will identify and maintain motivation for changing  communication skills.  Status: Continued - Date(s):  9/28/2020    Intervention(s)  Therapist will teach motivational skills.        Client has reviewed and agreed to the above plan.      Isis BELL, LGSW September 28, 2020  Note reviewed and clinical supervision by ARABELLA Cuba [facility] 10/5/2020

## 2020-11-12 ENCOUNTER — OFFICE VISIT (OUTPATIENT)
Dept: FAMILY MEDICINE | Facility: CLINIC | Age: 50
End: 2020-11-12
Payer: COMMERCIAL

## 2020-11-12 VITALS
BODY MASS INDEX: 26.29 KG/M2 | TEMPERATURE: 98 F | DIASTOLIC BLOOD PRESSURE: 78 MMHG | WEIGHT: 178 LBS | SYSTOLIC BLOOD PRESSURE: 120 MMHG | HEART RATE: 68 BPM | OXYGEN SATURATION: 96 %

## 2020-11-12 DIAGNOSIS — F41.1 GAD (GENERALIZED ANXIETY DISORDER): ICD-10-CM

## 2020-11-12 DIAGNOSIS — M51.369 DDD (DEGENERATIVE DISC DISEASE), LUMBAR: ICD-10-CM

## 2020-11-12 DIAGNOSIS — Z01.818 PREOP GENERAL PHYSICAL EXAM: Primary | ICD-10-CM

## 2020-11-12 LAB
ERYTHROCYTE [DISTWIDTH] IN BLOOD BY AUTOMATED COUNT: 12.8 % (ref 10–15)
HCT VFR BLD AUTO: 43.1 % (ref 40–53)
HGB BLD-MCNC: 16 G/DL (ref 13.3–17.7)
MCH RBC QN AUTO: 29.9 PG (ref 26.5–33)
MCHC RBC AUTO-ENTMCNC: 37.1 G/DL (ref 31.5–36.5)
MCV RBC AUTO: 81 FL (ref 78–100)
PLATELET # BLD AUTO: 234 10E9/L (ref 150–450)
RBC # BLD AUTO: 5.35 10E12/L (ref 4.4–5.9)
WBC # BLD AUTO: 4.3 10E9/L (ref 4–11)

## 2020-11-12 PROCEDURE — 90715 TDAP VACCINE 7 YRS/> IM: CPT | Performed by: PHYSICIAN ASSISTANT

## 2020-11-12 PROCEDURE — 85027 COMPLETE CBC AUTOMATED: CPT | Performed by: PHYSICIAN ASSISTANT

## 2020-11-12 PROCEDURE — 36415 COLL VENOUS BLD VENIPUNCTURE: CPT | Performed by: PHYSICIAN ASSISTANT

## 2020-11-12 PROCEDURE — 99214 OFFICE O/P EST MOD 30 MIN: CPT | Mod: 25 | Performed by: PHYSICIAN ASSISTANT

## 2020-11-12 PROCEDURE — 80048 BASIC METABOLIC PNL TOTAL CA: CPT | Performed by: PHYSICIAN ASSISTANT

## 2020-11-12 PROCEDURE — 90471 IMMUNIZATION ADMIN: CPT | Performed by: PHYSICIAN ASSISTANT

## 2020-11-12 RX ORDER — FLUOXETINE 40 MG/1
40 CAPSULE ORAL DAILY
Qty: 90 CAPSULE | Refills: 0 | Status: SHIPPED | OUTPATIENT
Start: 2020-11-12 | End: 2021-01-29

## 2020-11-12 ASSESSMENT — ANXIETY QUESTIONNAIRES
2. NOT BEING ABLE TO STOP OR CONTROL WORRYING: SEVERAL DAYS
GAD7 TOTAL SCORE: 7
1. FEELING NERVOUS, ANXIOUS, OR ON EDGE: SEVERAL DAYS
3. WORRYING TOO MUCH ABOUT DIFFERENT THINGS: SEVERAL DAYS
5. BEING SO RESTLESS THAT IT IS HARD TO SIT STILL: SEVERAL DAYS
6. BECOMING EASILY ANNOYED OR IRRITABLE: SEVERAL DAYS
IF YOU CHECKED OFF ANY PROBLEMS ON THIS QUESTIONNAIRE, HOW DIFFICULT HAVE THESE PROBLEMS MADE IT FOR YOU TO DO YOUR WORK, TAKE CARE OF THINGS AT HOME, OR GET ALONG WITH OTHER PEOPLE: SOMEWHAT DIFFICULT
7. FEELING AFRAID AS IF SOMETHING AWFUL MIGHT HAPPEN: NOT AT ALL

## 2020-11-12 ASSESSMENT — PATIENT HEALTH QUESTIONNAIRE - PHQ9
SUM OF ALL RESPONSES TO PHQ QUESTIONS 1-9: 11
5. POOR APPETITE OR OVEREATING: MORE THAN HALF THE DAYS

## 2020-11-12 NOTE — PROGRESS NOTES
[facility]  [address] [facility] [address]  [phone]  Dept: [phone]    PRE-OP EVALUATION:  Today's date: 2020    Marcel Melchor (: 1970) presents for pre-operative evaluation assessment as requested by Dr. Venegas.  He requires evaluation and anesthesia risk assessment prior to undergoing surgery/procedure for treatment of severe DDD L4-5 disc herniation L4-5 .    Fax number for surgical facility: [phone]  Primary Physician: Wes Braga  Type of Anesthesia Anticipated: to be determined    Preop Questionnnaire:  Pre-op Questionnaire 2020   1. Have you ever had a heart attack or stroke? No   2. Have you ever had surgery on your heart or blood vessels, such as a stent placement, a coronary artery bypass, or surgery on an artery in your head, neck, heart, or legs? No   3. Do you have chest pain with activity? No   4. Do you have a history of  heart failure? No   5. Do you currently have a cold, bronchitis or symptoms of other infection? No   6. Do you have a cough, shortness of breath, or wheezing? No   7. Do you or anyone in your family have previous history of blood clots? No   8. Do you or does anyone in your family have a serious bleeding problem such as prolonged bleeding following surgeries or cuts? No   9. Have you ever had problems with anemia or been told to take iron pills? No   10. Have you had any abnormal blood loss such as black, tarry or bloody stools? No   11. Have you ever had a blood transfusion? No   12. Are you willing to have a blood transfusion if it is medically needed before, during, or after your surgery? Yes   13. Have you or any of your relatives ever had problems with anesthesia? No   14. Do you have sleep apnea, excessive snoring or daytime drowsiness? YES    14a. Do you have a CPAP machine? Yes   15. Do you have any artifical heart valves or other implanted medical devices like a pacemaker,  defibrillator, or continuous glucose monitor? No   16. Do you have artificial joints? No   17. Are you allergic to latex? No         HPI:     HPI related to upcoming procedure: patient will be having minimally invasive lumbar interbody fusion at L4-5 after dealing with low back pain for many years.  He is currently feeling well      DEPRESSION - Patient has a long history of Depression of moderate severity requiring medication for control with recent symptoms being relapsing/remitting..Current symptoms of depression include excessive sleepiness, irritablility.     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     Priority: Medium     Narcolepsy 09/27/2018     Priority: Medium     Crohn's disease (H) 06/15/2015     Priority: Medium     AJAY (obstructive sleep apnea) 11/11/2014     Priority: Medium     ASUNCION (generalized anxiety disorder) 09/03/2013     Priority: Medium     Rotator cuff syndrome 01/19/2012     Priority: Medium     Motion sickness 07/14/2011     Priority: Medium     CARDIOVASCULAR SCREENING; LDL GOAL LESS THAN 160 10/31/2010     Priority: Medium     Attention deficit hyperactivity disorder (ADHD), combined type 12/04/2003     Priority: Medium     Problem list name updated by automated process. Provider to review       Dyspnea and respiratory abnormality 12/04/2003     Priority: Medium     Problem list name updated by automated process. Provider to review        Past Medical History:   Diagnosis Date     Anxiety 9/3/2013     AJAY (obstructive sleep apnea) 11/11/2014     Rotator cuff syndrome  1/19/2012     Past Surgical History:   Procedure Laterality Date     ZZC NONSPECIFIC PROCEDURE      fx R ankle-ORIF     Current Outpatient Medications   Medication Sig Dispense Refill     FLUoxetine (PROZAC) 40 MG capsule Take 1 capsule (40 mg) by mouth daily To take with 20mg capsule to equal 60mg daily 90 capsule 0     amphetamine-dextroamphetamine (ADDERALL) 10 MG tablet TAKE 1 TABLET BY MOUTH IN THE AFTERNOON PRN  0     armodafinil (NUVIGIL) 250 MG TABS tablet TAKE 1 TABLET BY MOUTH EVERY DAY IN THE MORNING  1     ASACOL  MG EC tablet TAKE 3 TABLETS BY MOUTH TWICE DAILY  0     clindamycin (CLEOCIN T) 1 % external lotion APPLY TOPICALLY TO FACE AND CHEST TWICE A DAY AS NEEDED 60 mL 3     FLUoxetine (PROZAC) 20 MG capsule TAKE 1 CAPSULE (20 MG) BY MOUTH DAILY TO TAKE WITH 40MG CAPSULE TO EQUAL 60MG DAILY 90 capsule 0     mesalamine (CANASA) 1000 MG Suppository Place 1,000 mg rectally 2 times daily       methocarbamol (ROBAXIN) 500 MG tablet Take 1-2 tablets (500-1,000 mg) by mouth 3 times daily as needed for muscle spasms 45 tablet 0     methylPREDNISolone (MEDROL DOSEPAK) 4 MG tablet therapy pack Follow Package Directions 21 tablet 0     testosterone cypionate (DEPOTESTOTERONE) 200 MG/ML injection Inject 50 mg into the muscle every 14 days       OTC products: None, except as noted above    Allergies   Allergen Reactions     Amoxicillin      itching      Latex Allergy: NO    Social History     Tobacco Use     Smoking status: Never Smoker     Smokeless tobacco: Never Used   Substance Use Topics     Alcohol use: No     Alcohol/week: 0.0 standard drinks     History   Drug Use No       REVIEW OF SYSTEMS:   Constitutional, neuro, ENT, endocrine, pulmonary, cardiac, gastrointestinal, genitourinary, musculoskeletal, integument and psychiatric systems are negative, except as otherwise noted.    EXAM:   /78 (BP Location: Right arm, Patient Position: Chair, Cuff Size: Adult Regular)   Pulse 68   Temp 98  F  (36.7  C) (Oral)   Wt 80.7 kg (178 lb)   SpO2 96%   BMI 26.29 kg/m      GENERAL APPEARANCE: healthy, alert and no distress     EYES: EOMI,  PERRL     HENT: ear canals and TM's normal and nose and mouth without ulcers or lesions     NECK: no adenopathy, no asymmetry, masses, or scars and thyroid normal to palpation     RESP: lungs clear to auscultation - no rales, rhonchi or wheezes     CV: regular rates and rhythm, normal S1 S2, no S3 or S4 and no murmur, click or rub     ABDOMEN:  soft, nontender, no HSM or masses and bowel sounds normal     MS: extremities normal- no gross deformities noted, no evidence of inflammation in joints, FROM in all extremities.     SKIN: no suspicious lesions or rashes     NEURO: Normal strength and tone, sensory exam grossly normal, mentation intact and speech normal     PSYCH: mentation appears normal. and affect normal/bright     LYMPHATICS: No cervical adenopathy    DIAGNOSTICS:     EKG: Not indicated due to non-vascular surgery and low risk of event (age <65 and without cardiac risk factors)  Labs Resulted Today:   Results for orders placed or performed in visit on 11/12/20   CBC with platelets     Status: Abnormal   Result Value Ref Range    WBC 4.3 4.0 - 11.0 10e9/L    RBC Count 5.35 4.4 - 5.9 10e12/L    Hemoglobin 16.0 13.3 - 17.7 g/dL    Hematocrit 43.1 40.0 - 53.0 %    MCV 81 78 - 100 fl    MCH 29.9 26.5 - 33.0 pg    MCHC 37.1 (H) 31.5 - 36.5 g/dL    RDW 12.8 10.0 - 15.0 %    Platelet Count 234 150 - 450 10e9/L     Labs Drawn and in Process:   Unresulted Labs Ordered in the Past 30 Days of this Admission     Date and Time Order Name Status Description    11/12/2020 1346 BASIC METABOLIC PANEL In process           Recent Labs   Lab Test 04/30/20 05/03/19  1400 06/08/18  0953 11/03/15   HGB  --  17.9* 16.4  --    PLT  --  223 244  --    NA  --  140  --  140   POTASSIUM  --  4.0  --  4.2   CR 0.93 0.89  --  0.88        IMPRESSION:   Reason for surgery/procedure: low back  pain/DDD/disc herniation  Diagnosis/reason for consult: pre op exam    The proposed surgical procedure is considered INTERMEDIATE risk.    REVISED CARDIAC RISK INDEX  The patient has the following serious cardiovascular risks for perioperative complications such as (MI, PE, VFib and 3  AV Block):  No serious cardiac risks  INTERPRETATION: 0 risks: Class I (very low risk - 0.4% complication rate)    The patient has the following additional risks for perioperative complications:  No identified additional risks      ICD-10-CM    1. Preop general physical exam  Z01.818 CBC with platelets     Basic metabolic panel  (Ca, Cl, CO2, Creat, Gluc, K, Na, BUN)   2. DDD (degenerative disc disease), lumbar  M51.36    3. ASUNCION (generalized anxiety disorder)  F41.1 FLUoxetine (PROZAC) 40 MG capsule         RECOMMENDATIONS:     --Consult hospital rounder / IM to assist post-op medical management    --Patient is to take all scheduled medications on the day of surgery    APPROVAL GIVEN to proceed with proposed procedure, without further diagnostic evaluation     Signed Electronically by: Wes Braga PA-C    Copy of this evaluation report is provided to requesting physician.    [facility] Preop Guidelines    Revised Cardiac Risk Index

## 2020-11-12 NOTE — PATIENT INSTRUCTIONS

## 2020-11-12 NOTE — LETTER
[facility]  [address] [facility] [id number] [phone]        November 16, 2020    Marcel Lauohjacinta                                                                                                                     [address] [place] [address]        Dear Marcel -       Here are your results from your recent clinic visit.         Complete blood count without anemia or infection.   Kidney, electrolyte and blood sugar tests normal.           If you have questions please call the clinic at [phone].         Take care,       Wes Braga PA-C

## 2020-11-12 NOTE — PROGRESS NOTES
Prior to immunization administration, verified patients identity using patient s name and date of birth. Please see Immunization Activity for additional information.     Screening Questionnaire for Adult Immunization    Are you sick today?   No   Do you have allergies to medications, food, a vaccine component or latex?   No   Have you ever had a serious reaction after receiving a vaccination?   No   Do you have a long-term health problem with heart, lung, kidney, or metabolic disease (e.g., diabetes), asthma, a blood disorder, no spleen, complement component deficiency, a cochlear implant, or a spinal fluid leak?  Are you on long-term aspirin therapy?   No   Do you have cancer, leukemia, HIV/AIDS, or any other immune system problem?   No   Do you have a parent, brother, or sister with an immune system problem?   No   In the past 3 months, have you taken medications that affect  your immune system, such as prednisone, other steroids, or anticancer drugs; drugs for the treatment of rheumatoid arthritis, Crohn s disease, or psoriasis; or have you had radiation treatments?   No   Have you had a seizure, or a brain or other nervous system problem?   No   During the past year, have you received a transfusion of blood or blood    products, or been given immune (gamma) globulin or antiviral drug?   No   For women: Are you pregnant or is there a chance you could become       pregnant during the next month?   No   Have you received any vaccinations in the past 4 weeks?   No     Immunization questionnaire answers were all negative.        Per orders of NAYELI Jauregui, injection of Tdap given by Leonila Greenberg MA. Patient instructed to remain in clinic for 15 minutes afterwards, and to report any adverse reaction to me immediately.       Screening performed by Leonila Greenberg MA on 11/12/2020 at 2:46 PM.

## 2020-11-13 LAB
ANION GAP SERPL CALCULATED.3IONS-SCNC: 4 MMOL/L (ref 3–14)
BUN SERPL-MCNC: 8 MG/DL (ref 7–30)
CALCIUM SERPL-MCNC: 9 MG/DL (ref 8.5–10.1)
CHLORIDE SERPL-SCNC: 106 MMOL/L (ref 94–109)
CO2 SERPL-SCNC: 29 MMOL/L (ref 20–32)
CREAT SERPL-MCNC: 1.05 MG/DL (ref 0.66–1.25)
GFR SERPL CREATININE-BSD FRML MDRD: 83 ML/MIN/{1.73_M2}
GLUCOSE SERPL-MCNC: 84 MG/DL (ref 70–99)
POTASSIUM SERPL-SCNC: 4 MMOL/L (ref 3.4–5.3)
SODIUM SERPL-SCNC: 139 MMOL/L (ref 133–144)

## 2020-11-13 ASSESSMENT — ANXIETY QUESTIONNAIRES: GAD7 TOTAL SCORE: 7

## 2020-12-03 ENCOUNTER — TRANSFERRED RECORDS (OUTPATIENT)
Dept: HEALTH INFORMATION MANAGEMENT | Facility: CLINIC | Age: 50
End: 2020-12-03

## 2020-12-09 ENCOUNTER — VIRTUAL VISIT (OUTPATIENT)
Dept: PSYCHOLOGY | Facility: CLINIC | Age: 50
End: 2020-12-09
Payer: COMMERCIAL

## 2020-12-09 DIAGNOSIS — F41.1 GAD (GENERALIZED ANXIETY DISORDER): Primary | ICD-10-CM

## 2020-12-09 PROCEDURE — 90832 PSYTX W PT 30 MINUTES: CPT | Mod: 95 | Performed by: SOCIAL WORKER

## 2020-12-09 NOTE — PROGRESS NOTES
Progress Note    Client Name: Marcel Lauohjacinta  Date: 12/9/2020       Service Type: Individual     Session Start Time: 10:10  Session End Time: 10:30      Session Length: 20 min     Session #: 29    Attendees: Client attended alone     Telemedicine Visit: The patient's condition can be safely assessed and treated via synchronous audio and visual telemedicine encounter.      Reason for Telemedicine Visit: Services only offered telehealth    Originating Site (Patient Location): Patient's home    Distant Site (Provider Location): Provider Remote Setting home office    Consent:  The patient/guardian has verbally consented to: the potential risks and benefits of telemedicine (video visit) versus in person care; bill my insurance or make self-payment for services provided; and responsibility for payment of non-covered services.     Mode of Communication:  Video Conference via [employer]    As the provider I attest to compliance with applicable laws and regulations related to telemedicine.     Treatment Plan Last Reviewed: 9/28/2020  PHQ-9 / ASUNCION-7 : 2/12/2020    DATA  Interactive Complexity: No  Crisis: No        Progress Since Last Session (Related to Symptoms / Goals / Homework):   Symptoms: Improving improved mood    Homework: Achieved / completed to satisfaction client reported making decision and getting surgery      Episode of Care Goals: Satisfactory progress - ACTION (Actively working towards change); Intervened by reinforcing change plan / affirming steps taken     Current / Ongoing Stressors and Concerns:   Ongoing: The client reports receiving feedback from others that he is irritable and difficult to work with. The client reports he may get irritable due to anxiety.  Current: The client reported he got back surgery recently and is resting while he heals. He said he has a lot of trust in his doctors so he wasn't too nervous to get the surgery and he is hopeful it helps  in the long run. He identified having pain getting in and out of bed but otherwise feels like he can move. Client reported having effective interpersonal communication.       Treatment Objective(s) Addressed in This Session:   learn & utilize at least 3 assertive communication skills weekly       Intervention:   Reviewed client's ability to cope with stress and pain  Motivational Interviewing     MI Intervention: Expressed Empathy/Understanding, Supported Autonomy, Collaboration, Evocation, Permission to raise concern or advise, Open-ended questions, Reflections: simple and complex, Change talk (evoked) and Reframe     Change Talk Expressed by the Patient: Desire to change Ability to change Reasons to change Committment to change Activation Taking steps    Provider Response to Change Talk: E - Evoked more info from patient about behavior change, A - Affirmed patient's thoughts, decisions, or attempts at behavior change, R - Reflected patient's change talk and S - Summarized patient's change talk statements          ASSESSMENT: Current Emotional / Mental Status (status of significant symptoms):   Risk status (Self / Other harm or suicidal ideation)   Client denies current fears or concerns for personal safety.   Client denies current or recent suicidal ideation or behaviors.   Client denies current or recent homicidal ideation or behaviors.   Client denies current or recent self injurious behavior or ideation.   Client denies other safety concerns.   Client reports there has been no change in risk factors since their last session.     Client reports there has been no change in protective factors since their last session.     Recommended that patient call 911 or go to the local ED should there be a change in any of these risk factors.     Appearance:   cannot assess over phone    Eye Contact:   cannot assess over phone    Psychomotor Behavior: cannot assess over phone    Attitude:   Cooperative  Interested  Pleasant   Orientation:   All   Speech    Rate / Production: Normal/ Responsive     Volume:  Normal    Mood:    Normal client sounded hopeful   Affect:    cannot assess over phone     Thought Content:  Clear    Thought Form:  Coherent  Logical    Insight:    Fair      Medication Review:   No changes to current psychiatric medication(s)     Medication Compliance:   Yes     Changes in Health Issues:   None reported     Chemical Use Review:   Substance Use: Chemical use reviewed, no active concerns identified      Tobacco Use: No current tobacco use.      Diagnosis:  1. ASUNCION (generalized anxiety disorder)       Collateral Reports Completed:   Not Applicable    PLAN: (Client Tasks / Therapist Tasks / Other)  Client will use sensory soothing when feeling pain and return for therapy in one month.        Isis Estrella, [facility] 12/9/2020  Note reviewed and clinical supervision by Savannah Bonilla [facility] 12/22/2020      ________________________________________________________________________    Treatment Plan    Client's Name: Marcel Melchor  YOB: 1970    Date: 9/28/2020    DSM-V Diagnoses: 300.02 (F41.1) Generalized Anxiety Disorder   Psychosocial / Contextual Factors: client reported continued conflict in family and struggling with continued restlessness and sense of urgency  WHODAS: see intake    Referral / Collaboration:  Referral to another professional/service is not indicated at this time..    Anticipated number of session or this episode of care: 5-8      MeasurableTreatment Goal(s) related to diagnosis / functional impairment(s)  Goal 1: Client will continue reducing symptoms of anxiety and irritability as evidenced by ASUNCION-7 remaining from 5 to 0 over the next four months and client reporting increased mindfulness in communication.    I will know I've met my goal when I don't have recurring issues with irritability.      Objective #A (Client Action)    Client will use cognitive strategies  identified in therapy to challenge anxious thoughts.  Status: Continued - Date(s): 9/28/2020    Intervention(s)  Therapist will assign homework to challenge distorted thinking  teach CBT skills.    Objective #B  Client will identify 3 initial signs or symptoms of anxiety.  Status: Continued - Date(s):  9/28/2020    Intervention(s)  Therapist will teach emotional recognition/identification. DBT.    Objective #C  Client will use relaxation strategies 3 times per day to reduce the physical symptoms of anxiety.  Status: Continued - Date(s): 9/28/2020    Intervention(s)  Therapist will assign homework to use mindfulness  teach mindfulness skills.      Goal 2: Client will improve ability to communicate effectively with others as evidenced by client reporting use of 3-4 new communications skills on a weekly basis.    I will know I've met my goal when I can feel open to being genuine and have feelings come out.      Objective #A (Client Action)    Status: Continued - Date(s): 9/28/2020    Client will learn & utilize at least 3 assertive communication skills weekly.    Intervention(s)  Therapist will assign homework to practice communication skills  teach assertiveness skills. DEAR MAN.    Objective #B  Client will pause and use skills before talking when irritable.    Status: Continued - Date(s):  9/28/2020    Intervention(s)  Therapist will teach mindfulness skills.    Objective #C  Client will identify and maintain motivation for changing communication skills.  Status: Continued - Date(s):  9/28/2020    Intervention(s)  Therapist will teach motivational skills.        Client has reviewed and agreed to the above plan.      Isis Estrella [facility]             ARABELLA, [facility] September 28, 2020  Note reviewed and clinical supervision by ARABELLA [facility] 10/5/2020

## 2021-01-13 ENCOUNTER — TRANSFERRED RECORDS (OUTPATIENT)
Dept: HEALTH INFORMATION MANAGEMENT | Facility: CLINIC | Age: 51
End: 2021-01-13

## 2021-01-26 DIAGNOSIS — F41.1 GAD (GENERALIZED ANXIETY DISORDER): ICD-10-CM

## 2021-01-26 NOTE — TELEPHONE ENCOUNTER
Routing refill request to provider for review/approval because:  PHQ-9 > 4.    PHQ 5/11/2020 8/12/2020 11/12/2020   PHQ-9 Total Score 9 13 11   Q9: Thoughts of better off dead/self-harm past 2 weeks Not at all Not at all Not at all     ASUNCION-7 SCORE 5/11/2020 8/12/2020 11/12/2020   Total Score - - -   Total Score 3 9 7     Penny Iverson RN

## 2021-01-26 NOTE — LETTER
January 29, 2021      Marcel Melchor  [address] [facility] [address]        Dear Mr. Marcel Melchor,    We recently received a call from your pharmacy requesting a refill of your medication Fluoxetine.    We are contacting you today to notify you that you are due for a medication check for further refills.    We have authorized one refill of your medication to allow time for you to schedule your appointment.    This appointment can be in clinic or virtual by either telephone, video.    Please call [phone] to schedule an appointment or if you have MyChart you can schedule with your provider as well.    Taking care of your health is important to us, an ongoing visits with your provider are vital to your care. We look forward to seeing you in the near future.    Thank you for using [facility] for your Medical Needs.          Sincerely,     Wes Braga PA-C

## 2021-01-29 RX ORDER — FLUOXETINE 40 MG/1
CAPSULE ORAL
Qty: 30 CAPSULE | Refills: 0 | Status: SHIPPED | OUTPATIENT
Start: 2021-01-29 | End: 2021-02-24

## 2021-02-09 ENCOUNTER — TRANSFERRED RECORDS (OUTPATIENT)
Dept: HEALTH INFORMATION MANAGEMENT | Facility: CLINIC | Age: 51
End: 2021-02-09

## 2021-02-22 DIAGNOSIS — F41.1 GAD (GENERALIZED ANXIETY DISORDER): ICD-10-CM

## 2021-02-22 NOTE — LETTER
February 25, 2021      Marcel Melchor  [address] [facility] [address]        Dear Mr. Marcel Melchor,    We recently received a call from your pharmacy requesting a refill of your medication Fluoxetine.    We are contacting you today to notify you that you are due for a medication check for further refills.    We have authorized one refill of your medication to allow time for you to schedule your appointment.    This appointment can be in clinic or virtual by either telephone, video.    Please call [phone] to schedule an appointment or if you have MyChart you can schedule with your provider as well.    Taking care of your health is important to us, an ongoing visits with your provider are vital to your care. We look forward to seeing you in the near future.    Thank you for using [facility] for your Medical Needs.          Sincerely,     Wes Braga PA-C

## 2021-02-24 RX ORDER — FLUOXETINE 40 MG/1
CAPSULE ORAL
Qty: 30 CAPSULE | Refills: 0 | Status: SHIPPED | OUTPATIENT
Start: 2021-02-24 | End: 2021-03-26

## 2021-02-24 NOTE — TELEPHONE ENCOUNTER
Routing refill request to provider for review/approval because:  PHQ 5/11/2020 8/12/2020 11/12/2020   PHQ-9 Total Score 9 13 11   Q9: Thoughts of better off dead/self-harm past 2 weeks Not at all Not at all Not at all     11/12/2020 Wes Braga PA-C Office Visit  Return in about 6 months (around 5/12/2021) for depression/anxiety med check.     Yari Luque RN

## 2021-04-02 ENCOUNTER — TELEPHONE (OUTPATIENT)
Dept: FAMILY MEDICINE | Facility: CLINIC | Age: 51
End: 2021-04-02

## 2021-04-02 NOTE — TELEPHONE ENCOUNTER
"Reason for Call:  Other call back    Detailed comments: Patient had called wanting to know if nursing or a cover provider for his PCP are able to give him a call back regarding a medication question. Patient would not go into more detail about the question other then it being a \"personal thing\" and only wants to talk to nursing or another provider about. Patient had also mentioned that he is not able to wait until next week when providers in the office to discuss this    Phone Number Patient can be reached at: Home number on file [phone] (home)    Best Time: Anytime    Can we leave a detailed message on this number? YES    Call taken on 4/2/2021 at 7:26 AM by Azalea Vila      "

## 2021-04-02 NOTE — TELEPHONE ENCOUNTER
Patient concerned with wife having side effects from discontinuing effexor. Requests nurse call wife.    Yari Luque RN

## 2021-04-19 ENCOUNTER — OFFICE VISIT (OUTPATIENT)
Dept: FAMILY MEDICINE | Facility: CLINIC | Age: 51
End: 2021-04-19
Payer: COMMERCIAL

## 2021-04-19 VITALS
HEIGHT: 69 IN | BODY MASS INDEX: 28.81 KG/M2 | HEART RATE: 68 BPM | WEIGHT: 194.5 LBS | OXYGEN SATURATION: 99 % | SYSTOLIC BLOOD PRESSURE: 116 MMHG | RESPIRATION RATE: 16 BRPM | TEMPERATURE: 98.2 F | DIASTOLIC BLOOD PRESSURE: 74 MMHG

## 2021-04-19 DIAGNOSIS — R45.86 MOOD SWING: ICD-10-CM

## 2021-04-19 DIAGNOSIS — Z13.220 SCREENING FOR HYPERLIPIDEMIA: ICD-10-CM

## 2021-04-19 DIAGNOSIS — G47.419 PRIMARY NARCOLEPSY WITHOUT CATAPLEXY: ICD-10-CM

## 2021-04-19 DIAGNOSIS — F41.1 GAD (GENERALIZED ANXIETY DISORDER): ICD-10-CM

## 2021-04-19 DIAGNOSIS — Z00.00 ROUTINE GENERAL MEDICAL EXAMINATION AT A HEALTH CARE FACILITY: Primary | ICD-10-CM

## 2021-04-19 LAB
CHOLEST SERPL-MCNC: 166 MG/DL
HDLC SERPL-MCNC: 46 MG/DL
LDLC SERPL CALC-MCNC: 97 MG/DL
NONHDLC SERPL-MCNC: 120 MG/DL
TRIGL SERPL-MCNC: 117 MG/DL

## 2021-04-19 PROCEDURE — 36415 COLL VENOUS BLD VENIPUNCTURE: CPT | Performed by: PHYSICIAN ASSISTANT

## 2021-04-19 PROCEDURE — 99396 PREV VISIT EST AGE 40-64: CPT | Performed by: PHYSICIAN ASSISTANT

## 2021-04-19 PROCEDURE — 99213 OFFICE O/P EST LOW 20 MIN: CPT | Mod: 25 | Performed by: PHYSICIAN ASSISTANT

## 2021-04-19 PROCEDURE — 80061 LIPID PANEL: CPT | Performed by: PHYSICIAN ASSISTANT

## 2021-04-19 PROCEDURE — 96127 BRIEF EMOTIONAL/BEHAV ASSMT: CPT | Performed by: PHYSICIAN ASSISTANT

## 2021-04-19 RX ORDER — MODAFINIL 200 MG/1
TABLET ORAL
COMMUNITY
Start: 2021-02-17

## 2021-04-19 ASSESSMENT — ENCOUNTER SYMPTOMS
HEMATOCHEZIA: 0
CHILLS: 0
HEADACHES: 0
DIZZINESS: 0
NERVOUS/ANXIOUS: 1
ABDOMINAL PAIN: 0
PARESTHESIAS: 0
SHORTNESS OF BREATH: 0
DYSURIA: 0
COUGH: 0
SORE THROAT: 0
DIARRHEA: 0
FEVER: 0
FREQUENCY: 0
HEARTBURN: 0
ARTHRALGIAS: 1
CONSTIPATION: 0
WEAKNESS: 0
PALPITATIONS: 0
JOINT SWELLING: 1
MYALGIAS: 1
EYE PAIN: 1
NAUSEA: 0
HEMATURIA: 0

## 2021-04-19 ASSESSMENT — ANXIETY QUESTIONNAIRES
3. WORRYING TOO MUCH ABOUT DIFFERENT THINGS: SEVERAL DAYS
GAD7 TOTAL SCORE: 6
7. FEELING AFRAID AS IF SOMETHING AWFUL MIGHT HAPPEN: NOT AT ALL
6. BECOMING EASILY ANNOYED OR IRRITABLE: MORE THAN HALF THE DAYS
IF YOU CHECKED OFF ANY PROBLEMS ON THIS QUESTIONNAIRE, HOW DIFFICULT HAVE THESE PROBLEMS MADE IT FOR YOU TO DO YOUR WORK, TAKE CARE OF THINGS AT HOME, OR GET ALONG WITH OTHER PEOPLE: NOT DIFFICULT AT ALL
1. FEELING NERVOUS, ANXIOUS, OR ON EDGE: SEVERAL DAYS
2. NOT BEING ABLE TO STOP OR CONTROL WORRYING: NOT AT ALL
5. BEING SO RESTLESS THAT IT IS HARD TO SIT STILL: SEVERAL DAYS

## 2021-04-19 ASSESSMENT — PATIENT HEALTH QUESTIONNAIRE - PHQ9
5. POOR APPETITE OR OVEREATING: SEVERAL DAYS
SUM OF ALL RESPONSES TO PHQ QUESTIONS 1-9: 7

## 2021-04-19 ASSESSMENT — MIFFLIN-ST. JEOR: SCORE: 1724.69

## 2021-04-19 NOTE — PROGRESS NOTES
SUBJECTIVE:   CC: Marcel Melchor is an 51 year old male who presents for preventative health visit.     Patient has been advised of split billing requirements and indicates understanding: Yes       Healthy Habits:     Getting at least 3 servings of Calcium per day:  NO    Bi-annual eye exam:  NO    Dental care twice a year:  Yes    Sleep apnea or symptoms of sleep apnea:  Sleep apnea    Diet:  Regular (no restrictions)    Frequency of exercise:  2-3 days/week    Duration of exercise:  15-30 minutes    Taking medications regularly:  Yes    Medication side effects:  None    PHQ-2 Total Score: 2    Additional concerns today:  Yes    Mood- has not been able to consistently get the extra 20mg prozac from the pharmacy so is only taking 40mg. Asking about Genesight.    Back- has surgery and is progressively recovering.    Fatigue- taking now nuvigil in the AM.  Not adderall in the PM, it would make him MORE tired.  He is now trying provigil in the afternoon.  This is from sleep provider for narcolepsy.      Today's PHQ-2 Score:   PHQ-2 ( 1999 [employer]) 4/19/2021   Q1: Little interest or pleasure in doing things 1   Q2: Feeling down, depressed or hopeless 1   PHQ-2 Score 2   Q1: Little interest or pleasure in doing things Several days   Q2: Feeling down, depressed or hopeless Several days   PHQ-2 Score 2       Abuse: Current or Past(Physical, Sexual or Emotional)- No  Do you feel safe in your environment? Yes    Have you ever done Advance Care Planning? (For example, a Health Directive, POLST, or a discussion with a medical provider or your loved ones about your wishes): No, advance care planning information given to patient to review.  Patient plans to discuss their wishes with loved ones or provider.      Social History     Tobacco Use     Smoking status: Never Smoker     Smokeless tobacco: Never Used   Substance Use Topics     Alcohol use: No     Alcohol/week: 0.0 standard drinks     If you drink alcohol do you typically  "have >3 drinks per day or >7 drinks per week? Not applicable    Alcohol Use 4/19/2021   Prescreen: >3 drinks/day or >7 drinks/week? Not Applicable   Prescreen: >3 drinks/day or >7 drinks/week? -       Last PSA: No results found for: PSA    Reviewed orders with patient. Reviewed health maintenance and updated orders accordingly - Yes  Lab work is in process  Labs reviewed in EPIC    Reviewed and updated as needed this visit by clinical staff  Tobacco  Allergies  Meds   Med Hx  Surg Hx  Fam Hx  Soc Hx        Reviewed and updated as needed this visit by Provider                  Review of Systems   Constitutional: Negative for chills and fever.   HENT: Positive for ear pain and hearing loss. Negative for congestion and sore throat.    Eyes: Positive for pain and visual disturbance.   Respiratory: Negative for cough and shortness of breath.    Cardiovascular: Negative for chest pain, palpitations and peripheral edema.   Gastrointestinal: Negative for abdominal pain, constipation, diarrhea, heartburn, hematochezia and nausea.   Genitourinary: Positive for impotence. Negative for discharge, dysuria, frequency, genital sores, hematuria and urgency.   Musculoskeletal: Positive for arthralgias, joint swelling and myalgias.   Skin: Negative for rash.   Neurological: Negative for dizziness, weakness, headaches and paresthesias.   Psychiatric/Behavioral: Positive for mood changes. The patient is nervous/anxious.        OBJECTIVE:   /74 (BP Location: Right arm, Patient Position: Chair, Cuff Size: Adult Regular)   Pulse 68   Temp 98.2  F (36.8  C) (Oral)   Resp 16   Ht 1.74 m (5' 8.5\")   Wt 88.2 kg (194 lb 8 oz)   SpO2 99%   BMI 29.14 kg/m      Physical Exam  GENERAL: healthy, alert and no distress  EYES: Eyes grossly normal to inspection, PERRL and conjunctivae and sclerae normal  HENT: ear canals and TM's normal, nose and mouth without ulcers or lesions  NECK: no adenopathy, no asymmetry, masses, or scars and " "thyroid normal to palpation  RESP: lungs clear to auscultation - no rales, rhonchi or wheezes  CV: regular rate and rhythm, normal S1 S2, no S3 or S4, no murmur, click or rub, no peripheral edema and peripheral pulses strong  ABDOMEN: soft, nontender, no hepatosplenomegaly, no masses and bowel sounds normal  MS: no gross musculoskeletal defects noted, no edema  SKIN: no suspicious lesions or rashes  NEURO: Normal strength and tone, mentation intact and speech normal  PSYCH: mentation appears normal, affect normal/bright    Diagnostic Test Results:  Labs reviewed in Epic    ASSESSMENT/PLAN:     1. Routine general medical examination at a health care facility  No other labs needed today.  Exam normal.  - Lipid panel reflex to direct LDL Fasting    2. Mood swing  He will continue with 40mg of prozac for now.  Will get him a nurse appointment for [facility] swab to be done and then will need follow up with Dr. Mcconnell or [facility] to review.    3. ASUNCION (generalized anxiety disorder)  As above.    4. Primary narcolepsy without cataplexy  Will need to add \"ADD\" meds to Genesight report for his sleep provider to review.    5. Screening for hyperlipidemia    - Lipid panel reflex to direct LDL Fasting      Patient has been advised of split billing requirements and indicates understanding: Yes  COUNSELING:   Reviewed preventive health counseling, as reflected in patient instructions    Estimated body mass index is 29.14 kg/m  as calculated from the following:    Height as of this encounter: 1.74 m (5' 8.5\").    Weight as of this encounter: 88.2 kg (194 lb 8 oz).     Weight management plan: Discussed healthy diet and exercise guidelines    He reports that he has never smoked. He has never used smokeless tobacco.      Counseling Resources:  ATP IV Guidelines  Pooled Cohorts Equation Calculator  FRAX Risk Assessment  ICSI Preventive Guidelines  Dietary Guidelines for Americans, 2010  USDA's MyPlate  ASA Prophylaxis  Lung CA " Screening    SRINIVAS Cardoso [facility]

## 2021-04-20 ASSESSMENT — ANXIETY QUESTIONNAIRES: GAD7 TOTAL SCORE: 6

## 2021-05-03 ENCOUNTER — ALLIED HEALTH/NURSE VISIT (OUTPATIENT)
Dept: NURSING | Facility: CLINIC | Age: 51
End: 2021-05-03
Payer: COMMERCIAL

## 2021-05-03 DIAGNOSIS — F69 BEHAVIOR CONCERN IN ADULT: Primary | ICD-10-CM

## 2021-05-03 PROCEDURE — 99207 PR NO CHARGE NURSE ONLY: CPT

## 2021-05-03 NOTE — PROGRESS NOTES
Genesight testing completed with patient. Placed on Dr. Mcconnell descris for completion.     Leonila Greenberg MA

## 2021-05-17 ENCOUNTER — TELEPHONE (OUTPATIENT)
Dept: FAMILY MEDICINE | Facility: CLINIC | Age: 51
End: 2021-05-17

## 2021-05-17 NOTE — TELEPHONE ENCOUNTER
LVM to let him know that his genesite testing needs to be redone. [employer] is over nighting test kit to his house. Will be able to send back and have results prior to appt with Dr. Mcconnell on 5/25.     Sepideh Lopez-

## 2021-05-19 PROCEDURE — 99N1204 PR STATISTIC GENESIGHT PSYCHOTROPIC: Mod: 90 | Performed by: FAMILY MEDICINE

## 2021-05-19 PROCEDURE — 36415 COLL VENOUS BLD VENIPUNCTURE: CPT | Performed by: FAMILY MEDICINE

## 2021-05-19 PROCEDURE — 99000 SPECIMEN HANDLING OFFICE-LAB: CPT | Performed by: FAMILY MEDICINE

## 2021-05-19 PROCEDURE — 99N1211 PR STATISTIC GENESIGHT MTHFR: Mod: 90 | Performed by: FAMILY MEDICINE

## 2021-05-25 ENCOUNTER — TELEPHONE (OUTPATIENT)
Dept: FAMILY MEDICINE | Facility: CLINIC | Age: 51
End: 2021-05-25

## 2021-05-25 NOTE — TELEPHONE ENCOUNTER
Left message on voicemail that I was canceling patient appointment with Wes TREADWELLon 5/26/21 due to NO Genesight results received yet.  Explained that we can call when results come in to reschedule appointment for him with either Dr. Mcconnell, or Wes Greenberg MA

## 2021-05-26 DIAGNOSIS — F41.1 GAD (GENERALIZED ANXIETY DISORDER): Primary | ICD-10-CM

## 2021-05-26 DIAGNOSIS — F41.1 GAD (GENERALIZED ANXIETY DISORDER): ICD-10-CM

## 2021-05-26 LAB
LAB SCANNED RESULT: NORMAL
LAB SCANNED RESULT: NORMAL

## 2021-05-27 NOTE — RESULT ENCOUNTER NOTE
Called patient, scheduled with PCP for 6/10/21, patient requested in person visit.     Leonila Greenberg MA

## 2021-06-10 ENCOUNTER — OFFICE VISIT (OUTPATIENT)
Dept: FAMILY MEDICINE | Facility: CLINIC | Age: 51
End: 2021-06-10
Payer: COMMERCIAL

## 2021-06-10 VITALS
BODY MASS INDEX: 29.01 KG/M2 | RESPIRATION RATE: 16 BRPM | DIASTOLIC BLOOD PRESSURE: 74 MMHG | SYSTOLIC BLOOD PRESSURE: 118 MMHG | TEMPERATURE: 98 F | HEART RATE: 60 BPM | WEIGHT: 193.6 LBS

## 2021-06-10 DIAGNOSIS — F41.1 GAD (GENERALIZED ANXIETY DISORDER): ICD-10-CM

## 2021-06-10 DIAGNOSIS — R45.86 MOOD SWING: Primary | ICD-10-CM

## 2021-06-10 PROCEDURE — 99214 OFFICE O/P EST MOD 30 MIN: CPT | Performed by: PHYSICIAN ASSISTANT

## 2021-06-10 RX ORDER — LAMOTRIGINE 25 MG/1
25 TABLET ORAL DAILY
Qty: 30 TABLET | Refills: 0 | Status: SHIPPED | OUTPATIENT
Start: 2021-06-10 | End: 2021-07-07

## 2021-06-10 RX ORDER — DESVENLAFAXINE 50 MG/1
50 TABLET, FILM COATED, EXTENDED RELEASE ORAL DAILY
Qty: 30 TABLET | Refills: 1 | Status: SHIPPED | OUTPATIENT
Start: 2021-06-10 | End: 2021-08-11

## 2021-06-10 ASSESSMENT — ANXIETY QUESTIONNAIRES
3. WORRYING TOO MUCH ABOUT DIFFERENT THINGS: NOT AT ALL
GAD7 TOTAL SCORE: 4
2. NOT BEING ABLE TO STOP OR CONTROL WORRYING: NOT AT ALL
7. FEELING AFRAID AS IF SOMETHING AWFUL MIGHT HAPPEN: NOT AT ALL
5. BEING SO RESTLESS THAT IT IS HARD TO SIT STILL: SEVERAL DAYS
1. FEELING NERVOUS, ANXIOUS, OR ON EDGE: SEVERAL DAYS
IF YOU CHECKED OFF ANY PROBLEMS ON THIS QUESTIONNAIRE, HOW DIFFICULT HAVE THESE PROBLEMS MADE IT FOR YOU TO DO YOUR WORK, TAKE CARE OF THINGS AT HOME, OR GET ALONG WITH OTHER PEOPLE: NOT DIFFICULT AT ALL
6. BECOMING EASILY ANNOYED OR IRRITABLE: SEVERAL DAYS

## 2021-06-10 ASSESSMENT — PATIENT HEALTH QUESTIONNAIRE - PHQ9
5. POOR APPETITE OR OVEREATING: SEVERAL DAYS
SUM OF ALL RESPONSES TO PHQ QUESTIONS 1-9: 4

## 2021-06-10 NOTE — NURSING NOTE
"Chief Complaint   Patient presents with     Results     Review genesight results.      Initial /74 (BP Location: Right arm, Patient Position: Sitting, Cuff Size: Adult Regular)   Pulse 60   Temp 98  F (36.7  C) (Oral)   Resp 16   Wt 87.8 kg (193 lb 9.6 oz)   BMI 29.01 kg/m   Estimated body mass index is 29.01 kg/m  as calculated from the following:    Height as of 4/19/21: 1.74 m (5' 8.5\").    Weight as of this encounter: 87.8 kg (193 lb 9.6 oz).  BP completed using cuff size regular right arm    Lisa Magill, CMA    "

## 2021-06-10 NOTE — PROGRESS NOTES
Assessment & Plan     Mood swing  Willing to try a mood stabilizer which we have not tried in the past.  I think this could be helpful for him.  Follow up one month, sooner prn.  - lamoTRIgine (LAMICTAL) 25 MG tablet; Take 1 tablet (25 mg) by mouth daily    ASUNCION (generalized anxiety disorder)  He has been on fluoxetine which is listed in the major gene interaction column- it appears the dose of this medication could be too low and higher doses required for therapeutic effect.  We will try a change to Pristiq and follow up one month, sooner prn.  - desvenlafaxine (PRISTIQ) 50 MG 24 hr tablet; Take 1 tablet (50 mg) by mouth daily      Return in about 1 month (around 7/10/2021) for depression/anxiety med check.    SRINIVAS Cardoso [facility] LENORAMOMANDA Rod is a 51 year old who presents for the following health issues     HPI     REVIEW GENESIGHT RESULTS:      Anxiety Follow-Up    How are you doing with your anxiety since your last visit? Stable- improving some    Are you having other symptoms that might be associated with anxiety? Yes:  mood swings    Have you had a significant life event? No     Are you feeling depressed? No    Do you have any concerns with your use of alcohol or other drugs? No    Social History     Tobacco Use     Smoking status: Never Smoker     Smokeless tobacco: Never Used   Substance Use Topics     Alcohol use: No     Alcohol/week: 0.0 standard drinks     Drug use: No     ASUNCION-7 SCORE 11/12/2020 4/19/2021 6/10/2021   Total Score - - -   Total Score 7 6 4     PHQ 11/12/2020 4/19/2021 6/10/2021   PHQ-9 Total Score 11 7 4   Q9: Thoughts of better off dead/self-harm past 2 weeks Not at all Not at all Not at all       Marcel is here to go over recent Genesight results.      Review of Systems   Constitutional, HEENT, cardiovascular, pulmonary, gi and gu systems are negative, except as otherwise noted.      Objective    /74 (BP Location: Right arm, Patient  Position: Sitting, Cuff Size: Adult Regular)   Pulse 60   Temp 98  F (36.7  C) (Oral)   Resp 16   Wt 87.8 kg (193 lb 9.6 oz)   BMI 29.01 kg/m    Body mass index is 29.01 kg/m .  Physical Exam   GENERAL: healthy, alert and no distress  PSYCH: mentation appears normal, affect normal/bright

## 2021-06-11 ASSESSMENT — ANXIETY QUESTIONNAIRES: GAD7 TOTAL SCORE: 4

## 2021-07-07 DIAGNOSIS — R45.86 MOOD SWING: ICD-10-CM

## 2021-07-07 DIAGNOSIS — F41.1 GAD (GENERALIZED ANXIETY DISORDER): ICD-10-CM

## 2021-07-07 RX ORDER — DESVENLAFAXINE 50 MG/1
TABLET, FILM COATED, EXTENDED RELEASE ORAL
Qty: 30 TABLET | Refills: 1 | OUTPATIENT
Start: 2021-07-07

## 2021-07-07 RX ORDER — LAMOTRIGINE 25 MG/1
25 TABLET ORAL DAILY
Qty: 30 TABLET | Refills: 0 | Status: SHIPPED | OUTPATIENT
Start: 2021-07-07 | End: 2021-08-11

## 2021-07-07 NOTE — TELEPHONE ENCOUNTER
Patient has an upcoming appointment with Wes Braga on 7/12/2021 at 6:45am.  Will give refill to get him through.    Penny Iverson RN

## 2021-09-05 ENCOUNTER — HEALTH MAINTENANCE LETTER (OUTPATIENT)
Age: 51
End: 2021-09-05

## 2021-09-21 DIAGNOSIS — F41.1 GAD (GENERALIZED ANXIETY DISORDER): ICD-10-CM

## 2021-09-22 RX ORDER — FLUOXETINE 40 MG/1
CAPSULE ORAL
Qty: 90 CAPSULE | Refills: 0 | Status: SHIPPED | OUTPATIENT
Start: 2021-09-22 | End: 2021-11-22

## 2021-09-27 DIAGNOSIS — F41.1 GAD (GENERALIZED ANXIETY DISORDER): ICD-10-CM

## 2021-09-27 DIAGNOSIS — R45.86 MOOD SWING: ICD-10-CM

## 2021-09-29 RX ORDER — LAMOTRIGINE 25 MG/1
TABLET ORAL
Qty: 15 TABLET | Refills: 0 | OUTPATIENT
Start: 2021-09-29

## 2021-09-29 RX ORDER — DESVENLAFAXINE 50 MG/1
TABLET, FILM COATED, EXTENDED RELEASE ORAL
Qty: 15 TABLET | Refills: 0 | OUTPATIENT
Start: 2021-09-29

## 2021-09-29 NOTE — TELEPHONE ENCOUNTER
Routing refill request to provider for review/approval because:  Klaudia given x1 and patient did not follow up, please advise  Still no future appointment scheduled    Penny Iverson RN

## 2021-09-29 NOTE — TELEPHONE ENCOUNTER
Has not followed up on either of these.  Unfortunately he just got a 90 day refill of prozac which is what were were changing from with these.... Maybe after that 90 days is up he will need to come in.    I'm denying these now.  We will need to deny Prozac until he makes an appointment if he asks for more of this.    Wes

## 2021-11-22 ENCOUNTER — OFFICE VISIT (OUTPATIENT)
Dept: FAMILY MEDICINE | Facility: CLINIC | Age: 51
End: 2021-11-22
Payer: COMMERCIAL

## 2021-11-22 VITALS
TEMPERATURE: 98.5 F | SYSTOLIC BLOOD PRESSURE: 112 MMHG | HEART RATE: 70 BPM | WEIGHT: 199 LBS | BODY MASS INDEX: 29.82 KG/M2 | RESPIRATION RATE: 16 BRPM | OXYGEN SATURATION: 100 % | DIASTOLIC BLOOD PRESSURE: 78 MMHG

## 2021-11-22 DIAGNOSIS — R45.86 MOOD SWING: ICD-10-CM

## 2021-11-22 DIAGNOSIS — F41.1 GAD (GENERALIZED ANXIETY DISORDER): ICD-10-CM

## 2021-11-22 PROCEDURE — 99214 OFFICE O/P EST MOD 30 MIN: CPT | Performed by: PHYSICIAN ASSISTANT

## 2021-11-22 RX ORDER — BUSPIRONE HYDROCHLORIDE 5 MG/1
5 TABLET ORAL 2 TIMES DAILY PRN
Qty: 60 TABLET | Refills: 0 | Status: SHIPPED | OUTPATIENT
Start: 2021-11-22 | End: 2021-12-19

## 2021-11-22 RX ORDER — DESVENLAFAXINE 100 MG/1
100 TABLET, EXTENDED RELEASE ORAL DAILY
Qty: 90 TABLET | Refills: 1 | Status: SHIPPED | OUTPATIENT
Start: 2021-11-22 | End: 2022-05-10

## 2021-11-22 RX ORDER — LAMOTRIGINE 25 MG/1
25 TABLET ORAL DAILY
Qty: 90 TABLET | Refills: 1 | Status: SHIPPED | OUTPATIENT
Start: 2021-11-22 | End: 2022-04-04

## 2021-11-22 ASSESSMENT — ANXIETY QUESTIONNAIRES
6. BECOMING EASILY ANNOYED OR IRRITABLE: MORE THAN HALF THE DAYS
4. TROUBLE RELAXING: SEVERAL DAYS
GAD7 TOTAL SCORE: 10
8. IF YOU CHECKED OFF ANY PROBLEMS, HOW DIFFICULT HAVE THESE MADE IT FOR YOU TO DO YOUR WORK, TAKE CARE OF THINGS AT HOME, OR GET ALONG WITH OTHER PEOPLE?: SOMEWHAT DIFFICULT
GAD7 TOTAL SCORE: 10
GAD7 TOTAL SCORE: 10
2. NOT BEING ABLE TO STOP OR CONTROL WORRYING: SEVERAL DAYS
7. FEELING AFRAID AS IF SOMETHING AWFUL MIGHT HAPPEN: SEVERAL DAYS
7. FEELING AFRAID AS IF SOMETHING AWFUL MIGHT HAPPEN: SEVERAL DAYS
5. BEING SO RESTLESS THAT IT IS HARD TO SIT STILL: SEVERAL DAYS
3. WORRYING TOO MUCH ABOUT DIFFERENT THINGS: MORE THAN HALF THE DAYS
1. FEELING NERVOUS, ANXIOUS, OR ON EDGE: MORE THAN HALF THE DAYS

## 2021-11-22 ASSESSMENT — PATIENT HEALTH QUESTIONNAIRE - PHQ9
SUM OF ALL RESPONSES TO PHQ QUESTIONS 1-9: 12
SUM OF ALL RESPONSES TO PHQ QUESTIONS 1-9: 12
10. IF YOU CHECKED OFF ANY PROBLEMS, HOW DIFFICULT HAVE THESE PROBLEMS MADE IT FOR YOU TO DO YOUR WORK, TAKE CARE OF THINGS AT HOME, OR GET ALONG WITH OTHER PEOPLE: VERY DIFFICULT

## 2021-11-22 NOTE — PROGRESS NOTES
"  Assessment & Plan     ASUNCION (generalized anxiety disorder)  Increase Pristiq to 100mg daily.  His mood is not optimally controlled.  I asked today what I could help him with and he did say he wanted something to help him feel more calm-- try buspar 5mg BID prn when he is feeling frustrated.  I do think he may need individual therapy to learn how to deal with his disappointments, anger and frustrations.  - desvenlafaxine (PRISTIQ) 100 MG 24 hr tablet; Take 1 tablet (100 mg) by mouth daily  - busPIRone (BUSPAR) 5 MG tablet; Take 1 tablet (5 mg) by mouth 2 times daily as needed (anxiety)    Mood swing  Does not need to necessarily take in the afternoon, I'd rather have him take regularly.  Refilled.  - lamoTRIgine (LAMICTAL) 25 MG tablet; Take 1 tablet (25 mg) by mouth daily       BMI:   Estimated body mass index is 29.82 kg/m  as calculated from the following:    Height as of 4/19/21: 1.74 m (5' 8.5\").    Weight as of this encounter: 90.3 kg (199 lb).   Weight management plan: deferred to next visit    Depression Screening Follow Up    PHQ 11/22/2021   PHQ-9 Total Score 12   Q9: Thoughts of better off dead/self-harm past 2 weeks Not at all         Return in about 6 months (around 5/22/2022) for depression/anxiety med check.    SRINIVAS Cardoso [facility] TRICIA Rod is a 51 year old who presents for the following health issues     History of Present Illness       He eats 2-3 servings of fruits and vegetables daily.He consumes 5 sweetened beverage(s) daily.He exercises with enough effort to increase his heart rate 9 or less minutes per day.  He exercises with enough effort to increase his heart rate 3 or less days per week.   He is taking medications regularly.       Anxiety Follow-Up    How are you doing with your anxiety since your last visit? Worsened     Are you having other symptoms that might be associated with anxiety? YES    Have you had a significant life event? " No     Are you feeling depressed? YES    Do you have any concerns with your use of alcohol or other drugs? No    Social History     Tobacco Use     Smoking status: Never Smoker     Smokeless tobacco: Never Used   Vaping Use     Vaping Use: Never used   Substance Use Topics     Alcohol use: No     Alcohol/week: 0.0 standard drinks     Drug use: No     ASUNCION-7 SCORE 4/19/2021 6/10/2021 11/22/2021   Total Score - - 10 (moderate anxiety)   Total Score 6 4 10     PHQ 4/19/2021 6/10/2021 11/22/2021   PHQ-9 Total Score 7 4 12   Q9: Thoughts of better off dead/self-harm past 2 weeks Not at all Not at all Not at all     Not sure that the medications are working.  He is still taking Pristiq but does not remember to take Lamictal as he says that one is taken in the afternoon.  He is feeling quite irritable, easily annoyed and often angry.  Frustrated with life and mostly at this time his family- wife and daughter.  He feels under attack constantly at home from them.  Still seeing therapist with his wife.  He is not sure it is helpful for him, thinks it might help his wife.      Review of Systems   Constitutional, HEENT, cardiovascular, pulmonary, gi and gu systems are negative, except as otherwise noted.      Objective    /78 (BP Location: Right arm, Patient Position: Sitting, Cuff Size: Adult Large)   Pulse 70   Temp 98.5  F (36.9  C) (Oral)   Resp 16   Wt 90.3 kg (199 lb)   SpO2 100%   BMI 29.82 kg/m    Body mass index is 29.82 kg/m .  Physical Exam   GENERAL: healthy, alert and no distress  PSYCH: mentation appears normal, affect flat- voiced quite a few frustrations during the visit today.

## 2021-11-23 ASSESSMENT — PATIENT HEALTH QUESTIONNAIRE - PHQ9: SUM OF ALL RESPONSES TO PHQ QUESTIONS 1-9: 12

## 2021-11-23 ASSESSMENT — ANXIETY QUESTIONNAIRES: GAD7 TOTAL SCORE: 10

## 2021-12-01 ENCOUNTER — OFFICE VISIT (OUTPATIENT)
Dept: PSYCHOLOGY | Facility: CLINIC | Age: 51
End: 2021-12-01
Payer: COMMERCIAL

## 2021-12-01 DIAGNOSIS — F41.1 GAD (GENERALIZED ANXIETY DISORDER): Primary | ICD-10-CM

## 2021-12-01 PROCEDURE — 90847 FAMILY PSYTX W/PT 50 MIN: CPT | Performed by: MARRIAGE & FAMILY THERAPIST

## 2021-12-02 NOTE — PROGRESS NOTES
Progress Note    Client Name: Marcel Lauohl  Date: 12/1/2020       Service Type: Couple      Video Visit: Yes, the patient's condition can be safely assessed and treated via synchronous audio and visual telemedicine  encounter.       Reason for Video Visit: Patient has requested telehealth visit      Location of Originating Site: Patient's home      Distant Site: Provider Remote Setting      Session Start Time: 12:00PM Session End Time: 12:45PM      Session Length: 45 min     Session #: Return    Attendees: Client and Spouse / Significant Other     Treatment Plan Last Reviewed: 12/1/2021  PHQ-9 / ASUNCION-7 : Each session    DATA  Interactive Complexity: No  Crisis: No        Progress Since Last Session (Related to Symptoms / Goals / Homework):   Symptoms: Worsening ongoing communication issues    Homework: Partially completed       Episode of Care Goals: Minimal progress - PREPARATION (Decided to change - considering how); Intervened by negotiating a change plan and determining options / strategies for behavior change, identifying triggers, exploring social supports, and working towards setting a date to begin behavior change     Current / Ongoing Stressors and Concerns:  Couple return to discuss conflict from the past year where client has inadvertently hurt Ermelinda and has not been good about apologizing. Client's wife states she is unsure if she can stay with Marcel if he cannot be there for her emotionally. Marcel does not disagree that he struggles with this and needs direction from Ermelinda. She wishes it would be more intuitive as she is this way but Marcel is not skilled at this and the couple continues to struggle with staying connected.     Treatment Objective(s) Addressed in This Session:   client to learn how to be more emotionally available to wife     Intervention:   Strategic couples counseling.     ASSESSMENT: Current Emotional / Mental Status (status of significant  symptoms):   Risk status (Self / Other harm or suicidal ideation)   Client denies current fears or concerns for personal safety.   Client denies current or recent suicidal ideation or behaviors.   Client denies current or recent homicidal ideation or behaviors.   Client denies current or recent self injurious behavior or ideation.   Client denies other safety concerns.   Client Patient reports there has been no change in risk factors since their last session.     Client Patient reports there has been no change in protective factors since their last session.     Recommended that patient call 911 or go to the local ED should there be a change in any of these risk factors.     Appearance:   Appropriate    Eye Contact:   Good    Psychomotor Behavior: Normal    Attitude:   Cooperative    Orientation:   All   Speech    Rate / Production: Normal     Volume:  Normal    Mood:    Normal    Affect:    Appropriate     Thought Content:  Clear    Thought Form:  Coherent  Logical    Insight:    Good      Medication Review:   No changes to current psychiatric medication(s)     Medication Compliance:   Yes     Changes in Health Issues:   None reported     Chemical Use Review:   Substance Use: Chemical use reviewed, no active concerns identified      Tobacco Use: No current tobacco use.      Diagnosis:  300.02 Generalized Anxiety Disorder     Collateral Reports Completed:   Not Applicable    PLAN: (Client Tasks / Therapist Tasks / Other)  Homework:       DOT Guardado   December 1, 2020    _______________________________________________________________________                                                   Treatment Plan    Client's Name: Marcel Melchor  YOB: 1970    Date: 12/1/2021    DSM-V Diagnoses: 300.02 - Generalized Anxiety Disorder By History; V71.09 - No Diagnosis  Psychosocial / Contextual Factors: work stress, marital discord  WHODAS: See Chart    Referral / Collaboration:  Referral to  another professional/service is not indicated at this time.    Anticipated number of session or this episode of care: 30      MeasurableTreatment Goal(s) related to diagnosis / functional impairment(s)  Goal 1: Client will lower his GAD7 score to 2 or below    I will know I've met my goal when I'm less anxious and angry especially in marriage.      Objective #A (Client Action)    Client will work on being more emotionally available to his wife.  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  Status: Continued - Date(s): 12/1/2021    Intervention(s)  Therapist will assign homework have weekly work/status meetings.    Objective #C  Client will better understand and seek his wife's forgiveness.  Status: Continued - Date(s): 12/1/2021     Intervention(s)  Therapist will help client in this process.      Patient has reviewed and agreed to the above plan.      Trinidad Solorio  December 1, 2021

## 2021-12-15 DIAGNOSIS — F41.1 GAD (GENERALIZED ANXIETY DISORDER): ICD-10-CM

## 2021-12-17 NOTE — TELEPHONE ENCOUNTER
Routing refill request to provider for review/approval because:  PHQ-9 and ASUNCION-7 elevated.     PHQ 4/19/2021 6/10/2021 11/22/2021   PHQ-9 Total Score 7 4 12   Q9: Thoughts of better off dead/self-harm past 2 weeks Not at all Not at all Not at all      ASUNCION-7 SCORE 4/19/2021 6/10/2021 11/22/2021   Total Score - - 10 (moderate anxiety)   Total Score 6 4 10     Penny Iverson RN

## 2021-12-19 RX ORDER — BUSPIRONE HYDROCHLORIDE 5 MG/1
5 TABLET ORAL 2 TIMES DAILY PRN
Qty: 60 TABLET | Refills: 0 | Status: SHIPPED | OUTPATIENT
Start: 2021-12-19 | End: 2022-01-13

## 2021-12-22 ENCOUNTER — OFFICE VISIT (OUTPATIENT)
Dept: PSYCHOLOGY | Facility: CLINIC | Age: 51
End: 2021-12-22
Payer: COMMERCIAL

## 2021-12-22 DIAGNOSIS — F41.1 GAD (GENERALIZED ANXIETY DISORDER): Primary | ICD-10-CM

## 2021-12-22 PROCEDURE — 90847 FAMILY PSYTX W/PT 50 MIN: CPT | Performed by: MARRIAGE & FAMILY THERAPIST

## 2021-12-22 NOTE — PROGRESS NOTES
Progress Note    Client Name: Marcel NG Tutdannyohl  Date: 12/22/2021       Service Type: Couple       Session Start Time: 12:00PM Session End Time: 12:45PM      Session Length: 45 min     Session #: Return    Attendees: Client and Spouse / Significant Other     Treatment Plan Last Reviewed: 12/1/2021  PHQ-9 / ASUNCION-7 : Monthly    DATA  Interactive Complexity: No  Crisis: No        Progress Since Last Session (Related to Symptoms / Goals / Homework):   Symptoms: Improving getting along better    Homework: Partially completed       Episode of Care Goals: Satisfactory progress - ACTION (Actively working towards change); Intervened by reinforcing change plan / affirming steps taken     Current / Ongoing Stressors and Concerns:  Couple have been communicating better and Marcel expressed to her that he wants to be there for her emotionally and is committed to that. Talked more today about Marcel's needs including physical intimacy and financial collaboration. Also discussed Ermelinda's boundaries with Marcel's family.     Treatment Objective(s) Addressed in This Session:   client to learn how to be more emotionally available to wife     Intervention:   Strategic couples counseling.     ASSESSMENT: Current Emotional / Mental Status (status of significant symptoms):   Risk status (Self / Other harm or suicidal ideation)   Client denies current fears or concerns for personal safety.   Client denies current or recent suicidal ideation or behaviors.   Client denies current or recent homicidal ideation or behaviors.   Client denies current or recent self injurious behavior or ideation.   Client denies other safety concerns.   Client Patient reports there has been no change in risk factors since their last session.     Client Patient reports there has been no change in protective factors since their last session.     Recommended that patient call 911 or go to the local ED should there be a change in any  of these risk factors.     Appearance:   Appropriate    Eye Contact:   Good    Psychomotor Behavior: Normal    Attitude:   Cooperative    Orientation:   All   Speech    Rate / Production: Normal     Volume:  Normal    Mood:    Normal    Affect:    Appropriate     Thought Content:  Clear    Thought Form:  Coherent  Logical    Insight:    Good      Medication Review:   No changes to current psychiatric medication(s)     Medication Compliance:   Yes     Changes in Health Issues:   None reported     Chemical Use Review:   Substance Use: Chemical use reviewed, no active concerns identified      Tobacco Use: No current tobacco use.      Diagnosis:  300.02 Generalized Anxiety Disorder     Collateral Reports Completed:   Not Applicable    PLAN: (Client Tasks / Therapist Tasks / Other)  Homework: Marcel to schedule with his individual therapist.      Trinidad Solorio LMFT       _______________________________________________________________________                                                   Treatment Plan    Client's Name: Marcel Melchor  YOB: 1970    Date: 12/1/2021    DSM-V Diagnoses: 300.02 - Generalized Anxiety Disorder By History; V71.09 - No Diagnosis  Psychosocial / Contextual Factors: work stress, marital discord  WHODAS: See Chart    Referral / Collaboration:  Referral to another professional/service is not indicated at this time.    Anticipated number of session or this episode of care: 30      MeasurableTreatment Goal(s) related to diagnosis / functional impairment(s)  Goal 1: Client will lower his GAD7 score to 2 or below    I will know I've met my goal when I'm less anxious and angry especially in marriage.      Objective #A (Client Action)    Client will work on being more emotionally available to his wife.  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  Status: Continued - Date(s): 12/1/2021    Intervention(s)  Therapist will assign homework have weekly work/status meetings.    Objective #C  Client will better understand and seek his wife's forgiveness.  Status: Continued - Date(s): 12/1/2021     Intervention(s)  Therapist will help client in this process.      Patient has reviewed and agreed to the above plan.      Trinidad Solorio  December 1, 2021

## 2022-01-05 ENCOUNTER — OFFICE VISIT (OUTPATIENT)
Dept: PSYCHOLOGY | Facility: CLINIC | Age: 52
End: 2022-01-05
Payer: COMMERCIAL

## 2022-01-05 DIAGNOSIS — F41.1 GAD (GENERALIZED ANXIETY DISORDER): Primary | ICD-10-CM

## 2022-01-05 PROCEDURE — 90834 PSYTX W PT 45 MINUTES: CPT | Performed by: MARRIAGE & FAMILY THERAPIST

## 2022-01-05 NOTE — PROGRESS NOTES
Progress Note    Client Name: Marcel NG Tutdannyohl  Date: 1/5/2021       Service Type: Couple       Session Start Time: 12:00PM Session End Time: 12:45PM      Session Length: 45 min     Session #: Return    Attendees: Client and Spouse / Significant Other     Treatment Plan Last Reviewed: 12/1/2021  PHQ-9 / ASUNCION-7 : Monthly    DATA  Interactive Complexity: No  Crisis: No        Progress Since Last Session (Related to Symptoms / Goals / Homework):   Symptoms: Improving getting along better    Homework: Partially completed       Episode of Care Goals: Satisfactory progress - ACTION (Actively working towards change); Intervened by reinforcing change plan / affirming steps taken     Current / Ongoing Stressors and Concerns:  Talked today about Ermelinda being open to repair with Marcel's family but the family members will have to initiate it likely at Marcel's prompting.      Treatment Objective(s) Addressed in This Session:   client to learn how to be more emotionally available to wife     Intervention:   Strategic couples counseling.     ASSESSMENT: Current Emotional / Mental Status (status of significant symptoms):   Risk status (Self / Other harm or suicidal ideation)   Client denies current fears or concerns for personal safety.   Client denies current or recent suicidal ideation or behaviors.   Client denies current or recent homicidal ideation or behaviors.   Client denies current or recent self injurious behavior or ideation.   Client denies other safety concerns.   Client Patient reports there has been no change in risk factors since their last session.     Client Patient reports there has been no change in protective factors since their last session.     Recommended that patient call 911 or go to the local ED should there be a change in any of these risk factors.     Appearance:   Appropriate    Eye Contact:   Good    Psychomotor Behavior: Normal    Attitude:   Cooperative     Orientation:   All   Speech    Rate / Production: Normal     Volume:  Normal    Mood:    Normal    Affect:    Appropriate     Thought Content:  Clear    Thought Form:  Coherent  Logical    Insight:    Good      Medication Review:   No changes to current psychiatric medication(s)     Medication Compliance:   Yes     Changes in Health Issues:   None reported     Chemical Use Review:   Substance Use: Chemical use reviewed, no active concerns identified      Tobacco Use: No current tobacco use.      Diagnosis:  300.02 Generalized Anxiety Disorder     Collateral Reports Completed:   Not Applicable    PLAN: (Client Tasks / Therapist Tasks / Other)  Homework: Marcel to consider talking to his siblings to discuss reconciliation with ErmelindaAcosta Solorio, LMFT       _______________________________________________________________________                                                   Treatment Plan    Client's Name: Marcel Melchor  YOB: 1970    Date: 12/1/2021    DSM-V Diagnoses: 300.02 - Generalized Anxiety Disorder By History; V71.09 - No Diagnosis  Psychosocial / Contextual Factors: work stress, marital discord  WHODAS: See Chart    Referral / Collaboration:  Referral to another professional/service is not indicated at this time.    Anticipated number of session or this episode of care: 30      MeasurableTreatment Goal(s) related to diagnosis / functional impairment(s)  Goal 1: Client will lower his GAD7 score to 2 or below    I will know I've met my goal when I'm less anxious and angry especially in marriage.      Objective #A (Client Action)    Client will work on being more emotionally available to his wife.  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  Status: Continued - Date(s): 12/1/2021    Intervention(s)  Therapist will assign homework have  weekly work/status meetings.    Objective #C  Client will better understand and seek his wife's forgiveness.  Status: Continued - Date(s): 12/1/2021     Intervention(s)  Therapist will help client in this process.      Patient has reviewed and agreed to the above plan.      Trinidad Solorio  December 1, 2021

## 2022-01-19 ENCOUNTER — OFFICE VISIT (OUTPATIENT)
Dept: PSYCHOLOGY | Facility: CLINIC | Age: 52
End: 2022-01-19
Payer: COMMERCIAL

## 2022-01-19 DIAGNOSIS — F41.1 GAD (GENERALIZED ANXIETY DISORDER): Primary | ICD-10-CM

## 2022-01-19 PROCEDURE — 90847 FAMILY PSYTX W/PT 50 MIN: CPT | Performed by: MARRIAGE & FAMILY THERAPIST

## 2022-01-19 NOTE — PROGRESS NOTES
Progress Note     Client Name: Marcel NG Tutewohl  Date: 1/19/2022       Service Type: Couple       Session Start Time: 12:00PM Session End Time: 12:45PM      Session Length: 45 min     Session #: Return    Attendees: Client and Spouse / Significant Other     Treatment Plan Last Reviewed: 12/1/2021  PHQ-9 / ASUNCION-7 : Monthly    DATA  Interactive Complexity: No  Crisis: No        Progress Since Last Session (Related to Symptoms / Goals / Homework):   Symptoms: Improving getting along better    Homework: Partially completed       Episode of Care Goals: Satisfactory progress - ACTION (Actively working towards change); Intervened by reinforcing change plan / affirming steps taken     Current / Ongoing Stressors and Concerns:  Talked today things being calm at home due to Marcel being calm. Marcel states this has been true but also feels that it helps when Ermelinda is being nice to him and giving him affection and attention. The couple talk today about Ermelinda's father and side of the family. Ermelinda stood up to her father (who is alcoholic) about swearing at his wife. She was nervous but said she felt she had to and he did apologize. Talked lastly about Marcel's siblings who would like to be back in a relationship with the couple. They will likely meet and talk things through to see if they can move past the issues of the past.     Treatment Objective(s) Addressed in This Session:   client to learn how to be more emotionally available to wife     Intervention:   Strategic couples counseling.     ASSESSMENT: Current Emotional / Mental Status (status of significant symptoms):   Risk status (Self / Other harm or suicidal ideation)   Client denies current fears or concerns for personal safety.   Client denies current or recent suicidal ideation or behaviors.   Client denies current or recent homicidal ideation or behaviors.   Client denies current or recent self injurious behavior or  ideation.   Client denies other safety concerns.   Client Patient reports there has been no change in risk factors since their last session.     Client Patient reports there has been no change in protective factors since their last session.     Recommended that patient call 911 or go to the local ED should there be a change in any of these risk factors.     Appearance:   Appropriate    Eye Contact:   Good    Psychomotor Behavior: Normal    Attitude:   Cooperative    Orientation:   All   Speech    Rate / Production: Normal     Volume:  Normal    Mood:    Normal    Affect:    Appropriate     Thought Content:  Clear    Thought Form:  Coherent  Logical    Insight:    Good      Medication Review:   No changes to current psychiatric medication(s)     Medication Compliance:   Yes     Changes in Health Issues:   None reported     Chemical Use Review:   Substance Use: Chemical use reviewed, no active concerns identified      Tobacco Use: No current tobacco use.      Diagnosis:  300.02 Generalized Anxiety Disorder     Collateral Reports Completed:   Not Applicable    PLAN: (Client Tasks / Therapist Tasks / Other)  Homework: Marcel to facilitate a meeting with Ermelinda and the siblings.      Trinidad Solorio LMFT       _______________________________________________________________________                                                   Treatment Plan    Client's Name: Marcel Melchor  YOB: 1970    Date: 12/1/2021    DSM-V Diagnoses: 300.02 - Generalized Anxiety Disorder By History; V71.09 - No Diagnosis  Psychosocial / Contextual Factors: work stress, marital discord  WHODAS: See Chart    Referral / Collaboration:  Referral to another professional/service is not indicated at this time.    Anticipated number of session or this episode of care: 30      MeasurableTreatment Goal(s) related to diagnosis / functional impairment(s)  Goal 1: Client will lower his GAD7 score to 2 or below    I will know I've met my  goal when I'm less anxious and angry especially in marriage.      Objective #A (Client Action)    Client will work on being more emotionally available to his wife.  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  Status: Continued - Date(s): 12/1/2021    Intervention(s)  Therapist will assign homework have weekly work/status meetings.    Objective #C  Client will better understand and seek his wife's forgiveness.  Status: Continued - Date(s): 12/1/2021     Intervention(s)  Therapist will help client in this process.      Patient has reviewed and agreed to the above plan.      Trinidad Solorio  December 1, 2021

## 2022-02-02 ENCOUNTER — OFFICE VISIT (OUTPATIENT)
Dept: PSYCHOLOGY | Facility: CLINIC | Age: 52
End: 2022-02-02
Payer: COMMERCIAL

## 2022-02-02 DIAGNOSIS — F41.1 GAD (GENERALIZED ANXIETY DISORDER): Primary | ICD-10-CM

## 2022-02-02 PROCEDURE — 90847 FAMILY PSYTX W/PT 50 MIN: CPT | Performed by: MARRIAGE & FAMILY THERAPIST

## 2022-02-02 NOTE — PROGRESS NOTES
Progress Note     Client Name: Marcel NG Tutewohl  Date: 2/2/2022       Service Type: Couple       Session Start Time: 12:00PM Session End Time: 12:45PM      Session Length: 45 min     Session #: Return    Attendees: Client and Spouse / Significant Other     Treatment Plan Last Reviewed: 12/1/2021  PHQ-9 / ASUNCION-7 : Monthly    DATA  Interactive Complexity: No  Crisis: No        Progress Since Last Session (Related to Symptoms / Goals / Homework):   Symptoms: Improving getting along better    Homework: Partially completed       Episode of Care Goals: Satisfactory progress - ACTION (Actively working towards change); Intervened by reinforcing change plan / affirming steps taken     Current / Ongoing Stressors and Concerns:  Talked today about the meeting with Marcel's brother and sister that overall went well. They will continue the conversations and begin spending time together with boundaries. Also talked about a negative interaction with Yara. Processed Marcel's feeling of always being the bad jorge and also feeling disrespected. Talked about being right or getting along which Marcel stated he wanted to be right. Supported client's frustration with the lack of boundaries between his wife and daughter.     Treatment Objective(s) Addressed in This Session:   client to learn how to be more emotionally available to wife     Intervention:   Strategic couples counseling.     ASSESSMENT: Current Emotional / Mental Status (status of significant symptoms):   Risk status (Self / Other harm or suicidal ideation)   Client denies current fears or concerns for personal safety.   Client denies current or recent suicidal ideation or behaviors.   Client denies current or recent homicidal ideation or behaviors.   Client denies current or recent self injurious behavior or ideation.   Client denies other safety concerns.   Client Patient reports there has been no change in risk factors since their last  session.     Client Patient reports there has been no change in protective factors since their last session.     Recommended that patient call 911 or go to the local ED should there be a change in any of these risk factors.     Appearance:   Appropriate    Eye Contact:   Good    Psychomotor Behavior: Normal    Attitude:   Cooperative    Orientation:   All   Speech    Rate / Production: Normal     Volume:  Normal    Mood:    Angry  Irritable     Affect:    Appropriate     Thought Content:  Clear    Thought Form:  Coherent  Logical    Insight:    Good      Medication Review:   No changes to current psychiatric medication(s)     Medication Compliance:   Yes     Changes in Health Issues:   None reported     Chemical Use Review:   Substance Use: Chemical use reviewed, no active concerns identified      Tobacco Use: No current tobacco use.      Diagnosis:  300.02 Generalized Anxiety Disorder     Collateral Reports Completed:   Not Applicable    PLAN: (Client Tasks / Therapist Tasks / Other)  Homework: Marcel to continue working on his relationship with his daughter.      DOT Guardado       _______________________________________________________________________                                                   Treatment Plan    Client's Name: Marcel Melchor  YOB: 1970    Date: 12/1/2021    DSM-V Diagnoses: 300.02 - Generalized Anxiety Disorder By History; V71.09 - No Diagnosis  Psychosocial / Contextual Factors: work stress, marital discord  WHODAS: See Chart    Referral / Collaboration:  Referral to another professional/service is not indicated at this time.    Anticipated number of session or this episode of care: 30      MeasurableTreatment Goal(s) related to diagnosis / functional impairment(s)  Goal 1: Client will lower his GAD7 score to 2 or below    I will know I've met my goal when I'm less anxious and angry especially in marriage.      Objective #A (Client Action)    Client will work on  being more emotionally available to his wife.  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  Status: Continued - Date(s): 12/1/2021    Intervention(s)  Therapist will assign homework have weekly work/status meetings.    Objective #C  Client will better understand and seek his wife's forgiveness.  Status: Continued - Date(s): 12/1/2021     Intervention(s)  Therapist will help client in this process.      Patient has reviewed and agreed to the above plan.      Trinidad Solorio  December 1, 2021

## 2022-02-23 ENCOUNTER — PSYCHE (OUTPATIENT)
Dept: PSYCHOLOGY | Facility: CLINIC | Age: 52
End: 2022-02-23
Payer: COMMERCIAL

## 2022-02-23 DIAGNOSIS — F41.1 GAD (GENERALIZED ANXIETY DISORDER): Primary | ICD-10-CM

## 2022-02-23 PROCEDURE — 90847 FAMILY PSYTX W/PT 50 MIN: CPT | Performed by: MARRIAGE & FAMILY THERAPIST

## 2022-02-23 NOTE — PROGRESS NOTES
Progress Note     Client Name: Marcel NG Tutewohl  Date: 2/23/2022       Service Type: Couple       Session Start Time: 1:00PM Session End Time: 1:45PM      Session Length: 45 min     Session #: Return    Attendees: Client and Spouse / Significant Other     Treatment Plan Last Reviewed: 12/1/2021  PHQ-9 / ASUNCION-7 : Monthly    DATA  Interactive Complexity: No  Crisis: No        Progress Since Last Session (Related to Symptoms / Goals / Homework):   Symptoms: Worsening increased conflict    Homework: Partially completed       Episode of Care Goals: Minimal progress - ACTION (Actively working towards change); Intervened by reinforcing change plan / affirming steps taken     Current / Ongoing Stressors and Concerns:  Talked today about a decision the couple is faced with 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 Ermelinda is upset Marcel did not discuss with her and Marcel wishes he could make decisions independently.      Treatment Objective(s) Addressed in This Session:   client to learn how to be more emotionally available to wife     Intervention:   Strategic couples counseling.     ASSESSMENT: Current Emotional / Mental Status (status of significant symptoms):   Risk status (Self / Other harm or suicidal ideation)   Client denies current fears or concerns for personal safety.   Client denies current or recent suicidal ideation or behaviors.   Client denies current or recent homicidal ideation or behaviors.   Client denies current or recent self injurious behavior or ideation.   Client denies other safety concerns.   Client reports there has been no change in risk factors since their last session.     Client reports there has been no change in protective factors since their last session.     Recommended that patient call 911 or go to the local ED should there be a change in any of these risk factors.     Appearance:   Appropriate    Eye Contact:   Good    Psychomotor  Behavior: Normal  Agitated    Attitude:   Cooperative  Interested Guarded    Orientation:   All   Speech    Rate / Production: Normal/ Responsive    Volume:  Normal    Mood:    Angry  Irritable     Affect:    Appropriate     Thought Content:  Clear    Thought Form:  Coherent  Logical    Insight:    Good      Medication Review:   No changes to current psychiatric medication(s)     Medication Compliance:   Yes     Changes in Health Issues:   None reported     Chemical Use Review:   Substance Use: Chemical use reviewed, no active concerns identified      Tobacco Use: No current tobacco use.      Diagnosis:  300.02 Generalized Anxiety Disorder     Collateral Reports Completed:   Not Applicable    PLAN: (Client Tasks / Therapist Tasks / Other)  Homework: Yudy to focus on deepening friendship with Manuel before hiring him.      Trinidad Solorio, LMFT       _______________________________________________________________________                                                   Treatment Plan    Client's Name: Marcel Melchor  YOB: 1970    Date: 12/1/2021    DSM-V Diagnoses: 300.02 - Generalized Anxiety Disorder By History; V71.09 - No Diagnosis  Psychosocial / Contextual Factors: work stress, marital discord  WHODAS: See Chart    Referral / Collaboration:  Referral to another professional/service is not indicated at this time.    Anticipated number of session or this episode of care: 30      MeasurableTreatment Goal(s) related to diagnosis / functional impairment(s)  Goal 1: Client will lower his GAD7 score to 2 or below    I will know I've met my goal when I'm less anxious and angry especially in marriage.      Objective #A (Client Action)    Client will work on being more emotionally available to his wife.  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  Status: Continued - Date(s): 12/1/2021    Intervention(s)  Therapist will assign homework have weekly work/status meetings.    Objective #C  Client will better understand and seek his wife's forgiveness.  Status: Continued - Date(s): 12/1/2021     Intervention(s)  Therapist will help client in this process.      Patient has reviewed and agreed to the above plan.      Trinidad Solorio  December 1, 2021

## 2022-03-09 ENCOUNTER — PSYCHE (OUTPATIENT)
Dept: PSYCHOLOGY | Facility: CLINIC | Age: 52
End: 2022-03-09
Payer: COMMERCIAL

## 2022-03-09 DIAGNOSIS — F41.1 GAD (GENERALIZED ANXIETY DISORDER): Primary | ICD-10-CM

## 2022-03-09 PROCEDURE — 90847 FAMILY PSYTX W/PT 50 MIN: CPT | Performed by: MARRIAGE & FAMILY THERAPIST

## 2022-03-09 ASSESSMENT — COLUMBIA-SUICIDE SEVERITY RATING SCALE - C-SSRS
1. HAVE YOU WISHED YOU WERE DEAD OR WISHED YOU COULD GO TO SLEEP AND NOT WAKE UP?: NO
TOTAL  NUMBER OF INTERRUPTED ATTEMPTS LIFETIME: NO
ATTEMPT LIFETIME: NO
6. HAVE YOU EVER DONE ANYTHING, STARTED TO DO ANYTHING, OR PREPARED TO DO ANYTHING TO END YOUR LIFE?: NO
TOTAL  NUMBER OF ABORTED OR SELF INTERRUPTED ATTEMPTS LIFETIME: NO
2. HAVE YOU ACTUALLY HAD ANY THOUGHTS OF KILLING YOURSELF?: NO

## 2022-03-09 NOTE — PROGRESS NOTES
[facility]                                     Progress Note    Patient Name: Marcel NG Tutewohl  Date: 3/9/2022         Service Type: Family with client present      Session Start Time: 11:03AM  Session End Time: 11:48AM     Session Length: 45 minutes    Session #: 8    Attendees: Client attended alone    Service Modality:  In-person    DATA  Interactive Complexity: No  Crisis: No        Progress Since Last Session (Related to Symptoms / Goals / Homework):   Symptoms: Worsening increased marital tension    Homework: Did not complete      Episode of Care Goals: No improvement - PREPARATION (Decided to change - considering how); Intervened by negotiating a change plan and determining options / strategies for behavior change, identifying triggers, exploring social supports, and working towards setting a date to begin behavior change     Current / Ongoing Stressors and Concerns:   Client arrives in angry mood. Wife states they have not really spoken since      Treatment Objective(s) Addressed in This Session:   practice the use of time outs in marital communication  Use speaker/listener technique when communicating through challenging conversations     Intervention:   Relationship counseling    Assessments completed prior to visit:  The following assessments were completed by patient for this visit:  PHQ2:   PHQ-2 ( 1999 [employer]) 3/9/2022 11/22/2021 4/19/2021 5/11/2020 6/8/2018 1/23/2017 8/27/2015   Q1: Little interest or pleasure in doing things 0 1 1 0 1 2 0   Q2: Feeling down, depressed or hopeless 0 1 1 0 1 1 0   PHQ-2 Score 0 2 2 0 2 3 0   PHQ-2 Total Score (12-17 Years)- Positive if 3 or more points; Administer PHQ-A if positive - - 2 0 - - -   Q1: Little interest or pleasure in doing things Not at all Several days Several days - - - -   Q2: Feeling down, depressed or hopeless Not at all Several days Several days - - - -   PHQ-2 Score 0 2 2 - - - -     PHQ9:   PHQ-9 SCORE 9/20/2019 5/11/2020  8/12/2020 11/12/2020 4/19/2021 6/10/2021 11/22/2021   PHQ-9 Total Score - - - - - - -   PHQ-9 Total Score MyChart 8 (Mild depression) - - - - - 12 (Moderate depression)   PHQ-9 Total Score 8 9 13 11 7 4 12     GAD2:   ASUNCION-2 3/9/2022   Feeling nervous, anxious, or on edge 0   Not being able to stop or control worrying 0   ASUNCION-2 Total Score 0     GAD7:   ASUNCION-7 SCORE 2/12/2020 5/11/2020 8/12/2020 11/12/2020 4/19/2021 6/10/2021 11/22/2021   Total Score - - - - - - 10 (moderate anxiety)   Total Score 1 3 9 7 6 4 10     PROMIS 10-Global Health (all questions and answers displayed):   PROMIS 10 3/9/2022   In general, would you say your health is: Good   In general, would you say your quality of life is: Very good   In general, how would you rate your physical health? Good   In general, how would you rate your mental health, including your mood and your ability to think? Good   In general, how would you rate your satisfaction with your social activities and relationships? Very good   In general, please rate how well you carry out your usual social activities and roles Very good   To what extent are you able to carry out your everyday physical activities such as walking, climbing stairs, carrying groceries, or moving a chair? Mostly   How often have you been bothered by emotional problems such as feeling anxious, depressed or irritable? Sometimes   How would you rate your fatigue on average? Moderate   How would you rate your pain on average?   0 = No Pain  to  10 = Worst Imaginable Pain 5   In general, would you say your health is: 3   In general, would you say your quality of life is: 4   In general, how would you rate your physical health? 3   In general, how would you rate your mental health, including your mood and your ability to think? 3   In general, how would you rate your satisfaction with your social activities and relationships? 4   In general, please rate how well you carry out your usual social activities and  roles. (This includes activities at home, at work and in your community, and responsibilities as a parent, child, spouse, employee, friend, etc.) 4   To what extent are you able to carry out your everyday physical activities such as walking, climbing stairs, carrying groceries, or moving a chair? 4   In the past 7 days, how often have you been bothered by emotional problems such as feeling anxious, depressed, or irritable? 3   In the past 7 days, how would you rate your fatigue on average? 3   In the past 7 days, how would you rate your pain on average, where 0 means no pain, and 10 means worst imaginable pain? 5   Global Mental Health Score 14   Global Physical Health Score 13   PROMIS TOTAL - SUBSCORES 27   Some recent data might be hidden     PROMIS 10-Global Health (only subscores and total score):   PROMIS-10 Scores Only 3/9/2022   Global Mental Health Score 14   Global Physical Health Score 13   PROMIS TOTAL - SUBSCORES 27     [place] Suicide Severity Rating Scale (Lifetime/Recent)  [place] Suicide Severity Rating (Lifetime/Recent) 9/30/2020 3/9/2022   Wish to be Dead (Lifetime) No -   RETIRED: 1. Wish to be Dead (Recent) No -   Actual Attempt (Lifetime) No -   Actual Attempt (Past 3 Months) No -   1. Wish to be Dead (Lifetime) - 0   2. Non-Specific Active Suicidal Thoughts (Lifetime) - 0   Actual Attempt (Lifetime) - 0   Has subject engaged in non-suicidal self-injurious behavior? (Lifetime) - 0   Interrupted Attempts (Lifetime) - 0   Aborted or Self-Interrupted Attempt (Lifetime) - 0   Preparatory Acts or Behavior (Lifetime) - 0   Calculated C-SSRS Risk Score (Lifetime/Recent) - No Risk Indicated         ASSESSMENT: Current Emotional / Mental Status (status of significant symptoms):   Risk status (Self / Other harm or suicidal ideation)   Patient denies current fears or concerns for personal safety.   Patient denies current or recent suicidal ideation or behaviors.   Patient denies current or recent  homicidal ideation or behaviors.   Patient denies current or recent self injurious behavior or ideation.   Patient denies other safety concerns.   Patient reports there has been no change in risk factors since their last session.     Patient reports there has been no change in protective factors since their last session.     Recommended that patient call 911 or go to the local ED should there be a change in any of these risk factors.     Appearance:   Appropriate    Eye Contact:   Good    Psychomotor Behavior: Agitated    Attitude:   Guarded  Evasive   Orientation:   All   Speech    Rate / Production: Mumbled    Volume:  Normal    Mood:    Angry  Agitated   Affect:    Appropriate    Thought Content:  Clear    Thought Form:  Coherent  Logical    Insight:    Fair      Medication Review:   No changes to current psychiatric medication(s)     Medication Compliance:   Yes     Changes in Health Issues:   None reported     Chemical Use Review:   Substance Use: Chemical use reviewed, no active concerns identified      Tobacco Use: No current tobacco use.      Diagnosis:  1. ASUNCION (generalized anxiety disorder)        Collateral Reports Completed:   Not Applicable    PLAN: (Patient Tasks / Therapist Tasks / Other)  Homework: Client to be willing to find common ground with his wife and use timeouts to manage anger.        Trinidad Solorio                                                         ______________________________________________________________________    Family with client present Treatment Plan    Patient's Name: Marcel Melchor  YOB: 1970    Date of Creation: 3/9/2022  Date Treatment Plan Last Reviewed/Revised: 3/9/2022    DSM5 Diagnoses: 300.02 (F41.1) Generalized Anxiety Disorder  Psychosocial / Contextual Factors: Marital discord, work stress, back pain  PROMIS (reviewed every 90 days):   PROMIS-10    In general, would you say your health is: (P) Good    In general, would you say your quality  of life is: (P) Very good    In general, how would you rate your physical health? (P) Good    In general, how would you rate your mental health, including your mood and your ability to think? (P) Good    In general, how would you rate your satisfaction with your social activities and relationships? (P) Very good    In general, please rate how well you carry out your usual social activities and roles. (This includes activities at home, at work and in your community, and responsibilities as a parent, child, spouse, employee, friend, etc.) (P) Very good    To what extent are you able to carry out your everyday physical activities such as walking, climbing stairs, carrying groceries, or moving a chair? (P) Mostly    In the past 7 days,  How often have you been bothered by emotional problems such as feeling anxious, depressed or irritable? (P) Sometimes  How would you rate your fatigue on average? (P) Moderate  How would you rate your pain on average? (P) 5    PROMIS GLOBAL SCORES    Mental health question re-calculation - no clinical value - (P) 3  Physical health question re-calculation - no clinical value - (P) 3  Pain question re-calculation - no clinical value - (P) 3  Global Mental Health Score - (P) 14  Global Physical Health Score - (P) 13  PROMIS TOTAL - SUBSCORES - (P) 27      [id number] PROMIS HEALTH ORGANIZATION AND PROMIS COOPERATIVE GROUP VERSION 1.1      Referral / Collaboration:  Was/were discussed and patient will pursue.    Anticipated number of session for this episode of care: 24  Anticipation frequency of session: Every other week  Anticipated Duration of each session: 38-52 minutes  Treatment plan will be reviewed in 90 days or when goals have been changed.       MeasurableTreatment Goal(s) related to diagnosis / functional impairment(s)  Goal 1: Patient will lower GAD7 score to 3 or below    I will know I've met my goal when I'm less anxious and irritable.      Objective #A (Patient  Action)    Patient will use relaxation strategies 3 times per day to reduce the physical symptoms of anxiety.  Status: New - Date: 3/9/2022     Intervention(s)  Therapist will teach client stress management and relaxation strategies.    Objective #B  Patient will practice the use of timeouts.  Status: New - Date: 3/9/2022     Intervention(s)  Therapist will encourage client to use time outs when overly agitated.    Objective #C  Patient will use the speaker/listener technique in communication.  Status: New - Date: 3/9/2022     Intervention(s)  Therapist will teach S/L Technique.        Patient has reviewed and agreed to the above plan.      Trinidad Solorio  March 9, 2022

## 2022-03-30 ENCOUNTER — PSYCHE (OUTPATIENT)
Dept: PSYCHOLOGY | Facility: CLINIC | Age: 52
End: 2022-03-30
Payer: COMMERCIAL

## 2022-03-30 DIAGNOSIS — F41.1 GAD (GENERALIZED ANXIETY DISORDER): Primary | ICD-10-CM

## 2022-03-30 PROCEDURE — 90847 FAMILY PSYTX W/PT 50 MIN: CPT | Performed by: MARRIAGE & FAMILY THERAPIST

## 2022-03-30 NOTE — Clinical Note
"Mónica Harvey, I am Marcel's couples therapist and have been working with he and Ermelinda for a few years. They have been struggling a lot and many of their communication issues stem from Marcel's anger and irritability. His wife and daughter both walk on eggshells around him and we talked today about a possible bipolar dx. I see that you prescribed lamictal in November but he never started it due to his fears about the rash. Since you are meeting on Monday, I have urged him to reconsider this medication. I'm not sure if the Pristiq is helping as he generally is edgy and sensitive. His wife is very kind and easy going but I fear she will not be able to handle much more. She stated today that she needs Marcel \"to be nice\". Marcel stated today that he is depressed, lacking in enjoyment. Many of his frustrations in their marriage are related to his sexual needs which he doesn't accept requires him to be nice.  I hope some of this helps for your meeting on Monday.    Thank you,  Trinidad Solorio, LMFT [facility]"

## 2022-03-31 NOTE — PROGRESS NOTES
[facility]                                     Progress Note    Patient Name: Marcel NG Tutewohl  Date: 3/30/2022         Service Type: Family with client present      Session Start Time: 2:00PM  Session End Time: 2:45PM     Session Length: 45 minutes    Session #: 9    Attendees: Client attended alone    Service Modality:  In-person    DATA  Interactive Complexity: No  Crisis: No        Progress Since Last Session (Related to Symptoms / Goals / Homework):   Symptoms: Worsening increased marital tension    Homework: Did not complete      Episode of Care Goals: No improvement - PREPARATION (Decided to change - considering how); Intervened by negotiating a change plan and determining options / strategies for behavior change, identifying triggers, exploring social supports, and working towards setting a date to begin behavior change     Current / Ongoing Stressors and Concerns:   Client and wife continue to struggle with conflict. They went to [place] and had difficulty enjoying their time together. Talked about healing and moving forward.     Treatment Objective(s) Addressed in This Session:   practice the use of time outs in marital communication  Use speaker/listener technique when communicating through challenging conversations     Intervention:   Relationship counseling    Assessments completed prior to visit:  The following assessments were completed by patient for this visit:  PROMIS 10-Global Health (all questions and answers displayed):   PROMIS 10 3/9/2022   In general, would you say your health is: Good   In general, would you say your quality of life is: Very good   In general, how would you rate your physical health? Good   In general, how would you rate your mental health, including your mood and your ability to think? Good   In general, how would you rate your satisfaction with your social activities and relationships? Very good   In general, please rate how well you carry out your  usual social activities and roles Very good   To what extent are you able to carry out your everyday physical activities such as walking, climbing stairs, carrying groceries, or moving a chair? Mostly   How often have you been bothered by emotional problems such as feeling anxious, depressed or irritable? Sometimes   How would you rate your fatigue on average? Moderate   How would you rate your pain on average?   0 = No Pain  to  10 = Worst Imaginable Pain 5   In general, would you say your health is: 3   In general, would you say your quality of life is: 4   In general, how would you rate your physical health? 3   In general, how would you rate your mental health, including your mood and your ability to think? 3   In general, how would you rate your satisfaction with your social activities and relationships? 4   In general, please rate how well you carry out your usual social activities and roles. (This includes activities at home, at work and in your community, and responsibilities as a parent, child, spouse, employee, friend, etc.) 4   To what extent are you able to carry out your everyday physical activities such as walking, climbing stairs, carrying groceries, or moving a chair? 4   In the past 7 days, how often have you been bothered by emotional problems such as feeling anxious, depressed, or irritable? 3   In the past 7 days, how would you rate your fatigue on average? 3   In the past 7 days, how would you rate your pain on average, where 0 means no pain, and 10 means worst imaginable pain? 5   Global Mental Health Score 14   Global Physical Health Score 13   PROMIS TOTAL - SUBSCORES 27   Some recent data might be hidden     PROMIS 10-Global Health (only subscores and total score):   PROMIS-10 Scores Only 3/9/2022   Global Mental Health Score 14   Global Physical Health Score 13   PROMIS TOTAL - SUBSCORES 27         ASSESSMENT: Current Emotional / Mental Status (status of significant symptoms):   Risk status  (Self / Other harm or suicidal ideation)   Patient denies current fears or concerns for personal safety.   Patient denies current or recent suicidal ideation or behaviors.   Patient denies current or recent homicidal ideation or behaviors.   Patient denies current or recent self injurious behavior or ideation.   Patient denies other safety concerns.   Patient reports there has been no change in risk factors since their last session.     Patient reports there has been no change in protective factors since their last session.     Recommended that patient call 911 or go to the local ED should there be a change in any of these risk factors.     Appearance:   Appropriate    Eye Contact:   Good    Psychomotor Behavior: Agitated    Attitude:   Guarded  Evasive   Orientation:   All   Speech    Rate / Production: Mumbled    Volume:  Normal    Mood:    Angry  Agitated   Affect:    Appropriate    Thought Content:  Clear    Thought Form:  Coherent  Logical    Insight:    Fair      Medication Review:   No changes to current psychiatric medication(s)     Medication Compliance:   Yes     Changes in Health Issues:   None reported     Chemical Use Review:   Substance Use: Chemical use reviewed, no active concerns identified      Tobacco Use: No current tobacco use.      Diagnosis:  1. ASUNCION (generalized anxiety disorder)        Collateral Reports Completed:   Routed note to PCP    PLAN: (Patient Tasks / Therapist Tasks / Other)  Homework: Client to talk to his MD about a mood stabilizer.        Trinidad Solorio                                                         ______________________________________________________________________    Family with client present Treatment Plan    Patient's Name: Marcel Melchor  YOB: 1970    Date of Creation: 3/9/2022  Date Treatment Plan Last Reviewed/Revised: 3/9/2022    DSM5 Diagnoses: 300.02 (F41.1) Generalized Anxiety Disorder  Psychosocial / Contextual Factors: Marital  discord, work stress, back pain  PROMIS (reviewed every 90 days):   PROMIS-10    In general, would you say your health is: (P) Good    In general, would you say your quality of life is: (P) Very good    In general, how would you rate your physical health? (P) Good    In general, how would you rate your mental health, including your mood and your ability to think? (P) Good    In general, how would you rate your satisfaction with your social activities and relationships? (P) Very good    In general, please rate how well you carry out your usual social activities and roles. (This includes activities at home, at work and in your community, and responsibilities as a parent, child, spouse, employee, friend, etc.) (P) Very good    To what extent are you able to carry out your everyday physical activities such as walking, climbing stairs, carrying groceries, or moving a chair? (P) Mostly    In the past 7 days,  How often have you been bothered by emotional problems such as feeling anxious, depressed or irritable? (P) Sometimes  How would you rate your fatigue on average? (P) Moderate  How would you rate your pain on average? (P) 5    PROMIS GLOBAL SCORES    Mental health question re-calculation - no clinical value - (P) 3  Physical health question re-calculation - no clinical value - (P) 3  Pain question re-calculation - no clinical value - (P) 3  Global Mental Health Score - (P) 14  Global Physical Health Score - (P) 13  PROMIS TOTAL - SUBSCORES - (P) 27      [id number] PROMIS HEALTH ORGANIZATION AND PROMIS COOPERATIVE GROUP VERSION 1.1      Referral / Collaboration:  Was/were discussed and patient will pursue.    Anticipated number of session for this episode of care: 24  Anticipation frequency of session: Every other week  Anticipated Duration of each session: 38-52 minutes  Treatment plan will be reviewed in 90 days or when goals have been changed.       MeasurableTreatment Goal(s) related to diagnosis / functional  impairment(s)  Goal 1: Patient will lower GAD7 score to 3 or below    I will know I've met my goal when I'm less anxious and irritable.      Objective #A (Patient Action)    Patient will use relaxation strategies 3 times per day to reduce the physical symptoms of anxiety.  Status: New - Date: 3/9/2022     Intervention(s)  Therapist will teach client stress management and relaxation strategies.    Objective #B  Patient will practice the use of timeouts.  Status: New - Date: 3/9/2022     Intervention(s)  Therapist will encourage client to use time outs when overly agitated.    Objective #C  Patient will use the speaker/listener technique in communication.  Status: New - Date: 3/9/2022     Intervention(s)  Therapist will teach S/L Technique.        Patient has reviewed and agreed to the above plan.      Trinidad Solorio  March 9, 2022

## 2022-04-04 ENCOUNTER — OFFICE VISIT (OUTPATIENT)
Dept: FAMILY MEDICINE | Facility: CLINIC | Age: 52
End: 2022-04-04
Payer: COMMERCIAL

## 2022-04-04 VITALS
HEART RATE: 65 BPM | TEMPERATURE: 98.5 F | SYSTOLIC BLOOD PRESSURE: 100 MMHG | DIASTOLIC BLOOD PRESSURE: 70 MMHG | BODY MASS INDEX: 29.19 KG/M2 | HEIGHT: 69 IN | RESPIRATION RATE: 16 BRPM | WEIGHT: 197.1 LBS | OXYGEN SATURATION: 97 %

## 2022-04-04 DIAGNOSIS — R45.86 MOOD SWING: Primary | ICD-10-CM

## 2022-04-04 DIAGNOSIS — L70.9 ACNE, UNSPECIFIED ACNE TYPE: ICD-10-CM

## 2022-04-04 PROCEDURE — 99214 OFFICE O/P EST MOD 30 MIN: CPT | Performed by: PHYSICIAN ASSISTANT

## 2022-04-04 RX ORDER — DOXYCYCLINE 100 MG/1
CAPSULE ORAL
COMMUNITY
Start: 2021-10-18 | End: 2022-04-04

## 2022-04-04 RX ORDER — TRAMADOL HYDROCHLORIDE 50 MG/1
TABLET ORAL
COMMUNITY
Start: 2021-11-03 | End: 2022-04-04

## 2022-04-04 RX ORDER — ARIPIPRAZOLE 2 MG/1
2 TABLET ORAL AT BEDTIME
Qty: 30 TABLET | Refills: 1 | Status: SHIPPED | OUTPATIENT
Start: 2022-04-04 | End: 2022-05-12

## 2022-04-04 RX ORDER — FINASTERIDE 1 MG/1
TABLET, FILM COATED ORAL
COMMUNITY
Start: 2022-01-26 | End: 2023-12-18

## 2022-04-04 RX ORDER — HYDROCODONE BITARTRATE AND ACETAMINOPHEN 5; 325 MG/1; MG/1
TABLET ORAL
COMMUNITY
Start: 2021-08-19 | End: 2022-04-04

## 2022-04-04 RX ORDER — ANASTROZOLE 1 MG/1
TABLET ORAL
COMMUNITY
Start: 2022-02-06

## 2022-04-04 RX ORDER — CLINDAMYCIN PHOSPHATE 10 UG/ML
LOTION TOPICAL
Qty: 60 ML | Refills: 3 | Status: SHIPPED | OUTPATIENT
Start: 2022-04-04 | End: 2022-08-24

## 2022-04-04 RX ORDER — CHLORHEXIDINE GLUCONATE ORAL RINSE 1.2 MG/ML
SOLUTION DENTAL
COMMUNITY
Start: 2021-08-19 | End: 2022-04-04

## 2022-04-04 RX ORDER — CEFDINIR 300 MG/1
CAPSULE ORAL
COMMUNITY
Start: 2021-08-19 | End: 2022-04-04

## 2022-04-04 ASSESSMENT — ANXIETY QUESTIONNAIRES
GAD7 TOTAL SCORE: 8
6. BECOMING EASILY ANNOYED OR IRRITABLE: NEARLY EVERY DAY
7. FEELING AFRAID AS IF SOMETHING AWFUL MIGHT HAPPEN: NOT AT ALL
4. TROUBLE RELAXING: SEVERAL DAYS
GAD7 TOTAL SCORE: 8
7. FEELING AFRAID AS IF SOMETHING AWFUL MIGHT HAPPEN: NOT AT ALL
1. FEELING NERVOUS, ANXIOUS, OR ON EDGE: MORE THAN HALF THE DAYS
GAD7 TOTAL SCORE: 8
3. WORRYING TOO MUCH ABOUT DIFFERENT THINGS: SEVERAL DAYS
5. BEING SO RESTLESS THAT IT IS HARD TO SIT STILL: NOT AT ALL
2. NOT BEING ABLE TO STOP OR CONTROL WORRYING: SEVERAL DAYS

## 2022-04-04 ASSESSMENT — PATIENT HEALTH QUESTIONNAIRE - PHQ9
SUM OF ALL RESPONSES TO PHQ QUESTIONS 1-9: 12
10. IF YOU CHECKED OFF ANY PROBLEMS, HOW DIFFICULT HAVE THESE PROBLEMS MADE IT FOR YOU TO DO YOUR WORK, TAKE CARE OF THINGS AT HOME, OR GET ALONG WITH OTHER PEOPLE: SOMEWHAT DIFFICULT
SUM OF ALL RESPONSES TO PHQ QUESTIONS 1-9: 12

## 2022-04-04 ASSESSMENT — PAIN SCALES - GENERAL: PAINLEVEL: NO PAIN (0)

## 2022-04-04 NOTE — PROGRESS NOTES
Assessment & Plan     Mood swing  I recommended individual therapy but he declined.  I think he will continue in marital/family therapy.  Given that mood is more depressed now will try adding Abilify for this and to control any possible mood swings.  REferral to Psych for further med management.  - ARIPiprazole (ABILIFY) 2 MG tablet; Take 1 tablet (2 mg) by mouth At Bedtime  - Adult Mental Health  Referral; Future    Acne, unspecified acne type  Routine refill request per patient.  - clindamycin (CLEOCIN T) 1 % external lotion; APPLY TOPICALLY TO FACE AND CHEST TWICE A DAY AS NEEDED        Return in about 1 month (around 5/4/2022) for depression/anxiety med check.    Wes Braga PA-C  [facility] TRICIA Rod is a 52 year old who presents for the following health issues     History of Present Illness       Mental Health Follow-up:  Patient presents to follow-up on Depression & Anxiety.Patient's depression since last visit has been:  Worse  The patient is having other symptoms associated with depression.  Patient's anxiety since last visit has been:  Medium  The patient is having other symptoms associated with anxiety.  Any significant life events: relationship concerns and job concerns  Patient is not feeling anxious or having panic attacks.  Patient has no concerns about alcohol or drug use.       Today's PHQ-9         PHQ-9 Total Score: 12  PHQ-9 Q9 Thoughts of better off dead/self-harm past 2 weeks :   (P) Not at all    How difficult have these problems made it for you to do your work, take care of things at home, or get along with other people: Somewhat difficult    Today's ASUNCION-7 Score: 8    He eats 0-1 servings of fruits and vegetables daily.He consumes 4 sweetened beverage(s) daily.He exercises with enough effort to increase his heart rate 10 to 19 minutes per day.  He exercises with enough effort to increase his heart rate 5 days per week.   He is taking  "medications regularly.    Other concern  He did not get the Lamictal refill last time so was not taking it.  Started it origianally in the fall but didn't  the refill.  Today notes that he started taking it a few days ago.    Feeling more depressed now, isolated.  Still in therapy with his wife.  Feeling frustrated when he gets home from work and then has to answer routine questions from her about his day.  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  He wishes that he didn't feel this way and wants to find a way to feel better.      Review of Systems   Constitutional, HEENT, cardiovascular, pulmonary, gi and gu systems are negative, except as otherwise noted.      Objective    /70 (BP Location: Right arm, Patient Position: Sitting, Cuff Size: Adult Regular)   Pulse 65   Temp 98.5  F (36.9  C) (Oral)   Resp 16   Ht 1.74 m (5' 8.5\")   Wt 89.4 kg (197 lb 1.6 oz)   SpO2 97%   BMI 29.53 kg/m    Body mass index is 29.53 kg/m .  Physical Exam   GENERAL: healthy, alert and no distress  PSYCH: mentation appears normal, affect flat and irritable, angry            "

## 2022-04-05 ASSESSMENT — ANXIETY QUESTIONNAIRES: GAD7 TOTAL SCORE: 8

## 2022-04-05 ASSESSMENT — PATIENT HEALTH QUESTIONNAIRE - PHQ9: SUM OF ALL RESPONSES TO PHQ QUESTIONS 1-9: 12

## 2022-04-13 ENCOUNTER — PSYCHE (OUTPATIENT)
Dept: PSYCHOLOGY | Facility: CLINIC | Age: 52
End: 2022-04-13
Payer: COMMERCIAL

## 2022-04-13 DIAGNOSIS — F41.1 GAD (GENERALIZED ANXIETY DISORDER): Primary | ICD-10-CM

## 2022-04-13 PROCEDURE — 90847 FAMILY PSYTX W/PT 50 MIN: CPT | Performed by: MARRIAGE & FAMILY THERAPIST

## 2022-04-13 NOTE — PROGRESS NOTES
[facility]                                     Progress Note    Patient Name: Marcel NG Tutewohl  Date: 4/13/2022         Service Type: Family with client present      Session Start Time: 2:00PM  Session End Time: 2:45PM     Session Length: 45 minutes    Session #: 10    Attendees: Client attended alone    Service Modality:  In-person    DATA  Interactive Complexity: No  Crisis: No        Progress Since Last Session (Related to Symptoms / Goals / Homework):   Symptoms: Improving client is less irritable    Homework: Partially completed      Episode of Care Goals: Satisfactory progress - ACTION (Actively working towards change); Intervened by reinforcing change plan / affirming steps taken     Current / Ongoing Stressors and Concerns:   Client met with his MD and has started on abilify in addition to his lamictal. He is continuing on pristiq at 100mg and his Primary referred him to a Psychiatrist. He noted that since starting the abilify, he has been feeling better. He's been able to let things go more easily and his affect is brighter. His wife stated things have been a little better. Talked about when couple have time together such as dinner time. They rarely eat dinner and do not have any rituals or shared activities. Encouraged couple to consider having more rituals especially as they enter into the empty nest phase of life.     Treatment Objective(s) Addressed in This Session:   practice the use of time outs in marital communication  Use speaker/listener technique when communicating through challenging conversations  Create rituals for connection     Intervention:   Relationship counseling    Assessments completed prior to visit:  The following assessments were completed by patient for this visit:  PROMIS 10-Global Health (all questions and answers displayed):   PROMIS 10 3/9/2022   In general, would you say your health is: Good   In general, would you say your quality of life is: Very good    In general, how would you rate your physical health? Good   In general, how would you rate your mental health, including your mood and your ability to think? Good   In general, how would you rate your satisfaction with your social activities and relationships? Very good   In general, please rate how well you carry out your usual social activities and roles Very good   To what extent are you able to carry out your everyday physical activities such as walking, climbing stairs, carrying groceries, or moving a chair? Mostly   How often have you been bothered by emotional problems such as feeling anxious, depressed or irritable? Sometimes   How would you rate your fatigue on average? Moderate   How would you rate your pain on average?   0 = No Pain  to  10 = Worst Imaginable Pain 5   In general, would you say your health is: 3   In general, would you say your quality of life is: 4   In general, how would you rate your physical health? 3   In general, how would you rate your mental health, including your mood and your ability to think? 3   In general, how would you rate your satisfaction with your social activities and relationships? 4   In general, please rate how well you carry out your usual social activities and roles. (This includes activities at home, at work and in your community, and responsibilities as a parent, child, spouse, employee, friend, etc.) 4   To what extent are you able to carry out your everyday physical activities such as walking, climbing stairs, carrying groceries, or moving a chair? 4   In the past 7 days, how often have you been bothered by emotional problems such as feeling anxious, depressed, or irritable? 3   In the past 7 days, how would you rate your fatigue on average? 3   In the past 7 days, how would you rate your pain on average, where 0 means no pain, and 10 means worst imaginable pain? 5   Global Mental Health Score 14   Global Physical Health Score 13   PROMIS TOTAL - SUBSCORES 27    Some recent data might be hidden     PROMIS 10-Global Health (only subscores and total score):   PROMIS-10 Scores Only 3/9/2022   Global Mental Health Score 14   Global Physical Health Score 13   PROMIS TOTAL - SUBSCORES 27         ASSESSMENT: Current Emotional / Mental Status (status of significant symptoms):   Risk status (Self / Other harm or suicidal ideation)   Patient denies current fears or concerns for personal safety.   Patient denies current or recent suicidal ideation or behaviors.   Patient denies current or recent homicidal ideation or behaviors.   Patient denies current or recent self injurious behavior or ideation.   Patient denies other safety concerns.   Patient reports there has been no change in risk factors since their last session.     Patient reports there has been no change in protective factors since their last session.     Recommended that patient call 911 or go to the local ED should there be a change in any of these risk factors.     Appearance:   Appropriate    Eye Contact:   Good    Psychomotor Behavior: Normal    Attitude:   Cooperative  Interested Pleasant   Orientation:   All   Speech    Rate / Production: Normal/ Responsive Talkative    Volume:  Normal    Mood:    Normal   Affect:    Appropriate    Thought Content:  Clear    Thought Form:  Coherent  Logical    Insight:    Fair      Medication Review:   Changes to psychiatric medications, see updated Medication List in EPIC.      Medication Compliance:   Yes     Changes in Health Issues:   None reported     Chemical Use Review:   Substance Use: Chemical use reviewed, no active concerns identified      Tobacco Use: No current tobacco use.      Diagnosis:  1. ASUNCION (generalized anxiety disorder)        Collateral Reports Completed:   Not Applicable    PLAN: (Patient Tasks / Therapist Tasks / Other)  Homework: Consider rituals to grow their emotional connection.        Trinidad Solorio                                                          ______________________________________________________________________    Family with client present Treatment Plan    Patient's Name: Marcel Melchor  YOB: 1970    Date of Creation: 3/9/2022  Date Treatment Plan Last Reviewed/Revised: 3/9/2022    DSM5 Diagnoses: 300.02 (F41.1) Generalized Anxiety Disorder  Psychosocial / Contextual Factors: Marital discord, work stress, back pain  PROMIS (reviewed every 90 days):   PROMIS-10    In general, would you say your health is: (P) Good    In general, would you say your quality of life is: (P) Very good    In general, how would you rate your physical health? (P) Good    In general, how would you rate your mental health, including your mood and your ability to think? (P) Good    In general, how would you rate your satisfaction with your social activities and relationships? (P) Very good    In general, please rate how well you carry out your usual social activities and roles. (This includes activities at home, at work and in your community, and responsibilities as a parent, child, spouse, employee, friend, etc.) (P) Very good    To what extent are you able to carry out your everyday physical activities such as walking, climbing stairs, carrying groceries, or moving a chair? (P) Mostly    In the past 7 days,  How often have you been bothered by emotional problems such as feeling anxious, depressed or irritable? (P) Sometimes  How would you rate your fatigue on average? (P) Moderate  How would you rate your pain on average? (P) 5    PROMIS GLOBAL SCORES    Mental health question re-calculation - no clinical value - (P) 3  Physical health question re-calculation - no clinical value - (P) 3  Pain question re-calculation - no clinical value - (P) 3  Global Mental Health Score - (P) 14  Global Physical Health Score - (P) 13  PROMIS TOTAL - SUBSCORES - (P) 27      0740-2987 PROMIS HEALTH ORGANIZATION AND PROMIS COOPERATIVE GROUP VERSION 1.1      Referral /  Collaboration:  Was/were discussed and patient will pursue.    Anticipated number of session for this episode of care: 24  Anticipation frequency of session: Every other week  Anticipated Duration of each session: 38-52 minutes  Treatment plan will be reviewed in 90 days or when goals have been changed.       MeasurableTreatment Goal(s) related to diagnosis / functional impairment(s)  Goal 1: Patient will lower GAD7 score to 3 or below    I will know I've met my goal when I'm less anxious and irritable.      Objective #A (Patient Action)    Patient will use relaxation strategies 3 times per day to reduce the physical symptoms of anxiety.  Status: New - Date: 3/9/2022     Intervention(s)  Therapist will teach client stress management and relaxation strategies.    Objective #B  Patient will practice the use of timeouts.  Status: New - Date: 3/9/2022     Intervention(s)  Therapist will encourage client to use time outs when overly agitated.    Objective #C  Patient will use the speaker/listener technique in communication.  Status: New - Date: 3/9/2022     Intervention(s)  Therapist will teach S/L Technique.        Patient has reviewed and agreed to the above plan.      Trinidad Solorio  March 9, 2022

## 2022-04-26 DIAGNOSIS — R45.86 MOOD SWING: ICD-10-CM

## 2022-04-27 RX ORDER — ARIPIPRAZOLE 2 MG/1
TABLET ORAL
Qty: 30 TABLET | Refills: 1 | OUTPATIENT
Start: 2022-04-27

## 2022-05-03 DIAGNOSIS — R45.86 MOOD SWING: ICD-10-CM

## 2022-05-07 DIAGNOSIS — F41.1 GAD (GENERALIZED ANXIETY DISORDER): ICD-10-CM

## 2022-05-10 RX ORDER — DESVENLAFAXINE 100 MG/1
TABLET, EXTENDED RELEASE ORAL
Qty: 90 TABLET | Refills: 0 | Status: SHIPPED | OUTPATIENT
Start: 2022-05-10 | End: 2022-08-26

## 2022-05-10 NOTE — TELEPHONE ENCOUNTER
Routing request to provider for review/approval because:  Labs not current:  creatinine    Visit is up to date. RN will issue one time 90 day jasmyn refill. Please advise on labs.   Roshan KANG RN

## 2022-05-12 RX ORDER — ARIPIPRAZOLE 2 MG/1
2 TABLET ORAL AT BEDTIME
Qty: 30 TABLET | Refills: 0 | Status: SHIPPED | OUTPATIENT
Start: 2022-05-12 | End: 2022-06-01

## 2022-05-12 NOTE — TELEPHONE ENCOUNTER
Marcel did not show up for his last appointment to review this medication.  I would like to check in with him on how this is going.  Please help him reschedule.    Thanks.

## 2022-05-18 ENCOUNTER — PSYCHE (OUTPATIENT)
Dept: PSYCHOLOGY | Facility: CLINIC | Age: 52
End: 2022-05-18
Payer: COMMERCIAL

## 2022-05-18 DIAGNOSIS — F41.1 GAD (GENERALIZED ANXIETY DISORDER): Primary | ICD-10-CM

## 2022-05-18 PROCEDURE — 90837 PSYTX W PT 60 MINUTES: CPT | Performed by: MARRIAGE & FAMILY THERAPIST

## 2022-05-18 NOTE — PROGRESS NOTES
[facility]                                     Progress Note    Patient Name: Marcel NG Tutewohl  Date: 5/18/2022         Service Type: Family with client present      Session Start Time: 2:00PM  Session End Time: 2:55PM     Session Length: 55 minutes    Session #: 11    Attendees: Client attended alone    Service Modality:  In-person    DATA  Extended Session (53+ minutes):   - Longer session due to limited access to mental health appointments and necessity to address patient's distress / complexity  Interactive Complexity: No  Crisis: No        Progress Since Last Session (Related to Symptoms / Goals / Homework):   Symptoms: Improving client is less irritable    Homework: Partially completed      Episode of Care Goals: Satisfactory progress - ACTION (Actively working towards change); Intervened by reinforcing change plan / affirming steps taken     Current / Ongoing Stressors and Concerns:   Client and wife talk about the recent issues with client's brother Manuel. Client would prefer to not talk about it as he and his wife spent years arguing over him. Client's wife received negative text messages from Manuel that she thinks originated with Marcel talking to him. She has not responded and states that she wishes Marcel had her back more.      Treatment Objective(s) Addressed in This Session:   practice the use of time outs in marital communication  Use speaker/listener technique when communicating through challenging conversations  Create rituals for connection     Intervention:   Relationship counseling    Assessments completed prior to visit:  The following assessments were completed by patient for this visit:  PROMIS 10-Global Health (all questions and answers displayed):   PROMIS 10 3/9/2022   In general, would you say your health is: Good   In general, would you say your quality of life is: Very good   In general, how would you rate your physical health? Good   In general, how would you rate your  mental health, including your mood and your ability to think? Good   In general, how would you rate your satisfaction with your social activities and relationships? Very good   In general, please rate how well you carry out your usual social activities and roles Very good   To what extent are you able to carry out your everyday physical activities such as walking, climbing stairs, carrying groceries, or moving a chair? Mostly   How often have you been bothered by emotional problems such as feeling anxious, depressed or irritable? Sometimes   How would you rate your fatigue on average? Moderate   How would you rate your pain on average?   0 = No Pain  to  10 = Worst Imaginable Pain 5   In general, would you say your health is: 3   In general, would you say your quality of life is: 4   In general, how would you rate your physical health? 3   In general, how would you rate your mental health, including your mood and your ability to think? 3   In general, how would you rate your satisfaction with your social activities and relationships? 4   In general, please rate how well you carry out your usual social activities and roles. (This includes activities at home, at work and in your community, and responsibilities as a parent, child, spouse, employee, friend, etc.) 4   To what extent are you able to carry out your everyday physical activities such as walking, climbing stairs, carrying groceries, or moving a chair? 4   In the past 7 days, how often have you been bothered by emotional problems such as feeling anxious, depressed, or irritable? 3   In the past 7 days, how would you rate your fatigue on average? 3   In the past 7 days, how would you rate your pain on average, where 0 means no pain, and 10 means worst imaginable pain? 5   Global Mental Health Score 14   Global Physical Health Score 13   PROMIS TOTAL - SUBSCORES 27   Some recent data might be hidden     PROMIS 10-Global Health (only subscores and total score):    PROMIS-10 Scores Only 3/9/2022   Global Mental Health Score 14   Global Physical Health Score 13   PROMIS TOTAL - SUBSCORES 27         ASSESSMENT: Current Emotional / Mental Status (status of significant symptoms):   Risk status (Self / Other harm or suicidal ideation)   Patient denies current fears or concerns for personal safety.   Patient denies current or recent suicidal ideation or behaviors.   Patient denies current or recent homicidal ideation or behaviors.   Patient denies current or recent self injurious behavior or ideation.   Patient denies other safety concerns.   Patient reports there has been no change in risk factors since their last session.     Patient reports there has been no change in protective factors since their last session.     Recommended that patient call 911 or go to the local ED should there be a change in any of these risk factors.     Appearance:   Appropriate    Eye Contact:   Good    Psychomotor Behavior: Normal    Attitude:   Indifferent Avoidant    Orientation:   All   Speech    Rate / Production: Normal/ Responsive Talkative    Volume:  Normal    Mood:    Angry  Irritable    Affect:    Defensive    Thought Content:  Clear    Thought Form:  Coherent  Logical    Insight:    Fair      Medication Review:   No changes to current psychiatric medication(s)     Medication Compliance:   Yes     Changes in Health Issues:   None reported     Chemical Use Review:   Substance Use: Chemical use reviewed, no active concerns identified      Tobacco Use: No current tobacco use.      Diagnosis:  1. ASUNCION (generalized anxiety disorder)        Collateral Reports Completed:   Not Applicable    PLAN: (Patient Tasks / Therapist Tasks / Other)  Homework: Client to support Ermelinda to her brother and behave more as a team. Ermelinda to talk to Suzie Solorio    ______________________________________________________________________    Family with client present Treatment Plan    Patient's Name:  Marcel Melchor  YOB: 1970    Date of Creation: 3/9/2022  Date Treatment Plan Last Reviewed/Revised: 3/9/2022    DSM5 Diagnoses: 300.02 (F41.1) Generalized Anxiety Disorder  Psychosocial / Contextual Factors: Marital discord, work stress, back pain  PROMIS (reviewed every 90 days):   PROMIS-10    In general, would you say your health is: (P) Good    In general, would you say your quality of life is: (P) Very good    In general, how would you rate your physical health? (P) Good    In general, how would you rate your mental health, including your mood and your ability to think? (P) Good    In general, how would you rate your satisfaction with your social activities and relationships? (P) Very good    In general, please rate how well you carry out your usual social activities and roles. (This includes activities at home, at work and in your community, and responsibilities as a parent, child, spouse, employee, friend, etc.) (P) Very good    To what extent are you able to carry out your everyday physical activities such as walking, climbing stairs, carrying groceries, or moving a chair? (P) Mostly    In the past 7 days,  How often have you been bothered by emotional problems such as feeling anxious, depressed or irritable? (P) Sometimes  How would you rate your fatigue on average? (P) Moderate  How would you rate your pain on average? (P) 5    PROMIS GLOBAL SCORES    Mental health question re-calculation - no clinical value - (P) 3  Physical health question re-calculation - no clinical value - (P) 3  Pain question re-calculation - no clinical value - (P) 3  Global Mental Health Score - (P) 14  Global Physical Health Score - (P) 13  PROMIS TOTAL - SUBSCORES - (P) 27      [id number] PROMIS HEALTH ORGANIZATION AND PROMIS COOPERATIVE GROUP VERSION 1.1      Referral / Collaboration:  Was/were discussed and patient will pursue.    Anticipated number of session for this episode of care: 24  Anticipation frequency  of session: Every other week  Anticipated Duration of each session: 38-52 minutes  Treatment plan will be reviewed in 90 days or when goals have been changed.       MeasurableTreatment Goal(s) related to diagnosis / functional impairment(s)  Goal 1: Patient will lower GAD7 score to 3 or below    I will know I've met my goal when I'm less anxious and irritable.      Objective #A (Patient Action)    Patient will use relaxation strategies 3 times per day to reduce the physical symptoms of anxiety.  Status: New - Date: 3/9/2022     Intervention(s)  Therapist will teach client stress management and relaxation strategies.    Objective #B  Patient will practice the use of timeouts.  Status: New - Date: 3/9/2022     Intervention(s)  Therapist will encourage client to use time outs when overly agitated.    Objective #C  Patient will use the speaker/listener technique in communication.  Status: New - Date: 3/9/2022     Intervention(s)  Therapist will teach S/L Technique.        Patient has reviewed and agreed to the above plan.      Trinidad Solorio  March 9, 2022

## 2022-05-26 DIAGNOSIS — R45.86 MOOD SWING: ICD-10-CM

## 2022-05-27 RX ORDER — ARIPIPRAZOLE 2 MG/1
TABLET ORAL
Qty: 30 TABLET | Refills: 1 | Status: SHIPPED | OUTPATIENT
Start: 2022-05-27 | End: 2022-06-01

## 2022-05-27 NOTE — TELEPHONE ENCOUNTER
Jasmyn refill to last until upcoming appointment 6/9/22. Routing request to provider for review/approval because:  Labs not current: Lipid panel, CBC, A1c or glucose    RN will issue one time 90 day jasmyn refill. Please advise on labs.   Roshan KANG RN

## 2022-06-01 ENCOUNTER — VIRTUAL VISIT (OUTPATIENT)
Dept: PSYCHIATRY | Facility: CLINIC | Age: 52
End: 2022-06-01
Attending: PHYSICIAN ASSISTANT
Payer: COMMERCIAL

## 2022-06-01 ENCOUNTER — VIRTUAL VISIT (OUTPATIENT)
Dept: BEHAVIORAL HEALTH | Facility: CLINIC | Age: 52
End: 2022-06-01
Payer: COMMERCIAL

## 2022-06-01 DIAGNOSIS — Z79.899 HIGH RISK MEDICATION USE: Primary | ICD-10-CM

## 2022-06-01 DIAGNOSIS — F39 MOOD DISORDER (H): ICD-10-CM

## 2022-06-01 DIAGNOSIS — F33.0 DEPRESSION, MAJOR, RECURRENT, MILD (H): ICD-10-CM

## 2022-06-01 DIAGNOSIS — F90.2 ATTENTION DEFICIT HYPERACTIVITY DISORDER (ADHD), COMBINED TYPE: ICD-10-CM

## 2022-06-01 DIAGNOSIS — F41.1 GAD (GENERALIZED ANXIETY DISORDER): ICD-10-CM

## 2022-06-01 DIAGNOSIS — F41.1 GENERALIZED ANXIETY DISORDER: Primary | ICD-10-CM

## 2022-06-01 PROCEDURE — 99215 OFFICE O/P EST HI 40 MIN: CPT | Mod: GT | Performed by: NURSE PRACTITIONER

## 2022-06-01 PROCEDURE — 90791 PSYCH DIAGNOSTIC EVALUATION: CPT | Mod: 52 | Performed by: SOCIAL WORKER

## 2022-06-01 RX ORDER — ARIPIPRAZOLE 2 MG/1
2 TABLET ORAL AT BEDTIME
Qty: 30 TABLET | Refills: 1 | Status: SHIPPED | OUTPATIENT
Start: 2022-06-01 | End: 2022-09-15
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
## 2022-06-01 NOTE — ASSESSMENT & PLAN NOTE
The addition of the Abilify has had positive effect on mood.  Will continue the Abilify at 2 mg and desvenlafaxine at 100 mg.  Can consider tapering/ discontinuing the buspirone if not needed.  Will follow-up with this provider in two months
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
## 2022-06-08 ENCOUNTER — OFFICE VISIT (OUTPATIENT)
Dept: PSYCHOLOGY | Facility: CLINIC | Age: 52
End: 2022-06-08
Payer: COMMERCIAL

## 2022-06-08 DIAGNOSIS — F41.1 GAD (GENERALIZED ANXIETY DISORDER): Primary | ICD-10-CM

## 2022-06-08 PROCEDURE — 90847 FAMILY PSYTX W/PT 50 MIN: CPT | Performed by: MARRIAGE & FAMILY THERAPIST
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
## 2022-06-09 ENCOUNTER — OFFICE VISIT (OUTPATIENT)
Dept: FAMILY MEDICINE | Facility: CLINIC | Age: 52
End: 2022-06-09
Payer: COMMERCIAL

## 2022-06-09 VITALS
TEMPERATURE: 98.5 F | RESPIRATION RATE: 16 BRPM | HEIGHT: 69 IN | SYSTOLIC BLOOD PRESSURE: 120 MMHG | WEIGHT: 197 LBS | BODY MASS INDEX: 29.18 KG/M2 | OXYGEN SATURATION: 99 % | DIASTOLIC BLOOD PRESSURE: 60 MMHG | HEART RATE: 74 BPM

## 2022-06-09 DIAGNOSIS — Z79.899 HIGH RISK MEDICATION USE: ICD-10-CM

## 2022-06-09 DIAGNOSIS — M25.532 LEFT WRIST PAIN: ICD-10-CM

## 2022-06-09 DIAGNOSIS — F41.1 GAD (GENERALIZED ANXIETY DISORDER): ICD-10-CM

## 2022-06-09 DIAGNOSIS — R45.86 MOOD SWING: Primary | ICD-10-CM

## 2022-06-09 DIAGNOSIS — M25.562 ACUTE PAIN OF LEFT KNEE: ICD-10-CM

## 2022-06-09 LAB
CHOLEST SERPL-MCNC: 188 MG/DL
FASTING STATUS PATIENT QL REPORTED: YES
HBA1C MFR BLD: 4.9 % (ref 0–5.6)
HDLC SERPL-MCNC: 42 MG/DL
LDLC SERPL CALC-MCNC: 109 MG/DL
NONHDLC SERPL-MCNC: 146 MG/DL
TRIGL SERPL-MCNC: 187 MG/DL

## 2022-06-09 PROCEDURE — 99214 OFFICE O/P EST MOD 30 MIN: CPT | Performed by: PHYSICIAN ASSISTANT

## 2022-06-09 PROCEDURE — 83036 HEMOGLOBIN GLYCOSYLATED A1C: CPT | Performed by: PHYSICIAN ASSISTANT

## 2022-06-09 PROCEDURE — 36415 COLL VENOUS BLD VENIPUNCTURE: CPT | Performed by: PHYSICIAN ASSISTANT

## 2022-06-09 PROCEDURE — 80061 LIPID PANEL: CPT | Performed by: PHYSICIAN ASSISTANT

## 2022-06-09 RX ORDER — BUSPIRONE HYDROCHLORIDE 5 MG/1
5 TABLET ORAL 2 TIMES DAILY PRN
Qty: 180 TABLET | Refills: 0 | Status: SHIPPED | OUTPATIENT
Start: 2022-06-09 | End: 2023-05-23

## 2022-06-09 ASSESSMENT — PATIENT HEALTH QUESTIONNAIRE - PHQ9
10. IF YOU CHECKED OFF ANY PROBLEMS, HOW DIFFICULT HAVE THESE PROBLEMS MADE IT FOR YOU TO DO YOUR WORK, TAKE CARE OF THINGS AT HOME, OR GET ALONG WITH OTHER PEOPLE: NOT DIFFICULT AT ALL
SUM OF ALL RESPONSES TO PHQ QUESTIONS 1-9: 1
SUM OF ALL RESPONSES TO PHQ QUESTIONS 1-9: 1

## 2022-06-09 ASSESSMENT — PAIN SCALES - GENERAL: PAINLEVEL: NO PAIN (0)

## 2022-06-09 NOTE — PROGRESS NOTES
"  Assessment & Plan     Mood swing  Continue with current medications and Abilify.  When refill is needed let or psych know.  Continue to follow with Psych and therapy.  Is doing individual and marriage counseling with his wife still.    Acute pain of left knee  Referral for eval.  - Orthopedic  Referral; Future    Left wrist pain  Referral- try wearing wrist brace (he has at home) at night.  - Orthopedic  Referral; Future    High risk medication use  Labs requested from Psych.  - Lipid panel reflex to direct LDL Fasting  - **A1C FUTURE 6mo       BMI:   Estimated body mass index is 29.52 kg/m  as calculated from the following:    Height as of this encounter: 1.74 m (5' 8.5\").    Weight as of this encounter: 89.4 kg (197 lb).   Weight management plan: Discussed healthy diet and exercise guidelines        Return in about 6 months (around 12/9/2022) for depression/anxiety med check.    Wes Braga PA-C  [facility] TRICIA Rod is a 52 year old who presents for the following health issues     History of Present Illness       Reason for visit:  Left hand is tingling thumb,pointer,middle finger  Symptom onset:  More than a month  Symptoms include:  Numb,tinglimg  Symptom intensity:  Moderate  Symptom progression:  Worsening  Had these symptoms before:  No  What makes it worse:  Driving,  What makes it better:  No    He eats 0-1 servings of fruits and vegetables daily.He consumes 4 sweetened beverage(s) daily.He exercises with enough effort to increase his heart rate 9 or less minutes per day.  He exercises with enough effort to increase his heart rate 3 or less days per week.   He is taking medications regularly.    Today's PHQ-9         PHQ-9 Total Score: 1    PHQ-9 Q9 Thoughts of better off dead/self-harm past 2 weeks :   Not at all    How difficult have these problems made it for you to do your work, take care of things at home, or get along with other people: " "Not difficult at all     Feeling much improved on Abilify- mood better, frustration tolerance improved.  Less fatigued.  Met with Psych who said everything was ok with medications.  Could try going off Buspar but when he did for a few days he felt \"off\" so started taking it again.  Needs labs.      Knee- Walked the dog a few weeks ago and woke the next day to a very swollen left knee.  Swelling has gone down but now has clicking and popping.    Left hand- thumb,1 and 2 fingers numb, weaker .  Right handed.    Review of Systems   Constitutional, HEENT, cardiovascular, pulmonary, gi and gu systems are negative, except as otherwise noted.      Objective    /60 (BP Location: Right arm, Patient Position: Sitting, Cuff Size: Adult Regular)   Pulse 74   Temp 98.5  F (36.9  C) (Oral)   Resp 16   Ht 1.74 m (5' 8.5\")   Wt 89.4 kg (197 lb)   SpO2 99%   BMI 29.52 kg/m    Body mass index is 29.52 kg/m .  Physical Exam   GENERAL: healthy, alert and no distress  MS: left knee tenderness medial, no swelling today, left hand/wrist without deformity, normal ROM- Tinel's negative.  SKIN: no suspicious lesions or rashes  PSYCH: mentation appears normal, affect normal/bright            "

## 2022-06-12 ENCOUNTER — HEALTH MAINTENANCE LETTER (OUTPATIENT)
Age: 52
End: 2022-06-12

## 2022-06-23 DIAGNOSIS — F39 MOOD DISORDER (H): ICD-10-CM

## 2022-06-23 RX ORDER — ARIPIPRAZOLE 2 MG/1
TABLET ORAL
Qty: 30 TABLET | Refills: 1 | OUTPATIENT
Start: 2022-06-23

## 2022-06-23 NOTE — TELEPHONE ENCOUNTER
Patient has a refill on file. Refusing refill request and closing encounter.     Aleja Sanz RN on 6/23/2022 at 10:09 AM

## 2022-06-29 ENCOUNTER — OFFICE VISIT (OUTPATIENT)
Dept: PSYCHOLOGY | Facility: CLINIC | Age: 52
End: 2022-06-29
Payer: COMMERCIAL

## 2022-06-29 DIAGNOSIS — F41.1 GAD (GENERALIZED ANXIETY DISORDER): Primary | ICD-10-CM

## 2022-06-29 PROCEDURE — 90837 PSYTX W PT 60 MINUTES: CPT | Performed by: MARRIAGE & FAMILY THERAPIST

## 2022-06-30 NOTE — PROGRESS NOTES
"[facility]                                     Progress Note    Patient Name: Marcel NG Tutewohl  Date: 6/29/2022         Service Type: Family with client present      Session Start Time: 11:00AM  Session End Time: 11:55AM     Session Length: 55 minutes    Session #: 13    Attendees: Client attended alone    Service Modality:  In-person    DATA  Extended Session (53+ minutes):   - High distress and under complex circumstances.  See Data section for details  Interactive Complexity: No  Crisis: No        Progress Since Last Session (Related to Symptoms / Goals / Homework):   Symptoms: Improving client is less irritable    Homework: Partially completed      Episode of Care Goals: Minimal progress - ACTION (Actively working towards change); Intervened by reinforcing change plan / affirming steps taken     Current / Ongoing Stressors and Concerns:  Client is frustrated today because of events over the weekend. He continues to be stressed about Ermelinda's relationship with his brother that causes them conflict. Marcel is very sensitive to his wife questioning him or his plans. He perceives that she is \"in his business\" when she may be asking for information. Attempted to reality test and clarify these miscommunications with Marcel today. Also to reiterate that each member of the family will have whatever relationship they choose with Marcel's brother. They will not attempt to influence or question one another. Additionally they talked about all the projects they have going on and how Ermelinda gets frustrated that Marcel is so busy and they don't collaborate on them. She tends to be more linear in her decision making and work and Marcel typically has many projects at the same time. Couple are not taking time to prioritize them together. 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      Treatment Objective(s) " Addressed in This Session:   practice the use of time outs in marital communication  Use speaker/listener technique when communicating through challenging conversations  Create rituals for connection  Time together to discuss priorities     Intervention:   Relationship counseling    Assessments completed prior to visit:  The following assessments were completed by patient for this visit:  PROMIS 10-Global Health (all questions and answers displayed):   PROMIS 10 3/9/2022 6/8/2022 6/29/2022   In general, would you say your health is: Good - -   In general, would you say your quality of life is: Very good - -   In general, how would you rate your physical health? Good - -   In general, how would you rate your mental health, including your mood and your ability to think? Good - -   In general, how would you rate your satisfaction with your social activities and relationships? Very good - -   In general, please rate how well you carry out your usual social activities and roles Very good - -   To what extent are you able to carry out your everyday physical activities such as walking, climbing stairs, carrying groceries, or moving a chair? Mostly - -   How often have you been bothered by emotional problems such as feeling anxious, depressed or irritable? Sometimes - -   How would you rate your fatigue on average? Moderate - -   How would you rate your pain on average?   0 = No Pain  to  10 = Worst Imaginable Pain 5 - -   In general, would you say your health is: 3 3 3   In general, would you say your quality of life is: 4 4 4   In general, how would you rate your physical health? 3 3 3   In general, how would you rate your mental health, including your mood and your ability to think? 3 2 2   In general, how would you rate your satisfaction with your social activities and relationships? 4 3 3   In general, please rate how well you carry out your usual social activities and roles. (This includes activities at home, at work  and in your community, and responsibilities as a parent, child, spouse, employee, friend, etc.) 4 3 3   To what extent are you able to carry out your everyday physical activities such as walking, climbing stairs, carrying groceries, or moving a chair? 4 4 4   In the past 7 days, how often have you been bothered by emotional problems such as feeling anxious, depressed, or irritable? 3 3 2   In the past 7 days, how would you rate your fatigue on average? 3 2 2   In the past 7 days, how would you rate your pain on average, where 0 means no pain, and 10 means worst imaginable pain? 5 4 3   Global Mental Health Score 14 12 13   Global Physical Health Score 13 14 15   PROMIS TOTAL - SUBSCORES 27 26 28   Some recent data might be hidden           ASSESSMENT: Current Emotional / Mental Status (status of significant symptoms):   Risk status (Self / Other harm or suicidal ideation)   Patient denies current fears or concerns for personal safety.   Patient denies current or recent suicidal ideation or behaviors.   Patient denies current or recent homicidal ideation or behaviors.   Patient denies current or recent self injurious behavior or ideation.   Patient denies other safety concerns.   Patient reports there has been no change in risk factors since their last session.     Patient reports there has been no change in protective factors since their last session.     Recommended that patient call 911 or go to the local ED should there be a change in any of these risk factors.     Appearance:   Appropriate    Eye Contact:   Good    Psychomotor Behavior: Normal    Attitude:   Cooperative  Interested   Orientation:   All   Speech    Rate / Production: Normal/ Responsive Talkative    Volume:  Normal    Mood:    Normal   Affect:    Appropriate    Thought Content:  Clear    Thought Form:  Coherent  Logical    Insight:    Fair      Medication Review:   No changes to current psychiatric medication(s)     Medication  Compliance:   Yes     Changes in Health Issues:   None reported     Chemical Use Review:   Substance Use: Chemical use reviewed, no active concerns identified      Tobacco Use: No current tobacco use.      Diagnosis:  1. ASUNCION (generalized anxiety disorder)        Collateral Reports Completed:   Not Applicable    PLAN: (Patient Tasks / Therapist Tasks / Other)  Homework: Couple to recommit to intentional time together creating more rituals that connect them.      Trinidad Fritz [place]    ______________________________________________________________________    Treatment Plan    Patient's Name: Marcel Melchor  YOB: 1970    Date of Creation: 6/8/2022  Date Treatment Plan Last Reviewed/Revised: 6/29/2022    DSM5 Diagnoses: 300.02 (F41.1) Generalized Anxiety Disorder  Psychosocial / Contextual Factors: Marital discord, work stress, back pain  PROMIS (reviewed every 90 days): 6/29/2022 Score: 28    Referral / Collaboration:  Was/were discussed and patient will pursue.    Anticipated number of session for this episode of care: 24  Anticipation frequency of session: Every other week  Anticipated Duration of each session: 38-52 minutes  Treatment plan will be reviewed in 90 days or when goals have been changed.       MeasurableTreatment Goal(s) related to diagnosis / functional impairment(s)  Goal 1: Patient will lower GAD7 score to 3 or below    I will know I've met my goal when I'm less anxious and irritable.      Objective #A (Patient Action)    Patient will use relaxation strategies 3 times per day to reduce the physical symptoms of anxiety.  Status: Continued - Date(s):6/29/2022     Intervention(s)  Therapist will teach client stress management and relaxation strategies.    Objective #B  Patient will practice the use of timeouts.  Status: Continued - Date(s):6/29/2022     Intervention(s)  Therapist will encourage client to use time outs when overly agitated.    Objective #C  Patient will use the  speaker/listener technique in communication.  Status: Continued - Date(s):6/29/2022     Intervention(s)  Therapist will teach S/L Technique.        Patient has reviewed and agreed to the above plan.      Trinidad Solorio  June 29, 2022

## 2022-07-01 ENCOUNTER — OFFICE VISIT (OUTPATIENT)
Dept: ORTHOPEDICS | Facility: CLINIC | Age: 52
End: 2022-07-01
Attending: PHYSICIAN ASSISTANT
Payer: COMMERCIAL

## 2022-07-01 VITALS
WEIGHT: 190 LBS | DIASTOLIC BLOOD PRESSURE: 72 MMHG | BODY MASS INDEX: 28.14 KG/M2 | HEIGHT: 69 IN | SYSTOLIC BLOOD PRESSURE: 130 MMHG

## 2022-07-01 DIAGNOSIS — G56.02 CARPAL TUNNEL SYNDROME OF LEFT WRIST: ICD-10-CM

## 2022-07-01 DIAGNOSIS — M25.532 LEFT WRIST PAIN: ICD-10-CM

## 2022-07-01 DIAGNOSIS — M23.204 DEGENERATIVE TEAR OF LEFT MEDIAL MENISCUS: ICD-10-CM

## 2022-07-01 DIAGNOSIS — M25.562 ACUTE PAIN OF LEFT KNEE: Primary | ICD-10-CM

## 2022-07-01 PROCEDURE — 20611 DRAIN/INJ JOINT/BURSA W/US: CPT | Mod: LT | Performed by: STUDENT IN AN ORGANIZED HEALTH CARE EDUCATION/TRAINING PROGRAM

## 2022-07-01 PROCEDURE — 99213 OFFICE O/P EST LOW 20 MIN: CPT | Mod: 25 | Performed by: STUDENT IN AN ORGANIZED HEALTH CARE EDUCATION/TRAINING PROGRAM

## 2022-07-01 RX ORDER — LIDOCAINE HYDROCHLORIDE 10 MG/ML
4 INJECTION, SOLUTION INFILTRATION; PERINEURAL
Status: SHIPPED | OUTPATIENT
Start: 2022-07-01

## 2022-07-01 RX ORDER — TRIAMCINOLONE ACETONIDE 40 MG/ML
40 INJECTION, SUSPENSION INTRA-ARTICULAR; INTRAMUSCULAR
Status: SHIPPED | OUTPATIENT
Start: 2022-07-01

## 2022-07-01 RX ADMIN — TRIAMCINOLONE ACETONIDE 40 MG: 40 INJECTION, SUSPENSION INTRA-ARTICULAR; INTRAMUSCULAR at 08:36

## 2022-07-01 RX ADMIN — LIDOCAINE HYDROCHLORIDE 4 ML: 10 INJECTION, SOLUTION INFILTRATION; PERINEURAL at 08:36

## 2022-07-01 NOTE — LETTER
7/1/2022         RE: Marcel Melchor  [address] [facility] [address]        Dear Colleague,    Thank you for referring your patient, Marcel Melchor, to the [facility] [place]. Please see a copy of my visit note below.    ASSESSMENT & PLAN    1. Acute pain of left knee    2. Degenerative tear of left medial meniscus    3. Left wrist pain    4. Carpal tunnel syndrome of left wrist      Marcel Melchor is a 52 year old male presenting for evaluation of acute left knee pain and left wrist pain/numbness.  History, exam and imaging findings were reviewed today, consistent with degenerative medial meniscus tear of the left knee in the setting of minimal arthritis. Remainder of ligamentous structures appears stable with no pain on palpation or with stressing. We reviewed treatment options inclusive of pain control, activity modification, bracing, injections and formal physical therapy. Also reviewed timing of advance imaging (ie MRI).     At this time, will proceed with the following plan:  - An ultrasound-guided cortisone injection of the left knee was performed today without complication. Please see post-injection instructions below.  - Please take it easy for the next 2 weeks while the cortisone takes effect, then increase activities as tolerated by pain.  - Elects to hold off on PT for now. Please reach out at any time if you would like a PT referral.   - Compression and icing for 10-15 minutes 3-4 times per day until swelling resolves.  - Ibuprofen 600 mg with food every 4-6 hours as needed for pain or swelling.    Evaluation of the left wrist/hands symptoms are consistent with chronic carpal tunnel syndrome. We reviewed treatment options inclusive of oral nsaid's, steroid injection, activity modification, bracing and formal hand therapy.   - Not tolerating over the counter wrist brace. Will refer to Hand Therapy for custom wrist brace and exercises - gentle wrist  "flexion/extension and finger flexion/extension exercises with pinch/ strengthening exercises along with median nerve glides and use of custom orthotic/splinting/modalities as needed with home exercise program. Please evaluate/discuss posture, chair height and office biomechanics.  - For a carpal tunnel injection, please schedule an injection visit with Dr. Yeo.    Please schedule a follow up appointment to see me in 6-8 weeks, or sooner as needed for persistence or worsening of pain. You may call our direct clinic number ([phone]) at any time with questions or concerns.    Gregoria Huerta MD, [facility]    -----    SUBJECTIVE  Marcel Melchor is a/an 52 year old male who is seen in consultation at the request of  Wes Braga PA-C for evaluation of left knee pain. The patient is seen by themselves.    Onset: 1 month(s) ago. Reports insidious onset without acute precipitating event.  Location of Pain: left medial and anterior knee  Rating of Pain at worst: 8/10  Rating of Pain Currently: 2/10  Worsened by: knee extension, walking / activity, knee flexion  Better with: activity avoidance  Treatments tried: rest/activity avoidance, ice and ibuprofen (avoids due to crohnes disease)  Associated symptoms: swelling, feels something \"slipping out of place\", catching, occasioanl feeling of instability, no locking; and decreased ROM  Orthopedic history: YES - patient reports h/o left knee aspiration and CSI 15 and 30 years ago; patient also complains of left wrist pain and numbness in left thumb, 2nd and 3rd fingers. He notes some progression of numbness in left 4th finger. This began ~ 6-9 months ago with symptoms progressing over the last 2 months. He has tried using his wrist brace from previous episode of carpal tunnel syndrome years ago. The brace seems to fit but provokes the symptoms. He would like to try a cortisone injection (previously performed by " "Dr. Yeo in 2019).  Relevant surgical history: NO  Social history: works - owns [employer]    Past Medical History:   Diagnosis Date     Anxiety 9/3/2013     AJAY (obstructive sleep apnea) 11/11/2014     Rotator cuff syndrome 1/19/2012     Social History     Socioeconomic History     Marital status:    Tobacco Use     Smoking status: Never Smoker     Smokeless tobacco: Never Used   Vaping Use     Vaping Use: Never used   Substance and Sexual Activity     Alcohol use: No     Alcohol/week: 0.0 standard drinks     Drug use: No     Sexual activity: Yes     Partners: Female   Other Topics Concern     Parent/sibling w/ CABG, MI or angioplasty before 65F 55M? No         Patient's past medical, surgical, social, and family histories were reviewed today and no changes are noted.    REVIEW OF SYSTEMS:  10 point ROS is negative other than symptoms noted above in HPI, Past Medical History or as stated below  Constitutional: NEGATIVE for fever, chills, change in weight  Skin: NEGATIVE for worrisome rashes, moles or lesions  GI/: NEGATIVE for bowel or bladder changes  Neuro: NEGATIVE for weakness, dizziness or paresthesias    OBJECTIVE:  /72   Ht 1.74 m (5' 8.5\")   Wt 86.2 kg (190 lb)   BMI 28.47 kg/m     General: healthy, alert and in no distress  HEENT: no scleral icterus or conjunctival erythema  Skin: no suspicious lesions or rash. No jaundice.  CV: no pedal edema  Resp: normal respiratory effort without conversational dyspnea   Psych: normal mood and affect  Gait: normal steady gait with appropriate coordination and balance  Neuro: Normal light sensory exam of lower extremity  MSK:  LEFT KNEE  Inspection:    normal alignment  Palpation:    Tender about the medial patellar facet and medial joint line. Remainder of bony and ligamentous landmarks are nontender.    Small effusion is present    Patellofemoral crepitus is Present  Range of Motion:     00 extension to 1200 flexion  Strength:    Quadriceps 5/5 " and hamstrings 5/5    Extensor mechanism intact    Dynamic knee valgus with single leg squat  Special Tests:    Positive: Patellar grind, patellar inhibition, painful Blanca's and Thessaly's without click    Negative: patellar apprehension, MCL/valgus stress (0 & 30 deg), LCL/varus stress (0 & 30 deg), Lachman's, anterior drawer, posterior drawer, bounce    LEFT HAND & WRIST  Inspection:    Hypertrophy of the small joints of the hands. No swelling, bruising, discoloration, or obvious asymmetry  Palpation:    Tender about the carpal tunnel. Remainder of bony and ligamentous line marks are nontender.  Range of Motion:    Grossly within normal limits  Strength:    Grossly within normal limits  Special Tests:    Positive: Tinel's (carpal tunnel)     Independent visualization of the below image:    XR LEFT KNEE 7/1/22  Per my review, minimal medial joint space narrowing. Bipartate patella bilaterally with slight lateral patellar tilt. No acute fracture. Awaiting radiology review.        Large Joint Injection/Arthocentesis: L knee joint    Date/Time: 7/1/2022 8:36 AM  Performed by: Gregoria Man MD  Authorized by: Gregoria Man MD     Indications:  Pain and osteoarthritis  Needle Size:  21 G  Guidance: ultrasound    Approach:  Anterolateral  Location:  Knee      Medications:  40 mg triamcinolone 40 MG/ML; 4 mL lidocaine 1 %  Aspirate amount (mL):  10  Aspirate:  Serous and yellow  Outcome:  Tolerated well, no immediate complications  Procedure discussed: discussed risks, benefits, and alternatives    Consent Given by:  Patient  Timeout: timeout called immediately prior to procedure    Prep: patient was prepped and draped in usual sterile fashion     Ultrasound was used to ensure safe and accurate needle placement and injection. Ultrasound images of the procedure were permanently stored.      Patient reported improvement in pain immediately following injection.      Gregoria Huerta MD,  CAQSM  [facility]        Again, thank you for allowing me to participate in the care of your patient.        Sincerely,        Gregoria Huerta MD

## 2022-07-01 NOTE — PATIENT INSTRUCTIONS
1. Acute pain of left knee    2. Degenerative tear of left medial meniscus    3. Left wrist pain    4. Carpal tunnel syndrome of left wrist      Marcel Melchor is a 52 year old male presenting for evaluation of acute left knee pain and left wrist pain/numbness.  History, exam and imaging findings were reviewed today, consistent with degenerative medial meniscus tear of the left knee in the setting of minimal arthritis. Remainder of ligamentous structures appears stable with no pain on palpation or with stressing. We reviewed treatment options inclusive of pain control, activity modification, bracing, injections and formal physical therapy. Also reviewed timing of advance imaging (ie MRI).     At this time, will proceed with the following plan:  - An ultrasound-guided cortisone injection of the left knee was performed today without complication. Please see post-injection instructions below.  - Please take it easy for the next 2 weeks while the cortisone takes effect, then increase activities as tolerated by pain.  - Elects to hold off on PT for now. Please reach out at any time if you would like a PT referral.   - Compression and icing for 10-15 minutes 3-4 times per day until swelling resolves.  - Ibuprofen 600 mg with food every 4-6 hours as needed for pain or swelling.    Evaluation of the left wrist/hands symptoms are consistent with carpal tunnel syndrome. We reviewed treatment options inclusive of oral nsaid's, steroid injection, activity modification, bracing and formal hand therapy.   - Not tolerating over the counter wrist brace. Will refer to Hand Therapy for custom wrist brace and exercises - gentle wrist flexion/extension and finger flexion/extension exercises with pinch/ strengthening exercises along with median nerve glides and use of custom orthotic/splinting/modalities as needed with home exercise program. Please evaluate/discuss posture, chair height and office biomechanics.  - For a carpal  tunnel injection, please schedule an injection visit with Dr. Yeo.    Please schedule a follow up appointment to see me in 6-8 weeks, or sooner as needed for persistence or worsening of pain. You may call our direct clinic number ([phone]) at any time with questions or concerns.    Gregorai Huerta MD, [facility] [place]

## 2022-07-01 NOTE — PROGRESS NOTES
ASSESSMENT & PLAN    1. Acute pain of left knee    2. Degenerative tear of left medial meniscus    3. Left wrist pain    4. Carpal tunnel syndrome of left wrist      Marcel Melchor is a 52 year old male presenting for evaluation of acute left knee pain and left wrist pain/numbness.  History, exam and imaging findings were reviewed today, consistent with degenerative medial meniscus tear of the left knee in the setting of minimal arthritis. Remainder of ligamentous structures appears stable with no pain on palpation or with stressing. We reviewed treatment options inclusive of pain control, activity modification, bracing, injections and formal physical therapy. Also reviewed timing of advance imaging (ie MRI).     At this time, will proceed with the following plan:  - An ultrasound-guided cortisone injection of the left knee was performed today without complication. Please see post-injection instructions below.  - Please take it easy for the next 2 weeks while the cortisone takes effect, then increase activities as tolerated by pain.  - Elects to hold off on PT for now. Please reach out at any time if you would like a PT referral.   - Compression and icing for 10-15 minutes 3-4 times per day until swelling resolves.  - Ibuprofen 600 mg with food every 4-6 hours as needed for pain or swelling.    Evaluation of the left wrist/hands symptoms are consistent with chronic carpal tunnel syndrome. We reviewed treatment options inclusive of oral nsaid's, steroid injection, activity modification, bracing and formal hand therapy.   - Not tolerating over the counter wrist brace. Will refer to Hand Therapy for custom wrist brace and exercises - gentle wrist flexion/extension and finger flexion/extension exercises with pinch/ strengthening exercises along with median nerve glides and use of custom orthotic/splinting/modalities as needed with home exercise program. Please evaluate/discuss posture, chair height and office  "biomechanics.  - For a carpal tunnel injection, please schedule an injection visit with Dr. Yeo.    Please schedule a follow up appointment to see me in 6-8 weeks, or sooner as needed for persistence or worsening of pain. You may call our direct clinic number ([phone]) at any time with questions or concerns.    Gregoria Huerta MD, [facility]    -----    SUBJECTIVE  Marcel Melchor is a/an 52 year old male who is seen in consultation at the request of  Wes Braga PA-C for evaluation of left knee pain. The patient is seen by themselves.    Onset: 1 month(s) ago. Reports insidious onset without acute precipitating event.  Location of Pain: left medial and anterior knee  Rating of Pain at worst: 8/10  Rating of Pain Currently: 2/10  Worsened by: knee extension, walking / activity, knee flexion  Better with: activity avoidance  Treatments tried: rest/activity avoidance, ice and ibuprofen (avoids due to crohnes disease)  Associated symptoms: swelling, feels something \"slipping out of place\", catching, occasioanl feeling of instability, no locking; and decreased ROM  Orthopedic history: YES - patient reports h/o left knee aspiration and CSI 15 and 30 years ago; patient also complains of left wrist pain and numbness in left thumb, 2nd and 3rd fingers. He notes some progression of numbness in left 4th finger. This began ~ 6-9 months ago with symptoms progressing over the last 2 months. He has tried using his wrist brace from previous episode of carpal tunnel syndrome years ago. The brace seems to fit but provokes the symptoms. He would like to try a cortisone injection (previously performed by Dr. Yeo in 2019).  Relevant surgical history: NO  Social history: works - owns [employer]    Past Medical History:   Diagnosis Date     Anxiety 9/3/2013     AJAY (obstructive sleep apnea) 11/11/2014     Rotator cuff syndrome 1/19/2012     Social History " "    Socioeconomic History     Marital status:    Tobacco Use     Smoking status: Never Smoker     Smokeless tobacco: Never Used   Vaping Use     Vaping Use: Never used   Substance and Sexual Activity     Alcohol use: No     Alcohol/week: 0.0 standard drinks     Drug use: No     Sexual activity: Yes     Partners: Female   Other Topics Concern     Parent/sibling w/ CABG, MI or angioplasty before 65F 55M? No         Patient's past medical, surgical, social, and family histories were reviewed today and no changes are noted.    REVIEW OF SYSTEMS:  10 point ROS is negative other than symptoms noted above in HPI, Past Medical History or as stated below  Constitutional: NEGATIVE for fever, chills, change in weight  Skin: NEGATIVE for worrisome rashes, moles or lesions  GI/: NEGATIVE for bowel or bladder changes  Neuro: NEGATIVE for weakness, dizziness or paresthesias    OBJECTIVE:  /72   Ht 1.74 m (5' 8.5\")   Wt 86.2 kg (190 lb)   BMI 28.47 kg/m     General: healthy, alert and in no distress  HEENT: no scleral icterus or conjunctival erythema  Skin: no suspicious lesions or rash. No jaundice.  CV: no pedal edema  Resp: normal respiratory effort without conversational dyspnea   Psych: normal mood and affect  Gait: normal steady gait with appropriate coordination and balance  Neuro: Normal light sensory exam of lower extremity  MSK:  LEFT KNEE  Inspection:    normal alignment  Palpation:    Tender about the medial patellar facet and medial joint line. Remainder of bony and ligamentous landmarks are nontender.    Small effusion is present    Patellofemoral crepitus is Present  Range of Motion:     00 extension to 1200 flexion  Strength:    Quadriceps 5/5 and hamstrings 5/5    Extensor mechanism intact    Dynamic knee valgus with single leg squat  Special Tests:    Positive: Patellar grind, patellar inhibition, painful Blanca's and Thessaly's without click    Negative: patellar apprehension, MCL/valgus " stress (0 & 30 deg), LCL/varus stress (0 & 30 deg), Lachman's, anterior drawer, posterior drawer, bounce    LEFT HAND & WRIST  Inspection:    Hypertrophy of the small joints of the hands. No swelling, bruising, discoloration, or obvious asymmetry  Palpation:    Tender about the carpal tunnel. Remainder of bony and ligamentous line marks are nontender.  Range of Motion:    Grossly within normal limits  Strength:    Grossly within normal limits  Special Tests:    Positive: Tinel's (carpal tunnel)     Independent visualization of the below image:    XR LEFT KNEE 7/1/22  Per my review, minimal medial joint space narrowing. Bipartate patella bilaterally with slight lateral patellar tilt. No acute fracture. Awaiting radiology review.        Large Joint Injection/Arthocentesis: L knee joint    Date/Time: 7/1/2022 8:36 AM  Performed by: Gregoria Man MD  Authorized by: Gregoria Man MD     Indications:  Pain and osteoarthritis  Needle Size:  21 G  Guidance: ultrasound    Approach:  Anterolateral  Location:  Knee      Medications:  40 mg triamcinolone 40 MG/ML; 4 mL lidocaine 1 %  Aspirate amount (mL):  10  Aspirate:  Serous and yellow  Outcome:  Tolerated well, no immediate complications  Procedure discussed: discussed risks, benefits, and alternatives    Consent Given by:  Patient  Timeout: timeout called immediately prior to procedure    Prep: patient was prepped and draped in usual sterile fashion     Ultrasound was used to ensure safe and accurate needle placement and injection. Ultrasound images of the procedure were permanently stored.      Patient reported improvement in pain immediately following injection.      Gregoria Huerta MD, [facility]

## 2022-07-12 NOTE — PROGRESS NOTES
ASSESSMENT & PLAN  Patient Instructions     1. Carpal tunnel syndrome of left wrist      -Patient has left wrist pain due to carpal tunnel syndrome  -Patient tolerated left carpal tunnel cortisone injection today without complications.  Patient was given postprocedure instructions  -Patient will follow up with Dr. Mendoza as directed  -Call direct clinic number [[phone]] at any time with questions or concerns.    Albert Yeo MD [facility]  [facility]          -----    SUBJECTIVE:  Marcel Melchor is a 52 year old male who is seen for left carpal tunnel injection  at the request of Dr.Amanda Mendoza.        Hand / Upper Extremity Injection/Arthrocentesis: L carpal tunnel    Date/Time: 7/18/2022 8:26 AM  Performed by: Yeo, Albert, MD  Authorized by: Yeo, Albert, MD     Indications:  Pain  Needle Size:  25 G  Guidance: ultrasound    Approach:  Volar  Condition: carpal tunnel      Site:  L carpal tunnel  Medications:  40 mg methylPREDNISolone 40 MG/ML  Outcome:  Tolerated well, no immediate complications  Procedure discussed: discussed risks, benefits, and alternatives    Consent Given by:  Patient  Prep: patient was prepped and draped in usual sterile fashion            Albert Yeo MD, [facility]

## 2022-07-14 DIAGNOSIS — F41.1 GAD (GENERALIZED ANXIETY DISORDER): ICD-10-CM

## 2022-07-18 ENCOUNTER — OFFICE VISIT (OUTPATIENT)
Dept: ORTHOPEDICS | Facility: CLINIC | Age: 52
End: 2022-07-18
Payer: COMMERCIAL

## 2022-07-18 VITALS
BODY MASS INDEX: 28.14 KG/M2 | SYSTOLIC BLOOD PRESSURE: 118 MMHG | HEIGHT: 69 IN | WEIGHT: 190 LBS | DIASTOLIC BLOOD PRESSURE: 76 MMHG

## 2022-07-18 DIAGNOSIS — G56.02 CARPAL TUNNEL SYNDROME OF LEFT WRIST: Primary | ICD-10-CM

## 2022-07-18 PROCEDURE — 76942 ECHO GUIDE FOR BIOPSY: CPT | Performed by: FAMILY MEDICINE

## 2022-07-18 PROCEDURE — 20526 THER INJECTION CARP TUNNEL: CPT | Mod: LT | Performed by: FAMILY MEDICINE

## 2022-07-18 RX ORDER — BUSPIRONE HYDROCHLORIDE 5 MG/1
5 TABLET ORAL 2 TIMES DAILY PRN
Qty: 180 TABLET | Refills: 0 | OUTPATIENT
Start: 2022-07-18

## 2022-07-18 RX ORDER — METHYLPREDNISOLONE ACETATE 40 MG/ML
40 INJECTION, SUSPENSION INTRA-ARTICULAR; INTRALESIONAL; INTRAMUSCULAR; SOFT TISSUE
Status: SHIPPED | OUTPATIENT
Start: 2022-07-18

## 2022-07-18 RX ORDER — DESVENLAFAXINE 100 MG/1
TABLET, EXTENDED RELEASE ORAL
Qty: 90 TABLET | Refills: 0 | OUTPATIENT
Start: 2022-07-18

## 2022-07-18 RX ADMIN — METHYLPREDNISOLONE ACETATE 40 MG: 40 INJECTION, SUSPENSION INTRA-ARTICULAR; INTRALESIONAL; INTRAMUSCULAR; SOFT TISSUE at 08:26

## 2022-07-18 NOTE — PATIENT INSTRUCTIONS
1. Carpal tunnel syndrome of left wrist      -Patient has left wrist pain due to carpal tunnel syndrome  -Patient tolerated left carpal tunnel cortisone injection today without complications.  Patient was given postprocedure instructions  -Patient will follow up with Dr. Mendoza as directed  -Call direct clinic number [[phone]] at any time with questions or concerns.    Albert Yeo MD [facility]  [facility]

## 2022-07-18 NOTE — LETTER
7/18/2022         RE: Marcel Melchor  [address] [facility] [place] [address]        Dear Colleague,    Thank you for referring your patient, Marcel Melchor, to the [facility] [place]. Please see a copy of my visit note below.    ASSESSMENT & PLAN  Patient Instructions     1. Carpal tunnel syndrome of left wrist      -Patient has left wrist pain due to carpal tunnel syndrome  -Patient tolerated left carpal tunnel cortisone injection today without complications.  Patient was given postprocedure instructions  -Patient will follow up with Dr. Mendoza as directed  -Call direct clinic number [[phone]] at any time with questions or concerns.    Albert Yeo MD [facility]  [facility]          -----    SUBJECTIVE:  Marcel Melchor is a 52 year old male who is seen for left carpal tunnel injection  at the request of Dr.Amanda Mendoza.        Hand / Upper Extremity Injection/Arthrocentesis: L carpal tunnel    Date/Time: 7/18/2022 8:26 AM  Performed by: Yeo, Albert, MD  Authorized by: Yeo, Albert, MD     Indications:  Pain  Needle Size:  25 G  Guidance: ultrasound    Approach:  Volar  Condition: carpal tunnel      Site:  L carpal tunnel  Medications:  40 mg methylPREDNISolone 40 MG/ML  Outcome:  Tolerated well, no immediate complications  Procedure discussed: discussed risks, benefits, and alternatives    Consent Given by:  Patient  Prep: patient was prepped and draped in usual sterile fashion            Albert Yeo MD, [facility]      Again, thank you for allowing me to participate in the care of your patient.        Sincerely,        Albert Yeo, MD

## 2022-07-20 ENCOUNTER — OFFICE VISIT (OUTPATIENT)
Dept: PSYCHOLOGY | Facility: CLINIC | Age: 52
End: 2022-07-20
Payer: COMMERCIAL

## 2022-07-20 DIAGNOSIS — F41.1 GAD (GENERALIZED ANXIETY DISORDER): Primary | ICD-10-CM

## 2022-07-20 PROCEDURE — 90837 PSYTX W PT 60 MINUTES: CPT | Performed by: MARRIAGE & FAMILY THERAPIST

## 2022-07-20 NOTE — PROGRESS NOTES
"[facility]                                     Progress Note    Patient Name: Marcel NG Tutewohl  Date: 7/20/2022         Service Type: Family with client present      Session Start Time: 11:00AM  Session End Time: 11:55AM     Session Length: 55 minutes    Session #: 14    Attendees: Client and Spouse / Significant Other    Service Modality:  In-person    DATA  Extended Session (53+ minutes):   - High distress and under complex circumstances.  See Data section for details  Interactive Complexity: No  Crisis: No        Progress Since Last Session (Related to Symptoms / Goals / Homework):   Symptoms: Improving client is less irritable    Homework: Partially completed      Episode of Care Goals: Satisfactory progress - ACTION (Actively working towards change); Intervened by reinforcing change plan / affirming steps taken     Current / Ongoing Stressors and Concerns:  Couple talk about [place] and some of the drama that has ensued. They have not been able to connect due to time dedicated to preparing for the wedding. Marcel does share some ongoing frustrations with sex because he expects more because of \"all he does\" for [place].     Treatment Objective(s) Addressed in This Session:   practice the use of time outs in marital communication  Use speaker/listener technique when communicating through challenging conversations  Create rituals for connection  Time together to discuss priorities     Intervention:   Relationship counseling    Assessments completed prior to visit:  The following assessments were completed by patient for this visit:  PROMIS 10-Global Health (all questions and answers displayed):   PROMIS 10 3/9/2022 6/8/2022 6/29/2022   In general, would you say your health is: Good - -   In general, would you say your quality of life is: Very good - -   In general, how would you rate your physical health? Good - -   In general, how would you rate " your mental health, including your mood and your ability to think? Good - -   In general, how would you rate your satisfaction with your social activities and relationships? Very good - -   In general, please rate how well you carry out your usual social activities and roles Very good - -   To what extent are you able to carry out your everyday physical activities such as walking, climbing stairs, carrying groceries, or moving a chair? Mostly - -   How often have you been bothered by emotional problems such as feeling anxious, depressed or irritable? Sometimes - -   How would you rate your fatigue on average? Moderate - -   How would you rate your pain on average?   0 = No Pain  to  10 = Worst Imaginable Pain 5 - -   In general, would you say your health is: 3 3 3   In general, would you say your quality of life is: 4 4 4   In general, how would you rate your physical health? 3 3 3   In general, how would you rate your mental health, including your mood and your ability to think? 3 2 2   In general, how would you rate your satisfaction with your social activities and relationships? 4 3 3   In general, please rate how well you carry out your usual social activities and roles. (This includes activities at home, at work and in your community, and responsibilities as a parent, child, spouse, employee, friend, etc.) 4 3 3   To what extent are you able to carry out your everyday physical activities such as walking, climbing stairs, carrying groceries, or moving a chair? 4 4 4   In the past 7 days, how often have you been bothered by emotional problems such as feeling anxious, depressed, or irritable? 3 3 2   In the past 7 days, how would you rate your fatigue on average? 3 2 2   In the past 7 days, how would you rate your pain on average, where 0 means no pain, and 10 means worst imaginable pain? 5 4 3   Global Mental Health Score 14 12 13   Global Physical Health Score 13 14 15   PROMIS TOTAL - SUBSCORES 27 26 28   Some  recent data might be hidden           ASSESSMENT: Current Emotional / Mental Status (status of significant symptoms):   Risk status (Self / Other harm or suicidal ideation)   Patient denies current fears or concerns for personal safety.   Patient denies current or recent suicidal ideation or behaviors.   Patient denies current or recent homicidal ideation or behaviors.   Patient denies current or recent self injurious behavior or ideation.   Patient denies other safety concerns.   Patient reports there has been no change in risk factors since their last session.     Patient reports there has been no change in protective factors since their last session.     Recommended that patient call 911 or go to the local ED should there be a change in any of these risk factors.     Appearance:   Appropriate    Eye Contact:   Good    Psychomotor Behavior: Normal    Attitude:   Cooperative  Interested   Orientation:   All   Speech    Rate / Production: Normal/ Responsive Talkative    Volume:  Normal    Mood:    Normal   Affect:    Appropriate    Thought Content:  Clear    Thought Form:  Coherent  Logical    Insight:    Fair      Medication Review:   No changes to current psychiatric medication(s)     Medication Compliance:   Yes     Changes in Health Issues:   None reported     Chemical Use Review:   Substance Use: Chemical use reviewed, no active concerns identified      Tobacco Use: No current tobacco use.      Diagnosis:  1. ASUNCION (generalized anxiety disorder)        Collateral Reports Completed:   Not Applicable    PLAN: (Patient Tasks / Therapist Tasks / Other)  Homework: Couple to recommit once wedding is over, to intentional time together creating more rituals that connect them. Also consider other ways to connect and be intimate such as spiritual intimacy.      Trinidad Solorio    ______________________________________________________________________    Treatment Plan    Patient's Name: Marcel Melchor  Date Of  Birth: 1970    Date of Creation: 6/8/2022  Date Treatment Plan Last Reviewed/Revised: 6/29/2022    DSM5 Diagnoses: 300.02 (F41.1) Generalized Anxiety Disorder  Psychosocial / Contextual Factors: Marital discord, work stress, back pain  PROMIS (reviewed every 90 days): 6/29/2022 Score: 28    Referral / Collaboration:  Was/were discussed and patient will pursue.    Anticipated number of session for this episode of care: 24  Anticipation frequency of session: Every other week  Anticipated Duration of each session: 38-52 minutes  Treatment plan will be reviewed in 90 days or when goals have been changed.       MeasurableTreatment Goal(s) related to diagnosis / functional impairment(s)  Goal 1: Patient will lower GAD7 score to 3 or below    I will know I've met my goal when I'm less anxious and irritable.      Objective #A (Patient Action)    Patient will use relaxation strategies 3 times per day to reduce the physical symptoms of anxiety.  Status: Continued - Date(s):6/29/2022     Intervention(s)  Therapist will teach client stress management and relaxation strategies.    Objective #B  Patient will practice the use of timeouts.  Status: Continued - Date(s):6/29/2022     Intervention(s)  Therapist will encourage client to use time outs when overly agitated.    Objective #C  Patient will use the speaker/listener technique in communication.  Status: Continued - Date(s):6/29/2022     Intervention(s)  Therapist will teach S/L Technique.        Patient has reviewed and agreed to the above plan.      Trinidad Solorio  June 29, 2022

## 2022-07-27 DIAGNOSIS — R45.86 MOOD SWING: ICD-10-CM

## 2022-07-29 RX ORDER — LAMOTRIGINE 25 MG/1
25 TABLET ORAL DAILY
Qty: 90 TABLET | Refills: 1 | OUTPATIENT
Start: 2022-07-29

## 2022-08-09 NOTE — TELEPHONE ENCOUNTER
"Now I'm a little confused.    This is my note from April- I guess maybe he did continue but I discontinued from med list when we started Abilify.  I would say he doesn't need it if it's not helpful.  The Abilify was meant to replace it.      \"He did not get the Lamictal refill last time so was not taking it.  Started it origianally in the fall but didn't  the refill.  Today notes that he started taking it a few days ago\"  "

## 2022-08-09 NOTE — TELEPHONE ENCOUNTER
Lamictal 25 mg is off of his med list already.      Called the pt.  He says he never went off of it.  He ran out in July.  He has noted a little change since he is off of it.  He feels more tired.  Will forward to Wes Braga.

## 2022-08-10 ENCOUNTER — OFFICE VISIT (OUTPATIENT)
Dept: PSYCHOLOGY | Facility: CLINIC | Age: 52
End: 2022-08-10
Payer: COMMERCIAL

## 2022-08-10 DIAGNOSIS — F41.1 GAD (GENERALIZED ANXIETY DISORDER): Primary | ICD-10-CM

## 2022-08-10 PROCEDURE — 90847 FAMILY PSYTX W/PT 50 MIN: CPT | Performed by: MARRIAGE & FAMILY THERAPIST

## 2022-08-11 NOTE — PROGRESS NOTES
[facility]                                     Progress Note    Patient Name: Marcel NG Tutewohl  Date: 8/11/2022         Service Type: Family with client present      Session Start Time: 11:00AM  Session End Time: 11:55AM     Session Length: 55 minutes    Session #: 15    Attendees: Client and Spouse / Significant Other    Service Modality:  In-person    DATA  Extended Session (53+ minutes):   - High distress and under complex circumstances.  See Data section for details  Interactive Complexity: No  Crisis: No        Progress Since Last Session (Related to Symptoms / Goals / Homework):   Symptoms: Improving client is less irritable    Homework: Partially completed      Episode of Care Goals: Satisfactory progress - ACTION (Actively working towards change); Intervened by reinforcing change plan / affirming steps taken     Current / Ongoing Stressors and Concerns:  C[place] There was no drama and everyone had a great time. It was emotional for everyone including Marcel who sobbed during the ceremony. Marcel scheduled a trip to [place] for just the two of them. Also registered for dance lessons for them.      Treatment Objective(s) Addressed in This Session:   practice the use of time outs in marital communication  Use speaker/listener technique when communicating through challenging conversations  Create rituals for connection  Time together to discuss priorities     Intervention:   Relationship counseling    Assessments completed prior to visit:  The following assessments were completed by patient for this visit:  PROMIS 10-Global Health (all questions and answers displayed):   PROMIS 10 3/9/2022 6/8/2022 6/29/2022   In general, would you say your health is: Good - -   In general, would you say your quality of life is: Very good - -   In general, how would you rate your physical health? Good - -   In general, how would you  rate your mental health, including your mood and your ability to think? Good - -   In general, how would you rate your satisfaction with your social activities and relationships? Very good - -   In general, please rate how well you carry out your usual social activities and roles Very good - -   To what extent are you able to carry out your everyday physical activities such as walking, climbing stairs, carrying groceries, or moving a chair? Mostly - -   How often have you been bothered by emotional problems such as feeling anxious, depressed or irritable? Sometimes - -   How would you rate your fatigue on average? Moderate - -   How would you rate your pain on average?   0 = No Pain  to  10 = Worst Imaginable Pain 5 - -   In general, would you say your health is: 3 3 3   In general, would you say your quality of life is: 4 4 4   In general, how would you rate your physical health? 3 3 3   In general, how would you rate your mental health, including your mood and your ability to think? 3 2 2   In general, how would you rate your satisfaction with your social activities and relationships? 4 3 3   In general, please rate how well you carry out your usual social activities and roles. (This includes activities at home, at work and in your community, and responsibilities as a parent, child, spouse, employee, friend, etc.) 4 3 3   To what extent are you able to carry out your everyday physical activities such as walking, climbing stairs, carrying groceries, or moving a chair? 4 4 4   In the past 7 days, how often have you been bothered by emotional problems such as feeling anxious, depressed, or irritable? 3 3 2   In the past 7 days, how would you rate your fatigue on average? 3 2 2   In the past 7 days, how would you rate your pain on average, where 0 means no pain, and 10 means worst imaginable pain? 5 4 3   Global Mental Health Score 14 12 13   Global Physical Health Score 13 14 15   PROMIS TOTAL - SUBSCORES 27 26 28    Some recent data might be hidden           ASSESSMENT: Current Emotional / Mental Status (status of significant symptoms):   Risk status (Self / Other harm or suicidal ideation)   Patient denies current fears or concerns for personal safety.   Patient denies current or recent suicidal ideation or behaviors.   Patient denies current or recent homicidal ideation or behaviors.   Patient denies current or recent self injurious behavior or ideation.   Patient denies other safety concerns.   Patient reports there has been no change in risk factors since their last session.     Patient reports there has been no change in protective factors since their last session.     Recommended that patient call 911 or go to the local ED should there be a change in any of these risk factors.     Appearance:   Appropriate    Eye Contact:   Good    Psychomotor Behavior: Normal    Attitude:   Cooperative  Interested   Orientation:   All   Speech    Rate / Production: Normal/ Responsive Talkative    Volume:  Normal    Mood:    Normal   Affect:    Appropriate    Thought Content:  Clear    Thought Form:  Coherent  Logical    Insight:    Fair      Medication Review:   No changes to current psychiatric medication(s)     Medication Compliance:   Yes     Changes in Health Issues:   None reported     Chemical Use Review:   Substance Use: Chemical use reviewed, no active concerns identified      Tobacco Use: No current tobacco use.      Diagnosis:  1. ASUNCION (generalized anxiety disorder)        Collateral Reports Completed:   Not Applicable    PLAN: (Patient Tasks / Therapist Tasks / Other)  Homework: Couple to continue to be intentional about time together.    Trinidad Solorio    ______________________________________________________________________    Treatment Plan    Patient's Name: Marcel Melchor  YOB: 1970    Date of Creation: 6/8/2022  Date Treatment Plan Last Reviewed/Revised: 6/29/2022    DSM5 Diagnoses: 300.02 (F41.1)  Generalized Anxiety Disorder  Psychosocial / Contextual Factors: Marital discord, work stress, back pain  PROMIS (reviewed every 90 days): 6/29/2022 Score: 28    Referral / Collaboration:  Was/were discussed and patient will pursue.    Anticipated number of session for this episode of care: 24  Anticipation frequency of session: Every other week  Anticipated Duration of each session: 38-52 minutes  Treatment plan will be reviewed in 90 days or when goals have been changed.       MeasurableTreatment Goal(s) related to diagnosis / functional impairment(s)  Goal 1: Patient will lower GAD7 score to 3 or below    I will know I've met my goal when I'm less anxious and irritable.      Objective #A (Patient Action)    Patient will use relaxation strategies 3 times per day to reduce the physical symptoms of anxiety.  Status: Continued - Date(s):6/29/2022     Intervention(s)  Therapist will teach client stress management and relaxation strategies.    Objective #B  Patient will practice the use of timeouts.  Status: Continued - Date(s):6/29/2022     Intervention(s)  Therapist will encourage client to use time outs when overly agitated.    Objective #C  Patient will use the speaker/listener technique in communication.  Status: Continued - Date(s):6/29/2022     Intervention(s)  Therapist will teach S/L Technique.        Patient has reviewed and agreed to the above plan.      Trinidad Solorio  June 29, 2022

## 2022-08-23 DIAGNOSIS — L70.9 ACNE, UNSPECIFIED ACNE TYPE: ICD-10-CM

## 2022-08-24 DIAGNOSIS — F41.1 GAD (GENERALIZED ANXIETY DISORDER): ICD-10-CM

## 2022-08-24 RX ORDER — CLINDAMYCIN PHOSPHATE 10 UG/ML
LOTION TOPICAL
Qty: 60 ML | Refills: 0 | Status: SHIPPED | OUTPATIENT
Start: 2022-08-24

## 2022-08-24 NOTE — TELEPHONE ENCOUNTER
Prescription approved per [facility] Refill Protocol for clindamycin 1% external lotion.

## 2022-08-25 NOTE — TELEPHONE ENCOUNTER
Routing refill request to provider for review/approval because:  Klaudia given x1 and patient did not follow up, please advise  Labs not current:  outdated Cr  Creatinine   Date Value Ref Range Status   11/12/2020 1.05 0.66 - 1.25 mg/dL Francesca ZIMMERMAN RN

## 2022-08-26 RX ORDER — DESVENLAFAXINE 100 MG/1
TABLET, EXTENDED RELEASE ORAL
Qty: 90 TABLET | Refills: 0 | Status: SHIPPED | OUTPATIENT
Start: 2022-08-26 | End: 2022-11-22

## 2022-08-31 ENCOUNTER — OFFICE VISIT (OUTPATIENT)
Dept: PSYCHOLOGY | Facility: CLINIC | Age: 52
End: 2022-08-31
Payer: COMMERCIAL

## 2022-08-31 DIAGNOSIS — F41.1 GAD (GENERALIZED ANXIETY DISORDER): Primary | ICD-10-CM

## 2022-08-31 PROCEDURE — 90837 PSYTX W PT 60 MINUTES: CPT | Performed by: MARRIAGE & FAMILY THERAPIST

## 2022-08-31 NOTE — PROGRESS NOTES
"[facility]                                     Progress Note    Patient Name: Marcel NG Tutewohl  Date: 8/31/2022         Service Type: Family with client present      Session Start Time: 11:00AM  Session End Time: 11:55AM     Session Length: 55 minutes    Session #: 16    Attendees: Client and Spouse / Significant Other    Service Modality:  In-person    DATA  Extended Session (53+ minutes):   - High distress and under complex circumstances.  See Data section for details  Interactive Complexity: No  Crisis: No        Progress Since Last Session (Related to Symptoms / Goals / Homework):   Symptoms: Improving client is less irritable    Homework: Partially completed      Episode of Care Goals: Satisfactory progress - ACTION (Actively working towards change); Intervened by reinforcing change plan / affirming steps taken     Current / Ongoing Stressors and Concerns:  Couple talked about an argument they had around sex. They continue to struggle to find strategies to meet both of their needs. Discussed the emotional intimacy Ermelinda needs (the gap between 5 and 8 in Marcel's ranking of himself) and how to meet Marcel's desire for sex approximately once a week. Marcel is not thrilled with the idea of scheduling sex and we talked about pulling a paper out of a hat. Marcel stated that he \"gives and gives and gives\" which was acknowledged however he acknowledges he does not always speak or behave respectfully and has a history of anger issues that he continually work on. Ermelinda also does what she can to work on their intimacy issues and this writer tried to help client see that they both have their part to work on. Client's wife also shared there have been other stressors at home including their daughter telling them she has been purging this past year and is now being seen at [facility] and there is also an issue with their niece we didn't have time to address.     Treatment Objective(s) Addressed in This " Session:   practice the use of time outs in marital communication  Use speaker/listener technique when communicating through challenging conversations  Create rituals for connection  Time together to discuss priorities     Intervention:   Relationship counseling    Assessments completed prior to visit:  The following assessments were completed by patient for this visit:  PROMIS 10-Global Health (all questions and answers displayed):   PROMIS 10 3/9/2022 6/8/2022 6/29/2022   In general, would you say your health is: Good - -   In general, would you say your quality of life is: Very good - -   In general, how would you rate your physical health? Good - -   In general, how would you rate your mental health, including your mood and your ability to think? Good - -   In general, how would you rate your satisfaction with your social activities and relationships? Very good - -   In general, please rate how well you carry out your usual social activities and roles Very good - -   To what extent are you able to carry out your everyday physical activities such as walking, climbing stairs, carrying groceries, or moving a chair? Mostly - -   How often have you been bothered by emotional problems such as feeling anxious, depressed or irritable? Sometimes - -   How would you rate your fatigue on average? Moderate - -   How would you rate your pain on average?   0 = No Pain  to  10 = Worst Imaginable Pain 5 - -   In general, would you say your health is: 3 3 3   In general, would you say your quality of life is: 4 4 4   In general, how would you rate your physical health? 3 3 3   In general, how would you rate your mental health, including your mood and your ability to think? 3 2 2   In general, how would you rate your satisfaction with your social activities and relationships? 4 3 3   In general, please rate how well you carry out your usual social activities and roles. (This includes activities at home, at work and in your  community, and responsibilities as a parent, child, spouse, employee, friend, etc.) 4 3 3   To what extent are you able to carry out your everyday physical activities such as walking, climbing stairs, carrying groceries, or moving a chair? 4 4 4   In the past 7 days, how often have you been bothered by emotional problems such as feeling anxious, depressed, or irritable? 3 3 2   In the past 7 days, how would you rate your fatigue on average? 3 2 2   In the past 7 days, how would you rate your pain on average, where 0 means no pain, and 10 means worst imaginable pain? 5 4 3   Global Mental Health Score 14 12 13   Global Physical Health Score 13 14 15   PROMIS TOTAL - SUBSCORES 27 26 28   Some recent data might be hidden           ASSESSMENT: Current Emotional / Mental Status (status of significant symptoms):   Risk status (Self / Other harm or suicidal ideation)   Patient denies current fears or concerns for personal safety.   Patient denies current or recent suicidal ideation or behaviors.   Patient denies current or recent homicidal ideation or behaviors.   Patient denies current or recent self injurious behavior or ideation.   Patient denies other safety concerns.   Patient reports there has been no change in risk factors since their last session.     Patient reports there has been no change in protective factors since their last session.     Recommended that patient call 911 or go to the local ED should there be a change in any of these risk factors.     Appearance:   Appropriate    Eye Contact:   Good    Psychomotor Behavior: Normal    Attitude:   Cooperative  Interested   Orientation:   All   Speech    Rate / Production: Normal/ Responsive Talkative    Volume:  Normal    Mood:    Normal   Affect:    Appropriate    Thought Content:  Clear    Thought Form:  Coherent  Logical    Insight:    Fair      Medication Review:   No changes to current psychiatric medication(s)     Medication Compliance:   Yes     Changes in  Health Issues:   None reported     Chemical Use Review:   Substance Use: Chemical use reviewed, no active concerns identified      Tobacco Use: No current tobacco use.      Diagnosis:  1. ASUNCION (generalized anxiety disorder)        Collateral Reports Completed:   Not Applicable    PLAN: (Patient Tasks / Therapist Tasks / Other)  Homework: Couple to continue to be intentional about scheduling sex. Ask about their trip to [place].    Trinidad HEIN [place]    ______________________________________________________________________    Treatment Plan    Patient's Name: Marcel Melchor  YOB: 1970    Date of Creation: 6/8/2022  Date Treatment Plan Last Reviewed/Revised: 6/29/2022    DSM5 Diagnoses: 300.02 (F41.1) Generalized Anxiety Disorder  Psychosocial / Contextual Factors: Marital discord, work stress, back pain  PROMIS (reviewed every 90 days): 6/29/2022 Score: 28    Referral / Collaboration:  Was/were discussed and patient will pursue.    Anticipated number of session for this episode of care: 24  Anticipation frequency of session: Every other week  Anticipated Duration of each session: 38-52 minutes  Treatment plan will be reviewed in 90 days or when goals have been changed.       MeasurableTreatment Goal(s) related to diagnosis / functional impairment(s)  Goal 1: Patient will lower GAD7 score to 3 or below    I will know I've met my goal when I'm less anxious and irritable.      Objective #A (Patient Action)    Patient will use relaxation strategies 3 times per day to reduce the physical symptoms of anxiety.  Status: Continued - Date(s):6/29/2022     Intervention(s)  Therapist will teach client stress management and relaxation strategies.    Objective #B  Patient will practice the use of timeouts.  Status: Continued - Date(s):6/29/2022     Intervention(s)  Therapist will encourage client to use time outs when overly agitated.    Objective #C  Patient will use the speaker/listener technique in  communication.  Status: Continued - Date(s):6/29/2022     Intervention(s)  Therapist will teach S/L Technique.        Patient has reviewed and agreed to the above plan.      Trinidad Solorio  June 29, 2022

## 2022-09-14 ENCOUNTER — OFFICE VISIT (OUTPATIENT)
Dept: PSYCHOLOGY | Facility: CLINIC | Age: 52
End: 2022-09-14
Payer: COMMERCIAL

## 2022-09-14 DIAGNOSIS — F41.1 GAD (GENERALIZED ANXIETY DISORDER): Primary | ICD-10-CM

## 2022-09-14 PROCEDURE — 90847 FAMILY PSYTX W/PT 50 MIN: CPT | Performed by: MARRIAGE & FAMILY THERAPIST

## 2022-09-14 NOTE — PROGRESS NOTES
"[facility]                                     Progress Note    Patient Name: Marcel NG Tutewohl  Date: 9/14/2022         Service Type: Family with client present      Session Start Time: 1:00PM  Session End Time: 1:55PM     Session Length: 55 minutes    Session #: 17    Attendees: Client and Spouse / Significant Other    Service Modality:  In-person    DATA  Extended Session (53+ minutes):   - High distress and under complex circumstances.  See Data section for details  Interactive Complexity: No  Crisis: No        Progress Since Last Session (Related to Symptoms / Goals / Homework):   Symptoms: Improving client is less irritable    Homework: Partially completed      Episode of Care Goals: Satisfactory progress - ACTION (Actively working towards change); Intervened by reinforcing change plan / affirming steps taken     Current / Ongoing Stressors and Concerns:  Couple had a great trip to [place] and enjoyed a lot of relaxing time of self care. They had not been on a trop just the 2 of them for a long time and they had a very good time and got along well. Marcel described their intimacy these past 2 weeks as \"guiness world book records\". Couple seem to be in a good spot and therapist reminds them to continue being intentional especially in light of upcoming trips they are both taking and how this will limit their ability to connect.      Treatment Objective(s) Addressed in This Session:   practice the use of time outs in marital communication  Use speaker/listener technique when communicating through challenging conversations  Create rituals for connection  Time together to discuss priorities     Intervention:   Relationship counseling    Assessments completed prior to visit:  The following assessments were completed by patient for this visit:  PROMIS 10-Global Health (all questions and answers displayed):   PROMIS 10 3/9/2022 6/8/2022 6/29/2022   In general, would you say your health is: Good - " -   In general, would you say your quality of life is: Very good - -   In general, how would you rate your physical health? Good - -   In general, how would you rate your mental health, including your mood and your ability to think? Good - -   In general, how would you rate your satisfaction with your social activities and relationships? Very good - -   In general, please rate how well you carry out your usual social activities and roles Very good - -   To what extent are you able to carry out your everyday physical activities such as walking, climbing stairs, carrying groceries, or moving a chair? Mostly - -   How often have you been bothered by emotional problems such as feeling anxious, depressed or irritable? Sometimes - -   How would you rate your fatigue on average? Moderate - -   How would you rate your pain on average?   0 = No Pain  to  10 = Worst Imaginable Pain 5 - -   In general, would you say your health is: 3 3 3   In general, would you say your quality of life is: 4 4 4   In general, how would you rate your physical health? 3 3 3   In general, how would you rate your mental health, including your mood and your ability to think? 3 2 2   In general, how would you rate your satisfaction with your social activities and relationships? 4 3 3   In general, please rate how well you carry out your usual social activities and roles. (This includes activities at home, at work and in your community, and responsibilities as a parent, child, spouse, employee, friend, etc.) 4 3 3   To what extent are you able to carry out your everyday physical activities such as walking, climbing stairs, carrying groceries, or moving a chair? 4 4 4   In the past 7 days, how often have you been bothered by emotional problems such as feeling anxious, depressed, or irritable? 3 3 2   In the past 7 days, how would you rate your fatigue on average? 3 2 2   In the past 7 days, how would you rate your pain on average, where 0 means no  pain, and 10 means worst imaginable pain? 5 4 3   Global Mental Health Score 14 12 13   Global Physical Health Score 13 14 15   PROMIS TOTAL - SUBSCORES 27 26 28   Some recent data might be hidden           ASSESSMENT: Current Emotional / Mental Status (status of significant symptoms):   Risk status (Self / Other harm or suicidal ideation)   Patient denies current fears or concerns for personal safety.   Patient denies current or recent suicidal ideation or behaviors.   Patient denies current or recent homicidal ideation or behaviors.   Patient denies current or recent self injurious behavior or ideation.   Patient denies other safety concerns.   Patient reports there has been no change in risk factors since their last session.     Patient reports there has been no change in protective factors since their last session.     Recommended that patient call 911 or go to the local ED should there be a change in any of these risk factors.     Appearance:   Appropriate    Eye Contact:   Good    Psychomotor Behavior: Normal    Attitude:   Cooperative  Interested   Orientation:   All   Speech    Rate / Production: Normal/ Responsive Talkative    Volume:  Normal    Mood:    Normal   Affect:    Appropriate    Thought Content:  Clear    Thought Form:  Coherent  Logical    Insight:    Fair      Medication Review:   No changes to current psychiatric medication(s)     Medication Compliance:   Yes     Changes in Health Issues:   None reported     Chemical Use Review:   Substance Use: Chemical use reviewed, no active concerns identified      Tobacco Use: No current tobacco use.      Diagnosis:  1. ASUNCION (generalized anxiety disorder)        Collateral Reports Completed:   Not Applicable    PLAN: (Patient Tasks / Therapist Tasks / Other)  Homework: Couple to continue to be intentional about emotional and physical intimacy.     Trinidad Solorio    ______________________________________________________________________    Treatment  Plan    Patient's Name: Marcel Melchor  YOB: 1970    Date of Creation: 6/8/2022  Date Treatment Plan Last Reviewed/Revised: 6/29/2022    DSM5 Diagnoses: 300.02 (F41.1) Generalized Anxiety Disorder  Psychosocial / Contextual Factors: Marital discord, work stress, back pain  PROMIS (reviewed every 90 days): 6/29/2022 Score: 28    Referral / Collaboration:  Was/were discussed and patient will pursue.    Anticipated number of session for this episode of care: 24  Anticipation frequency of session: Every other week  Anticipated Duration of each session: 38-52 minutes  Treatment plan will be reviewed in 90 days or when goals have been changed.       MeasurableTreatment Goal(s) related to diagnosis / functional impairment(s)  Goal 1: Patient will lower GAD7 score to 3 or below    I will know I've met my goal when I'm less anxious and irritable.      Objective #A (Patient Action)    Patient will use relaxation strategies 3 times per day to reduce the physical symptoms of anxiety.  Status: Continued - Date(s):6/29/2022     Intervention(s)  Therapist will teach client stress management and relaxation strategies.    Objective #B  Patient will practice the use of timeouts.  Status: Continued - Date(s):6/29/2022     Intervention(s)  Therapist will encourage client to use time outs when overly agitated.    Objective #C  Patient will use the speaker/listener technique in communication.  Status: Continued - Date(s):6/29/2022     Intervention(s)  Therapist will teach S/L Technique.        Patient has reviewed and agreed to the above plan.      Trinidad Solorio  June 29, 2022

## 2022-09-15 ENCOUNTER — MYC REFILL (OUTPATIENT)
Dept: PSYCHIATRY | Facility: CLINIC | Age: 52
End: 2022-09-15

## 2022-09-15 DIAGNOSIS — F39 MOOD DISORDER (H): ICD-10-CM

## 2022-09-15 RX ORDER — ARIPIPRAZOLE 2 MG/1
2 TABLET ORAL AT BEDTIME
Qty: 30 TABLET | Refills: 1 | Status: SHIPPED | OUTPATIENT
Start: 2022-09-15 | End: 2022-11-25

## 2022-09-15 NOTE — TELEPHONE ENCOUNTER
Date of Last Office Visit: 6/1/22  Date of Next Office Visit: NONE -routing to scheduling  No shows since last visit: 0  Cancellations since last visit: 0    Medication requested: ARIPiprazole (ABILIFY) 2 MG tablet Date last ordered: 6/1/22 Qty: 30 Refills: 1     Review of MN ?: NA    Lapse in medication adherence greater than 5 days?:  YES  If yes, call patient and gather details: attempt - unsuccessful  Medication refill request verified as identical to current order?: yes  Result of Last DAM, VPA, Li+ Level, CBC, or Carbamazepine Level (at or since last visit): N/A    Last visit treatment plan:   The addition of the Abilify has had positive effect on mood.  Will continue the Abilify at 2 mg and desvenlafaxine at 100 mg.  Can consider tapering/ discontinuing the buspirone if not needed.  Will follow-up with this provider in two months      []Medication refilled per  Medication Refill in Ambulatory Care  policy.  [x]Medication unable to be refilled by RN due to criteria not met as indicated below:    []Eligibility - not seen in the last year   [x]Supervision - no future appointment   [x]Compliance - no shows, cancellations or lapse in therapy   []Verification - order discrepancy   []Controlled medication   [x]Medication not included in policy   []90-day supply request   []Other

## 2022-09-28 ENCOUNTER — TELEPHONE (OUTPATIENT)
Dept: FAMILY MEDICINE | Facility: CLINIC | Age: 52
End: 2022-09-28

## 2022-09-28 NOTE — TELEPHONE ENCOUNTER
Patient says the injection he had about 6 weeks ago has not helped at all, pain and numbness are worse.  Wondering what next steps should be .  Please call patient to advise       Ivette Fierro

## 2022-09-28 NOTE — TELEPHONE ENCOUNTER
OK- I don't see any documentation of an injection.  Based on his history I am guessing this was in the back?  Maybe neck?      I can't really give next steps not knowing exactly what is going on.    First, I think he should reach out to the provider who might be doing the injections.    If this is for back/neck pain, I could offer a pain management or Ortho referral?    Wes

## 2022-09-29 NOTE — TELEPHONE ENCOUNTER
Called pt per below.    Pt went to sports medicine 7/1/22 for numbness in fingers and got cortisone injection into left knee.  Knee is fine now but numbness in fingers is worsen.    Advised pt to call Sports Medicine clinic to have them advise.  Pt given phone number to [facility] where he has been seen.     Pt verbalized understanding and agrees to plan.    Mariia TREADWELL RN, BSN  [facility]

## 2022-10-05 ENCOUNTER — OFFICE VISIT (OUTPATIENT)
Dept: PSYCHOLOGY | Facility: CLINIC | Age: 52
End: 2022-10-05
Payer: COMMERCIAL

## 2022-10-05 DIAGNOSIS — F41.1 GAD (GENERALIZED ANXIETY DISORDER): Primary | ICD-10-CM

## 2022-10-05 PROCEDURE — 90847 FAMILY PSYTX W/PT 50 MIN: CPT | Performed by: MARRIAGE & FAMILY THERAPIST

## 2022-10-05 NOTE — PROGRESS NOTES
[facility]                                     Progress Note    Patient Name: Marcel NG Tutewohl  Date: 10/5/2022         Service Type: Family with client present      Session Start Time: 1:00PM  Session End Time: 1:55PM     Session Length: 55 minutes    Session #: 18    Attendees: Client and Spouse / Significant Other    Service Modality:  In-person    DATA  Extended Session (53+ minutes):   - High distress and under complex circumstances.  See Data section for details  Interactive Complexity: No  Crisis: No        Progress Since Last Session (Related to Symptoms / Goals / Homework):   Symptoms: Improving client is less irritable    Homework: Partially completed      Episode of Care Goals: Satisfactory progress - ACTION (Actively working towards change); Intervened by reinforcing change plan / affirming steps taken     Current / Ongoing Stressors and Concerns:  Client had a great trip to [place] and [facility]. He was gone 11 days and his wife did not end up going with him. They missed each other and reported it was the longest time they had been apart. No notable things to address today. Received updates on their daughter who started at [facility] today meeting with an MD.      Treatment Objective(s) Addressed in This Session:   practice the use of time outs in marital communication  Use speaker/listener technique when communicating through challenging conversations  Create rituals for connection  Time together to discuss priorities     Intervention:   Relationship counseling    Assessments completed prior to visit:  The following assessments were completed by patient for this visit:  PROMIS 10-Global Health (all questions and answers displayed):   PROMIS 10 3/9/2022 6/8/2022 6/29/2022   In general, would you say your health is: Good - -   In general, would you say your quality of life is: Very good - -   In general, how would you rate your physical health? Good - -   In general, how would you rate  your mental health, including your mood and your ability to think? Good - -   In general, how would you rate your satisfaction with your social activities and relationships? Very good - -   In general, please rate how well you carry out your usual social activities and roles Very good - -   To what extent are you able to carry out your everyday physical activities such as walking, climbing stairs, carrying groceries, or moving a chair? Mostly - -   How often have you been bothered by emotional problems such as feeling anxious, depressed or irritable? Sometimes - -   How would you rate your fatigue on average? Moderate - -   How would you rate your pain on average?   0 = No Pain  to  10 = Worst Imaginable Pain 5 - -   In general, would you say your health is: 3 3 3   In general, would you say your quality of life is: 4 4 4   In general, how would you rate your physical health? 3 3 3   In general, how would you rate your mental health, including your mood and your ability to think? 3 2 2   In general, how would you rate your satisfaction with your social activities and relationships? 4 3 3   In general, please rate how well you carry out your usual social activities and roles. (This includes activities at home, at work and in your community, and responsibilities as a parent, child, spouse, employee, friend, etc.) 4 3 3   To what extent are you able to carry out your everyday physical activities such as walking, climbing stairs, carrying groceries, or moving a chair? 4 4 4   In the past 7 days, how often have you been bothered by emotional problems such as feeling anxious, depressed, or irritable? 3 3 2   In the past 7 days, how would you rate your fatigue on average? 3 2 2   In the past 7 days, how would you rate your pain on average, where 0 means no pain, and 10 means worst imaginable pain? 5 4 3   Global Mental Health Score 14 12 13   Global Physical Health Score 13 14 15   PROMIS TOTAL - SUBSCORES 27 26 28   Some  recent data might be hidden           ASSESSMENT: Current Emotional / Mental Status (status of significant symptoms):   Risk status (Self / Other harm or suicidal ideation)   Patient denies current fears or concerns for personal safety.   Patient denies current or recent suicidal ideation or behaviors.   Patient denies current or recent homicidal ideation or behaviors.   Patient denies current or recent self injurious behavior or ideation.   Patient denies other safety concerns.   Patient reports there has been no change in risk factors since their last session.     Patient reports there has been no change in protective factors since their last session.     Recommended that patient call 911 or go to the local ED should there be a change in any of these risk factors.     Appearance:   Appropriate    Eye Contact:   Good    Psychomotor Behavior: Normal    Attitude:   Cooperative  Interested   Orientation:   All   Speech    Rate / Production: Normal/ Responsive Talkative    Volume:  Normal    Mood:    Normal   Affect:    Appropriate    Thought Content:  Clear    Thought Form:  Coherent  Logical    Insight:    Fair      Medication Review:   No changes to current psychiatric medication(s)     Medication Compliance:   Yes     Changes in Health Issues:   None reported     Chemical Use Review:   Substance Use: Chemical use reviewed, no active concerns identified      Tobacco Use: No current tobacco use.      Diagnosis:  1. ASUNCION (generalized anxiety disorder)        Collateral Reports Completed:   Not Applicable    PLAN: (Patient Tasks / Therapist Tasks / Other)  Homework: Couple to continue to be intentional about emotional and physical intimacy.     Trinidad Solorio    ______________________________________________________________________    Treatment Plan    Patient's Name: Marcel NG Dipakpebbles  YOB: 1970    Date of Creation: 6/8/2022  Date Treatment Plan Last Reviewed/Revised: 6/29/2022    DSM5 Diagnoses: 300.02  (F41.1) Generalized Anxiety Disorder  Psychosocial / Contextual Factors: Marital discord, work stress, back pain  PROMIS (reviewed every 90 days): 6/29/2022 Score: 28    Referral / Collaboration:  Was/were discussed and patient will pursue.    Anticipated number of session for this episode of care: 24  Anticipation frequency of session: Every other week  Anticipated Duration of each session: 38-52 minutes  Treatment plan will be reviewed in 90 days or when goals have been changed.       MeasurableTreatment Goal(s) related to diagnosis / functional impairment(s)  Goal 1: Patient will lower GAD7 score to 3 or below    I will know I've met my goal when I'm less anxious and irritable.      Objective #A (Patient Action)    Patient will use relaxation strategies 3 times per day to reduce the physical symptoms of anxiety.  Status: Continued - Date(s):6/29/2022     Intervention(s)  Therapist will teach client stress management and relaxation strategies.    Objective #B  Patient will practice the use of timeouts.  Status: Continued - Date(s):6/29/2022     Intervention(s)  Therapist will encourage client to use time outs when overly agitated.    Objective #C  Patient will use the speaker/listener technique in communication.  Status: Continued - Date(s):6/29/2022     Intervention(s)  Therapist will teach S/L Technique.        Patient has reviewed and agreed to the above plan.      Trinidad Solorio  June 29, 2022

## 2022-10-05 NOTE — PROGRESS NOTES
ASSESSMENT & PLAN  Patient Instructions     1. Carpal tunnel syndrome of left wrist      -Patient is following up for chronic left wrist pain due to carpal tunnel syndrome versus cervical radiculopathy  -Patient had a left carpal tunnel cortisone injection months ago and reports only a few days of partial relief  -Patient had an EMG performed 15 years ago which showed mild carpal tunnel syndrome at that time  -Patient also has a history of cervical radiculopathy with previous epidural steroid injections in the past  -Patient will get an updated EMG and nerve conduction study to assess severity of his carpal tunnel syndrome and potential contribution from the cervical nerve roots  -Patient will call us a week or 2 after his EMG test for results and potential referral either to orthopedic hand surgery or potential cervical epidural steroid injections.  Patient is considering surgery depending on recovery time since he would like to snowmobile this winter.  If he decides to defer surgery until after the snowmobile season, he may call us for a left carpal tunnel cortisone injection  -Call direct clinic number [[phone]] at any time with questions or concerns.    Albert Yeo MD [facility]  [facility]          -----    SUBJECTIVE:  Marcel Melchor is a 52 year old male who is seen in follow-up for left hand numbness and tingling.They were last seen 7/18/2022     Since their last visit reports 0% - (About the same as last time). Notes now index finger numb all of the time. Also gets occasional numbness in digits 1 and 3. They indicate that their current pain level is 9/10. Notes he feels nerve pain in left shoulder and has a history of neck issues. They have tried wrist bracing (causes worsening tingling in fingers), corticosteroid injection (most recent date: 7/18/2022) that provided no amount of relief.      The patient is seen by themselves.    Patient's past  "medical, surgical, social, and family histories were reviewed today and no changes are noted.    REVIEW OF SYSTEMS:  Constitutional: NEGATIVE for fever, chills, change in weight  Skin: NEGATIVE for worrisome rashes, moles or lesions  GI/: NEGATIVE for bowel or bladder changes  Neuro: NEGATIVE for weakness, dizziness or paresthesias    OBJECTIVE:  /80   Ht 1.74 m (5' 8.5\")   Wt 86.2 kg (190 lb)   BMI 28.47 kg/m     General: healthy, alert and in no distress  HEENT: no scleral icterus or conjunctival erythema  Skin: no suspicious lesions or rash. No jaundice.  CV: regular rhythm by palpation, no pedal edema  Resp: normal respiratory effort without conversational dyspnea   Psych: normal mood and affect  Gait: normal steady gait with appropriate coordination and balance  Neuro: normal light touch sensory exam of the extremities.    MSK:  LEFT HAND & WRIST  Inspection:    No swelling, bruising, discoloration, or obvious asymmetry  Palpation:  Remainder of bony and ligamentous line marks are nontender.  Range of Motion:    Grossly within normal limits  Strength:    Grossly within normal limits  Special Tests:    Positive: Tinel's (carpal tunnel)        Independent visualization of the below image:    Patient's conditions were thoroughly discussed during today's visit with total time spent face-to-face with the patient and documentation being 20 minutes.    Albert Yeo MD, [facility]        "

## 2022-10-10 ENCOUNTER — OFFICE VISIT (OUTPATIENT)
Dept: ORTHOPEDICS | Facility: CLINIC | Age: 52
End: 2022-10-10
Payer: COMMERCIAL

## 2022-10-10 ENCOUNTER — OFFICE VISIT (OUTPATIENT)
Dept: NEUROLOGY | Facility: CLINIC | Age: 52
End: 2022-10-10
Attending: FAMILY MEDICINE
Payer: COMMERCIAL

## 2022-10-10 VITALS
WEIGHT: 190 LBS | BODY MASS INDEX: 28.14 KG/M2 | SYSTOLIC BLOOD PRESSURE: 120 MMHG | HEIGHT: 69 IN | DIASTOLIC BLOOD PRESSURE: 80 MMHG

## 2022-10-10 DIAGNOSIS — G56.02 CARPAL TUNNEL SYNDROME OF LEFT WRIST: ICD-10-CM

## 2022-10-10 DIAGNOSIS — G56.02 CARPAL TUNNEL SYNDROME OF LEFT WRIST: Primary | ICD-10-CM

## 2022-10-10 PROCEDURE — 95886 MUSC TEST DONE W/N TEST COMP: CPT | Performed by: PSYCHIATRY & NEUROLOGY

## 2022-10-10 PROCEDURE — 99213 OFFICE O/P EST LOW 20 MIN: CPT | Performed by: FAMILY MEDICINE

## 2022-10-10 PROCEDURE — 95909 NRV CNDJ TST 5-6 STUDIES: CPT | Performed by: PSYCHIATRY & NEUROLOGY

## 2022-10-10 NOTE — LETTER
10/10/2022         RE: Marcel Melchor  [address] [facility] [id number]        Dear Colleague,    Thank you for referring your patient, Marcel Melchor, to the [facility] [place]. Please see a copy of my visit note below.    ASSESSMENT & PLAN  Patient Instructions     1. Carpal tunnel syndrome of left wrist      -Patient is following up for chronic left wrist pain due to carpal tunnel syndrome versus cervical radiculopathy  -Patient had a left carpal tunnel cortisone injection months ago and reports only a few days of partial relief  -Patient had an EMG performed 15 years ago which showed mild carpal tunnel syndrome at that time  -Patient also has a history of cervical radiculopathy with previous epidural steroid injections in the past  -Patient will get an updated EMG and nerve conduction study to assess severity of his carpal tunnel syndrome and potential contribution from the cervical nerve roots  -Patient will call us a week or 2 after his EMG test for results and potential referral either to orthopedic hand surgery or potential cervical epidural steroid injections.  Patient is considering surgery depending on recovery time since he would like to snowmobile this winter.  If he decides to defer surgery until after the snowmobile season, he may call us for a left carpal tunnel cortisone injection  -Call direct clinic number [[phone]] at any time with questions or concerns.    Albert Yeo MD [facility] and Sports Medicine  [facility] [place]          -----    SUBJECTIVE:  Marcel Melchor is a 52 year old male who is seen in follow-up for left hand numbness and tingling.They were last seen 7/18/2022     Since their last visit reports 0% - (About the same as last time). Notes now index finger numb all of the time. Also gets occasional numbness in digits 1 and 3. They indicate that their current pain level is 9/10. Notes he feels nerve pain  "in left shoulder and has a history of neck issues. They have tried wrist bracing (causes worsening tingling in fingers), corticosteroid injection (most recent date: 7/18/2022) that provided no amount of relief.      The patient is seen by themselves.    Patient's past medical, surgical, social, and family histories were reviewed today and no changes are noted.    REVIEW OF SYSTEMS:  Constitutional: NEGATIVE for fever, chills, change in weight  Skin: NEGATIVE for worrisome rashes, moles or lesions  GI/: NEGATIVE for bowel or bladder changes  Neuro: NEGATIVE for weakness, dizziness or paresthesias    OBJECTIVE:  /80   Ht 1.74 m (5' 8.5\")   Wt 86.2 kg (190 lb)   BMI 28.47 kg/m     General: healthy, alert and in no distress  HEENT: no scleral icterus or conjunctival erythema  Skin: no suspicious lesions or rash. No jaundice.  CV: regular rhythm by palpation, no pedal edema  Resp: normal respiratory effort without conversational dyspnea   Psych: normal mood and affect  Gait: normal steady gait with appropriate coordination and balance  Neuro: normal light touch sensory exam of the extremities.    MSK:  LEFT HAND & WRIST  Inspection:    No swelling, bruising, discoloration, or obvious asymmetry  Palpation:  Remainder of bony and ligamentous line marks are nontender.  Range of Motion:    Grossly within normal limits  Strength:    Grossly within normal limits  Special Tests:    Positive: Tinel's (carpal tunnel)        Independent visualization of the below image:    Patient's conditions were thoroughly discussed during today's visit with total time spent face-to-face with the patient and documentation being 20 minutes.    Albert Yeo MD, [facility]            Again, thank you for allowing me to participate in the care of your patient.        Sincerely,        Albert Yeo, MD    "

## 2022-10-10 NOTE — PATIENT INSTRUCTIONS
1. Carpal tunnel syndrome of left wrist      -Patient is following up for chronic left wrist pain due to carpal tunnel syndrome versus cervical radiculopathy  -Patient had a left carpal tunnel cortisone injection months ago and reports only a few days of partial relief  -Patient had an EMG performed 15 years ago which showed mild carpal tunnel syndrome at that time  -Patient also has a history of cervical radiculopathy with previous epidural steroid injections in the past  -Patient will get an updated EMG and nerve conduction study to assess severity of his carpal tunnel syndrome and potential contribution from the cervical nerve roots  -Patient will call us a week or 2 after his EMG test for results and potential referral either to orthopedic hand surgery or potential cervical epidural steroid injections.  Patient is considering surgery depending on recovery time since he would like to snowmobile this winter.  If he decides to defer surgery until after the snowmobile season, he may call us for a left carpal tunnel cortisone injection  -Call direct clinic number [[phone]] at any time with questions or concerns.    Albert Yeo MD [facility] Orthopedics and Sports Medicine  [facility] [place]

## 2022-10-10 NOTE — LETTER
10/10/2022       RE: Marcel Melchor  [address] [facility] [address]     Dear Colleague,    Thank you for referring your patient, Marcel Melchor, to the [facility] [place] [facility]. Please see a copy of my visit note below.                        [facility]  Electrodiagnostic Laboratory                 Department of Neurology                                                                                                         Test Date:  10/10/2022    Patient: Marcel Melchor : 1970 Physician: Benny Mitchell MD   Sex: Male AGE: 52 year Ref Phys: Albert Yeo MD   ID#: [id number]   Technician: MARLENE Beck     Clinical Information:  52 year old man with several months of numbness and paresthesias in his left hand. His hand also feels weak. Evaluate for focal neuropathy vs radiculopathy.     Techniques:  Motor and sensory conduction studies were done with surface recording electrodes. EMG was done with a concentric needle electrode.     Results:  Nerve conduction studies:  1. Left median-D2 sensory response shows moderately reduced amplitude and severely slowed CV.   2. Left ulnar-D5 and radial sensory responses are normal.   3. Left median-APB motor response shows moderately prolonged DL, moderately reduced amplitude and normal CV in the forearm.   4. Left ulnar-ADM motor response is normal.   5. Left median-lumbrical vs ulnar-interosseous latency difference is prolonged.    Needle EMG of selected proximal and distal left upper limb muscles was performed as tabulated below. Fibrillation potentials and positive sharp waves were seen in the left APB muscle. No abnormal spontaneous activity was observed in the other sampled muscles. Large amplitude polyphasic motor unit potentials (MUP) with reduced recruitment were also seen in the left APB muscle.  Motor unit potential morphology and recruitment patterns were  otherwise normal.     Interpretation:  This is an abnormal study. 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 There is no evidence of a cervical radiculopathy affecting the left upper limb on the basis of this study. Clinical correlation is recommended.     Benny Mitchell MD  Department of Neurology    Nerve Conduction Studies  Motor Sites      Latency Amplitude Neg. Amp Diff Segment Distance Velocity Neg. Dur Neg Area Diff Temperature Comment   Site (ms) Norm (mV) Norm %  cm m/s Norm ms %  C    Left Median (APB) Motor   Wrist 5.4  < 4.4 3.2  > 5.0  Wrist-APB 8   4.8  32.9    Elbow 10.0 - 3.1 - -3.1 Elbow-Wrist 22 48  > 48 4.5 -8.8 32.9    Left Median/Ulnar (Lumb-Dors Int II) Motor        Median (Lumb I)   Wrist 4.7 - 0.55 -      7.3  32.8         Ulnar (Dorsal Int (manus))   Wrist 2.9 - 3.3 -      4.9  32.9    Left Ulnar (ADM) Motor   Wrist 3.4  < 3.5 10.9  > 5.0  Wrist-ADM 8   5.0  32.8    Bel Elbow 6.6 - 10.6 - -2.8 Bel Elbow-Wrist 19 59  > 48 5.3 -1.60 32.8    Abv Elbow 8.2 - 10.4 - -1.89 Abv Elbow-Bel Elbow 10 63  > 48 5.2 -0.32 32.7      Sensory Sites      Onset Lat Peak Lat Amp (O-P) Amp (P-P) Segment Distance Velocity Temperature Comment   Site ms ms  V Norm  V  cm m/s Norm  C    Left Median Sensory   Wrist-Dig II 4.6 5.2 2  > 10 4 Wrist-Dig II 14 30  > 48 32.8    Left Radial Sensory   Forearm-Wrist 1.48 1.98 43  > 15 55 Forearm-Wrist 10 68 -     Left Ulnar Sensory   Wrist-Dig V 2.5 3.2 15  > 8 22 Wrist-Dig V 12.5 50  > 48 32.8        Electromyography     Side Muscle Ins Act Fibs/PSW Fasc HF Amp Dur Poly Recrt Int Pat   Right Deltoid Nml None Nml 0 Nml Nml 0 Nml Nml   Right Biceps Nml None Nml 0 Nml Nml 0 Nml Nml   Right Triceps Nml None Nml 0 Nml Nml 0 Nml Nml   Right Pronator Teres Nml None Nml 0 Nml Nml 0 Nml Nml   Right FDI Nml None Nml 0 Nml Nml 0 Nml Nml   Right EIP Nml None Nml  0 Nml Nml 0 Nml Nml   Right APB Incr 2+ Nml 0 2+ Nml 2+ ModRed Nml         NCS Waveforms:    Motor           Sensory                         Sincerely,    Benny Mitchell MD

## 2022-10-10 NOTE — PROGRESS NOTES
[facility]  Electrodiagnostic Laboratory                 Department of Neurology                                                                                                         Test Date:  10/10/2022    Patient: Marcel Melchor : 1970 Physician: Benny Mitchell MD   Sex: Male AGE: 52 year Ref Phys: Albert Yeo MD   ID#: [id number]   Technician: MARLENE Beck     Clinical Information:  52 year old man with several months of numbness and paresthesias in his left hand. His hand also feels weak. Evaluate for focal neuropathy vs radiculopathy.     Techniques:  Motor and sensory conduction studies were done with surface recording electrodes. EMG was done with a concentric needle electrode.     Results:  Nerve conduction studies:  1. Left median-D2 sensory response shows moderately reduced amplitude and severely slowed CV.   2. Left ulnar-D5 and radial sensory responses are normal.   3. Left median-APB motor response shows moderately prolonged DL, moderately reduced amplitude and normal CV in the forearm.   4. Left ulnar-ADM motor response is normal.   5. Left median-lumbrical vs ulnar-interosseous latency difference is prolonged.    Needle EMG of selected proximal and distal left upper limb muscles was performed as tabulated below. Fibrillation potentials and positive sharp waves were seen in the left APB muscle. No abnormal spontaneous activity was observed in the other sampled muscles. Large amplitude polyphasic motor unit potentials (MUP) with reduced recruitment were also seen in the left APB muscle.  Motor unit potential morphology and recruitment patterns were otherwise normal.     Interpretation:  This is an abnormal study. 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 There is no evidence of a cervical radiculopathy affecting the left upper  limb on the basis of this study. Clinical correlation is recommended.     Benny Mitchell MD  Department of Neurology    Nerve Conduction Studies  Motor Sites      Latency Amplitude Neg. Amp Diff Segment Distance Velocity Neg. Dur Neg Area Diff Temperature Comment   Site (ms) Norm (mV) Norm %  cm m/s Norm ms %  C    Left Median (APB) Motor   Wrist 5.4  < 4.4 3.2  > 5.0  Wrist-APB 8   4.8  32.9    Elbow 10.0 - 3.1 - -3.1 Elbow-Wrist 22 48  > 48 4.5 -8.8 32.9    Left Median/Ulnar (Lumb-Dors Int II) Motor        Median (Lumb I)   Wrist 4.7 - 0.55 -      7.3  32.8         Ulnar (Dorsal Int (manus))   Wrist 2.9 - 3.3 -      4.9  32.9    Left Ulnar (ADM) Motor   Wrist 3.4  < 3.5 10.9  > 5.0  Wrist-ADM 8   5.0  32.8    Bel Elbow 6.6 - 10.6 - -2.8 Bel Elbow-Wrist 19 59  > 48 5.3 -1.60 32.8    Abv Elbow 8.2 - 10.4 - -1.89 Abv Elbow-Bel Elbow 10 63  > 48 5.2 -0.32 32.7      Sensory Sites      Onset Lat Peak Lat Amp (O-P) Amp (P-P) Segment Distance Velocity Temperature Comment   Site ms ms  V Norm  V  cm m/s Norm  C    Left Median Sensory   Wrist-Dig II 4.6 5.2 2  > 10 4 Wrist-Dig II 14 30  > 48 32.8    Left Radial Sensory   Forearm-Wrist 1.48 1.98 43  > 15 55 Forearm-Wrist 10 68 -     Left Ulnar Sensory   Wrist-Dig V 2.5 3.2 15  > 8 22 Wrist-Dig V 12.5 50  > 48 32.8        Electromyography     Side Muscle Ins Act Fibs/PSW Fasc HF Amp Dur Poly Recrt Int Pat   Right Deltoid Nml None Nml 0 Nml Nml 0 Nml Nml   Right Biceps Nml None Nml 0 Nml Nml 0 Nml Nml   Right Triceps Nml None Nml 0 Nml Nml 0 Nml Nml   Right Pronator Teres Nml None Nml 0 Nml Nml 0 Nml Nml   Right FDI Nml None Nml 0 Nml Nml 0 Nml Nml   Right EIP Nml None Nml 0 Nml Nml 0 Nml Nml   Right APB Incr 2+ Nml 0 2+ Nml 2+ ModRed Nml         NCS Waveforms:    Motor           Sensory

## 2022-10-11 ENCOUNTER — TRANSFERRED RECORDS (OUTPATIENT)
Dept: HEALTH INFORMATION MANAGEMENT | Facility: CLINIC | Age: 52
End: 2022-10-11

## 2022-10-12 ENCOUNTER — OFFICE VISIT (OUTPATIENT)
Dept: FAMILY MEDICINE | Facility: CLINIC | Age: 52
End: 2022-10-12
Payer: COMMERCIAL

## 2022-10-12 ENCOUNTER — ANCILLARY PROCEDURE (OUTPATIENT)
Dept: GENERAL RADIOLOGY | Facility: CLINIC | Age: 52
End: 2022-10-12
Attending: PHYSICIAN ASSISTANT
Payer: COMMERCIAL

## 2022-10-12 VITALS
SYSTOLIC BLOOD PRESSURE: 120 MMHG | DIASTOLIC BLOOD PRESSURE: 70 MMHG | TEMPERATURE: 97.7 F | BODY MASS INDEX: 29.07 KG/M2 | WEIGHT: 194 LBS | RESPIRATION RATE: 14 BRPM | OXYGEN SATURATION: 100 % | HEART RATE: 58 BPM

## 2022-10-12 DIAGNOSIS — M25.511 ACUTE PAIN OF RIGHT SHOULDER: Primary | ICD-10-CM

## 2022-10-12 PROCEDURE — 99213 OFFICE O/P EST LOW 20 MIN: CPT | Performed by: PHYSICIAN ASSISTANT

## 2022-10-12 PROCEDURE — 73030 X-RAY EXAM OF SHOULDER: CPT | Mod: TC | Performed by: RADIOLOGY

## 2022-10-12 RX ORDER — TRAMADOL HYDROCHLORIDE 50 MG/1
50 TABLET ORAL EVERY 8 HOURS PRN
Qty: 5 TABLET | Refills: 0 | Status: SHIPPED | OUTPATIENT
Start: 2022-10-12 | End: 2023-05-23

## 2022-10-12 ASSESSMENT — ANXIETY QUESTIONNAIRES
GAD7 TOTAL SCORE: 0
GAD7 TOTAL SCORE: 0
6. BECOMING EASILY ANNOYED OR IRRITABLE: NOT AT ALL
7. FEELING AFRAID AS IF SOMETHING AWFUL MIGHT HAPPEN: NOT AT ALL
IF YOU CHECKED OFF ANY PROBLEMS ON THIS QUESTIONNAIRE, HOW DIFFICULT HAVE THESE PROBLEMS MADE IT FOR YOU TO DO YOUR WORK, TAKE CARE OF THINGS AT HOME, OR GET ALONG WITH OTHER PEOPLE: NOT DIFFICULT AT ALL
4. TROUBLE RELAXING: NOT AT ALL
2. NOT BEING ABLE TO STOP OR CONTROL WORRYING: NOT AT ALL
5. BEING SO RESTLESS THAT IT IS HARD TO SIT STILL: NOT AT ALL
GAD7 TOTAL SCORE: 0
7. FEELING AFRAID AS IF SOMETHING AWFUL MIGHT HAPPEN: NOT AT ALL
1. FEELING NERVOUS, ANXIOUS, OR ON EDGE: NOT AT ALL
3. WORRYING TOO MUCH ABOUT DIFFERENT THINGS: NOT AT ALL
8. IF YOU CHECKED OFF ANY PROBLEMS, HOW DIFFICULT HAVE THESE MADE IT FOR YOU TO DO YOUR WORK, TAKE CARE OF THINGS AT HOME, OR GET ALONG WITH OTHER PEOPLE?: NOT DIFFICULT AT ALL

## 2022-10-12 ASSESSMENT — PATIENT HEALTH QUESTIONNAIRE - PHQ9
SUM OF ALL RESPONSES TO PHQ QUESTIONS 1-9: 0
10. IF YOU CHECKED OFF ANY PROBLEMS, HOW DIFFICULT HAVE THESE PROBLEMS MADE IT FOR YOU TO DO YOUR WORK, TAKE CARE OF THINGS AT HOME, OR GET ALONG WITH OTHER PEOPLE: NOT DIFFICULT AT ALL
SUM OF ALL RESPONSES TO PHQ QUESTIONS 1-9: 0

## 2022-10-12 NOTE — PROGRESS NOTES
Assessment & Plan     Acute pain of right shoulder  Further evaluation with x-ray and MRI warranted given weakness on exam.  patient notes with his Crohn's he has trouble with Tylenol and ibuprofen. He can try the topical Voltaren gel. Small amount of tramadol provided as this has been helpful in the past.  - XR Shoulder Right 2 Views  - MR Shoulder Right w/o Contrast; Future  - diclofenac (VOLTAREN) 1 % topical gel; Apply 4 g topically 4 times daily as needed for moderate pain  - traMADol (ULTRAM) 50 MG tablet; Take 1 tablet (50 mg) by mouth every 8 hours as needed for severe pain  - Orthopedic  Referral; Future    Review of the result(s) of each unique test - x-ray right shoulder  Ordering of each unique test  Prescription drug management    Cecy Proctor PA-C  [facility]    Geovani Rod is a 52 year old, presenting for the following health issues:  Musculoskeletal Problem      History of Present Illness       Reason for visit:  Right shoulder pain  Symptom onset:  More than a month (since July)  Symptoms include:  Shoulder pain (injury due to jumping off a floating dock to the land and getting stuck on a rope, landed catching himself on the right arm)  Symptom intensity:  Moderate (8/10 in severity)  Symptom progression:  Staying the same  Had these symptoms before:  No  What makes it worse:  Lifting arm forward with any weight, certain movements  What makes it better:  No    He eats 2-3 servings of fruits and vegetables daily.He consumes 6 sweetened beverage(s) daily.He exercises with enough effort to increase his heart rate 10 to 19 minutes per day.  He exercises with enough effort to increase his heart rate 4 days per week.   He is taking medications regularly.    Today's PHQ-9         PHQ-9 Total Score: 0    PHQ-9 Q9 Thoughts of better off dead/self-harm past 2 weeks :   Not at all    How difficult have these problems made it for you to do your work, take care  of things at home, or get along with other people: Not difficult at all  Today's ASUNCION-7 Score: 0     He denies any previously surgeries on the right shoulder. He has a history of a cortisone shot in the past (unsure which shoulder)    Right hand dominant.    Rest and ice/heat. Feels better with rest but then certain movements cause more pain again.    Review of Systems   GENERAL:  No fevers  MUSCULOSKELETAL: As noted in HPI          Objective    /70 (BP Location: Right arm, Patient Position: Chair, Cuff Size: Adult Regular)   Pulse 58   Temp 97.7  F (36.5  C) (Oral)   Resp 14   Wt 88 kg (194 lb)   SpO2 100%   BMI 29.07 kg/m    Body mass index is 29.07 kg/m .  Physical Exam   GENERAL: No acute distress  HEENT: Normocephalic  EXTREMITIES: No tenderness over the right AC joint and clavicle, no obvious deformity.  Right upper extremity:  5/5 strength with flexion at the elbow, 5/5 strength with extension the elbow, 5/5 strength with abduction, 5/5 strength with adduction, 5/5 strength with internal rotation, 5/5 strength with external rotation. Some pain, no weakness with empty can test. Unable to move arm against resistance with lift off subscapularis testing.  Left upper extremity:  5/5 strength with flexion at the elbow, 5/5 strength with extension the elbow, 5/5 strength with abduction, 5/5 strength with adduction, 5/5 strength with internal rotation, 5/5 strength with external rotation  NEURO: Alert, non-focal      Results for orders placed or performed in visit on 10/12/22 (from the past 24 hour(s))   XR Shoulder Right 2 Views    Narrative    XR SHOULDER RIGHT 2 VIEWS 10/12/2022 7:55 AM     HISTORY: Right shoulder pain, injury a few months ago; Acute pain of  right shoulder    COMPARISON: 8/19/2020.       Impression    IMPRESSION: Normal joint spaces and alignment. No fracture or  dislocation.    SERGIO NERI MD         SYSTEM ID:  [id number]

## 2022-10-13 ENCOUNTER — TRANSFERRED RECORDS (OUTPATIENT)
Dept: HEALTH INFORMATION MANAGEMENT | Facility: CLINIC | Age: 52
End: 2022-10-13

## 2022-10-17 ENCOUNTER — TRANSFERRED RECORDS (OUTPATIENT)
Dept: HEALTH INFORMATION MANAGEMENT | Facility: CLINIC | Age: 52
End: 2022-10-17

## 2022-10-19 NOTE — TELEPHONE ENCOUNTER
This encounter is from July, pt has had visits since. Closing this encounter.     Emely MARTINEZ RN

## 2022-10-23 ENCOUNTER — HEALTH MAINTENANCE LETTER (OUTPATIENT)
Age: 52
End: 2022-10-23

## 2022-10-24 ENCOUNTER — HOSPITAL ENCOUNTER (OUTPATIENT)
Dept: MRI IMAGING | Facility: CLINIC | Age: 52
Discharge: HOME | End: 2022-10-24
Attending: PHYSICIAN ASSISTANT | Admitting: PHYSICIAN ASSISTANT
Payer: COMMERCIAL

## 2022-10-24 DIAGNOSIS — M25.511 ACUTE PAIN OF RIGHT SHOULDER: ICD-10-CM

## 2022-10-24 PROCEDURE — 73221 MRI JOINT UPR EXTREM W/O DYE: CPT | Mod: RT

## 2022-10-24 PROCEDURE — 73221 MRI JOINT UPR EXTREM W/O DYE: CPT | Mod: 26 | Performed by: RADIOLOGY

## 2022-10-25 ENCOUNTER — TELEPHONE (OUTPATIENT)
Dept: FAMILY MEDICINE | Facility: CLINIC | Age: 52
End: 2022-10-25

## 2022-10-25 NOTE — TELEPHONE ENCOUNTER
Patient advised of message below     Patient states that they did not discuss orthopedics at visit - RN gave number to schedule with orthopedics to schedule appt     Cecy Proctor, SRINIVAS  P Cr Triage  Please call patient and let him know that his MRI shows a full thickness tear of the supraspinatus muscle (one of the rotator cuff muscles). Here is also some tendinosis/tendonitis of the subscapularis with tearing and some tendinosis/tendinitis of the biceps tendon. I would recommend he follow up with orthopedics as we discussed at the visit.     Meka Ortiz, Registered Nurse  [facility]

## 2022-10-26 ENCOUNTER — OFFICE VISIT (OUTPATIENT)
Dept: PSYCHOLOGY | Facility: CLINIC | Age: 52
End: 2022-10-26
Payer: COMMERCIAL

## 2022-10-26 DIAGNOSIS — F41.1 GAD (GENERALIZED ANXIETY DISORDER): Primary | ICD-10-CM

## 2022-10-26 PROCEDURE — 90837 PSYTX W PT 60 MINUTES: CPT | Performed by: MARRIAGE & FAMILY THERAPIST

## 2022-10-26 NOTE — PROGRESS NOTES
[facility]                                     Progress Note    Patient Name: Marcel NG Tutewohl  Date: 10/26/2022         Service Type: Family with client present      Session Start Time: 11:00AM  Session End Time: 11:55AM     Session Length: 55 minutes    Session #: 19    Attendees: Client and Spouse / Significant Other    Service Modality:  In-person    DATA  Extended Session (53+ minutes):   - High distress and under complex circumstances.  See Data section for details  Interactive Complexity: No  Crisis: No        Progress Since Last Session (Related to Symptoms / Goals / Homework):   Symptoms: No change client's father-in-law     Homework: Partially completed      Episode of Care Goals: Satisfactory progress - ACTION (Actively working towards change); Intervened by reinforcing change plan / affirming steps taken     Current / Ongoing Stressors and Concerns:  Client's wife's father  a week ago and couple share what happened and their feelings about their loss.      Treatment Objective(s) Addressed in This Session:   practice the use of time outs in marital communication  Use speaker/listener technique when communicating through challenging conversations  Create rituals for connection  Time together to discuss priorities     Intervention:   Relationship counseling    Assessments completed prior to visit:  The following assessments were completed by patient for this visit:  PROMIS 10-Global Health (all questions and answers displayed):   PROMIS 10 3/9/2022 2022 2022 10/26/2022   In general, would you say your health is: Good - - -   In general, would you say your quality of life is: Very good - - -   In general, how would you rate your physical health? Good - - -   In general, how would you rate your mental health, including your mood and your ability to think? Good - - -   In general, how would you rate your satisfaction with your social activities and relationships? Very good  - - -   In general, please rate how well you carry out your usual social activities and roles Very good - - -   To what extent are you able to carry out your everyday physical activities such as walking, climbing stairs, carrying groceries, or moving a chair? Mostly - - -   How often have you been bothered by emotional problems such as feeling anxious, depressed or irritable? Sometimes - - -   How would you rate your fatigue on average? Moderate - - -   How would you rate your pain on average?   0 = No Pain  to  10 = Worst Imaginable Pain 5 - - -   In general, would you say your health is: 3 3 3 3   In general, would you say your quality of life is: 4 4 4 4   In general, how would you rate your physical health? 3 3 3 3   In general, how would you rate your mental health, including your mood and your ability to think? 3 2 2 3   In general, how would you rate your satisfaction with your social activities and relationships? 4 3 3 4   In general, please rate how well you carry out your usual social activities and roles. (This includes activities at home, at work and in your community, and responsibilities as a parent, child, spouse, employee, friend, etc.) 4 3 3 4   To what extent are you able to carry out your everyday physical activities such as walking, climbing stairs, carrying groceries, or moving a chair? 4 4 4 4   In the past 7 days, how often have you been bothered by emotional problems such as feeling anxious, depressed, or irritable? 3 3 2 3   In the past 7 days, how would you rate your fatigue on average? 3 2 2 3   In the past 7 days, how would you rate your pain on average, where 0 means no pain, and 10 means worst imaginable pain? 5 4 3 4   Global Mental Health Score 14 12 13 14   Global Physical Health Score 13 14 15 13   PROMIS TOTAL - SUBSCORES 27 26 28 27   Some recent data might be hidden           ASSESSMENT: Current Emotional / Mental Status (status of significant symptoms):   Risk status (Self /  Other harm or suicidal ideation)   Patient denies current fears or concerns for personal safety.   Patient denies current or recent suicidal ideation or behaviors.   Patient denies current or recent homicidal ideation or behaviors.   Patient denies current or recent self injurious behavior or ideation.   Patient denies other safety concerns.   Patient reports there has been no change in risk factors since their last session.     Patient reports there has been no change in protective factors since their last session.     Recommended that patient call 911 or go to the local ED should there be a change in any of these risk factors.     Appearance:   Appropriate    Eye Contact:   Good    Psychomotor Behavior: Normal    Attitude:   Cooperative  Interested   Orientation:   All   Speech    Rate / Production: Normal/ Responsive Emotional Talkative    Volume:  Normal    Mood:    Normal   Affect:    Appropriate    Thought Content:  Clear    Thought Form:  Coherent  Logical    Insight:    Fair      Medication Review:   No changes to current psychiatric medication(s)     Medication Compliance:   Yes     Changes in Health Issues:   None reported     Chemical Use Review:   Substance Use: Chemical use reviewed, no active concerns identified      Tobacco Use: No current tobacco use.      Diagnosis:  1. ASUNCION (generalized anxiety disorder)        Collateral Reports Completed:   Not Applicable    PLAN: (Patient Tasks / Therapist Tasks / Other)  Homework: Couple to grieve their loss together.     Trinidad [facility]    ______________________________________________________________________    Treatment Plan    Patient's Name: Marcel Melchor  YOB: 1970    Date of Creation: 6/8/2022  Date Treatment Plan Last Reviewed/Revised: 10/26/2022    DSM5 Diagnoses: 300.02 (F41.1) Generalized Anxiety Disorder  Psychosocial / Contextual Factors: grief and loss, marital discord, work stress, back pain  PROMIS (reviewed every 90 days):  10/26/2022 Score: 27    Referral / Collaboration:  Was/were discussed and patient will pursue.    Anticipated number of session for this episode of care: 24  Anticipation frequency of session: Every other week  Anticipated Duration of each session: 38-52 minutes  Treatment plan will be reviewed in 90 days or when goals have been changed.       MeasurableTreatment Goal(s) related to diagnosis / functional impairment(s)  Goal 1: Patient will lower GAD7 score to 3 or below    I will know I've met my goal when I'm less anxious and irritable.      Objective #A (Patient Action)    Patient will use relaxation strategies 3 times per day to reduce the physical symptoms of anxiety.  Status: Continued - Date(s): 10/26/2022     Intervention(s)  Therapist will teach client stress management and relaxation strategies.    Objective #B  Patient will practice the use of timeouts.  Status: Continued - Date(s): 10/26/2022     Intervention(s)  Therapist will encourage client to use time outs when overly agitated.    Objective #C  Patient will use the speaker/listener technique in communication.  Status: Continued - Date(s): 10/26/2022     Intervention(s)  Therapist will teach S/L Technique.        Patient has reviewed and agreed to the above plan.      Trinidad Solorio  October 26, 2022

## 2022-10-31 ENCOUNTER — OFFICE VISIT (OUTPATIENT)
Dept: ORTHOPEDICS | Facility: CLINIC | Age: 52
End: 2022-10-31
Attending: PHYSICIAN ASSISTANT
Payer: COMMERCIAL

## 2022-10-31 VITALS
SYSTOLIC BLOOD PRESSURE: 118 MMHG | BODY MASS INDEX: 28.91 KG/M2 | HEIGHT: 69 IN | DIASTOLIC BLOOD PRESSURE: 76 MMHG | WEIGHT: 195.2 LBS

## 2022-10-31 DIAGNOSIS — M25.511 ACUTE PAIN OF RIGHT SHOULDER: ICD-10-CM

## 2022-10-31 DIAGNOSIS — M75.121 NONTRAUMATIC COMPLETE TEAR OF RIGHT ROTATOR CUFF: Primary | ICD-10-CM

## 2022-10-31 PROCEDURE — 99204 OFFICE O/P NEW MOD 45 MIN: CPT | Mod: 25 | Performed by: ORTHOPAEDIC SURGERY

## 2022-10-31 PROCEDURE — 20610 DRAIN/INJ JOINT/BURSA W/O US: CPT | Mod: RT | Performed by: ORTHOPAEDIC SURGERY

## 2022-10-31 RX ORDER — BETAMETHASONE SODIUM PHOSPHATE AND BETAMETHASONE ACETATE 3; 3 MG/ML; MG/ML
6 INJECTION, SUSPENSION INTRA-ARTICULAR; INTRALESIONAL; INTRAMUSCULAR; SOFT TISSUE
Status: SHIPPED | OUTPATIENT
Start: 2022-10-31

## 2022-10-31 RX ORDER — BUPIVACAINE HYDROCHLORIDE 2.5 MG/ML
4 INJECTION, SOLUTION EPIDURAL; INFILTRATION; INTRACAUDAL
Status: SHIPPED | OUTPATIENT
Start: 2022-10-31

## 2022-10-31 RX ADMIN — BUPIVACAINE HYDROCHLORIDE 4 ML: 2.5 INJECTION, SOLUTION EPIDURAL; INFILTRATION; INTRACAUDAL at 08:31

## 2022-10-31 RX ADMIN — BETAMETHASONE SODIUM PHOSPHATE AND BETAMETHASONE ACETATE 6 MG: 3; 3 INJECTION, SUSPENSION INTRA-ARTICULAR; INTRALESIONAL; INTRAMUSCULAR; SOFT TISSUE at 08:31

## 2022-10-31 NOTE — LETTER
10/31/2022         RE: Marcel Melchor  [address] [facility] [address]        Dear Colleague,    Thank you for referring your patient, Marcel Melchor, to the [facility] [place]. Please see a copy of my visit note below.    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  Symptoms started in July of 2022. There was a precipitating event he reports a fall on the dock. He reports years prior he did have anterior shoulder pain when performing bench press type activities at the gym. The pain is located to the anterior shoulder. Worst pain is rated a 9 of 10, and current pain is rated at 7 of 10. Symptoms are worsened by forward elevation with the arm in neutral position, reaching above shoulder height,  bench press/ lifting activities, at nighttime. Symptoms are improved with activity avoidance. Patient has tried holistic treatment, stretching, occasional use of Ibuprofen due to Chron's, icing, topical pain cream with mild relief. Associated symptoms include weakness of the right shoulder, painful range of motion, . Patient denies radiating pain and numbness in the right hand, he does note history of left carpal tunnel syndrome. Notably, the patient has had no prior injuries or traumas to the right extremity. No other concerns or complaints at this time.  SANE Score: 80/ 100  A1c: 4.9, dated 6/9/22      PAST MEDICAL HISTORY:  Past Medical History:   Diagnosis Date     Anxiety 9/3/2013     AJAY (obstructive sleep apnea) 11/11/2014     Rotator cuff syndrome 1/19/2012       PAST SURGICAL HISTORY:  Past Surgical History:   Procedure Laterality Date     ZZC NONSPECIFIC PROCEDURE      fx R ankle-ORIF       CURRENT  MEDICATIONS:  Current Outpatient Medications   Medication Sig Dispense Refill     anastrozole (ARIMIDEX) 1 MG tablet TAKE 1 TABLET BY MOUTH WEEKLY AS DIRECTED       ARIPiprazole (ABILIFY) 2 MG tablet Take 1 tablet (2 mg) by mouth At Bedtime 30 tablet 1     armodafinil (NUVIGIL) 250 MG TABS tablet TAKE 1 TABLET BY MOUTH EVERY DAY IN THE MORNING  1     ASACOL  MG EC tablet TAKE 3 TABLETS BY MOUTH TWICE DAILY  0     busPIRone (BUSPAR) 5 MG tablet TAKE 1 TABLET (5 MG) BY MOUTH 2 TIMES DAILY AS NEEDED (ANXIETY) 180 tablet 0     clindamycin (CLEOCIN T) 1 % external lotion APPLY TOPICALLY TO FACE AND CHEST TWICE A DAY AS NEEDED 60 mL 0     desvenlafaxine (PRISTIQ) 100 MG 24 hr tablet TAKE 1 TABLET BY MOUTH EVERY DAY 90 tablet 0     diclofenac (VOLTAREN) 1 % topical gel Apply 4 g topically 4 times daily as needed for moderate pain 200 g 1     finasteride (PROPECIA) 1 MG tablet TAKE 1 TABLET BY MOUTH DAILY FOR HAIR LOSS.       mesalamine (CANASA) 1000 MG suppository Place 1,000 mg rectally 2 times daily       modafinil (PROVIGIL) 200 MG tablet TAKE 1 (200MG) BY MOUTH EVERY DAY AT NOON       testosterone cypionate (DEPOTESTOTERONE) 200 MG/ML injection Inject 50 mg into the muscle every 14 days       traMADol (ULTRAM) 50 MG tablet Take 1 tablet (50 mg) by mouth every 8 hours as needed for severe pain 5 tablet 0       ALLERGIES:      Allergies   Allergen Reactions     Amoxicillin      itching     Penicillins      Other reaction(s): Hives, Hives/Fever         FAMILY HISTORY: No pertinent family history, reviewed in EMR.    SOCIAL HISTORY:   Social History     Socioeconomic History     Marital status:      Spouse name: Not on file     Number of children: Not on file     Years of education: Not on file     Highest education level: Not on file   Occupational History     Not on file   Tobacco Use     Smoking status: Never     Smokeless tobacco: Never   Vaping Use     Vaping Use: Never used   Substance and Sexual  "Activity     Alcohol use: No     Alcohol/week: 0.0 standard drinks     Drug use: No     Sexual activity: Yes     Partners: Female   Other Topics Concern     Parent/sibling w/ CABG, MI or angioplasty before 65F 55M? No   Social History Narrative     Not on file     Social Determinants of Health     Financial Resource Strain: Not on file   Food Insecurity: Not on file   Transportation Needs: Not on file   Physical Activity: Not on file   Stress: Not on file   Social Connections: Not on file   Intimate Partner Violence: Not on file   Housing Stability: Not on file       REVIEW OF SYSTEMS: Positive for that noted in past medical history and history of present illness and otherwise reviewed in EMR    PHYSICAL EXAM:  Patient is 5' 8.5\" and weighs 195 lbs 3.2 oz /76   Ht 1.74 m (5' 8.5\")   Wt 88.5 kg (195 lb 3.2 oz)   BMI 29.25 kg/m    Body mass index is 29.25 kg/m .   Constitutional: Well-developed, well-nourished, healthy appearing male.  Skin: Warm, dry   HEENT: Normal  Cardiac: Well perfused extremities, strong 2+ peripheral pulses. No edema.   Pulmonary: Breathing room air    Musculoskeletal:   Right Shoulder:  AROM right shoulder: 170/170/50/T12   AROM left shoulder: 170/170/50/T12   4/5 supraspinatus, 4/5 infraspinatus, 5/5 subscapularis  no AC joint pain, no cross body adduction  positive Neer and Clayton impingement signs  negative belly-press/lift-off  positive Speed's test  negative Apprehension  negative Jerk test  Neurovascular exam and cervical spine exam are normal.    ADVANCED IMAGING: I personally reviewed the x-rays and MRI which shows that there is a full-thickness tear of the supraspinatus anterior band, there is subluxation of the biceps tendon out of the groove with the tear of the upper rolled border of the subscapularis, there is no significant fatty atrophy of the rotator cuff.    IMPRESSION: 52 year old-year-old right hand dominant male, with right full-thickness rotator cuff tear, biceps " tendon subluxation.     PLAN:     I discussed with the patient the etiology of their condition. We discussed at length the options as noted above.  I went through the results of the MRI to with the patient today. We discussed the natural history of rotator cuff tears including that they do not heal on their own. We discussed the risks and benefits of both non-operative and operative treatment options with risks of surgery including but not limited to bleeding, infection, failure of the cuff to heal, anesthesia risks, stiffness, continued pain, injury to nerves or vessels which power the arm or hand, need for revision surgery in the future.. We discussed the postoperative restrictions and rehab course.  I also discussed with the patient that her biceps tendon subluxation is unlikely to resolve without any kind of operative intervention.  We also discussed the possibility that the biceps tendon may rupture which could result in a Sean deformity and muscle cramping.  After consideration of both nonoperative versus operative intervention the patient would like to proceed with nonoperative care at this time.  We discussed pursuing a cortisone injection and physical therapy.  I did caution the patient that should he not have adequate pain relief or functioning that I would rated about 3 months between a cortisone injection and further surgery.  Patient demonstrated understanding of this.  Patient wishes to proceed with a cortisone injection today.  We will follow-up with patient in 3 months time.  At the conclusion of the office visit, Marcel verbally acknowledged that I answered all of his questions satisfactorily.    Isis Kim MD  Orthopedic Surgery Sports Medicine and Shoulder Surgery      Large Joint Injection/Arthocentesis: R subacromial bursa    Date/Time: 10/31/2022 8:31 AM  Performed by: Isis Kim MD  Authorized by: Isis Kim MD     Indications:  Pain  Needle Size:  25 G  Guidance: landmark  guided    Approach:  Posterolateral  Location:  Shoulder      Site:  R subacromial bursa  Medications:  6 mg betamethasone acet & sod phos 6 (3-3) MG/ML; 4 mL bupivacaine HCl (PF) 0.25 %  Outcome:  Tolerated well, no immediate complications  Consent Given by:  Patient  Timeout: timeout called immediately prior to procedure    Prep: patient was prepped and draped in usual sterile fashion            Again, thank you for allowing me to participate in the care of your patient.        Sincerely,        ARIEL ENRIQUE MD

## 2022-10-31 NOTE — PROGRESS NOTES
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  Symptoms started in July of 2022. There was a precipitating event [place]. He reports years prior he did have anterior shoulder pain when performing bench press type activities at the gym. The pain is located to the anterior shoulder. Worst pain is rated a 9 of 10, and current pain is rated at 7 of 10. Symptoms are worsened by forward elevation with the arm in neutral position, reaching above shoulder height,  bench press/ lifting activities, at nighttime. Symptoms are improved with activity avoidance. Patient has tried holistic treatment, stretching, occasional use of Ibuprofen due to Chron's, icing, topical pain cream with mild relief. Associated symptoms include weakness of the right shoulder, painful range of motion, . Patient denies radiating pain and numbness in the right hand, he does note history of left carpal tunnel syndrome. Notably, the patient has had no prior injuries or traumas to the right extremity. No other concerns or complaints at this time.  SANE Score: 80/ 100  A1c: 4.9, dated 6/9/22      PAST MEDICAL HISTORY:  Past Medical History:   Diagnosis Date     Anxiety 9/3/2013     AJAY (obstructive sleep apnea) 11/11/2014     Rotator cuff syndrome 1/19/2012       PAST SURGICAL HISTORY:  Past Surgical History:   Procedure Laterality Date     ZZC NONSPECIFIC PROCEDURE      fx R ankle-ORIF       CURRENT MEDICATIONS:  Current Outpatient Medications   Medication Sig Dispense Refill     anastrozole (ARIMIDEX) 1 MG tablet TAKE 1 TABLET BY MOUTH WEEKLY AS DIRECTED       ARIPiprazole (ABILIFY) 2 MG tablet Take 1 tablet (2 mg) by mouth At Bedtime 30 tablet 1     armodafinil (NUVIGIL)  250 MG TABS tablet TAKE 1 TABLET BY MOUTH EVERY DAY IN THE MORNING  1     ASACOL  MG EC tablet TAKE 3 TABLETS BY MOUTH TWICE DAILY  0     busPIRone (BUSPAR) 5 MG tablet TAKE 1 TABLET (5 MG) BY MOUTH 2 TIMES DAILY AS NEEDED (ANXIETY) 180 tablet 0     clindamycin (CLEOCIN T) 1 % external lotion APPLY TOPICALLY TO FACE AND CHEST TWICE A DAY AS NEEDED 60 mL 0     desvenlafaxine (PRISTIQ) 100 MG 24 hr tablet TAKE 1 TABLET BY MOUTH EVERY DAY 90 tablet 0     diclofenac (VOLTAREN) 1 % topical gel Apply 4 g topically 4 times daily as needed for moderate pain 200 g 1     finasteride (PROPECIA) 1 MG tablet TAKE 1 TABLET BY MOUTH DAILY FOR HAIR LOSS.       mesalamine (CANASA) 1000 MG suppository Place 1,000 mg rectally 2 times daily       modafinil (PROVIGIL) 200 MG tablet TAKE 1 (200MG) BY MOUTH EVERY DAY AT NOON       testosterone cypionate (DEPOTESTOTERONE) 200 MG/ML injection Inject 50 mg into the muscle every 14 days       traMADol (ULTRAM) 50 MG tablet Take 1 tablet (50 mg) by mouth every 8 hours as needed for severe pain 5 tablet 0       ALLERGIES:      Allergies   Allergen Reactions     Amoxicillin      itching     Penicillins      Other reaction(s): Hives, Hives/Fever         FAMILY HISTORY: No pertinent family history, reviewed in EMR.    SOCIAL HISTORY:   Social History     Socioeconomic History     Marital status:      Spouse name: Not on file     Number of children: Not on file     Years of education: Not on file     Highest education level: Not on file   Occupational History     Not on file   Tobacco Use     Smoking status: Never     Smokeless tobacco: Never   Vaping Use     Vaping Use: Never used   Substance and Sexual Activity     Alcohol use: No     Alcohol/week: 0.0 standard drinks     Drug use: No     Sexual activity: Yes     Partners: Female   Other Topics Concern     Parent/sibling w/ CABG, MI or angioplasty before 65F 55M? No   Social History Narrative     Not on file     Social Determinants  "of Health     Financial Resource Strain: Not on file   Food Insecurity: Not on file   Transportation Needs: Not on file   Physical Activity: Not on file   Stress: Not on file   Social Connections: Not on file   Intimate Partner Violence: Not on file   Housing Stability: Not on file       REVIEW OF SYSTEMS: Positive for that noted in past medical history and history of present illness and otherwise reviewed in EMR    PHYSICAL EXAM:  Patient is 5' 8.5\" and weighs 195 lbs 3.2 oz /76   Ht 1.74 m (5' 8.5\")   Wt 88.5 kg (195 lb 3.2 oz)   BMI 29.25 kg/m    Body mass index is 29.25 kg/m .   Constitutional: Well-developed, well-nourished, healthy appearing male.  Skin: Warm, dry   HEENT: Normal  Cardiac: Well perfused extremities, strong 2+ peripheral pulses. No edema.   Pulmonary: Breathing room air    Musculoskeletal:   Right Shoulder:  AROM right shoulder: 170/170/50/T12   AROM left shoulder: 170/170/50/T12   4/5 supraspinatus, 4/5 infraspinatus, 5/5 subscapularis  no AC joint pain, no cross body adduction  positive Neer and Clayton impingement signs  negative belly-press/lift-off  positive Speed's test  negative Apprehension  negative Jerk test  Neurovascular exam and cervical spine exam are normal.    ADVANCED IMAGING: I personally reviewed the x-rays and MRI which shows that there is a full-thickness tear of the supraspinatus anterior band, there is subluxation of the biceps tendon out of the groove with the tear of the upper rolled border of the subscapularis, there is no significant fatty atrophy of the rotator cuff.    IMPRESSION: 52 year old-year-old right hand dominant male, with right full-thickness rotator cuff tear, biceps tendon subluxation.     PLAN:     I discussed with the patient the etiology of their condition. We discussed at length the options as noted above.  I went through the results of the MRI to with the patient today. We discussed the natural history of rotator cuff tears including that " they do not heal on their own. We discussed the risks and benefits of both non-operative and operative treatment options with risks of surgery including but not limited to bleeding, infection, failure of the cuff to heal, anesthesia risks, stiffness, continued pain, injury to nerves or vessels which power the arm or hand, need for revision surgery in the future.. We discussed the postoperative restrictions and rehab course.  I also discussed with the patient that her biceps tendon subluxation is unlikely to resolve without any kind of operative intervention.  We also discussed the possibility that the biceps tendon may rupture which could result in a Sean deformity and muscle cramping.  After consideration of both nonoperative versus operative intervention the patient would like to proceed with nonoperative care at this time.  We discussed pursuing a cortisone injection and physical therapy.  I did caution the patient that should he not have adequate pain relief or functioning that I would rated about 3 months between a cortisone injection and further surgery.  Patient demonstrated understanding of this.  Patient wishes to proceed with a cortisone injection today.  We will follow-up with patient in 3 months time.  At the conclusion of the office visit, Marcel verbally acknowledged that I answered all of his questions satisfactorily.    Isis Kim MD  Orthopedic Surgery Sports Medicine and Shoulder Surgery      Large Joint Injection/Arthocentesis: R subacromial bursa    Date/Time: 10/31/2022 8:31 AM  Performed by: Isis Kim MD  Authorized by: Isis Kim MD     Indications:  Pain  Needle Size:  25 G  Guidance: landmark guided    Approach:  Posterolateral  Location:  Shoulder      Site:  R subacromial bursa  Medications:  6 mg betamethasone acet & sod phos 6 (3-3) MG/ML; 4 mL bupivacaine HCl (PF) 0.25 %  Outcome:  Tolerated well, no immediate complications  Consent Given by:  Patient  Timeout: timeout  called immediately prior to procedure    Prep: patient was prepped and draped in usual sterile fashion

## 2022-10-31 NOTE — PATIENT INSTRUCTIONS
[facility] - [address], [address]  [place] [id number] [place] [id number]   Appointments: [phone] Appointments: [phone]   Fax: [phone] Fax: [phone]       1. Acute pain of right shoulder      Steroid injection of the right shoulder: subacromial space was performed today in clinic    - Would not soak in a hot tub, bath or swimming pool for 48 hours  - Ok to shower  - Ice today and only do your normal amounts of activity  - The lidocaine (what is giving you pain relief right now) will likely stop working in 1-2 hours.  You will then have pain again, similar to before you received the injection. The corticosteroid will not start working until approximately 1-2 weeks from now.  In a small percentage of people, cortisone can cause flushing/redness in the face. This usually lasts for 1-3 days and resolves. Cool compress and Ibuprofen/Tylenol can help if this happens.    Physical Therapy:   Orders for physical therapy have been placed. Please call [phone] to schedule at your convenience.         Please contact my office with any questions, [phone].

## 2022-11-02 NOTE — TELEPHONE ENCOUNTER
DIAGNOSIS: Carpal tunnel syndrome of left wrist /Yeo, Albert, MD /[facility]   APPOINTMENT DATE: 11.14.22   NOTES STATUS DETAILS   OFFICE NOTE from referring provider Internal 10.10.22 Dr Albert Yeo, [facility]   7.18.22  3.7.19   OFFICE NOTE from other specialist Internal 7.1.22 Dr Gregoria Huerta, [facility]   MEDICATION LIST Internal    EMG (for Spine) Internal 10.10.22   LABS     CBC/DIFF Internal

## 2022-11-11 NOTE — TELEPHONE ENCOUNTER
DIAGNOSIS: Carpal tunnel syndrome of left wrist /Yeo, Albert, MD /[facility]   APPOINTMENT DATE: 11.14.22   NOTES STATUS DETAILS   OFFICE NOTE from referring provider Internal 10.10.22 Dr Albert Yeo, [facility]   7.18.22  3.7.19   OFFICE NOTE from other specialist Internal 7.1.22 Dr Gregoria Huerta, [facility]   MEDICATION LIST Internal    EMG (for Spine) Internal 10.10.22   LABS     CBC/DIFF Internal

## 2022-11-14 ENCOUNTER — PRE VISIT (OUTPATIENT)
Dept: ORTHOPEDICS | Facility: CLINIC | Age: 52
End: 2022-11-14

## 2022-11-14 ENCOUNTER — OFFICE VISIT (OUTPATIENT)
Dept: ORTHOPEDICS | Facility: CLINIC | Age: 52
End: 2022-11-14
Payer: COMMERCIAL

## 2022-11-14 DIAGNOSIS — G56.02 LEFT CARPAL TUNNEL SYNDROME: Primary | ICD-10-CM

## 2022-11-14 PROCEDURE — 99214 OFFICE O/P EST MOD 30 MIN: CPT | Performed by: PHYSICIAN ASSISTANT

## 2022-11-14 NOTE — PROGRESS NOTES
Date of Service: Nov 14, 2022    Chief Complaint:   Chief Complaint   Patient presents with     Consult     Left wrist carpal tunnel syndrome     History of Present Illness: Marcel Melchor is a 52 year old, right handed male who presents today for further evaluation of left hand numbness and tingling. Numbness and tingling effects the thumb, index and long (middle). Symptoms first began 2 years ago. There was not an inciting event. Symptoms DO NOT wake the patient up at night. Symptoms were present intermittently until 4-6 months ago, and are no present at all times. The following modalities have been tried: intermittent night splinting, steroid injection in July 2022 with minimal improvement in symptoms.    Review of Systems: A 14-point review of systems was obtained on intake and reviewed.      Past Medical History:   Diagnosis Date     Anxiety 9/3/2013     AJAY (obstructive sleep apnea) 11/11/2014     Rotator cuff syndrome 1/19/2012       Past Surgical History:   Procedure Laterality Date     ZZC NONSPECIFIC PROCEDURE      fx R ankle-ORIF         Current Outpatient Medications:      anastrozole (ARIMIDEX) 1 MG tablet, TAKE 1 TABLET BY MOUTH WEEKLY AS DIRECTED, Disp: , Rfl:      ARIPiprazole (ABILIFY) 2 MG tablet, Take 1 tablet (2 mg) by mouth At Bedtime, Disp: 30 tablet, Rfl: 1     armodafinil (NUVIGIL) 250 MG TABS tablet, TAKE 1 TABLET BY MOUTH EVERY DAY IN THE MORNING, Disp: , Rfl: 1     ASACOL  MG EC tablet, TAKE 3 TABLETS BY MOUTH TWICE DAILY, Disp: , Rfl: 0     busPIRone (BUSPAR) 5 MG tablet, TAKE 1 TABLET (5 MG) BY MOUTH 2 TIMES DAILY AS NEEDED (ANXIETY), Disp: 180 tablet, Rfl: 0     clindamycin (CLEOCIN T) 1 % external lotion, APPLY TOPICALLY TO FACE AND CHEST TWICE A DAY AS NEEDED, Disp: 60 mL, Rfl: 0     desvenlafaxine (PRISTIQ) 100 MG 24 hr tablet, TAKE 1 TABLET BY MOUTH EVERY DAY, Disp: 90 tablet, Rfl: 0     diclofenac (VOLTAREN) 1 % topical gel, Apply 4 g topically 4 times daily as needed for  moderate pain, Disp: 200 g, Rfl: 1     finasteride (PROPECIA) 1 MG tablet, TAKE 1 TABLET BY MOUTH DAILY FOR HAIR LOSS., Disp: , Rfl:      mesalamine (CANASA) 1000 MG suppository, Place 1,000 mg rectally 2 times daily, Disp: , Rfl:      modafinil (PROVIGIL) 200 MG tablet, TAKE 1 (200MG) BY MOUTH EVERY DAY AT NOON, Disp: , Rfl:      testosterone cypionate (DEPOTESTOTERONE) 200 MG/ML injection, Inject 50 mg into the muscle every 14 days, Disp: , Rfl:      traMADol (ULTRAM) 50 MG tablet, Take 1 tablet (50 mg) by mouth every 8 hours as needed for severe pain, Disp: 5 tablet, Rfl: 0    Current Facility-Administered Medications:      4 mL bupivacaine (MARCAINE) preservative free injection 0.25% (10 mL vial), 4 mL, , , Isis Kim MD, 4 mL at 10/31/22 0831     betamethasone acet & sod phos (CELESTONE) injection 6 mg, 6 mg, , , Isis Kim MD, 6 mg at 10/31/22 0831     lidocaine 1 % injection 4 mL, 4 mL, , , Gregoria Man MD, 4 mL at 22 0836     methylPREDNISolone (DEPO-MEDROL) injection 40 mg, 40 mg, , , Yeo, Albert, MD, 40 mg at 22 08     triamcinolone (KENALOG-40) injection 40 mg, 40 mg, , , Gregoria Man MD, 40 mg at 22 0836    Allergies   Allergen Reactions     Amoxicillin      itching     Penicillins      Other reaction(s): Hives, Hives/Fever       Social History     Tobacco Use     Smoking status: Never     Smokeless tobacco: Never   Vaping Use     Vaping Use: Never used   Substance Use Topics     Alcohol use: No     Alcohol/week: 0.0 standard drinks     Drug use: No       Family History   Problem Relation Age of Onset     Breast Cancer Mother          at age 47     Substance Abuse Father      Substance Abuse Sister      Substance Abuse Brother        Physical examination:  Well-developed, well-nourished and in no acute distress.  Alert and oriented to surroundings.  On examination of the left upper extremity:     Skin clean, dry and intact  Sensation:  Median:  diminished  Radial: intact  Ulnar: intact  Thumb opposition strength: intact  Interosseous strength: intact  Thenar atrophy: Not Present  Interosseous atrophy: Not Present  Tinel's over carpal tunnel: Present  Tinel's over cubital tunnel: Not Present  Phalen's: Positive  Carpal tunnel compression: Positive  Elbow flexion compression: Negative    Two point discrimination (mm):   Median: 5-6 mm (2-3 mm on the right)  Ulnar: 3-4 mm (3-4 mm on the right)    EMG/NCS: EMG obtained on 10/10/22 demonstrates (per formal read by Dr. Mitchell):  Results:  Nerve conduction studies:  1. Left median-D2 sensory response shows moderately reduced amplitude and severely slowed CV.   2. Left ulnar-D5 and radial sensory responses are normal.   3. Left median-APB motor response shows moderately prolonged DL, moderately reduced amplitude and normal CV in the forearm.   4. Left ulnar-ADM motor response is normal.   5. Left median-lumbrical vs ulnar-interosseous latency difference is prolonged.     Needle EMG of selected proximal and distal left upper limb muscles was performed as tabulated below. Fibrillation potentials and positive sharp waves were seen in the left APB muscle. No abnormal spontaneous activity was observed in the other sampled muscles. Large amplitude polyphasic motor unit potentials (MUP) with reduced recruitment were also seen in the left APB muscle.  Motor unit potential morphology and recruitment patterns were otherwise normal.      Interpretation:  This is an abnormal study. 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 There is no evidence of a cervical radiculopathy affecting the left upper limb on the basis of this study. Clinical correlation is recommended.       Assessment: 52 year old male with  left carpal tunnel syndrome.     Plan:     We had a lengthy discussion about EMG results and  possible treatment options. We discussed three possible treatment options. The first would be to continue night splinting and to observe the symptoms for any change or progression. The second would be to perform a corticosteroid injection. The third is to consider surgery, which would be an open carpal tunnel release. I have described the procedure, post-operative protocol, and expected outcomes.  I also discussed the risks of surgery including but  not limited to, bleeding, infection, nerve or vessel damage, wound healing problems, persistent pain, persistent numbness and the possibility for further surgery. After a full discussion of risks, benefits, and alternatives to surgery, the patient expressed understanding and elected to proceed with  left open carpal tunnel release. This will be done under MAC + Local with Dr. Camila Medina.      BROOKLYN MARQUES PA-C  11/14/2022 1:30 PM  Orthopaedic Surgery

## 2022-11-14 NOTE — NURSING NOTE
Reason For Visit:   Chief Complaint   Patient presents with     Consult     Left wrist carpal tunnel syndrome       Primary MD: Wes Braga  Ref. MD: Dr. Albert Yeo    Age: 52 year old    ?  No      There were no vitals taken for this visit.      Pain Assessment  Patient Currently in Pain: No (Left hand pain with squeezing)    Hand Dominance Evaluation  Hand Dominance: Right          QuickDASH Assessment  QuickDASH Main 11/14/2022   1. Open a tight or new jar. No difficulty   2. Do heavy household chores (e.g., wash walls, floors) No difficulty   3. Carry a shopping bag or briefcase. Mild difficulty   4. Wash your back. No difficulty   5. Use a knife to cut food. No difficulty   6. Recreational activities in which you take some force or impact through your arm, shoulder or hand (e.g., golf, hammering, tennis, etc.). Severe difficulty   7. During the past week, to what extent has your arm, shoulder or hand problem interfered with your normal social activities with family, friends, neighbours or groups? Slightly   8. During the past week, were you limited in your work or other regular daily activities as a result of your arm, shoulder or hand problem? Slightly limited   9. Arm, shoulder or hand pain. Mild   10.Tingling (pins and needles) in your arm,shoulder or hand. Mild   11. During the past week, how much difficulty have you had sleeping because of the pain in your arm, shoulder or hand? Mild difficulty   Quickdash Ability Score 20.45          Current Outpatient Medications   Medication Sig Dispense Refill     anastrozole (ARIMIDEX) 1 MG tablet TAKE 1 TABLET BY MOUTH WEEKLY AS DIRECTED       ARIPiprazole (ABILIFY) 2 MG tablet Take 1 tablet (2 mg) by mouth At Bedtime 30 tablet 1     armodafinil (NUVIGIL) 250 MG TABS tablet TAKE 1 TABLET BY MOUTH EVERY DAY IN THE MORNING  1     ASACOL  MG EC tablet TAKE 3 TABLETS BY MOUTH TWICE DAILY  0     busPIRone (BUSPAR) 5 MG tablet TAKE 1 TABLET (5  MG) BY MOUTH 2 TIMES DAILY AS NEEDED (ANXIETY) 180 tablet 0     clindamycin (CLEOCIN T) 1 % external lotion APPLY TOPICALLY TO FACE AND CHEST TWICE A DAY AS NEEDED 60 mL 0     desvenlafaxine (PRISTIQ) 100 MG 24 hr tablet TAKE 1 TABLET BY MOUTH EVERY DAY 90 tablet 0     diclofenac (VOLTAREN) 1 % topical gel Apply 4 g topically 4 times daily as needed for moderate pain 200 g 1     finasteride (PROPECIA) 1 MG tablet TAKE 1 TABLET BY MOUTH DAILY FOR HAIR LOSS.       mesalamine (CANASA) 1000 MG suppository Place 1,000 mg rectally 2 times daily       modafinil (PROVIGIL) 200 MG tablet TAKE 1 (200MG) BY MOUTH EVERY DAY AT NOON       testosterone cypionate (DEPOTESTOTERONE) 200 MG/ML injection Inject 50 mg into the muscle every 14 days       traMADol (ULTRAM) 50 MG tablet Take 1 tablet (50 mg) by mouth every 8 hours as needed for severe pain 5 tablet 0       Allergies   Allergen Reactions     Amoxicillin      itching     Penicillins      Other reaction(s): Hives, Hives/Fever       PEE Ulrich

## 2022-11-14 NOTE — NURSING NOTE
Teaching Flowsheet   Relevant Diagnosis: Carpal tunnel syndrome on left    Teaching Topic: left open carpal tunnel release    CSC under MAC with local with Dr Caimla Medina    Patient has PCP pre-op on 11-22-22.  Patient requesting surgery ASAP this year    He requests doing a saliva PCR.  Order placed.       Person(s) involved in teaching:   Patient     Motivation Level:  Asks Questions: Yes  Eager to Learn: Yes  Cooperative: Yes  Receptive (willing/able to accept information): Yes  Any cultural factors/Yazidism beliefs that may influence understanding or compliance? No       Patient demonstrates understanding of the following:  Reason for the appointment, diagnosis and treatment plan: Yes  Knowledge of proper use of medications and conditions for which they are ordered (with special attention to potential side effects or drug interactions): Yes  Which situations necessitate calling provider and whom to contact: Yes       Teaching Concerns Addressed:        Proper use and care of  (medical equip, care aids, etc.): Yes  Nutritional needs and diet plan: Yes  Pain management techniques: Yes  Wound Care: Yes  How and/when to access community resources: Yes     Instructional Materials Used/Given: Preoperative surgery packet, antibacterial Chlorhexidine soap. Stop Light Tool reviewed, patient verbalized understanding, had no immediate questions. Nicole Syed RN

## 2022-11-14 NOTE — LETTER
11/14/2022         RE: Marcel Melchor  [address] [facility] [address]        Dear Colleague,    Thank you for referring your patient, Marcel Melchor, to the [facility] [place]. Please see a copy of my visit note below.    Date of Service: Nov 14, 2022    Chief Complaint:   Chief Complaint   Patient presents with     Consult     Left wrist carpal tunnel syndrome     History of Present Illness: Marcel Melchor is a 52 year old, right handed male who presents today for further evaluation of left hand numbness and tingling. Numbness and tingling effects the thumb, index and long (middle). Symptoms first began 2 years ago. There was not an inciting event. Symptoms DO NOT wake the patient up at night. Symptoms were present intermittently until 4-6 months ago, and are no present at all times. The following modalities have been tried: intermittent night splinting, steroid injection in July 2022 with minimal improvement in symptoms.    Review of Systems: A 14-point review of systems was obtained on intake and reviewed.      Past Medical History:   Diagnosis Date     Anxiety 9/3/2013     AJAY (obstructive sleep apnea) 11/11/2014     Rotator cuff syndrome 1/19/2012       Past Surgical History:   Procedure Laterality Date     [facility] NONSPECIFIC PROCEDURE      fx R ankle-ORIF         Current Outpatient Medications:      anastrozole (ARIMIDEX) 1 MG tablet, TAKE 1 TABLET BY MOUTH WEEKLY AS DIRECTED, Disp: , Rfl:      ARIPiprazole (ABILIFY) 2 MG tablet, Take 1 tablet (2 mg) by mouth At Bedtime, Disp: 30 tablet, Rfl: 1     armodafinil (NUVIGIL) 250 MG TABS tablet, TAKE 1 TABLET BY MOUTH EVERY DAY IN THE MORNING, Disp: , Rfl: 1     ASACOL  MG EC tablet, TAKE 3 TABLETS BY MOUTH TWICE DAILY, Disp: , Rfl: 0     busPIRone (BUSPAR) 5 MG tablet, TAKE 1 TABLET (5 MG) BY MOUTH 2 TIMES DAILY AS NEEDED (ANXIETY), Disp: 180 tablet, Rfl: 0     clindamycin (CLEOCIN T) 1 % external lotion, APPLY  TOPICALLY TO FACE AND CHEST TWICE A DAY AS NEEDED, Disp: 60 mL, Rfl: 0     desvenlafaxine (PRISTIQ) 100 MG 24 hr tablet, TAKE 1 TABLET BY MOUTH EVERY DAY, Disp: 90 tablet, Rfl: 0     diclofenac (VOLTAREN) 1 % topical gel, Apply 4 g topically 4 times daily as needed for moderate pain, Disp: 200 g, Rfl: 1     finasteride (PROPECIA) 1 MG tablet, TAKE 1 TABLET BY MOUTH DAILY FOR HAIR LOSS., Disp: , Rfl:      mesalamine (CANASA) 1000 MG suppository, Place 1,000 mg rectally 2 times daily, Disp: , Rfl:      modafinil (PROVIGIL) 200 MG tablet, TAKE 1 (200MG) BY MOUTH EVERY DAY AT NOON, Disp: , Rfl:      testosterone cypionate (DEPOTESTOTERONE) 200 MG/ML injection, Inject 50 mg into the muscle every 14 days, Disp: , Rfl:      traMADol (ULTRAM) 50 MG tablet, Take 1 tablet (50 mg) by mouth every 8 hours as needed for severe pain, Disp: 5 tablet, Rfl: 0    Current Facility-Administered Medications:      4 mL bupivacaine (MARCAINE) preservative free injection 0.25% (10 mL vial), 4 mL, , , Isis Kim MD, 4 mL at 10/31/22 0831     betamethasone acet & sod phos (CELESTONE) injection 6 mg, 6 mg, , , Isis Kim MD, 6 mg at 10/31/22 0831     lidocaine 1 % injection 4 mL, 4 mL, , , Gregoria Man MD, 4 mL at 22 0836     methylPREDNISolone (DEPO-MEDROL) injection 40 mg, 40 mg, , , Yeo, Albert, MD, 40 mg at 22 0826     triamcinolone (KENALOG-40) injection 40 mg, 40 mg, , , Gregoria Man MD, 40 mg at 22 0836    Allergies   Allergen Reactions     Amoxicillin      itching     Penicillins      Other reaction(s): Hives, Hives/Fever       Social History     Tobacco Use     Smoking status: Never     Smokeless tobacco: Never   Vaping Use     Vaping Use: Never used   Substance Use Topics     Alcohol use: No     Alcohol/week: 0.0 standard drinks     Drug use: No       Family History   Problem Relation Age of Onset     Breast Cancer Mother          at age 47     Substance Abuse Father       Substance Abuse Sister      Substance Abuse Brother        Physical examination:  Well-developed, well-nourished and in no acute distress.  Alert and oriented to surroundings.  On examination of the left upper extremity:     Skin clean, dry and intact  Sensation:  Median: diminished  Radial: intact  Ulnar: intact  Thumb opposition strength: intact  Interosseous strength: intact  Thenar atrophy: Not Present  Interosseous atrophy: Not Present  Tinel's over carpal tunnel: Present  Tinel's over cubital tunnel: Not Present  Phalen's: Positive  Carpal tunnel compression: Positive  Elbow flexion compression: Negative    Two point discrimination (mm):   Median: 5-6 mm (2-3 mm on the right)  Ulnar: 3-4 mm (3-4 mm on the right)    EMG/NCS: EMG obtained on 10/10/22 demonstrates (per formal read by Dr. Mitchell):  Results:  Nerve conduction studies:  1. Left median-D2 sensory response shows moderately reduced amplitude and severely slowed CV.   2. Left ulnar-D5 and radial sensory responses are normal.   3. Left median-APB motor response shows moderately prolonged DL, moderately reduced amplitude and normal CV in the forearm.   4. Left ulnar-ADM motor response is normal.   5. Left median-lumbrical vs ulnar-interosseous latency difference is prolonged.     Needle EMG of selected proximal and distal left upper limb muscles was performed as tabulated below. Fibrillation potentials and positive sharp waves were seen in the left APB muscle. No abnormal spontaneous activity was observed in the other sampled muscles. Large amplitude polyphasic motor unit potentials (MUP) with reduced recruitment were also seen in the left APB muscle.  Motor unit potential morphology and recruitment patterns were otherwise normal.      Interpretation:  This is an abnormal study. 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 There is  no evidence of a cervical radiculopathy affecting the left upper limb on the basis of this study. Clinical correlation is recommended.       Assessment: 52 year old male with  left carpal tunnel syndrome.     Plan:     We had a lengthy discussion about EMG results and possible treatment options. We discussed three possible treatment options. The first would be to continue night splinting and to observe the symptoms for any change or progression. The second would be to perform a corticosteroid injection. The third is to consider surgery, which would be an open carpal tunnel release. I have described the procedure, post-operative protocol, and expected outcomes.  I also discussed the risks of surgery including but  not limited to, bleeding, infection, nerve or vessel damage, wound healing problems, persistent pain, persistent numbness and the possibility for further surgery. After a full discussion of risks, benefits, and alternatives to surgery, the patient expressed understanding and elected to proceed with  left open carpal tunnel release. This will be done under [facility] with Dr. Camila Medina.      BROOKLYN MARQUES PA-C  11/14/2022 1:30 PM  Orthopaedic Surgery

## 2022-11-19 PROBLEM — G56.02 LEFT CARPAL TUNNEL SYNDROME: Status: ACTIVE | Noted: 2022-11-19

## 2022-11-21 ENCOUNTER — TELEPHONE (OUTPATIENT)
Dept: ORTHOPEDICS | Facility: CLINIC | Age: 52
End: 2022-11-21

## 2022-11-21 DIAGNOSIS — F41.1 GAD (GENERALIZED ANXIETY DISORDER): ICD-10-CM

## 2022-11-21 NOTE — TELEPHONE ENCOUNTER
Patient is scheduled for surgery with Dr. Medina    Spoke with: Marcel    Date of Surgery: 11/29/22    Location: [facility]    Informed patient they will need an adult  Yes    H&P: Scheduled with PCP    Pre-procedure COVID-19 Test: Patient will complete at home    Additional imaging/appointments: POP Made    Surgery packet: Received     Additional comments: N/A

## 2022-11-22 ENCOUNTER — OFFICE VISIT (OUTPATIENT)
Dept: FAMILY MEDICINE | Facility: CLINIC | Age: 52
End: 2022-11-22
Payer: COMMERCIAL

## 2022-11-22 VITALS
HEIGHT: 69 IN | HEART RATE: 70 BPM | WEIGHT: 198 LBS | DIASTOLIC BLOOD PRESSURE: 64 MMHG | SYSTOLIC BLOOD PRESSURE: 128 MMHG | OXYGEN SATURATION: 99 % | TEMPERATURE: 97.8 F | RESPIRATION RATE: 14 BRPM | BODY MASS INDEX: 29.33 KG/M2

## 2022-11-22 DIAGNOSIS — F33.1 MODERATE EPISODE OF RECURRENT MAJOR DEPRESSIVE DISORDER (H): ICD-10-CM

## 2022-11-22 DIAGNOSIS — G56.02 CARPAL TUNNEL SYNDROME OF LEFT WRIST: ICD-10-CM

## 2022-11-22 DIAGNOSIS — Z01.818 PREOP GENERAL PHYSICAL EXAM: Primary | ICD-10-CM

## 2022-11-22 DIAGNOSIS — K50.919 CROHN'S DISEASE WITH COMPLICATION, UNSPECIFIED GASTROINTESTINAL TRACT LOCATION (H): ICD-10-CM

## 2022-11-22 PROBLEM — F33.9 MAJOR DEPRESSION, RECURRENT (H): Status: ACTIVE | Noted: 2022-11-22

## 2022-11-22 PROCEDURE — 99214 OFFICE O/P EST MOD 30 MIN: CPT | Performed by: INTERNAL MEDICINE

## 2022-11-22 RX ORDER — DESVENLAFAXINE 100 MG/1
TABLET, EXTENDED RELEASE ORAL
Qty: 90 TABLET | Refills: 1 | Status: SHIPPED | OUTPATIENT
Start: 2022-11-22 | End: 2023-05-16

## 2022-11-22 ASSESSMENT — PAIN SCALES - GENERAL: PAINLEVEL: MILD PAIN (3)

## 2022-11-22 NOTE — PROGRESS NOTES
"[facility]  [address] [facility] [address]  Phone: [phone]  Primary Provider: Wes Braga  Pre-op Performing Provider: DAWSON ROSEN      PREOPERATIVE EVALUATION:  Today's date: 11/22/2022    Marcel Melchor is a 52 year old male who presents for a preoperative evaluation.    Surgical Information:  Surgery/Procedure: LEFT OPEN CARPAL TUNNEL RELEASE  [place] [facility]  Surgeon: Dr. Camila Medina   Surgery Date: 11/29/2022  Time of Surgery: 08:00am  Where patient plans to recover: At home with family  Fax number for surgical facility: Note does not need to be faxed, will be available electronically in Epic.    Type of Anesthesia Anticipated: To be determined        Assessment & Plan     The proposed surgical procedure is considered LOW risk.    (Z01.818) Preop general physical exam  (primary encounter diagnosis)  Comment: left Carpal Tunnel Release related to persistent symptoms despite nonsurgical intervention.  Plan: proceed with surgery as planned    (G56.02) Carpal tunnel syndrome of left wrist  Comment: pt has tried splints, \"carpal tunnel exercises\", and NSAIDS without significant improvement  Plan: he wishes to proceed with elective surgery    (F33.1) Moderate episode of recurrent major depressive disorder (H)  Comment: pt states meds OK, well tolerated and followed by Psych and PCP  Plan: no change in meds.     (K50.919) Crohn's disease with complication, unspecified gastrointestinal tract location (H)  Comment: followed by GI  Plan: pt reports no flare in Crohn's symptoms.         RECOMMENDATIONS:      --Approval given to proceed with proposed procedure, without further diagnostic evaluation  --Pt advised to avoid NSAIDS (Motrin, Ibuprofen, Aleve or Naprosyn);  If needed, Tylenol or Acetaminophen are fine to use.  --meds reviewed; may hold meds on AM of surgery. Hold meds until after surgery  --Pt thought he would have general " anesthesia; if so, recommend bringing CPAP for post op recovery.  --Pain medications, time off from work and FMLA following surgery deferred to surgeon.    APPROVAL GIVEN to proceed with proposed procedure, without further diagnostic evaluation.    30 minutes spent on the date of the encounter doing chart review, history and exam, documentation and further activities per the note    Ngozi Hood MD  Internal Medicine  electronically signed           Subjective     HPI related to upcoming procedure: Marcel Melchor is a 52 year old male who presents with persistent left CTS symptoms;     Preop Questions 11/22/2022   1. Have you ever had a heart attack or stroke? No   2. Have you ever had surgery on your heart or blood vessels, such as a stent placement, a coronary artery bypass, or surgery on an artery in your head, neck, heart, or legs? No   3. Do you have chest pain with activity? No   4. Do you have a history of  heart failure? No   5. Do you currently have a cold, bronchitis or symptoms of other infection? No   6. Do you have a cough, shortness of breath, or wheezing? No   7. Do you or anyone in your family have previous history of blood clots? No   8. Do you or does anyone in your family have a serious bleeding problem such as prolonged bleeding following surgeries or cuts? No   9. Have you ever had problems with anemia or been told to take iron pills? No   10. Have you had any abnormal blood loss such as black, tarry or bloody stools? No   11. Have you ever had a blood transfusion? No   12. Are you willing to have a blood transfusion if it is medically needed before, during, or after your surgery? Yes   13. Have you or any of your relatives ever had problems with anesthesia? No   14. Do you have sleep apnea, excessive snoring or daytime drowsiness? YES - Narcolepsy and sleep apnea   14a. Do you have a CPAP machine? Yes   15. Do you have any artifical heart valves or other implanted medical devices like a  pacemaker, defibrillator, or continuous glucose monitor? No   16. Do you have artificial joints? No   17. Are you allergic to latex? No       Health Care Directive:  Patient does not have a Health Care Directive or Living Will: Discussed advance care planning with patient; however, patient declined at this time.    Preoperative Review of :   reviewed - controlled substances prescribed by other outside provider(s).          Review of Systems  CONSTITUTIONAL: NEGATIVE for fever, chills, change in weight  INTEGUMENTARY/SKIN: NEGATIVE for worrisome rashes, moles or lesions  RESP: NEGATIVE for significant cough or SOB  CV: NEGATIVE for chest pain, palpitations or peripheral edema  GI: Crohns well controlled with current meds; per GI  MUSCULOSKELETAL:working with ortho for shoulder and now left CTS- wondering about PRP  NEURO: NEGATIVE for weakness, dizziness or paresthesias  ENDOCRINE: NEGATIVE for temperature intolerance, skin/hair changes  HEME: NEGATIVE for bleeding problems  PSYCHIATRIC: several psych meds per Psych and PCP; sleep medicine    Patient Active Problem List    Diagnosis Date Noted     Left carpal tunnel syndrome 11/19/2022     Priority: Medium     Added automatically from request for surgery [id number]       DDD (degenerative disc disease), lumbar 11/12/2020     Priority: Medium     Impulse control disorder in adult 05/10/2019     Priority: Medium     Intermittent explosive disorder in adult 05/10/2019     Priority: Medium     Mood swing 05/03/2019     Priority: Medium     Irritability and anger 05/03/2019     Priority: Medium     Behavior concern in adult 12/13/2018     Priority: Medium     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     Priority: Medium     Narcolepsy 09/27/2018     Priority: Medium     Crohn's disease (H) 06/15/2015     Priority: Medium     AJAY (obstructive sleep apnea) 11/11/2014     Priority: Medium     ASUNCION (generalized anxiety disorder) 09/03/2013     Priority: Medium     Rotator cuff  syndrome 01/19/2012     Priority: Medium     Motion sickness 07/14/2011     Priority: Medium     CARDIOVASCULAR SCREENING; LDL GOAL LESS THAN 160 10/31/2010     Priority: Medium     Attention deficit hyperactivity disorder (ADHD), combined type 12/04/2003     Priority: Medium     Problem list name updated by automated process. Provider to review       Dyspnea and respiratory abnormality 12/04/2003     Priority: Medium     Problem list name updated by automated process. Provider to review        Past Medical History:   Diagnosis Date     Anxiety 9/3/2013     AJAY (obstructive sleep apnea) 11/11/2014     Rotator cuff syndrome 1/19/2012     Past Surgical History:   Procedure Laterality Date     [facility] NONSPECIFIC PROCEDURE      fx R ankle-ORIF     Current Outpatient Medications   Medication Sig Dispense Refill     anastrozole (ARIMIDEX) 1 MG tablet TAKE 1 TABLET BY MOUTH WEEKLY AS DIRECTED       ARIPiprazole (ABILIFY) 2 MG tablet Take 1 tablet (2 mg) by mouth At Bedtime 30 tablet 1     armodafinil (NUVIGIL) 250 MG TABS tablet TAKE 1 TABLET BY MOUTH EVERY DAY IN THE MORNING  1     ASACOL  MG EC tablet TAKE 3 TABLETS BY MOUTH TWICE DAILY  0     busPIRone (BUSPAR) 5 MG tablet TAKE 1 TABLET (5 MG) BY MOUTH 2 TIMES DAILY AS NEEDED (ANXIETY) 180 tablet 0     clindamycin (CLEOCIN T) 1 % external lotion APPLY TOPICALLY TO FACE AND CHEST TWICE A DAY AS NEEDED 60 mL 0     desvenlafaxine (PRISTIQ) 100 MG 24 hr tablet TAKE 1 TABLET BY MOUTH EVERY DAY 90 tablet 0     diclofenac (VOLTAREN) 1 % topical gel Apply 4 g topically 4 times daily as needed for moderate pain 200 g 1     finasteride (PROPECIA) 1 MG tablet TAKE 1 TABLET BY MOUTH DAILY FOR HAIR LOSS.       mesalamine (CANASA) 1000 MG suppository Place 1,000 mg rectally 2 times daily       modafinil (PROVIGIL) 200 MG tablet TAKE 1 (200MG) BY MOUTH EVERY DAY AT NOON       testosterone cypionate (DEPOTESTOTERONE) 200 MG/ML injection Inject 50 mg into the muscle every 14 days    "    traMADol (ULTRAM) 50 MG tablet Take 1 tablet (50 mg) by mouth every 8 hours as needed for severe pain 5 tablet 0       Allergies   Allergen Reactions     Amoxicillin      itching     Penicillins      Other reaction(s): Hives, Hives/Fever        Social History     Tobacco Use     Smoking status: Never     Smokeless tobacco: Never   Substance Use Topics     Alcohol use: No     Alcohol/week: 0.0 standard drinks     Family History   Problem Relation Age of Onset     Breast Cancer Mother          at age 47     Substance Abuse Father      Substance Abuse Sister      Substance Abuse Brother      History   Drug Use No         Objective     /64 (BP Location: Right arm, Patient Position: Sitting, Cuff Size: Adult Regular)   Pulse 70   Temp 97.8  F (36.6  C) (Tympanic)   Resp 14   Ht 1.74 m (5' 8.5\")   Wt 89.8 kg (198 lb)   SpO2 99%   BMI 29.67 kg/m      Physical Exam    GENERAL APPEARANCE: healthy, alert and no distress     NECK: no adenopathy, no asymmetry, masses, or scars and thyroid normal to palpation     RESP: lungs clear to auscultation - no rales, rhonchi or wheezes     CV: regular rates and rhythm, normal S1 S2, no S3 or S4 and no murmur, click or rub     ABDOMEN:  soft, nontender, no HSM or masses and bowel sounds normal     MS: moves all extremities     SKIN: no suspicious lesions or rashes     NEURO: Normal strength and tone, sensory exam grossly normal, mentation intact and speech normal     PSYCH: mentation appears normal. and affect normal/bright    Recent Labs   Lab Test 22  0744   A1C 4.9        Diagnostics:  No labs were ordered during this visit.   No EKG required for low risk surgery (cataract, skin procedure, breast biopsy, etc).    Revised Cardiac Risk Index (RCRI):  The patient has the following serious cardiovascular risks for perioperative complications:   - No serious cardiac risks = 0 points     RCRI Interpretation: 0 points: Class I (very low risk - 0.4% complication " rate)           Signed Electronically by: Ngozi Hood MD  Copy of this evaluation report is provided to requesting physician.

## 2022-11-22 NOTE — PATIENT INSTRUCTIONS
Preparing for Your Surgery  Getting started  A nurse will call you to review your health history and instructions. They will give you an arrival time based on your scheduled surgery time. Please be ready to share:  Your doctor s clinic name and phone number  Your medical, surgical, and anesthesia history  A list of allergies and sensitivities  A list of medicines, including herbal treatments and over-the-counter drugs  Whether the patient has a legal guardian (ask how to send us the papers in advance)  Please tell us if you re pregnant--or if there s any chance you might be pregnant. Some surgeries may injure a fetus (unborn baby), so they require a pregnancy test. Surgeries that are safe for a fetus don t always need a test, and you can choose whether to have one.   If you have a child who s having surgery, please ask for a copy of Preparing for Your Child s Surgery.    Preparing for surgery  Within 10 to 30 days of surgery: Have a pre-op exam (sometimes called an H&P, or History and Physical). This can be done at a clinic or pre-operative center.  If you re having a , you may not need this exam. Talk to your care team.  At your pre-op exam, talk to your care team about all medicines you take. If you need to stop any medicines before surgery, ask when to start taking them again.  We do this for your safety. Many medicines can make you bleed too much during surgery. Some change how well surgery (anesthesia) drugs work.  Call your insurance company to let them know you re having surgery. (If you don t have insurance, call [phone].)  Call your clinic if there s any change in your health. This includes signs of a cold or flu (sore throat, runny nose, cough, rash, fever). It also includes a scrape or scratch near the surgery site.  If you have questions on the day of surgery, call your hospital or surgery center.  COVID testing  You may need to be tested for COVID-19 before having surgery. If so, we will  give you instructions (or click here).  Eating and drinking guidelines  For your safety: Unless your surgeon tells you otherwise, follow the guidelines below.  Eat and drink as usual until 8 hours before you arrive for surgery. After that, no food or milk.  Drink clear liquids until 2 hours before you arrive. These are liquids you can see through, like water, Gatorade, and Propel Water. They also include plain black coffee and tea (no cream or milk), candy, and breath mints. You can spit out gum when you arrive.  If you drink alcohol: Stop drinking it the night before surgery.  If your care team tells you to take medicine on the morning of surgery, it s okay to take it with a sip of water.  Preventing infection  Shower or bathe the night before and morning of your surgery. Follow the instructions your clinic gave you. (If no instructions, use regular soap.)  Don t shave or clip hair near your surgery site. We ll remove the hair if needed.  Don t smoke or vape the morning of surgery. You may chew nicotine gum up to 2 hours before surgery. A nicotine patch is okay.  Note: Some surgeries require you to completely quit smoking and nicotine. Check with your surgeon.  Your care team will make every effort to keep you safe from infection. We will:  Clean our hands often with soap and water (or an alcohol-based hand rub).  Clean the skin at your surgery site with a special soap that kills germs.  Give you a special gown to keep you warm. (Cold raises the risk of infection.)  Wear special hair covers, masks, gowns and gloves during surgery.  Give antibiotic medicine, if prescribed. Not all surgeries need antibiotics.  What to bring on the day of surgery  Photo ID and insurance card  Copy of your health care directive, if you have one  Glasses and hearing aids (bring cases)  You can t wear contacts during surgery  Inhaler and eye drops, if you use them (tell us about these when you arrive)  CPAP machine or breathing device,  if you use them  A few personal items, if spending the night  If you have . . .  A pacemaker, ICD (cardiac defibrillator) or other implant: Bring the ID card.  An implanted stimulator: Bring the remote control.  A legal guardian: Bring a copy of the certified (court-stamped) guardianship papers.  Please remove any jewelry, including body piercings. Leave jewelry and other valuables at home.  If you re going home the day of surgery  You must have a responsible adult drive you home. They should stay with you overnight as well.  If you don t have someone to stay with you, and you aren t safe to go home alone, we may keep you overnight. Insurance often won t pay for this.  After surgery  If it s hard to control your pain or you need more pain medicine, please call your surgeon s office.  Questions?   If you have any questions for your care team, list them here:   ____________________________________________________________________________________________________________________________________________________________________________________________________________________________________________________________________  For informational purposes only. Not to replace the advice of your health care provider. Copyright   2003, 2019 [employer]. All rights reserved. Clinically reviewed by Meera Chavez MD. [employer] 464136 - REV 10/22.        RECOMMENDATIONS:  --Approval given to proceed with proposed procedure, without further diagnostic evaluation  --Pt advised to avoid NSAIDS (Motrin, Ibuprofen, Aleve or Naprosyn);  If needed, Tylenol or Acetaminophen are fine to use.  --meds reviewed; may hold meds on AM of surgery. Hold meds until after surgery  --Pain medications, time off from work and FMLA following surgery deferred to surgeon.

## 2022-11-22 NOTE — TELEPHONE ENCOUNTER
Routing refill request to provider for review/approval because:  Labs not current:  Creatinine  Creatinine   Date Value Ref Range Status   11/12/2020 1.05 0.66 - 1.25 mg/dL Final     Christine Pulido RN, BSN, PHN  [facility]

## 2022-11-25 DIAGNOSIS — F39 MOOD DISORDER (H): ICD-10-CM

## 2022-11-25 RX ORDER — ARIPIPRAZOLE 2 MG/1
TABLET ORAL
Qty: 30 TABLET | Refills: 0 | Status: SHIPPED | OUTPATIENT
Start: 2022-11-25 | End: 2023-05-23

## 2022-11-25 NOTE — TELEPHONE ENCOUNTER
Psych providers are covering for INDIRA Schilling    Behavioral Access, please schedule this patient for a future visit with INDIRA Schilling . Patient is requesting a refill.      Date of Last Office Visit: 6/1/22 Return in about 2 months (around 8/1/2022) for Follow up with me.  Date of Next Office Visit: None. Scheduling attempting to contact pt  No shows since last visit: 0  Cancellations since last visit: 0    Medication requested:ARIPiprazole (ABILIFY) 2 MG tablet  Date last ordered: 9/15/22 Qty: 30 Refills: 1     ARIPiprazole (ABILIFY) 2 MG tablet 30 tablet 1 9/15/2022  No   Sig - Route: Take 1 tablet (2 mg) by mouth At Bedtime - Oral   Sent to pharmacy as: ARIPiprazole 2 MG Oral Tablet (ABILIFY)   Class: E-Prescribe         Review of [place] ?: NA      Lapse in medication adherence greater than 5 days?: No. According to pharmacy info, last refilled on 10/31/22  If yes, call patient and gather details: NA  Medication refill request verified as identical to current order?: yes  Result of Last DAM, VPA, Li+ Level, CBC, or Carbamazepine Level (at or since last visit): N/A    Last visit treatment plan:        Return in about 2 months (around 8/1/2022) for Follow up with me.       Behavioral       The addition of the Abilify has had positive effect on mood.  Will continue the Abilify at 2 mg and desvenlafaxine at 100 mg.  Can consider tapering/ discontinuing the buspirone if not needed.  Will follow-up with this provider in two months                    Other     Mood swing     Relevant Medications     ARIPiprazole (ABILIFY) 2 MG tablet               Other Visit Diagnoses      High risk medication use    -  Primary     Relevant Orders     Lipid panel reflex to direct LDL Fasting (Completed)     **A1C FUTURE 6mo (Completed)             []Medication refilled per  Medication Refill in Ambulatory Care  policy.  [x]Medication unable to be refilled by RN due to criteria not met as indicated below:    []Eligibility - not seen in the last  year   [x]Supervision - no future appointment   []Compliance - no shows, cancellations or lapse in therapy  - Pt is overdue for appt   []Verification - order discrepancy   []Controlled medication   [x]Medication not included in policy   []90-day supply request   [x]Other: LPN is processing request

## 2022-11-28 ENCOUNTER — ANESTHESIA EVENT (OUTPATIENT)
Dept: SURGERY | Facility: AMBULATORY SURGERY CENTER | Age: 52
End: 2022-11-28
Payer: COMMERCIAL

## 2022-11-28 NOTE — DISCHARGE INSTRUCTIONS
Patient Name: Marcel Melchor  MRN: [id number]  : 1970  Date of Surgery: 2022  Surgery: left carpal tunnel release      DRESSING   The operative site will be bandaged and/or splinted following your surgery.     Leave your dressing in place until your follow up visit.   Please keep your dressing clean and dry at all times.     You may shower tomorrow with your dressing covered with a plastic bag and sealed with tape to make it watertight.  A plastic trash bag works well, or a commercial cast protective bag can be purchased at most drug stores.    Do not make holes in your splint/cast.  Do not stick objects down it to scratch. To reduce irritation under the splint/cast, you can blow cool air from a fan or hair drying under cast.    DIET   Following surgery, start with clear liquids. Once you tolerate clear liquids, your diet may be progressed to your normal diet as tolerated.  Do not try to eat too much too soon.  This may result in nausea due to the narcotic pain medication and anesthesia. If you feel nauseated, try clear liquids and crackers only.    Smoking can affect your healing and increase the risk of complications.  I strongly recommend that you do not smoke after your surgery.    Do not drink any alcohol (beer, wine, or spirits) for one week after surgery or while taking narcotic pain medicines.    MEDICATIONS   Pain medicine should be taken only  as needed  per your doctor's instructions to help control your pain. Initially, you may take the pills on regular 4 to 6 hour intervals without missing any doses. However, as your pain improves, you will be able to decrease the frequency and amount.     If a regional nerve block has been used for your anesthesia, it is common to experience numbness and tingling in the arm for twelve hours or longer after surgery.  In order to get  in-front of  the pain, take your narcotic pain medicine as soon as you start to feel the nerve block wear off.   You need to  be as comfortable as possible, but also understand that no amount of pain medication, ice, elevation, and rest will completely eliminate pain after surgery. Non-steroidal anti-inflammatory over the counter medicines (Motrin, Advil, Aleve, Naprosyn, etc.) may be used to supplement your prescription narcotic pain medication. Because the pain prescription you were given contains acetaminophen (Tylenol), you should not take Tylenol or any medication containing acetaminophen (Tylenol) while you are taking the pain prescription.   Occasionally pain medicines may cause nausea if taken on an empty stomach. Narcotic pain medicines can also cause drowsiness, lightheadedness, itching and constipation.  Over the counter stool softeners and increased fluid intake can help relieve constipation.    Unless otherwise specified, you can resume taking all of the medicines you were taking routinely prior to surgery.    ACTIVITIES   WEIGHT BEARING STATUS: Non-Weight Bearing (NWB)   Keep your hand/wrist at or above the level of your heart at all times for the next 3 days. This can be accomplished by using the foam pillow provided or other firm pillows.  A sling will not hold your hand/wrist above your heart and therefore is inadequate, it also may cause elbow and/or shoulder stiffness.   Use your hand as much as possible within the limits of your dressing and splint to decrease swelling and improve your overall surgical results UNLESS tendon, nerve, or phalangeal fracture repair was performed.   Move all joints of the extremity that are not immobilized (shoulder, elbow, forearm, wrist, digits) to minimize stiffness.  Hand exercises can be performed a few times daily unless otherwise specified.    Avoid all activities which may re-injure your hand or finger such as lifting objects heavier that a book, or rigorous physical activity.    You should not drive a car with your hand/arm in a splint or while taking narcotic pain  medicines.    WHEN TO CONTACT YOUR DOCTOR   If you have a persistent temperature of 101.5 degrees Fahrenheit or greater.   If you develop any signs of wound infection- Increasing redness, swelling, pus-like drainage.   If you have uncontrollable nausea/vomiting post operatively.   If your fingers appear blue or cold.   If you have persistent bleeding through your dressing.   If you have progressively increased numbness or pain.   If your dressing feels too tight and painful.    [facility]  Home Care Following Anesthesia  For 24 hours after surgery:  Get plenty of rest.  A responsible adult must stay with you for at least 24 hours after you leave the surgery center.  Do not drive or use heavy equipment.  If you have weakness or tingling, don't drive or use heavy equipment until this feeling goes away.   Do not drink alcohol.   Avoid strenuous or risky activities.  Ask for help when climbing stairs.  You may feel lightheaded.  IF so, sit for a few minutes before standing.  Have someone help you get up.   If you have nausea (feel sick to your stomach): Drink only clear liquids such as apple juice, ginger ale, broth or 7-Up.  Rest may also help.  Be sure to drink enough fluids.  Move to a regular diet as you feel able.   You may have a slight fever.  Call the doctor if your fever is over 100 F (37.7 C) (taken under the tongue) or lasts longer than 24 hours.  You may have a dry mouth, a sore throat, muscle aches or trouble sleeping. These should go away after 24 hours.  Do not make important or legal decisions.   It is recommended to avoid smoking.               Tips for taking pain medications  To get the best pain relief possible, remember these points:  Take pain medications as directed, before pain becomes severe.  Pain medication can upset your stomach: taking it with food may help.  Constipation is a common side effect of pain medication. Drink plenty of  fluids.  Eat foods  high in fiber. Take a stool softener if recommended by your doctor or pharmacist.  Do not drink alcohol, drive or operate machinery while taking pain medications.  Ask about other ways to control pain, such as with heat, ice or relaxation.    Tylenol/Acetaminophen Consumption  To help encourage the safe use of acetaminophen, the makers of TYLENOL  have lowered the maximum daily dose for single-ingredient Extra Strength TYLENOL  (acetaminophen) products sold in the [place] from 8 pills per day (4,000 mg) to 6 pills per day (3,000 mg). The dosing interval has also changed from 2 pills every 4-6 hours to 2 pills every 6 hours.  If you feel your pain relief is insufficient, you may take Tylenol/Acetaminophen in addition to your narcotic pain medication.   Be careful not to exceed 3,000 mg of Tylenol/Acetaminophen in a 24 hour period from all sources.  If you are taking extra strength Tylenol/acetaminophen (500 mg), the maximum dose is 6 tablets in 24 hours.  If you are taking regular strength acetaminophen (325 mg), the maximum dose is 9 tablets in 24 hours.    Call a doctor for any of the following:  Signs of infection (fever, growing tenderness at the surgery site, a large amount of drainage or bleeding, severe pain, foul-smelling drainage, redness, swelling).  It has been over 8 to 10 hours since surgery and you are still not able to urinate (pass water).  Headache for over 24 hours.  Numbness, tingling or weakness the day after surgery (if you had spinal anesthesia).  Signs of Covid-19 infection (temperature over 100 degrees, shortness of breath, cough, loss of taste/smell, generalized body aches, persistent headache, chills, sore throat, nausea/vomiting/diarrhea)  Your doctor is:       Dr. Camila Medina, Orthopaedics: [phone]               Or dial [phone] and ask for the resident on call for:  Orthopaedics  For emergency care, call the:  [facility] Emergency Department: [phone] (TTY for hearing  impaired: [phone])

## 2022-11-29 ENCOUNTER — ANESTHESIA (OUTPATIENT)
Dept: SURGERY | Facility: AMBULATORY SURGERY CENTER | Age: 52
End: 2022-11-29
Payer: COMMERCIAL

## 2022-11-29 ENCOUNTER — HOSPITAL ENCOUNTER (OUTPATIENT)
Facility: AMBULATORY SURGERY CENTER | Age: 52
Discharge: HOME | End: 2022-11-29
Attending: STUDENT IN AN ORGANIZED HEALTH CARE EDUCATION/TRAINING PROGRAM
Payer: COMMERCIAL

## 2022-11-29 VITALS
RESPIRATION RATE: 16 BRPM | DIASTOLIC BLOOD PRESSURE: 78 MMHG | WEIGHT: 200 LBS | OXYGEN SATURATION: 99 % | BODY MASS INDEX: 29.62 KG/M2 | HEART RATE: 67 BPM | TEMPERATURE: 98 F | SYSTOLIC BLOOD PRESSURE: 159 MMHG | HEIGHT: 69 IN

## 2022-11-29 DIAGNOSIS — G56.02 LEFT CARPAL TUNNEL SYNDROME: ICD-10-CM

## 2022-11-29 PROCEDURE — 64721 CARPAL TUNNEL SURGERY: CPT | Mod: LT

## 2022-11-29 PROCEDURE — 999N000127 HC STATISTIC PERIPHERAL IV START W US GUIDANCE

## 2022-11-29 PROCEDURE — 64721 CARPAL TUNNEL SURGERY: CPT | Mod: LT | Performed by: STUDENT IN AN ORGANIZED HEALTH CARE EDUCATION/TRAINING PROGRAM

## 2022-11-29 RX ORDER — ACETAMINOPHEN 325 MG/1
975 TABLET ORAL ONCE
Status: COMPLETED | OUTPATIENT
Start: 2022-11-29 | End: 2022-11-29

## 2022-11-29 RX ORDER — TRAMADOL HYDROCHLORIDE 50 MG/1
50 TABLET ORAL EVERY 6 HOURS PRN
Qty: 10 TABLET | Refills: 0 | Status: SHIPPED | OUTPATIENT
Start: 2022-11-29 | End: 2022-12-02

## 2022-11-29 RX ORDER — HYDROMORPHONE HYDROCHLORIDE 1 MG/ML
0.4 INJECTION, SOLUTION INTRAMUSCULAR; INTRAVENOUS; SUBCUTANEOUS EVERY 5 MIN PRN
Status: DISCONTINUED | OUTPATIENT
Start: 2022-11-29 | End: 2022-11-30 | Stop reason: HOSPADM

## 2022-11-29 RX ORDER — LIDOCAINE 40 MG/G
CREAM TOPICAL
Status: DISCONTINUED | OUTPATIENT
Start: 2022-11-29 | End: 2022-11-30 | Stop reason: HOSPADM

## 2022-11-29 RX ORDER — MAGNESIUM HYDROXIDE 1200 MG/15ML
LIQUID ORAL PRN
Status: DISCONTINUED | OUTPATIENT
Start: 2022-11-29 | End: 2022-11-29 | Stop reason: HOSPADM

## 2022-11-29 RX ORDER — LIDOCAINE HYDROCHLORIDE 20 MG/ML
INJECTION, SOLUTION INFILTRATION; PERINEURAL PRN
Status: DISCONTINUED | OUTPATIENT
Start: 2022-11-29 | End: 2022-11-29

## 2022-11-29 RX ORDER — FENTANYL CITRATE 50 UG/ML
25 INJECTION, SOLUTION INTRAMUSCULAR; INTRAVENOUS EVERY 5 MIN PRN
Status: DISCONTINUED | OUTPATIENT
Start: 2022-11-29 | End: 2022-11-30 | Stop reason: HOSPADM

## 2022-11-29 RX ORDER — LIDOCAINE HYDROCHLORIDE AND EPINEPHRINE 10; 10 MG/ML; UG/ML
20 INJECTION, SOLUTION INFILTRATION; PERINEURAL ONCE
Status: COMPLETED | OUTPATIENT
Start: 2022-11-29 | End: 2022-11-29

## 2022-11-29 RX ORDER — ONDANSETRON 4 MG/1
4 TABLET, ORALLY DISINTEGRATING ORAL EVERY 30 MIN PRN
Status: DISCONTINUED | OUTPATIENT
Start: 2022-11-29 | End: 2022-11-30 | Stop reason: HOSPADM

## 2022-11-29 RX ORDER — FENTANYL CITRATE 50 UG/ML
50 INJECTION, SOLUTION INTRAMUSCULAR; INTRAVENOUS EVERY 5 MIN PRN
Status: DISCONTINUED | OUTPATIENT
Start: 2022-11-29 | End: 2022-11-30 | Stop reason: HOSPADM

## 2022-11-29 RX ORDER — FENTANYL CITRATE 50 UG/ML
25 INJECTION, SOLUTION INTRAMUSCULAR; INTRAVENOUS
Status: DISCONTINUED | OUTPATIENT
Start: 2022-11-29 | End: 2022-11-30 | Stop reason: HOSPADM

## 2022-11-29 RX ORDER — HYDROMORPHONE HYDROCHLORIDE 1 MG/ML
0.2 INJECTION, SOLUTION INTRAMUSCULAR; INTRAVENOUS; SUBCUTANEOUS EVERY 5 MIN PRN
Status: DISCONTINUED | OUTPATIENT
Start: 2022-11-29 | End: 2022-11-30 | Stop reason: HOSPADM

## 2022-11-29 RX ORDER — MEPERIDINE HYDROCHLORIDE 25 MG/ML
12.5 INJECTION INTRAMUSCULAR; INTRAVENOUS; SUBCUTANEOUS
Status: DISCONTINUED | OUTPATIENT
Start: 2022-11-29 | End: 2022-11-30 | Stop reason: HOSPADM

## 2022-11-29 RX ORDER — BACITRACIN ZINC 500 [USP'U]/G
OINTMENT TOPICAL PRN
Status: DISCONTINUED | OUTPATIENT
Start: 2022-11-29 | End: 2022-11-29 | Stop reason: HOSPADM

## 2022-11-29 RX ORDER — SODIUM CHLORIDE, SODIUM LACTATE, POTASSIUM CHLORIDE, CALCIUM CHLORIDE 600; 310; 30; 20 MG/100ML; MG/100ML; MG/100ML; MG/100ML
INJECTION, SOLUTION INTRAVENOUS CONTINUOUS
Status: DISCONTINUED | OUTPATIENT
Start: 2022-11-29 | End: 2022-11-30 | Stop reason: HOSPADM

## 2022-11-29 RX ORDER — ONDANSETRON 2 MG/ML
4 INJECTION INTRAMUSCULAR; INTRAVENOUS EVERY 30 MIN PRN
Status: DISCONTINUED | OUTPATIENT
Start: 2022-11-29 | End: 2022-11-30 | Stop reason: HOSPADM

## 2022-11-29 RX ORDER — GABAPENTIN 300 MG/1
300 CAPSULE ORAL
Status: COMPLETED | OUTPATIENT
Start: 2022-11-29 | End: 2022-11-29

## 2022-11-29 RX ORDER — PROPOFOL 10 MG/ML
INJECTION, EMULSION INTRAVENOUS CONTINUOUS PRN
Status: DISCONTINUED | OUTPATIENT
Start: 2022-11-29 | End: 2022-11-29

## 2022-11-29 RX ORDER — FENTANYL CITRATE 50 UG/ML
INJECTION, SOLUTION INTRAMUSCULAR; INTRAVENOUS PRN
Status: DISCONTINUED | OUTPATIENT
Start: 2022-11-29 | End: 2022-11-29

## 2022-11-29 RX ADMIN — PROPOFOL 100 MCG/KG/MIN: 10 INJECTION, EMULSION INTRAVENOUS at 07:53

## 2022-11-29 RX ADMIN — GABAPENTIN 300 MG: 300 CAPSULE ORAL at 07:03

## 2022-11-29 RX ADMIN — LIDOCAINE HYDROCHLORIDE 80 MG: 20 INJECTION, SOLUTION INFILTRATION; PERINEURAL at 07:53

## 2022-11-29 RX ADMIN — SODIUM CHLORIDE, SODIUM LACTATE, POTASSIUM CHLORIDE, CALCIUM CHLORIDE: 600; 310; 30; 20 INJECTION, SOLUTION INTRAVENOUS at 07:25

## 2022-11-29 RX ADMIN — LIDOCAINE HYDROCHLORIDE AND EPINEPHRINE 20 ML: 10; 10 INJECTION, SOLUTION INFILTRATION; PERINEURAL at 07:29

## 2022-11-29 RX ADMIN — FENTANYL CITRATE 50 MCG: 50 INJECTION, SOLUTION INTRAMUSCULAR; INTRAVENOUS at 07:50

## 2022-11-29 RX ADMIN — ACETAMINOPHEN 975 MG: 325 TABLET ORAL at 07:03

## 2022-11-29 NOTE — OP NOTE
OPERATIVE REPORT    PATIENT NAME: Marcel Melchor  MRN: [id number]    DATE: 11/29/2022    PREOPERATIVE DIAGNOSIS:   1. Left wrist carpal tunnel syndrome.    POSTOPERATIVE DIAGNOSIS:   1. Left wrist carpal tunnel syndrome.    OPERATION:   1. Left wrist carpal tunnel release. [id number]    SURGEON: Camila Medina MD     STAFF: Circulator: Lena Cardona RN  Scrub Person: Colleen Mathew MA    ANESTHESIA:  Local and IV sedation    IMPLANT(S): * No implants in log *    SPECIMEN: * No specimens in log *    ESTIMATED BLOOD LOSS: 5 mL.    FLUIDS: See anesthesia records.     URINE OUTPUT: Not applicable.  Bautista was not placed.     COMPLICATIONS: None.     INDICATIONS: Marcel Melchor is a 52 year old male who developed left carpal tunnel syndrome. The patient did not respond to conservative management and was therefore indicated for operative intervention. The risks, benefits, and alternatives were discussed with the patient.  The patient verbalized understanding of the treatment plan and an informed consent was signed. The surgical site was anesthetized with ~ 15 mL of a 1% lidocaine with 1:100,000 epinephrine. The patient tolerated the procedure well.    DESCRIPTION OF PROCEDURE: The patient was taken to the operating room and placed in supine position on the operating table. Sedation was provided by the department of Anesthesiology.  The left upper extremity was then prepped and draped in the usual sterile fashion.  A timeout was performed with all OR staff.  The patient name, MRN, operative extremity, procedure, allergies, antibiotics, DVT prophylaxis/SCDS, and fire precautions/plan were reviewed, and all were in agreement.    A longitudinal incision was made in the palm in-line with the 4th ray, just radial to the hook of hamate.  The incision extended from the distal wrist crease to Booker's cardinal line. The skin and the subcutaneous tissue were sharply incised.  Dissection was carried down to the palmar fascia,  which was incised followed by release of the transverse carpal ligament.  Distally the sentinel fat pad and superficial arch were identified and proximally the dissection was carried out under direct visualization to divide the antebrachial fascia and remainder of the transverse carpal ligament.  A complete release was confirmed and exploration of the carpal canal revealed no masses. The median nerve and its branches were intact.  The tourniquet was let down.  Hemostasis was achieved.  The wound was irrigated.  Closure was performed with 5-0 plain gut suture.  Sterile dressings were applied.  Capillary refill was intact < 2 seconds.      The patient was taken to the recovery room in stable condition.      All counts were correct at the end of the case.    There were no complications.      PARRISH ALBERTO MD

## 2022-11-29 NOTE — ANESTHESIA CARE TRANSFER NOTE
Patient: Marcel Melchor    Procedure: Procedure(s):  LEFT OPEN CARPAL TUNNEL RELEASE       Diagnosis: Left carpal tunnel syndrome [G56.02]  Diagnosis Additional Information: No value filed.    Anesthesia Type:   No value filed.     Note:    Oropharynx: oropharynx clear of all foreign objects and spontaneously breathing  Level of Consciousness: awake  Oxygen Supplementation: room air    Independent Airway: airway patency satisfactory and stable  Dentition: dentition unchanged  Vital Signs Stable: post-procedure vital signs reviewed and stable  Report to RN Given: handoff report given  Patient transferred to: Phase II    Handoff Report: Identifed the Patient, Identified the Reponsible Provider, Reviewed the pertinent medical history, Discussed the surgical course, Reviewed Intra-OP anesthesia mangement and issues during anesthesia, Set expectations for post-procedure period and Allowed opportunity for questions and acknowledgement of understanding      Vitals:  Vitals Value Taken Time   /78 11/29/22 0830   Temp 36.7  C (98  F) 11/29/22 0830   Pulse 62 11/29/22 0830   Resp 16 11/29/22 0830   SpO2 98 % 11/29/22 0830       Electronically Signed By: JENISE ALVAREZ  November 29, 2022  8:31 AM

## 2022-11-29 NOTE — ANESTHESIA PREPROCEDURE EVALUATION
Anesthesia Pre-Procedure Evaluation    Patient: Marcel Melchor   MRN: [id number] : 1970        Procedure : Procedure(s):  LEFT OPEN CARPAL TUNNEL RELEASE          Past Medical History:   Diagnosis Date     Anxiety 9/3/2013     AJAY (obstructive sleep apnea) 2014     Rotator cuff syndrome 2012      Past Surgical History:   Procedure Laterality Date     ZZC NONSPECIFIC PROCEDURE      fx R ankle-ORIF      Allergies   Allergen Reactions     Amoxicillin      itching     Penicillins      Other reaction(s): Hives, Hives/Fever      Social History     Tobacco Use     Smoking status: Never     Smokeless tobacco: Never   Substance Use Topics     Alcohol use: No     Alcohol/week: 0.0 standard drinks      Wt Readings from Last 1 Encounters:   22 90.7 kg (200 lb)        Anesthesia Evaluation            ROS/MED HX  ENT/Pulmonary:     (+) sleep apnea,     Neurologic:       Cardiovascular:       METS/Exercise Tolerance:     Hematologic:       Musculoskeletal:       GI/Hepatic:       Renal/Genitourinary:       Endo:       Psychiatric/Substance Use:     (+) psychiatric history anxiety and depression     Infectious Disease:       Malignancy:       Other:            Physical Exam    Airway        Mallampati: II       Respiratory Devices and Support         Dental           Cardiovascular          Rhythm and rate: regular     Pulmonary           breath sounds clear to auscultation           OUTSIDE LABS:  CBC:   Lab Results   Component Value Date    WBC 4.3 2020    WBC 6.8 2019    HGB 16.0 2020    HGB 17.9 (H) 2019    HCT 43.1 2020    HCT 49.0 2019     2020     2019     BMP:   Lab Results   Component Value Date     2020     2019    POTASSIUM 4.0 2020    POTASSIUM 4.0 2019    CHLORIDE 106 2020    CHLORIDE 105 2019    CO2 29 2020    CO2 30 2019    BUN 8 2020    BUN 10 2019    CR 1.05  11/12/2020    CR 0.93 04/30/2020    GLC 84 11/12/2020    GLC 87 05/03/2019     COAGS: No results found for: PTT, INR, FIBR  POC: No results found for: BGM, HCG, HCGS  HEPATIC:   Lab Results   Component Value Date    ALBUMIN 4.2 05/03/2019    PROTTOTAL 7.3 05/03/2019    ALT 35 04/30/2020    AST 26 04/30/2020    ALKPHOS 103 05/03/2019    BILITOTAL 0.8 05/03/2019     OTHER:   Lab Results   Component Value Date    A1C 4.9 06/09/2022    RICH 9.0 11/12/2020    TSH 1.13 05/03/2019    CRP <2.9 05/03/2019       Anesthesia Plan    ASA Status:  2   NPO Status:  NPO Appropriate    Anesthesia Type: MAC.              Consents    Anesthesia Plan(s) and associated risks, benefits, and realistic alternatives discussed. Questions answered and patient/representative(s) expressed understanding.    - Discussed:     - Discussed with:  Patient         Postoperative Care            Comments:                Tio Hall MD

## 2022-11-29 NOTE — ANESTHESIA POSTPROCEDURE EVALUATION
Patient: Marcel Melchor    Procedure: Procedure(s):  LEFT OPEN CARPAL TUNNEL RELEASE       Anesthesia Type:  No value filed.    Note:  Disposition: Outpatient   Postop Pain Control: Uneventful            Sign Out: Well controlled pain   PONV: No   Neuro/Psych: Uneventful            Sign Out: Acceptable/Baseline neuro status   Airway/Respiratory: Uneventful            Sign Out: Acceptable/Baseline resp. status   CV/Hemodynamics: Uneventful            Sign Out: Acceptable CV status; No obvious hypovolemia; No obvious fluid overload   Other NRE: NONE   DID A NON-ROUTINE EVENT OCCUR?            Last vitals:  Vitals Value Taken Time   /78 11/29/22 0848   Temp 36.7  C (98  F) 11/29/22 0848   Pulse 67 11/29/22 0848   Resp 16 11/29/22 0848   SpO2 99 % 11/29/22 0848       Electronically Signed By: Tio Hall MD  November 29, 2022  2:42 PM

## 2022-11-29 NOTE — INTERVAL H&P NOTE
I have reviewed the surgical (or preoperative) H&P that is linked to this encounter, and examined the patient. There are no significant changes    I had a thorough discussion today with the patient with regard to the intended surgical procedure: left carpal tunnel release. We reviewed the risks and benefits associated with conservative management and surgical intervention.  Surgery is indicated to release the median nerve, improve function and quality of life.  The risks of surgery include but are not limited to infection, bleeding, nerve injury, pillar pain, post-operative stiffness, surgical scar, CRPS, anesthetic complications, etc.  We also discussed that there is a possibility that the surgery will not be successful and will require repeat surgery. We reviewed post-operative pain management and rehabilitation.  All questions answered.  The patient fully understands and is agreeable to proceeding with the surgical procedure. An informed consent was signed.

## 2022-12-06 ENCOUNTER — OFFICE VISIT (OUTPATIENT)
Dept: ORTHOPEDICS | Facility: CLINIC | Age: 52
End: 2022-12-06
Payer: COMMERCIAL

## 2022-12-06 DIAGNOSIS — G56.02 LEFT CARPAL TUNNEL SYNDROME: Primary | ICD-10-CM

## 2022-12-06 PROCEDURE — 99024 POSTOP FOLLOW-UP VISIT: CPT | Performed by: STUDENT IN AN ORGANIZED HEALTH CARE EDUCATION/TRAINING PROGRAM

## 2022-12-06 NOTE — LETTER
12/6/2022         RE: Marcel Melchor  [address] [facility] [address]        Dear Colleague,    Thank you for referring your patient, Marcel Melchor, to the [facility] [place]. Please see a copy of my visit note below.    Date of Surgery: 11/29/2022  Surgery: left carpal tunnel release    Marcel Melchor is a 52 year old male presenting for post-operative evaluation.     Interval: Doing well.  Pain well controlled.  Compliant with postoperative restrictions.  The tingling of his thumb has resolved, the index and middle fingers are improving.    Physical Examination:  There were no vitals filed for this visit.  There is no height or weight on file to calculate BMI.    Well appearing, well nourished  Alert and oriented x 3, cooperative with exam     Left hand  Incision clean/dry/intact  Sutures in place   Minimal soft tissue swelling about the surgical site   No erythema/induration/fluctuance/drainage  Motor Exam: Intact TU/OK/x2-3. 5/5 1st DOI and thumb abduction  Sensory Exam: Sensation intact to light touch in FDWS (radial), volar IF (median), volar SF (ulnar)  Vascular Exam: 2+ radial pulse, < 2 sec capillary refill    Pathology: Not applicable.    Imaging/Studies: no new    Assessment: Marcel Melchor is a 52 year old male s/p left carpal tunnel release.    Plan:   I had a discussion with the patient regarding my clinical findings, diagnosis, and treatment plan. We reviewed the post-operative plan, frequency of follow-up, rehabilitation, and expected outcomes.  All questions answered.      PWB (< 5 lbs) left upper extremity.    OK to wash hands/shower.  Do not submerge incision until 4 weeks from the date of surgery.    Reviewed digit/wrist/forearm ROM exercises.     Next Visit:    Follow-up: 3 week(s).    Imaging: none    Plan: symptom and ROM check.        PARRISH ALBERTO MD

## 2022-12-06 NOTE — NURSING NOTE
Reason For Visit:   Chief Complaint   Patient presents with     Surgical Followup     1 wk POP left open CTR DOS: 11/29/22       Primary MD: Wes Braga  Ref. MD: JIM    Age: 52 year old    ?  No      There were no vitals taken for this visit.      Pain Assessment  Patient Currently in Pain: No    Hand Dominance Evaluation  Hand Dominance: Right          QuickDASH Assessment  QuickDASH Main 11/14/2022   1. Open a tight or new jar. No difficulty   2. Do heavy household chores (e.g., wash walls, floors) No difficulty   3. Carry a shopping bag or briefcase. Mild difficulty   4. Wash your back. No difficulty   5. Use a knife to cut food. No difficulty   6. Recreational activities in which you take some force or impact through your arm, shoulder or hand (e.g., golf, hammering, tennis, etc.). Severe difficulty   7. During the past week, to what extent has your arm, shoulder or hand problem interfered with your normal social activities with family, friends, neighbours or groups? Slightly   8. During the past week, were you limited in your work or other regular daily activities as a result of your arm, shoulder or hand problem? Slightly limited   9. Arm, shoulder or hand pain. Mild   10.Tingling (pins and needles) in your arm,shoulder or hand. Mild   11. During the past week, how much difficulty have you had sleeping because of the pain in your arm, shoulder or hand? Mild difficulty   Quickdash Ability Score 20.45          Current Outpatient Medications   Medication Sig Dispense Refill     anastrozole (ARIMIDEX) 1 MG tablet TAKE 1 TABLET BY MOUTH WEEKLY AS DIRECTED       ARIPiprazole (ABILIFY) 2 MG tablet TAKE 1 TAB BY MOUTH DAILY. PLEASE SCHEDULE FOLLOWUP APPOINTMENT 30 tablet 0     armodafinil (NUVIGIL) 250 MG TABS tablet TAKE 1 TABLET BY MOUTH EVERY DAY IN THE MORNING  1     ASACOL  MG EC tablet TAKE 3 TABLETS BY MOUTH TWICE DAILY  0     busPIRone (BUSPAR) 5 MG tablet TAKE 1 TABLET (5 MG) BY  MOUTH 2 TIMES DAILY AS NEEDED (ANXIETY) 180 tablet 0     clindamycin (CLEOCIN T) 1 % external lotion APPLY TOPICALLY TO FACE AND CHEST TWICE A DAY AS NEEDED 60 mL 0     desvenlafaxine (PRISTIQ) 100 MG 24 hr tablet TAKE 1 TABLET BY MOUTH EVERY DAY 90 tablet 1     diclofenac (VOLTAREN) 1 % topical gel Apply 4 g topically 4 times daily as needed for moderate pain 200 g 1     finasteride (PROPECIA) 1 MG tablet TAKE 1 TABLET BY MOUTH DAILY FOR HAIR LOSS.       mesalamine (CANASA) 1000 MG suppository Place 1,000 mg rectally 2 times daily       modafinil (PROVIGIL) 200 MG tablet TAKE 1 (200MG) BY MOUTH EVERY DAY AT NOON       testosterone cypionate (DEPOTESTOTERONE) 200 MG/ML injection Inject 50 mg into the muscle every 14 days       traMADol (ULTRAM) 50 MG tablet Take 1 tablet (50 mg) by mouth every 8 hours as needed for severe pain (Patient not taking: Reported on 11/29/2022) 5 tablet 0       Allergies   Allergen Reactions     Amoxicillin      itching     Penicillins      Other reaction(s): Hives, Hives/Fever       PEE Ulrich

## 2022-12-06 NOTE — PROGRESS NOTES
Date of Surgery: 11/29/2022  Surgery: left carpal tunnel release    Marcel Melchor is a 52 year old male presenting for post-operative evaluation.     Interval: Doing well.  Pain well controlled.  Compliant with postoperative restrictions.  The tingling of his thumb has resolved, the index and middle fingers are improving.    Physical Examination:  There were no vitals filed for this visit.  There is no height or weight on file to calculate BMI.    Well appearing, well nourished  Alert and oriented x 3, cooperative with exam     Left hand  Incision clean/dry/intact  Sutures in place   Minimal soft tissue swelling about the surgical site   No erythema/induration/fluctuance/drainage  Motor Exam: Intact TU/OK/x2-3. 5/5 1st DOI and thumb abduction  Sensory Exam: Sensation intact to light touch in FDWS (radial), volar IF (median), volar SF (ulnar)  Vascular Exam: 2+ radial pulse, < 2 sec capillary refill    Pathology: Not applicable.    Imaging/Studies: no new    Assessment: Marcel Melchor is a 52 year old male s/p left carpal tunnel release.    Plan:   I had a discussion with the patient regarding my clinical findings, diagnosis, and treatment plan. We reviewed the post-operative plan, frequency of follow-up, rehabilitation, and expected outcomes.  All questions answered.      PWB (< 5 lbs) left upper extremity.    OK to wash hands/shower.  Do not submerge incision until 4 weeks from the date of surgery.    Reviewed digit/wrist/forearm ROM exercises.     Next Visit:    Follow-up: 3 week(s).    Imaging: none    Plan: symptom and ROM check.    PARRISH ALBERTO MD

## 2022-12-07 ENCOUNTER — OFFICE VISIT (OUTPATIENT)
Dept: PSYCHOLOGY | Facility: CLINIC | Age: 52
End: 2022-12-07
Payer: COMMERCIAL

## 2022-12-07 DIAGNOSIS — F41.1 GAD (GENERALIZED ANXIETY DISORDER): Primary | ICD-10-CM

## 2022-12-07 PROCEDURE — 90837 PSYTX W PT 60 MINUTES: CPT | Performed by: MARRIAGE & FAMILY THERAPIST

## 2022-12-07 NOTE — PROGRESS NOTES
[facility]                                     Progress Note    Patient Name: Marcel NG Tutewohl  Date: 2022         Service Type: Family with client present      Session Start Time: 11:00AM  Session End Time: 11:55AM     Session Length: 55 minutes    Session #: 20    Attendees: Client and Spouse / Significant Other    Service Modality:  In-person    DATA  Extended Session (53+ minutes):   - High distress and under complex circumstances.  See Data section for details  Interactive Complexity: No  Crisis: No        Progress Since Last Session (Related to Symptoms / Goals / Homework):   Symptoms: Improving improved communication    Homework: Partially completed      Episode of Care Goals: Satisfactory progress - ACTION (Actively working towards change); Intervened by reinforcing change plan / affirming steps taken     Current / Ongoing Stressors and Concerns:  Couple process how both  services went for Ermelinda's father. They were solid in their support of one another. Marcel had carpal tunnel surgery and is preparing for shoulder surgery. Both have separate trips planned with friends and are also considering a trip to [place] together. Therapist encouraged couple to be intentional about having dinner together on  when Yara works late. Both willing to make the effort.      Treatment Objective(s) Addressed in This Session:   practice the use of time outs in marital communication  Use speaker/listener technique when communicating through challenging conversations  Create rituals for connection  Time together to discuss priorities     Intervention:   Relationship counseling    Assessments completed prior to visit:  The following assessments were completed by patient for this visit:  PROMIS 10-Global Health (all questions and answers displayed):   PROMIS 10 3/9/2022 2022 2022 10/26/2022   In general, would you say your health is: Good - - -   In general, would you say your  quality of life is: Very good - - -   In general, how would you rate your physical health? Good - - -   In general, how would you rate your mental health, including your mood and your ability to think? Good - - -   In general, how would you rate your satisfaction with your social activities and relationships? Very good - - -   In general, please rate how well you carry out your usual social activities and roles Very good - - -   To what extent are you able to carry out your everyday physical activities such as walking, climbing stairs, carrying groceries, or moving a chair? Mostly - - -   How often have you been bothered by emotional problems such as feeling anxious, depressed or irritable? Sometimes - - -   How would you rate your fatigue on average? Moderate - - -   How would you rate your pain on average?   0 = No Pain  to  10 = Worst Imaginable Pain 5 - - -   In general, would you say your health is: 3 3 3 3   In general, would you say your quality of life is: 4 4 4 4   In general, how would you rate your physical health? 3 3 3 3   In general, how would you rate your mental health, including your mood and your ability to think? 3 2 2 3   In general, how would you rate your satisfaction with your social activities and relationships? 4 3 3 4   In general, please rate how well you carry out your usual social activities and roles. (This includes activities at home, at work and in your community, and responsibilities as a parent, child, spouse, employee, friend, etc.) 4 3 3 4   To what extent are you able to carry out your everyday physical activities such as walking, climbing stairs, carrying groceries, or moving a chair? 4 4 4 4   In the past 7 days, how often have you been bothered by emotional problems such as feeling anxious, depressed, or irritable? 3 3 2 3   In the past 7 days, how would you rate your fatigue on average? 3 2 2 3   In the past 7 days, how would you rate your pain on average, where 0 means no  pain, and 10 means worst imaginable pain? 5 4 3 4   Global Mental Health Score 14 12 13 14   Global Physical Health Score 13 14 15 13   PROMIS TOTAL - SUBSCORES 27 26 28 27   Some recent data might be hidden           ASSESSMENT: Current Emotional / Mental Status (status of significant symptoms):   Risk status (Self / Other harm or suicidal ideation)   Patient denies current fears or concerns for personal safety.   Patient denies current or recent suicidal ideation or behaviors.   Patient denies current or recent homicidal ideation or behaviors.   Patient denies current or recent self injurious behavior or ideation.   Patient denies other safety concerns.   Patient reports there has been no change in risk factors since their last session.     Patient reports there has been no change in protective factors since their last session.     Recommended that patient call 911 or go to the local ED should there be a change in any of these risk factors.     Appearance:   Appropriate    Eye Contact:   Good    Psychomotor Behavior: Normal    Attitude:   Cooperative  Interested   Orientation:   All   Speech    Rate / Production: Normal/ Responsive Emotional Talkative    Volume:  Normal    Mood:    Normal   Affect:    Appropriate    Thought Content:  Clear    Thought Form:  Coherent  Logical    Insight:    Fair      Medication Review:   No changes to current psychiatric medication(s)     Medication Compliance:   Yes     Changes in Health Issues:   None reported     Chemical Use Review:   Substance Use: Chemical use reviewed, no active concerns identified      Tobacco Use: No current tobacco use.      Diagnosis:  1. ASUNCION (generalized anxiety disorder)        Collateral Reports Completed:   Not Applicable    PLAN: (Patient Tasks / Therapist Tasks / Other)  Homework: Couple to plan dinner together on Wednesday nights. Consider saying yes to [place].    Trinidad Solorio,  LMFT    ______________________________________________________________________    Treatment Plan    Patient's Name: Marcel Melchor  YOB: 1970    Date of Creation: 6/8/2022  Date Treatment Plan Last Reviewed/Revised: 10/26/2022    DSM5 Diagnoses: 300.02 (F41.1) Generalized Anxiety Disorder  Psychosocial / Contextual Factors: grief and loss, marital discord, work stress, back pain  PROMIS (reviewed every 90 days): 10/26/2022 Score: 27    Referral / Collaboration:  Was/were discussed and patient will pursue.    Anticipated number of session for this episode of care: 24  Anticipation frequency of session: Every other week  Anticipated Duration of each session: 38-52 minutes  Treatment plan will be reviewed in 90 days or when goals have been changed.       MeasurableTreatment Goal(s) related to diagnosis / functional impairment(s)  Goal 1: Patient will lower GAD7 score to 3 or below    I will know I've met my goal when I'm less anxious and irritable.      Objective #A (Patient Action)    Patient will use relaxation strategies 3 times per day to reduce the physical symptoms of anxiety.  Status: Continued - Date(s): 10/26/2022     Intervention(s)  Therapist will teach client stress management and relaxation strategies.    Objective #B  Patient will practice the use of timeouts.  Status: Continued - Date(s): 10/26/2022     Intervention(s)  Therapist will encourage client to use time outs when overly agitated.    Objective #C  Patient will use the speaker/listener technique in communication.  Status: Continued - Date(s): 10/26/2022     Intervention(s)  Therapist will teach S/L Technique.        Patient has reviewed and agreed to the above plan.      Trinidad Solorio, DOT  October 26, 2022

## 2022-12-19 DIAGNOSIS — F39 MOOD DISORDER (H): ICD-10-CM

## 2023-01-05 RX ORDER — ARIPIPRAZOLE 2 MG/1
TABLET ORAL
Qty: 30 TABLET | Refills: 0 | OUTPATIENT
Start: 2023-01-05

## 2023-01-05 NOTE — TELEPHONE ENCOUNTER
"    1) RN reviewed provider note: Yes, the last script clearly says it needs a follow up.  I am going to refuse.  Thank you. Nursing, can you refuse somehow?  Or, if he gets scheduled, I will fill.  It has been over six months since last visit. Thank you! Carmel Schilling, DNP, APRN, HNP-BC.\"    2) RN sent refill request back to pharmacy as DENIED, with note for pt to call [phone] to schedule follow-up before any refills.    3) Routing to [facility] to reach out and help pt schedule an appointment.     Leighann Medina RN on 1/5/2023 at 3:10 PM    "

## 2023-01-10 RX ORDER — ARIPIPRAZOLE 2 MG/1
2 TABLET ORAL DAILY
Qty: 30 TABLET | Refills: 0 | Status: SHIPPED | OUTPATIENT
Start: 2023-01-10 | End: 2023-04-11

## 2023-02-01 ENCOUNTER — OFFICE VISIT (OUTPATIENT)
Dept: PSYCHOLOGY | Facility: CLINIC | Age: 53
End: 2023-02-01
Payer: COMMERCIAL

## 2023-02-01 DIAGNOSIS — F41.1 GAD (GENERALIZED ANXIETY DISORDER): Primary | ICD-10-CM

## 2023-02-01 PROCEDURE — 90837 PSYTX W PT 60 MINUTES: CPT | Performed by: MARRIAGE & FAMILY THERAPIST

## 2023-02-01 NOTE — PROGRESS NOTES
[facility]                                     Progress Note    Patient Name: Marcel NG Tutewohl  Date: 2/1/2023         Service Type: Family with client present      Session Start Time: 11:00AM  Session End Time: 11:55AM     Session Length: 55 minutes    Session #: 21    Attendees: Client and Spouse / Significant Other    Service Modality:  In-person    DATA  Extended Session (53+ minutes):   - High distress and under complex circumstances.  See Data section for details  Interactive Complexity: No  Crisis: No        Progress Since Last Session (Related to Symptoms / Goals / Homework):   Symptoms: Improving improved communication    Homework: Partially completed      Episode of Care Goals: Satisfactory progress - ACTION (Actively working towards change); Intervened by reinforcing change plan / affirming steps taken     Current / Ongoing Stressors and Concerns:  Couple have been doing well and continuing to progress. Dance classes continue as do Wednesday dinners together. Both have done some travel and have more coming up. Marcel has a shoulder procedure. Continuing to grieve the loss of Ermelinda's father and spending time with Stepmother Mana. Talked about not always having conversation at dinner so therapist recommends using [employer] card deck.      Treatment Objective(s) Addressed in This Session:   practice the use of time outs in marital communication  Use speaker/listener technique when communicating through challenging conversations  Create rituals for connection  Time together to discuss priorities     Intervention:   Relationship counseling    Assessments completed prior to visit:  The following assessments were completed by patient for this visit:  PROMIS 10-Global Health (all questions and answers displayed):   PROMIS 10 3/9/2022 6/8/2022 6/29/2022 10/26/2022 2/1/2023   In general, would you say your health is: Good - - - -   In general, would you say your quality of life is: Very good - -  - -   In general, how would you rate your physical health? Good - - - -   In general, how would you rate your mental health, including your mood and your ability to think? Good - - - -   In general, how would you rate your satisfaction with your social activities and relationships? Very good - - - -   In general, please rate how well you carry out your usual social activities and roles Very good - - - -   To what extent are you able to carry out your everyday physical activities such as walking, climbing stairs, carrying groceries, or moving a chair? Mostly - - - -   How often have you been bothered by emotional problems such as feeling anxious, depressed or irritable? Sometimes - - - -   How would you rate your fatigue on average? Moderate - - - -   How would you rate your pain on average?   0 = No Pain  to  10 = Worst Imaginable Pain 5 - - - -   In general, would you say your health is: 3 3 3 3 3   In general, would you say your quality of life is: 4 4 4 4 4   In general, how would you rate your physical health? 3 3 3 3 3   In general, how would you rate your mental health, including your mood and your ability to think? 3 2 2 3 3   In general, how would you rate your satisfaction with your social activities and relationships? 4 3 3 4 4   In general, please rate how well you carry out your usual social activities and roles. (This includes activities at home, at work and in your community, and responsibilities as a parent, child, spouse, employee, friend, etc.) 4 3 3 4 4   To what extent are you able to carry out your everyday physical activities such as walking, climbing stairs, carrying groceries, or moving a chair? 4 4 4 4 5   In the past 7 days, how often have you been bothered by emotional problems such as feeling anxious, depressed, or irritable? 3 3 2 3 2   In the past 7 days, how would you rate your fatigue on average? 3 2 2 3 2   In the past 7 days, how would you rate your pain on average, where 0 means no  pain, and 10 means worst imaginable pain? 5 4 3 4 3   Global Mental Health Score 14 12 13 14 15   Global Physical Health Score 13 14 15 13 16   PROMIS TOTAL - SUBSCORES 27 26 28 27 31   Some recent data might be hidden           ASSESSMENT: Current Emotional / Mental Status (status of significant symptoms):   Risk status (Self / Other harm or suicidal ideation)   Patient denies current fears or concerns for personal safety.   Patient denies current or recent suicidal ideation or behaviors.   Patient denies current or recent homicidal ideation or behaviors.   Patient denies current or recent self injurious behavior or ideation.   Patient denies other safety concerns.   Patient reports there has been no change in risk factors since their last session.     Patient reports there has been no change in protective factors since their last session.     Recommended that patient call 911 or go to the local ED should there be a change in any of these risk factors.     Appearance:   Appropriate    Eye Contact:   Good    Psychomotor Behavior: Normal    Attitude:   Cooperative  Interested   Orientation:   All   Speech    Rate / Production: Normal/ Responsive Emotional Talkative    Volume:  Normal    Mood:    Normal   Affect:    Appropriate    Thought Content:  Clear    Thought Form:  Coherent  Logical    Insight:    Fair      Medication Review:   No changes to current psychiatric medication(s)     Medication Compliance:   Yes     Changes in Health Issues:   None reported     Chemical Use Review:   Substance Use: Chemical use reviewed, no active concerns identified      Tobacco Use: No current tobacco use.      Diagnosis:  1. ASUNCION (generalized anxiety disorder)        Collateral Reports Completed:   Not Applicable    PLAN: (Patient Tasks / Therapist Tasks / Other)  Homework: Couple to plan dinner together on Wednesday nights. Use conversation cards to improve time together.    Trinidad Solorio,  LMFT    ______________________________________________________________________    Treatment Plan    Patient's Name: Marcel Melchor  YOB: 1970    Date of Creation: 6/8/2022  Date Treatment Plan Last Reviewed/Revised: 2/1/2023    DSM5 Diagnoses: 300.02 (F41.1) Generalized Anxiety Disorder  Psychosocial / Contextual Factors: grief and loss, marital discord, work stress, back pain  PROMIS (reviewed every 90 days): 2/1/2023 Score: 31    Referral / Collaboration:  Was/were discussed and patient will pursue.    Anticipated number of session for this episode of care: 24  Anticipation frequency of session: Every other week  Anticipated Duration of each session: 38-52 minutes  Treatment plan will be reviewed in 90 days or when goals have been changed.       MeasurableTreatment Goal(s) related to diagnosis / functional impairment(s)  Goal 1: Patient will lower GAD7 score to 3 or below    I will know I've met my goal when I'm less anxious and irritable.      Objective #A (Patient Action)    Patient will have weekly date nights withi his wife .  Status: New - Date: 2/1/2023    Intervention(s)  Therapist will teach the benefits of date night and intentional time together.    Objective #B  Patient will practice the use of timeouts.  Status: Continued - Date(s): 2/1/2023     Intervention(s)  Therapist will encourage client to use time outs when overly agitated.    Objective #C  Patient will use the speaker/listener technique in communication.  Status: Continued - Date(s): 2/1/2023    Intervention(s)  Therapist will teach S/L Technique.        Patient has reviewed and agreed to the above plan.      Trinidad Solorio, DOT  February 1, 2023

## 2023-03-08 ENCOUNTER — OFFICE VISIT (OUTPATIENT)
Dept: PSYCHOLOGY | Facility: CLINIC | Age: 53
End: 2023-03-08
Payer: COMMERCIAL

## 2023-03-08 DIAGNOSIS — F41.1 GAD (GENERALIZED ANXIETY DISORDER): Primary | ICD-10-CM

## 2023-03-08 PROCEDURE — 90837 PSYTX W PT 60 MINUTES: CPT | Performed by: MARRIAGE & FAMILY THERAPIST

## 2023-03-08 NOTE — PROGRESS NOTES
[facility]                                     Progress Note    Patient Name: Marcel NG Tutewohl  Date: 3/8/2023         Service Type: Family with client present      Session Start Time: 11:00AM  Session End Time: 11:55AM     Session Length: 55 minutes    Session #: 22    Attendees: Client and Spouse / Significant Other    Service Modality:  In-person    DATA  Extended Session (53+ minutes):   - Patient travels a long distance for appointments and needed longer session due limited access to mental health services  Interactive Complexity: No  Crisis: No        Progress Since Last Session (Related to Symptoms / Goals / Homework):   Symptoms: Improving improved communication    Homework: Partially completed      Episode of Care Goals: Satisfactory progress - ACTION (Actively working towards change); Intervened by reinforcing change plan / affirming steps taken     Current / Ongoing Stressors and Concerns:  Couple have been doing well but Marcel is dealing with some increased depression related to his recent shoulder procedure. He has not been able to be active or work which is frustrating. He had another surgery before this one so both procedures have taken a toll on his mental health. Client is hard on himself and likes to be productive. Talked about being grateful this is temporary and not be so hard on himself. Couple to continue with date night on Wednesdays.      Treatment Objective(s) Addressed in This Session:   practice the use of time outs in marital communication  Use speaker/listener technique when communicating through challenging conversations  Create rituals for connection  Time together to discuss priorities     Intervention:   Relationship counseling    Assessments completed prior to visit:  The following assessments were completed by patient for this visit:  PROMIS 10-Global Health (all questions and answers displayed):   PROMIS 10 3/9/2022 6/8/2022 6/29/2022 10/26/2022 2/1/2023   In  general, would you say your health is: Good - - - -   In general, would you say your quality of life is: Very good - - - -   In general, how would you rate your physical health? Good - - - -   In general, how would you rate your mental health, including your mood and your ability to think? Good - - - -   In general, how would you rate your satisfaction with your social activities and relationships? Very good - - - -   In general, please rate how well you carry out your usual social activities and roles Very good - - - -   To what extent are you able to carry out your everyday physical activities such as walking, climbing stairs, carrying groceries, or moving a chair? Mostly - - - -   How often have you been bothered by emotional problems such as feeling anxious, depressed or irritable? Sometimes - - - -   How would you rate your fatigue on average? Moderate - - - -   How would you rate your pain on average?   0 = No Pain  to  10 = Worst Imaginable Pain 5 - - - -   In general, would you say your health is: 3 3 3 3 3   In general, would you say your quality of life is: 4 4 4 4 4   In general, how would you rate your physical health? 3 3 3 3 3   In general, how would you rate your mental health, including your mood and your ability to think? 3 2 2 3 3   In general, how would you rate your satisfaction with your social activities and relationships? 4 3 3 4 4   In general, please rate how well you carry out your usual social activities and roles. (This includes activities at home, at work and in your community, and responsibilities as a parent, child, spouse, employee, friend, etc.) 4 3 3 4 4   To what extent are you able to carry out your everyday physical activities such as walking, climbing stairs, carrying groceries, or moving a chair? 4 4 4 4 5   In the past 7 days, how often have you been bothered by emotional problems such as feeling anxious, depressed, or irritable? 3 3 2 3 2   In the past 7 days, how would you  rate your fatigue on average? 3 2 2 3 2   In the past 7 days, how would you rate your pain on average, where 0 means no pain, and 10 means worst imaginable pain? 5 4 3 4 3   Global Mental Health Score 14 12 13 14 15   Global Physical Health Score 13 14 15 13 16   PROMIS TOTAL - SUBSCORES 27 26 28 27 31   Some recent data might be hidden           ASSESSMENT: Current Emotional / Mental Status (status of significant symptoms):   Risk status (Self / Other harm or suicidal ideation)   Patient denies current fears or concerns for personal safety.   Patient denies current or recent suicidal ideation or behaviors.   Patient denies current or recent homicidal ideation or behaviors.   Patient denies current or recent self injurious behavior or ideation.   Patient denies other safety concerns.   Patient reports there has been no change in risk factors since their last session.     Patient reports there has been no change in protective factors since their last session.     Recommended that patient call 911 or go to the local ED should there be a change in any of these risk factors.     Appearance:   Appropriate    Eye Contact:   Good    Psychomotor Behavior: Normal    Attitude:   Cooperative  Interested   Orientation:   All   Speech    Rate / Production: Normal/ Responsive Emotional Talkative    Volume:  Normal    Mood:    Depressed    Affect:    Appropriate    Thought Content:  Clear    Thought Form:  Coherent  Logical    Insight:    Fair      Medication Review:   No changes to current psychiatric medication(s)     Medication Compliance:   Yes     Changes in Health Issues:   None reported     Chemical Use Review:   Substance Use: Chemical use reviewed, no active concerns identified      Tobacco Use: No current tobacco use.      Diagnosis:  1. ASUNCION (generalized anxiety disorder)        Collateral Reports Completed:   Not Applicable    PLAN: (Patient Tasks / Therapist Tasks / Other)  Homework: Couple continue to plan dinner  together on Wednesday nights. Marcel to be kinder to himself while recovering.    DOT Thompson    ______________________________________________________________________    Treatment Plan    Patient's Name: Marcel Melchor  YOB: 1970    Date of Creation: 6/8/2022  Date Treatment Plan Last Reviewed/Revised: 2/1/2023    DSM5 Diagnoses: 300.02 (F41.1) Generalized Anxiety Disorder  Psychosocial / Contextual Factors: grief and loss, marital discord, work stress, back pain  PROMIS (reviewed every 90 days): 2/1/2023 Score: 31    Referral / Collaboration:  Was/were discussed and patient will pursue.    Anticipated number of session for this episode of care: 24  Anticipation frequency of session: Every other week  Anticipated Duration of each session: 38-52 minutes  Treatment plan will be reviewed in 90 days or when goals have been changed.       MeasurableTreatment Goal(s) related to diagnosis / functional impairment(s)  Goal 1: Patient will lower GAD7 score to 3 or below    I will know I've met my goal when I'm less anxious and irritable.      Objective #A (Patient Action)    Patient will have weekly date nights withi his wife .  Status: New - Date: 2/1/2023    Intervention(s)  Therapist will teach the benefits of date night and intentional time together.    Objective #B  Patient will practice the use of timeouts.  Status: Continued - Date(s): 2/1/2023     Intervention(s)  Therapist will encourage client to use time outs when overly agitated.    Objective #C  Patient will use the speaker/listener technique in communication.  Status: Continued - Date(s): 2/1/2023    Intervention(s)  Therapist will teach S/L Technique.        Patient has reviewed and agreed to the above plan.      DOT Thompson  February 1, 2023

## 2023-04-10 DIAGNOSIS — F39 MOOD DISORDER (H): ICD-10-CM

## 2023-04-10 NOTE — TELEPHONE ENCOUNTER
Patient calling and is requesting Abilify refill.  States he made appt but can not get in til 5/23/23.  T'd up.  Routed to refill pool to process.  Shahana Redmond RN

## 2023-04-10 NOTE — TELEPHONE ENCOUNTER
"Refill request r'cd from [facility] via fax for Aripriprazole 2 mg  denied due to no longer under provider's care. Faxed \"not authorized\" back to pharmacy.    Last evaluated by Aria on 6/1/22          "

## 2023-04-11 RX ORDER — ARIPIPRAZOLE 2 MG/1
2 TABLET ORAL DAILY
Qty: 90 TABLET | Refills: 0 | Status: SHIPPED | OUTPATIENT
Start: 2023-04-11 | End: 2023-05-23

## 2023-05-13 DIAGNOSIS — F41.1 GAD (GENERALIZED ANXIETY DISORDER): ICD-10-CM

## 2023-05-15 NOTE — TELEPHONE ENCOUNTER
Routing refill request to provider for review/approval because:  BP Readings from Last 6 Encounters:   11/29/22 (!) 159/78   11/22/22 128/64   10/31/22 118/76   10/12/22 120/70   10/10/22 120/80   07/18/22 118/76      Veena Nath RN

## 2023-05-16 RX ORDER — DESVENLAFAXINE 100 MG/1
TABLET, EXTENDED RELEASE ORAL
Qty: 30 TABLET | Refills: 1 | Status: SHIPPED | OUTPATIENT
Start: 2023-05-16 | End: 2023-05-23

## 2023-05-17 ENCOUNTER — OFFICE VISIT (OUTPATIENT)
Dept: PSYCHOLOGY | Facility: CLINIC | Age: 53
End: 2023-05-17
Payer: COMMERCIAL

## 2023-05-17 DIAGNOSIS — F41.1 GAD (GENERALIZED ANXIETY DISORDER): Primary | ICD-10-CM

## 2023-05-17 PROCEDURE — 90837 PSYTX W PT 60 MINUTES: CPT | Performed by: MARRIAGE & FAMILY THERAPIST

## 2023-05-17 NOTE — TELEPHONE ENCOUNTER
BP elevated, has not had visit or PHQ for 6 months.  I have not seen him for anything in a year.    Will fill short supply.  Patient needs to schedule appointment with a provider for more.      Wes

## 2023-05-18 NOTE — PROGRESS NOTES
[facility]                                     Progress Note    Patient Name: Marcel NG Tutewohl  Date: 5/17/2023         Service Type: Family with client present      Session Start Time: 2:00PM  Session End Time: 2:55PM     Session Length: 55 minutes    Session #: 23    Attendees: Client and Spouse / Significant Other    Service Modality:  In-person    DATA  Extended Session (53+ minutes):   - Patient travels a long distance for appointments and needed longer session due limited access to mental health services  Interactive Complexity: No  Crisis: No        Progress Since Last Session (Related to Symptoms / Goals / Homework):   Symptoms: Improving improved communication    Homework: Partially completed      Episode of Care Goals: Satisfactory progress - ACTION (Actively working towards change); Intervened by reinforcing change plan / affirming steps taken     Current / Ongoing Stressors and Concerns:  Couple have been doing well but Marcel is still dealing with some shoulder pain and discomfort. It is possible he could need to have another surgery. Their daughter is engaged (Leonie asked for her hand). Talked about Marcel's criteria for giving it (asking Leonie to have Yara's back due to a recent event). Couple have been busy with work and state they have 23 guys on jobs right now which is busy. Overall they've been good but did have a conflict 3 weeks ago due to Marcel buying a 3rd dune buggy without telling Ermelinda. They didn't speak for 3 weeks and process some of the issues that caused the conflict. Ermelinda has some concerns and trauma related to Marcel's over accumulation of toys as she saw her parents lose everything. Also she was upset that he chose to not tell her about it. Marcel tries to defend his position and therapist tries to help him see things from Ermelinda's perspective which can be a challenge for Marcel. Encouraged him to understand how she prioritizes security but also gives him much leeway with  his spending. He does feel entitled to his spending because of how hard he works.       Treatment Objective(s) Addressed in This Session:   practice the use of time outs in marital communication  Use speaker/listener technique when communicating through challenging conversations  Create rituals for connection  Time together to discuss priorities     Intervention:   Relationship counseling    Assessments completed prior to visit:  The following assessments were completed by patient for this visit:  PROMIS 10-Global Health (all questions and answers displayed):       3/9/2022    10:56 AM 6/8/2022     1:25 PM 6/29/2022    10:00 PM 10/26/2022    12:00 PM 2/1/2023    12:00 PM 5/18/2023     3:00 PM   PROMIS 10   In general, would you say your health is: Good        In general, would you say your quality of life is: Very good        In general, how would you rate your physical health? Good        In general, how would you rate your mental health, including your mood and your ability to think? Good        In general, how would you rate your satisfaction with your social activities and relationships? Very good        In general, please rate how well you carry out your usual social activities and roles Very good        To what extent are you able to carry out your everyday physical activities such as walking, climbing stairs, carrying groceries, or moving a chair? Mostly        In the past 7 days, how often have you been bothered by emotional problems such as feeling anxious, depressed, or irritable? Sometimes        In the past 7 days, how would you rate your fatigue on average? Moderate        In the past 7 days, how would you rate your pain on average, where 0 means no pain, and 10 means worst imaginable pain? 5        In general, would you say your health is: 3 3 3 3 3 3   In general, would you say your quality of life is: 4 4 4 4 4 3   In general, how would you rate your physical health? 3 3 3 3 3 3   In general, how  would you rate your mental health, including your mood and your ability to think? 3 2 2 3 3 3   In general, how would you rate your satisfaction with your social activities and relationships? 4 3 3 4 4 4   In general, please rate how well you carry out your usual social activities and roles. (This includes activities at home, at work and in your community, and responsibilities as a parent, child, spouse, employee, friend, etc.) 4 3 3 4 4 4   To what extent are you able to carry out your everyday physical activities such as walking, climbing stairs, carrying groceries, or moving a chair? 4 4 4 4 5 4   In the past 7 days, how often have you been bothered by emotional problems such as feeling anxious, depressed, or irritable? 3 3 2 3 2 3   In the past 7 days, how would you rate your fatigue on average? 3 2 2 3 2 2   In the past 7 days, how would you rate your pain on average, where 0 means no pain, and 10 means worst imaginable pain? 5 4 3 4 3 4   Global Mental Health Score 14 12 13 14 15 13   Global Physical Health Score 13 14 15 13 16 14   PROMIS TOTAL - SUBSCORES 27 26 28 27 31 27           ASSESSMENT: Current Emotional / Mental Status (status of significant symptoms):   Risk status (Self / Other harm or suicidal ideation)   Patient denies current fears or concerns for personal safety.   Patient denies current or recent suicidal ideation or behaviors.   Patient denies current or recent homicidal ideation or behaviors.   Patient denies current or recent self injurious behavior or ideation.   Patient denies other safety concerns.   Patient reports there has been no change in risk factors since their last session.     Patient reports there has been no change in protective factors since their last session.     Recommended that patient call 911 or go to the local ED should there be a change in any of these risk factors.     Appearance:   Appropriate    Eye Contact:   Good    Psychomotor Behavior: Normal     Attitude:   Cooperative  Interested   Orientation:   All   Speech    Rate / Production: Normal/ Responsive Emotional Talkative    Volume:  Normal    Mood:    Depressed    Affect:    Appropriate    Thought Content:  Clear    Thought Form:  Coherent  Logical    Insight:    Fair      Medication Review:   No changes to current psychiatric medication(s)     Medication Compliance:   Yes     Changes in Health Issues:   Yes: Pain, Associated Psychological Distress     Chemical Use Review:   Substance Use: Chemical use reviewed, no active concerns identified      Tobacco Use: No current tobacco use.      Diagnosis:  1. ASUNCION (generalized anxiety disorder)        Collateral Reports Completed:   Not Applicable    PLAN: (Patient Tasks / Therapist Tasks / Other)  Homework: Discuss next time: couple continue to revisit idea of dinner together on Wednesday nights. Marcel to be more transparent about spending and be mindful of Ermelinda's need for security. Consider working with a .    DOT Thompson    ______________________________________________________________________    Treatment Plan    Patient's Name: Marcel Melchor  YOB: 1970    Date of Creation: 6/8/2022  Date Treatment Plan Last Reviewed/Revised: 5/17/2023    DSM5 Diagnoses: 300.02 (F41.1) Generalized Anxiety Disorder  Psychosocial / Contextual Factors: marital discord, work stress, pain issues  PROMIS (reviewed every 90 days): 5/17/2023 Score: 27     Referral / Collaboration:  Was/were discussed and patient will pursue.    Anticipated number of session for this episode of care: 24  Anticipation frequency of session: Every other week  Anticipated Duration of each session: 38-52 minutes  Treatment plan will be reviewed in 90 days or when goals have been changed.       MeasurableTreatment Goal(s) related to diagnosis / functional impairment(s)  Goal 1: Patient will lower GAD7 score to 3 or below    I will know I've met my goal when I'm  less anxious and irritable.      Objective #A (Patient Action)    Patient will have weekly date nights withi his wife .  Status: Continued - Date(s):5/17/2023    Intervention(s)  Therapist will teach the benefits of date night and intentional time together.    Objective #B  Patient will practice the use of timeouts.  Status: Continued - Date(s): 5/17/2023     Intervention(s)  Therapist will encourage client to use time outs when overly agitated.    Objective #C  Patient will use the speaker/listener technique in communication.  Status: Continued - Date(s): 5/17/2023    Intervention(s)  Therapist will teach S/L Technique.      Patient has reviewed and agreed to the above plan.      Trinidad Solorio, LMFT  May 17, 2023

## 2023-05-23 ENCOUNTER — OFFICE VISIT (OUTPATIENT)
Dept: FAMILY MEDICINE | Facility: CLINIC | Age: 53
End: 2023-05-23
Payer: COMMERCIAL

## 2023-05-23 VITALS
HEIGHT: 69 IN | HEART RATE: 71 BPM | OXYGEN SATURATION: 98 % | RESPIRATION RATE: 18 BRPM | BODY MASS INDEX: 28.93 KG/M2 | WEIGHT: 195.3 LBS | SYSTOLIC BLOOD PRESSURE: 121 MMHG | DIASTOLIC BLOOD PRESSURE: 65 MMHG

## 2023-05-23 DIAGNOSIS — F41.1 GAD (GENERALIZED ANXIETY DISORDER): ICD-10-CM

## 2023-05-23 DIAGNOSIS — F39 MOOD DISORDER (H): ICD-10-CM

## 2023-05-23 PROCEDURE — 99213 OFFICE O/P EST LOW 20 MIN: CPT | Performed by: PHYSICIAN ASSISTANT

## 2023-05-23 RX ORDER — DESVENLAFAXINE 100 MG/1
100 TABLET, EXTENDED RELEASE ORAL DAILY
Qty: 90 TABLET | Refills: 1 | Status: SHIPPED | OUTPATIENT
Start: 2023-05-23 | End: 2023-12-07

## 2023-05-23 RX ORDER — ARIPIPRAZOLE 5 MG/1
5 TABLET ORAL DAILY
Qty: 90 TABLET | Refills: 0 | Status: SHIPPED | OUTPATIENT
Start: 2023-05-23 | End: 2023-08-22

## 2023-05-23 RX ORDER — DEXTROAMPHETAMINE SACCHARATE, AMPHETAMINE ASPARTATE, DEXTROAMPHETAMINE SULFATE AND AMPHETAMINE SULFATE 2.5; 2.5; 2.5; 2.5 MG/1; MG/1; MG/1; MG/1
TABLET ORAL
COMMUNITY
Start: 2023-04-12 | End: 2023-12-18

## 2023-05-23 NOTE — PROGRESS NOTES
"  Assessment & Plan     Mood disorder (H)  Increase dose to 5mg- can plan to go higher if needed.  Continue therapy.  - ARIPiprazole (ABILIFY) 5 MG tablet; Take 1 tablet (5 mg) by mouth daily    ASUNCION (generalized anxiety disorder)  Refilled today.  Mood is stable currently  - desvenlafaxine (PRISTIQ) 100 MG 24 hr tablet; Take 1 tablet (100 mg) by mouth daily         BMI:   Estimated body mass index is 28.84 kg/m  as calculated from the following:    Height as of this encounter: 1.753 m (5' 9\").    Weight as of this encounter: 88.6 kg (195 lb 4.8 oz).   Weight management plan: Discussed healthy diet and exercise guidelines        SRINIVAS Cardoso [facility] TRICIA Rod is a 53 year old, presenting for the following health issues:  Medication Follow-up        5/23/2023     6:46 AM   Additional Questions   Roomed by josse munoz         5/23/2023     6:46 AM   Patient Reported Additional Medications   Patient reports taking the following new medications none     HPI     Depression and Anxiety Follow-Up    How are you doing with your depression since your last visit? No change \"hit or miss\"    How are you doing with your anxiety since your last visit?  No change    Are you having other symptoms that might be associated with depression or anxiety? Yes:       Have you had a significant life event? No     Do you have any concerns with your use of alcohol or other drugs? No    Social History     Tobacco Use     Smoking status: Never     Smokeless tobacco: Never   Vaping Use     Vaping status: Never Used     Passive vaping exposure: Yes   Substance Use Topics     Alcohol use: No     Alcohol/week: 0.0 standard drinks of alcohol     Drug use: No         4/4/2022    10:08 AM 6/9/2022     6:39 AM 10/12/2022     7:06 AM   PHQ   PHQ-9 Total Score 12 1    1 0   Q9: Thoughts of better off dead/self-harm past 2 weeks Not at all Not at all    Not at all Not at all         11/22/2021     7:07 " "AM 4/4/2022    10:08 AM 10/12/2022     7:07 AM   ASUNCION-7 SCORE   Total Score 10 (moderate anxiety) 8 (mild anxiety) 0 (minimal anxiety)   Total Score 10 8 0     On and off Abilify in the past few months due to refill issues.  He does feel better in general when on it.  Gives him energy in the AM.  Stabilizes mood.  Still also taking Pristiq  Seeing therapy regularly      Review of Systems   Constitutional, HEENT, cardiovascular, pulmonary, gi and gu systems are negative, except as otherwise noted.      Objective    /65 (BP Location: Right arm, Patient Position: Sitting, Cuff Size: Adult Large)   Pulse 71   Resp 18   Ht 1.753 m (5' 9\")   Wt 88.6 kg (195 lb 4.8 oz)   SpO2 98%   BMI 28.84 kg/m    Body mass index is 28.84 kg/m .  Physical Exam   GENERAL: healthy, alert and no distress  PSYCH: mentation appears normal, affect normal/bright                     "

## 2023-05-25 ASSESSMENT — PATIENT HEALTH QUESTIONNAIRE - PHQ9
5. POOR APPETITE OR OVEREATING: SEVERAL DAYS
SUM OF ALL RESPONSES TO PHQ QUESTIONS 1-9: 9

## 2023-05-25 ASSESSMENT — ANXIETY QUESTIONNAIRES
3. WORRYING TOO MUCH ABOUT DIFFERENT THINGS: SEVERAL DAYS
5. BEING SO RESTLESS THAT IT IS HARD TO SIT STILL: SEVERAL DAYS
6. BECOMING EASILY ANNOYED OR IRRITABLE: MORE THAN HALF THE DAYS
7. FEELING AFRAID AS IF SOMETHING AWFUL MIGHT HAPPEN: NOT AT ALL
GAD7 TOTAL SCORE: 9
1. FEELING NERVOUS, ANXIOUS, OR ON EDGE: NEARLY EVERY DAY
2. NOT BEING ABLE TO STOP OR CONTROL WORRYING: SEVERAL DAYS
GAD7 TOTAL SCORE: 9
IF YOU CHECKED OFF ANY PROBLEMS ON THIS QUESTIONNAIRE, HOW DIFFICULT HAVE THESE PROBLEMS MADE IT FOR YOU TO DO YOUR WORK, TAKE CARE OF THINGS AT HOME, OR GET ALONG WITH OTHER PEOPLE: SOMEWHAT DIFFICULT

## 2023-06-18 ENCOUNTER — HEALTH MAINTENANCE LETTER (OUTPATIENT)
Age: 53
End: 2023-06-18

## 2023-07-07 DIAGNOSIS — F39 MOOD DISORDER (H): ICD-10-CM

## 2023-07-10 RX ORDER — ARIPIPRAZOLE 2 MG/1
TABLET ORAL
Qty: 90 TABLET | Refills: 0 | OUTPATIENT
Start: 2023-07-10

## 2023-07-26 ENCOUNTER — OFFICE VISIT (OUTPATIENT)
Dept: PSYCHOLOGY | Facility: CLINIC | Age: 53
End: 2023-07-26
Payer: COMMERCIAL

## 2023-07-26 DIAGNOSIS — F41.1 GAD (GENERALIZED ANXIETY DISORDER): Primary | ICD-10-CM

## 2023-07-26 PROCEDURE — 90847 FAMILY PSYTX W/PT 50 MIN: CPT | Performed by: MARRIAGE & FAMILY THERAPIST

## 2023-07-26 NOTE — PROGRESS NOTES
[facility]                                     Progress Note    Patient Name: Marcel NG Tutewohl  Date: 7/26/2023         Service Type: Family with client present      Session Start Time: 11:00AM  Session End Time: 11:55AM     Session Length: 55 minutes    Session #: 24    Attendees: Client and Spouse / Significant Other    Service Modality:  In-person    DATA  Extended Session (53+ minutes):   - Longer session due to limited access to mental health appointments and necessity to address patient's distress / complexity  Interactive Complexity: No  Crisis: No        Progress Since Last Session (Related to Symptoms / Goals / Homework):   Symptoms: Worsening couple are still processing through a conflict where client yelled at his wife    Homework: Partially completed      Episode of Care Goals: Minimal progress - PREPARATION (Decided to change - considering how); Intervened by negotiating a change plan and determining options / strategies for behavior change, identifying triggers, exploring social supports, and working towards setting a date to begin behavior change     Current / Ongoing Stressors and Concerns:  Couple are in the middle of a conflict. Marcel yelled at [employer] at the morris working on projects because he expected her to be helping him side by side. He also has been feeling sexually frustrated especially that he has to initiate 100% of the time and that sometimes she turns him down. Client expects her to initiate and views this as an expression of her love for him. When she doesn't initiate sex, he feels unloved. He builds up resentments and doesn't communicate what he's feeling. This has led to angry outbursts and client's wife will ignore him. This increases client's anger and frustration. Talked about how his father has made him and his siblings feel responsible. This frustrates Marcel but he struggles to see how he in turn, does this to his loved ones. Asked couple what they think they  need to get beyond this conflict. Marcel talks about wanting to run away. His wife says she's ok with them taking space from each other.        Treatment Objective(s) Addressed in This Session:   use cognitive strategies identified in therapy to challenge anxious thoughts  Use speaker/listener technique when communicating through challenging conversations  Create rituals for connection  Time together to discuss priorities     Intervention:   Relationship counseling    Assessments completed prior to visit:  The following assessments were completed by patient for this visit:  PROMIS 10-Global Health (all questions and answers displayed):       3/9/2022    10:56 AM 6/8/2022     1:25 PM 6/29/2022    10:00 PM 10/26/2022    12:00 PM 2/1/2023    12:00 PM 5/18/2023     3:00 PM   PROMIS 10   In general, would you say your health is: Good        In general, would you say your quality of life is: Very good        In general, how would you rate your physical health? Good        In general, how would you rate your mental health, including your mood and your ability to think? Good        In general, how would you rate your satisfaction with your social activities and relationships? Very good        In general, please rate how well you carry out your usual social activities and roles Very good        To what extent are you able to carry out your everyday physical activities such as walking, climbing stairs, carrying groceries, or moving a chair? Mostly        In the past 7 days, how often have you been bothered by emotional problems such as feeling anxious, depressed, or irritable? Sometimes        In the past 7 days, how would you rate your fatigue on average? Moderate        In the past 7 days, how would you rate your pain on average, where 0 means no pain, and 10 means worst imaginable pain? 5        In general, would you say your health is: 3 3 3 3 3 3   In general, would you say your quality of life is: 4 4 4 4 4 3   In  general, how would you rate your physical health? 3 3 3 3 3 3   In general, how would you rate your mental health, including your mood and your ability to think? 3 2 2 3 3 3   In general, how would you rate your satisfaction with your social activities and relationships? 4 3 3 4 4 4   In general, please rate how well you carry out your usual social activities and roles. (This includes activities at home, at work and in your community, and responsibilities as a parent, child, spouse, employee, friend, etc.) 4 3 3 4 4 4   To what extent are you able to carry out your everyday physical activities such as walking, climbing stairs, carrying groceries, or moving a chair? 4 4 4 4 5 4   In the past 7 days, how often have you been bothered by emotional problems such as feeling anxious, depressed, or irritable? 3 3 2 3 2 3   In the past 7 days, how would you rate your fatigue on average? 3 2 2 3 2 2   In the past 7 days, how would you rate your pain on average, where 0 means no pain, and 10 means worst imaginable pain? 5 4 3 4 3 4   Global Mental Health Score 14 12 13 14 15 13   Global Physical Health Score 13 14 15 13 16 14   PROMIS TOTAL - SUBSCORES 27 26 28 27 31 27           ASSESSMENT: Current Emotional / Mental Status (status of significant symptoms):   Risk status (Self / Other harm or suicidal ideation)   Patient denies current fears or concerns for personal safety.   Patient denies current or recent suicidal ideation or behaviors.   Patient denies current or recent homicidal ideation or behaviors.   Patient denies current or recent self injurious behavior or ideation.   Patient denies other safety concerns.   Patient reports there has been no change in risk factors since their last session.     Patient reports there has been no change in protective factors since their last session.     Recommended that patient call 911 or go to the local ED should there be a change in any of these risk  factors.     Appearance:   Appropriate    Eye Contact:   Good    Psychomotor Behavior: Normal    Attitude:   Cooperative  Interested   Orientation:   All   Speech    Rate / Production: Normal/ Responsive Emotional Talkative    Volume:  Normal    Mood:    Angry  Depressed  Irritable    Affect:    Appropriate    Thought Content:  Clear    Thought Form:  Coherent  Logical    Insight:    Fair      Medication Review:   No changes to current psychiatric medication(s)     Medication Compliance:   Yes     Changes in Health Issues:   Yes: Pain, Associated Psychological Distress     Chemical Use Review:   Substance Use: Chemical use reviewed, no active concerns identified      Tobacco Use: No current tobacco use.      Diagnosis:  1. ASUNCION (generalized anxiety disorder)        Collateral Reports Completed:   Not Applicable    PLAN: (Patient Tasks / Therapist Tasks / Other)  Homework: Couple to take space from one another. Client to think about his anger and how to better manage his emotions when trying to get his needs met. Consider the way his father expresses his anger and the way this makes others feel.    Trinidad Solorio, [facility]    ______________________________________________________________________    Treatment Plan    Patient's Name: Marcel Melchor  YOB: 1970    Date of Creation: 6/8/2022  Date Treatment Plan Last Reviewed/Revised: 5/17/2023    DSM5 Diagnoses: 300.02 (F41.1) Generalized Anxiety Disorder  Psychosocial / Contextual Factors: marital discord, work stress, pain issues  PROMIS (reviewed every 90 days): 5/17/2023 Score: 27     Referral / Collaboration:  Was/were discussed and patient will pursue.    Anticipated number of session for this episode of care: 24  Anticipation frequency of session: Every other week  Anticipated Duration of each session: 38-52 minutes  Treatment plan will be reviewed in 90 days or when goals have been changed.       MeasurableTreatment Goal(s) related to diagnosis /  functional impairment(s)  Goal 1: Patient will lower GAD7 score to 3 or below    I will know I've met my goal when I'm less anxious and irritable.      Objective #A (Patient Action)    Patient will  have weekly date nights withi his wife  .  Status: Continued - Date(s):5/17/2023    Intervention(s)  Therapist will teach  the benefits of date night and intentional time together .    Objective #B  Patient will practice the use of timeouts.  Status: Continued - Date(s): 5/17/2023     Intervention(s)  Therapist will  encourage client to use time outs when overly agitated .    Objective #C  Patient will  use the speaker/listener technique in communication .  Status: Continued - Date(s): 5/17/2023    Intervention(s)  Therapist will teach S/L Technique .      Patient has reviewed and agreed to the above plan.      Trinidad Solorio, ISSACFT  May 17, 2023

## 2023-08-18 DIAGNOSIS — F39 MOOD DISORDER (H): ICD-10-CM

## 2023-08-21 NOTE — TELEPHONE ENCOUNTER
Routing refill request to provider for review/approval because:  Antipsychotic Medications [id number]/18/2023 12:15 AM   Protocol Details Lipid panel on file within the past 12 months    A1c or Glucose on file in past 12 months     Lab Results   Component Value Date    A1C 4.9 06/09/2022     Recent Labs   Lab Test 06/09/22  1036 04/19/21  1031   CHOL 188 166   HDL 42 46   * 97   TRIG 187* 117       Azalea CHRIS RN

## 2023-08-22 RX ORDER — ARIPIPRAZOLE 5 MG/1
5 TABLET ORAL DAILY
Qty: 90 TABLET | Refills: 1 | Status: SHIPPED | OUTPATIENT
Start: 2023-08-22 | End: 2023-12-18

## 2023-09-06 ENCOUNTER — OFFICE VISIT (OUTPATIENT)
Dept: PSYCHOLOGY | Facility: CLINIC | Age: 53
End: 2023-09-06
Payer: COMMERCIAL

## 2023-09-06 DIAGNOSIS — F41.1 GAD (GENERALIZED ANXIETY DISORDER): Primary | ICD-10-CM

## 2023-09-06 PROCEDURE — 90837 PSYTX W PT 60 MINUTES: CPT | Performed by: MARRIAGE & FAMILY THERAPIST

## 2023-09-06 NOTE — PROGRESS NOTES
[facility]                                     Progress Note    Patient Name: Marcel NG Tutewohl  Date: 9/6/2023         Service Type: Family with client present      Session Start Time: 12:00PM  Session End Time: 12:55PM     Session Length: 55 minutes    Session #: 25    Attendees: Client and Spouse / Significant Other    Service Modality:  In-person    DATA  Extended Session (53+ minutes):   - Longer session due to limited access to mental health appointments and necessity to address patient's distress / complexity  Interactive Complexity: No  Crisis: No        Progress Since Last Session (Related to Symptoms / Goals / Homework):   Symptoms: Worsening couple are still processing through a conflict where client yelled at his wife    Homework: Partially completed      Episode of Care Goals: Minimal progress - PREPARATION (Decided to change - considering how); Intervened by negotiating a change plan and determining options / strategies for behavior change, identifying triggers, exploring social supports, and working towards setting a date to begin behavior change     Current / Ongoing Stressors and Concerns:  Couple have reconciled from last session and Marcel apologized to his daughter for his actions. He is in less pain today and in an improved mood. Talked more about partnering in his marriage as well as the impact of his childhood trauma on his 'demands' in his marriage. When client doesn't get what he wants, he tends to get angry or goes around his spouse to get what he wants. Also talked about finances and being more of a team in managing them as well as considering getting a  to assist in future/MCFP planning.      Treatment Objective(s) Addressed in This Session:   use cognitive strategies identified in therapy to challenge anxious thoughts  Use speaker/listener technique when communicating through challenging conversations  Create rituals for connection  Time together  to discuss priorities     Intervention:   Relationship counseling    Assessments completed prior to visit:  The following assessments were completed by patient for this visit:  PROMIS 10-Global Health (all questions and answers displayed):       3/9/2022    10:56 AM 6/8/2022     1:25 PM 6/29/2022    10:00 PM 10/26/2022    12:00 PM 2/1/2023    12:00 PM 5/18/2023     3:00 PM 9/6/2023     3:00 PM   PROMIS 10   In general, would you say your health is: Good         In general, would you say your quality of life is: Very good         In general, how would you rate your physical health? Good         In general, how would you rate your mental health, including your mood and your ability to think? Good         In general, how would you rate your satisfaction with your social activities and relationships? Very good         In general, please rate how well you carry out your usual social activities and roles Very good         To what extent are you able to carry out your everyday physical activities such as walking, climbing stairs, carrying groceries, or moving a chair? Mostly         In the past 7 days, how often have you been bothered by emotional problems such as feeling anxious, depressed, or irritable? Sometimes         In the past 7 days, how would you rate your fatigue on average? Moderate         In the past 7 days, how would you rate your pain on average, where 0 means no pain, and 10 means worst imaginable pain? 5         In general, would you say your health is: 3 3 3 3 3 3 4   In general, would you say your quality of life is: 4 4 4 4 4 3 4   In general, how would you rate your physical health? 3 3 3 3 3 3 3   In general, how would you rate your mental health, including your mood and your ability to think? 3 2 2 3 3 3 3   In general, how would you rate your satisfaction with your social activities and relationships? 4 3 3 4 4 4 4   In general, please rate how well you carry out your usual social activities and  roles. (This includes activities at home, at work and in your community, and responsibilities as a parent, child, spouse, employee, friend, etc.) 4 3 3 4 4 4 4   To what extent are you able to carry out your everyday physical activities such as walking, climbing stairs, carrying groceries, or moving a chair? 4 4 4 4 5 4 4   In the past 7 days, how often have you been bothered by emotional problems such as feeling anxious, depressed, or irritable? 3 3 2 3 2 3 2   In the past 7 days, how would you rate your fatigue on average? 3 2 2 3 2 2 2   In the past 7 days, how would you rate your pain on average, where 0 means no pain, and 10 means worst imaginable pain? 5 4 3 4 3 4 3   Global Mental Health Score 14 12 13 14 15 13 15   Global Physical Health Score 13 14 15 13 16 14 15   PROMIS TOTAL - SUBSCORES 27 26 28 27 31 27 30           ASSESSMENT: Current Emotional / Mental Status (status of significant symptoms):   Risk status (Self / Other harm or suicidal ideation)   Patient denies current fears or concerns for personal safety.   Patient denies current or recent suicidal ideation or behaviors.   Patient denies current or recent homicidal ideation or behaviors.   Patient denies current or recent self injurious behavior or ideation.   Patient denies other safety concerns.   Patient reports there has been no change in risk factors since their last session.     Patient reports there has been no change in protective factors since their last session.     Recommended that patient call 911 or go to the local ED should there be a change in any of these risk factors.     Appearance:   Appropriate    Eye Contact:   Good    Psychomotor Behavior: Normal    Attitude:   Cooperative  Interested   Orientation:   All   Speech    Rate / Production: Normal/ Responsive Emotional Talkative    Volume:  Normal    Mood:    Angry  Depressed  Irritable    Affect:    Appropriate    Thought Content:  Clear    Thought Form:  Coherent  Logical     Insight:    Fair      Medication Review:   No changes to current psychiatric medication(s)     Medication Compliance:   Yes     Changes in Health Issues:   Yes: Pain, Associated Psychological Distress     Chemical Use Review:   Substance Use: Chemical use reviewed, no active concerns identified      Tobacco Use: No current tobacco use.      Diagnosis:  1. ASUNCION (generalized anxiety disorder)        Collateral Reports Completed:   Not Applicable    PLAN: (Patient Tasks / Therapist Tasks / Other)  Homework: Couple to look into hiring a .     Trinidad HEIN Marybeka, LMFT    ______________________________________________________________________    Treatment Plan    Patient's Name: Marcel Melchor  YOB: 1970    Date of Creation: 6/8/2022  Date Treatment Plan Last Reviewed/Revised: 9/6/2023    DSM5 Diagnoses: 300.02 (F41.1) Generalized Anxiety Disorder  Psychosocial / Contextual Factors: marital discord, work stress, pain issues  PROMIS (reviewed every 90 days): 9/6/2023 Score: 30    Referral / Collaboration:  Was/were discussed and patient will pursue.    Anticipated number of session for this episode of care: 24  Anticipation frequency of session: Every other week  Anticipated Duration of each session: 38-52 minutes  Treatment plan will be reviewed in 90 days or when goals have been changed.       MeasurableTreatment Goal(s) related to diagnosis / functional impairment(s)  Goal 1: Patient will lower GAD7 score to 3 or below    I will know I've met my goal when I'm less anxious and irritable.      Objective #A (Patient Action)    Patient will  have weekly date nights withi his wife  .  Status: Continued - Date(s): 9/6/2023    Intervention(s)  Therapist will teach  the benefits of date night and intentional time together .    Objective #B  Patient will practice the use of timeouts.  Status: Continued - Date(s): 9/6/2023     Intervention(s)  Therapist will  encourage client to use time outs when  overly agitated .    Objective #C  Patient will  use the speaker/listener technique in communication .  Status: Continued - Date(s): 9/6/2023    Intervention(s)  Therapist will teach S/L Technique .      Patient has reviewed and agreed to the above plan.      Trinidad Solorio, [employer]  September 6, 2023

## 2023-09-14 ENCOUNTER — VIRTUAL VISIT (OUTPATIENT)
Dept: PSYCHOLOGY | Facility: CLINIC | Age: 53
End: 2023-09-14
Payer: COMMERCIAL

## 2023-09-14 DIAGNOSIS — F41.1 GAD (GENERALIZED ANXIETY DISORDER): Primary | ICD-10-CM

## 2023-09-14 PROCEDURE — 90837 PSYTX W PT 60 MINUTES: CPT | Mod: 95 | Performed by: MARRIAGE & FAMILY THERAPIST

## 2023-10-11 ENCOUNTER — OFFICE VISIT (OUTPATIENT)
Dept: PSYCHOLOGY | Facility: CLINIC | Age: 53
End: 2023-10-11
Payer: COMMERCIAL

## 2023-10-11 DIAGNOSIS — F41.1 GAD (GENERALIZED ANXIETY DISORDER): Primary | ICD-10-CM

## 2023-10-11 PROCEDURE — 90837 PSYTX W PT 60 MINUTES: CPT | Performed by: MARRIAGE & FAMILY THERAPIST

## 2023-10-11 NOTE — PROGRESS NOTES
"    [facility]                                     Progress Note    Patient Name: Marcel Lauohjacinta  Date: 10/11/2023         Service Type: Individual      Session Start Time: 11:00AM  Session End Time: 11:55AM     Session Length: 55 minutes    Session #: 27    Attendees: Client    Service Modality:  Phone Visit:      Provider verified identity through the following two step process.  Patient provided:  Patient is known previously to provider    Telephone Visit: The patient's condition can be safely assessed and treated via synchronous audio telemedicine encounter.      Reason for Audio Telemedicine Visit: Patient has requested telehealth visit    Originating Site (Patient Location): Patient's home    Distant Site (Provider Location): Provider Remote Setting- Home Office    Consent:  The patient/guardian has verbally consented to:     1. The potential risks and benefits of telemedicine (telephone visit) versus in person care;    The patient has been notified of the following:      \"We have found that certain health care needs can be provided without the need for a face to face visit.  This service lets us provide the care you need with a phone conversation.       I will have full access to your [facility] medical record during this entire phone call.   I will be taking notes for your medical record.      Since this is like an office visit, we will bill your insurance company for this service.       There are potential benefits and risks of telephone visits (e.g. limits to patient confidentiality) that differ from in-person visits.?Confidentiality still applies for telephone services, and nobody will record the visit.  It is important to be in a quiet, private space that is free of distractions (including cell phone or other devices) during the visit.??      If during the course of the call I believe a telephone visit is not appropriate, you will not be charged for this service\"     Consent has " been obtained for this service by care team member: Yes     DATA  Extended Session (53+ minutes):   - Patient's presenting concerns require more intensive intervention than could be completed within the usual service  Interactive Complexity: No  Crisis: No        Progress Since Last Session (Related to Symptoms / Goals / Homework):   Symptoms: No change couple are still working through relationship issues      Homework: Partially completed attending an anger management group      Episode of Care Goals: Minimal progress - ACTION (Actively working towards change); Intervened by reinforcing change plan / affirming steps taken     Current / Ongoing Stressors and Concerns:  Client talks today about where he and his wife are at since their big argument. He is committed to continuing working on things and says he can get his anger under control. 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 He wants to make things work with his wife although he is uncertain if she does. He states she does not want to work with a male couples therapist. Talked about different strategies if we continue to work together as well as ways to better love his wife.       Treatment Objective(s) Addressed in This Session:   use cognitive strategies identified in therapy to challenge anxious thoughts  Use speaker/listener technique when communicating through challenging conversations  Create rituals for connection  Time together to discuss priorities     Intervention:   Relationship counseling    Assessments completed prior to visit:  The following assessments were completed by patient for this visit:  PROMIS 10-Global Health (all questions and answers displayed):       3/9/2022    10:56 AM 6/8/2022     1:25 PM 6/29/2022    10:00 PM 10/26/2022    12:00 PM 2/1/2023    12:00 PM 5/18/2023     3:00 PM 9/6/2023     3:00 PM   PROMIS 10   In general, would you say your health is: Good         In general, would you say  your quality of life is: Very good         In general, how would you rate your physical health? Good         In general, how would you rate your mental health, including your mood and your ability to think? Good         In general, how would you rate your satisfaction with your social activities and relationships? Very good         In general, please rate how well you carry out your usual social activities and roles Very good         To what extent are you able to carry out your everyday physical activities such as walking, climbing stairs, carrying groceries, or moving a chair? Mostly         In the past 7 days, how often have you been bothered by emotional problems such as feeling anxious, depressed, or irritable? Sometimes         In the past 7 days, how would you rate your fatigue on average? Moderate         In the past 7 days, how would you rate your pain on average, where 0 means no pain, and 10 means worst imaginable pain? 5         In general, would you say your health is: 3 3 3 3 3 3 4   In general, would you say your quality of life is: 4 4 4 4 4 3 4   In general, how would you rate your physical health? 3 3 3 3 3 3 3   In general, how would you rate your mental health, including your mood and your ability to think? 3 2 2 3 3 3 3   In general, how would you rate your satisfaction with your social activities and relationships? 4 3 3 4 4 4 4   In general, please rate how well you carry out your usual social activities and roles. (This includes activities at home, at work and in your community, and responsibilities as a parent, child, spouse, employee, friend, etc.) 4 3 3 4 4 4 4   To what extent are you able to carry out your everyday physical activities such as walking, climbing stairs, carrying groceries, or moving a chair? 4 4 4 4 5 4 4   In the past 7 days, how often have you been bothered by emotional problems such as feeling anxious, depressed, or irritable? 3 3 2 3 2 3 2   In the past 7 days, how  would you rate your fatigue on average? 3 2 2 3 2 2 2   In the past 7 days, how would you rate your pain on average, where 0 means no pain, and 10 means worst imaginable pain? 5 4 3 4 3 4 3   Global Mental Health Score 14 12 13 14 15 13 15   Global Physical Health Score 13 14 15 13 16 14 15   PROMIS TOTAL - SUBSCORES 27 26 28 27 31 27 30           ASSESSMENT: Current Emotional / Mental Status (status of significant symptoms):   Risk status (Self / Other harm or suicidal ideation)   Patient denies current fears or concerns for personal safety.   Patient denies current or recent suicidal ideation or behaviors.   Patient denies current or recent homicidal ideation or behaviors.   Patient denies current or recent self injurious behavior or ideation.   Patient denies other safety concerns.   Patient reports there has been no change in risk factors since their last session.     Patient reports there has been no change in protective factors since their last session.     Recommended that patient call 911 or go to the local ED should there be a change in any of these risk factors.     Appearance:   Appropriate    Eye Contact:   Good    Psychomotor Behavior: Normal    Attitude:   Cooperative  Interested   Orientation:   All   Speech    Rate / Production: Normal/ Responsive Emotional Talkative    Volume:  Normal    Mood:    Angry  Depressed  Irritable    Affect:    Appropriate    Thought Content:  Clear    Thought Form:  Coherent  Logical    Insight:    Fair      Medication Review:   No changes to current psychiatric medication(s)     Medication Compliance:   Yes     Changes in Health Issues:   None reported     Chemical Use Review:   Substance Use: Chemical use reviewed, no active concerns identified      Tobacco Use: No current tobacco use.      Diagnosis:  1. ASUNCION (generalized anxiety disorder)        Collateral Reports Completed:   Not Applicable    PLAN: (Patient Tasks / Therapist Tasks / Other)  Homework: Client to  discuss next steps with his wife including a marriage intensive weekend and possibly continuing with this writer using a different format.    DOT Thompson    ______________________________________________________________________    Treatment Plan    Patient's Name: Marcel Melchor  YOB: 1970    Date of Creation: 6/8/2022  Date Treatment Plan Last Reviewed/Revised: 9/6/2023    DSM5 Diagnoses: 300.02 (F41.1) Generalized Anxiety Disorder  Psychosocial / Contextual Factors: marital discord, work stress, pain issues  PROMIS (reviewed every 90 days): 9/6/2023 Score: 30    Referral / Collaboration:  Was/were discussed and patient will pursue.    Anticipated number of session for this episode of care: 24  Anticipation frequency of session: Every other week  Anticipated Duration of each session: 38-52 minutes  Treatment plan will be reviewed in 90 days or when goals have been changed.       MeasurableTreatment Goal(s) related to diagnosis / functional impairment(s)  Goal 1: Patient will lower GAD7 score to 3 or below    I will know I've met my goal when I'm less anxious and irritable.      Objective #A (Patient Action)    Patient will  have weekly date nights withi his wife  .  Status: Continued - Date(s): 9/6/2023    Intervention(s)  Therapist will teach  the benefits of date night and intentional time together .    Objective #B  Patient will practice the use of timeouts.  Status: Continued - Date(s): 9/6/2023     Intervention(s)  Therapist will  encourage client to use time outs when overly agitated .    Objective #C  Patient will  use the speaker/listener technique in communication .  Status: Continued - Date(s): 9/6/2023    Intervention(s)  Therapist will teach S/L Technique .      Patient has reviewed and agreed to the above plan.      DOT Thompson  September 6, 2023

## 2023-11-01 ENCOUNTER — OFFICE VISIT (OUTPATIENT)
Dept: PSYCHOLOGY | Facility: CLINIC | Age: 53
End: 2023-11-01
Payer: COMMERCIAL

## 2023-11-01 DIAGNOSIS — F41.1 GAD (GENERALIZED ANXIETY DISORDER): Primary | ICD-10-CM

## 2023-11-01 PROCEDURE — 90785 PSYTX COMPLEX INTERACTIVE: CPT | Performed by: MARRIAGE & FAMILY THERAPIST

## 2023-11-01 PROCEDURE — 90837 PSYTX W PT 60 MINUTES: CPT | Performed by: MARRIAGE & FAMILY THERAPIST

## 2023-11-02 ENCOUNTER — TRANSFERRED RECORDS (OUTPATIENT)
Dept: HEALTH INFORMATION MANAGEMENT | Facility: CLINIC | Age: 53
End: 2023-11-02
Payer: COMMERCIAL

## 2023-11-02 NOTE — PROGRESS NOTES
[facility]                                     Progress Note    Patient Name: Marcel NG Tutewohl  Date: 11/1/2023         Service Type: Individual      Session Start Time: 3:00PM  Session End Time: 3:55PM     Session Length: 55 minutes    Session #: 28    Attendees: Client and Spouse / Significant Other    Service Modality:       DATA  Extended Session (53+ minutes):   - Longer session due to limited access to mental health appointments and necessity to address patient's distress / complexity    - Patient's presenting concerns require more intensive intervention than could be completed within the usual service    - High distress and under complex circumstances.  See Data section for details  Interactive Complexity: Yes, visit entailed Interactive Complexity evidenced by:  -The need to manage maladaptive communication (related to, e.g., high anxiety, high reactivity, repeated questions, or disagreement) among participants that complicates delivery of care  Crisis: No        Progress Since Last Session (Related to Symptoms / Goals / Homework):   Symptoms: No change couple are still working through relationship issues      Homework: Partially completed attending an anger management group and returning to discuss continued work on marriage      Episode of Care Goals: Minimal progress - ACTION (Actively working towards change); Intervened by reinforcing change plan / affirming steps taken     Current / Ongoing Stressors and Concerns:  Client and wife return to discuss issues related to client's anger. Wife presents as collateral informant to client's mental health. The couple are back living together and process today the events of a month ago that led wife to call the police. She continues to struggle to feel safe in the marriage and trust Marcel. He seems to understand this but also tries to suggest that he is 90% of the problem but wants his wife to acknowledge her 10%. Client's wife struggles to  understand what could have led Marcel to be as angry as he was despite her role in the situation. Marcel has taken good steps since this event to work on his anger but continues to be sexually frustrated which we process more today. Client described never being acknowledged or appreciated or shown affection or love growing up. He did a lot and continues to, for the family and expects appreciation in return. This is often not communicated to him. His wife recounts her stance when they first dated of seeing this dynamic in Marcel's family and working very hard to show him love and appreciation since he did not receive it at home. She was very conscientious of doing this and in essence, re-parenting Marcel. Therapist suggest to Marcel that this need for appreciation in the form of sex is linked to his childhood trauma and appears to be a source of anger and frustration when he doesn't receive this type of appreciation from Ermelinda. Therapist helped Marcel see that this is a type of unhealthy bartering in their marriage and describes how sex should be a mutually loving act and not a payment for services rendered. This seemed to resonate with his wife and unclear if it did for Marcel. Encouraged him to think about this and find ways to satisfy his sexual needs individually but also behave in ways that would compel his wife to want to be intimate with him by being consistently kind and emotionally connected to her.      Treatment Objective(s) Addressed in This Session:   use cognitive strategies identified in therapy to challenge anxious thoughts  Use speaker/listener technique when communicating through challenging conversations  Create rituals for connection  Time together to discuss priorities     Intervention:   Relationship counseling    Assessments completed prior to visit:  The following assessments were completed by patient for this visit:  PROMIS 10-Global Health (all questions and answers displayed):       3/9/2022    10:56 AM  6/8/2022     1:25 PM 6/29/2022    10:00 PM 10/26/2022    12:00 PM 2/1/2023    12:00 PM 5/18/2023     3:00 PM 9/6/2023     3:00 PM   PROMIS 10   In general, would you say your health is: Good         In general, would you say your quality of life is: Very good         In general, how would you rate your physical health? Good         In general, how would you rate your mental health, including your mood and your ability to think? Good         In general, how would you rate your satisfaction with your social activities and relationships? Very good         In general, please rate how well you carry out your usual social activities and roles Very good         To what extent are you able to carry out your everyday physical activities such as walking, climbing stairs, carrying groceries, or moving a chair? Mostly         In the past 7 days, how often have you been bothered by emotional problems such as feeling anxious, depressed, or irritable? Sometimes         In the past 7 days, how would you rate your fatigue on average? Moderate         In the past 7 days, how would you rate your pain on average, where 0 means no pain, and 10 means worst imaginable pain? 5         In general, would you say your health is: 3 3 3 3 3 3 4   In general, would you say your quality of life is: 4 4 4 4 4 3 4   In general, how would you rate your physical health? 3 3 3 3 3 3 3   In general, how would you rate your mental health, including your mood and your ability to think? 3 2 2 3 3 3 3   In general, how would you rate your satisfaction with your social activities and relationships? 4 3 3 4 4 4 4   In general, please rate how well you carry out your usual social activities and roles. (This includes activities at home, at work and in your community, and responsibilities as a parent, child, spouse, employee, friend, etc.) 4 3 3 4 4 4 4   To what extent are you able to carry out your everyday physical activities such as walking, climbing  stairs, carrying groceries, or moving a chair? 4 4 4 4 5 4 4   In the past 7 days, how often have you been bothered by emotional problems such as feeling anxious, depressed, or irritable? 3 3 2 3 2 3 2   In the past 7 days, how would you rate your fatigue on average? 3 2 2 3 2 2 2   In the past 7 days, how would you rate your pain on average, where 0 means no pain, and 10 means worst imaginable pain? 5 4 3 4 3 4 3   Global Mental Health Score 14 12 13 14 15 13 15   Global Physical Health Score 13 14 15 13 16 14 15   PROMIS TOTAL - SUBSCORES 27 26 28 27 31 27 30           ASSESSMENT: Current Emotional / Mental Status (status of significant symptoms):   Risk status (Self / Other harm or suicidal ideation)   Patient denies current fears or concerns for personal safety.   Patient denies current or recent suicidal ideation or behaviors.   Patient denies current or recent homicidal ideation or behaviors.   Patient denies current or recent self injurious behavior or ideation.   Patient denies other safety concerns.   Patient reports there has been no change in risk factors since their last session.     Patient reports there has been no change in protective factors since their last session.     Recommended that patient call 911 or go to the local ED should there be a change in any of these risk factors.     Appearance:   Appropriate    Eye Contact:   Good    Psychomotor Behavior: Normal    Attitude:   Cooperative  Interested   Orientation:   All   Speech    Rate / Production: Normal/ Responsive Emotional Talkative    Volume:  Normal    Mood:    Normal   Affect:    Appropriate    Thought Content:  Clear    Thought Form:  Coherent  Logical    Insight:    Fair      Medication Review:   No changes to current psychiatric medication(s)     Medication Compliance:   Yes     Changes in Health Issues:   None reported     Chemical Use Review:   Substance Use: Chemical use reviewed, no active concerns identified      Tobacco Use: No  current tobacco use.      Diagnosis:  1. ASUNCION (generalized anxiety disorder)          Collateral Reports Completed:   Not Applicable    PLAN: (Patient Tasks / Therapist Tasks / Other)  Homework: Client and wife to return and split their time next session. Spend initial 15 minutes with each individually and last 30 with them together.  Marcel to continue working on managing his anger and sexual needs independently.    Trinidad ANGEL LUIS [place], LMFT    ______________________________________________________________________    Treatment Plan    Patient's Name: Marcel Melchor  YOB: 1970    Date of Creation: 6/8/2022  Date Treatment Plan Last Reviewed/Revised: 9/6/2023    DSM5 Diagnoses: 300.02 (F41.1) Generalized Anxiety Disorder  Psychosocial / Contextual Factors: marital discord, work stress, pain issues  PROMIS (reviewed every 90 days): 9/6/2023 Score: 30    Referral / Collaboration:  Was/were discussed and patient will pursue.    Anticipated number of session for this episode of care: 24  Anticipation frequency of session: Every other week  Anticipated Duration of each session: 38-52 minutes  Treatment plan will be reviewed in 90 days or when goals have been changed.       MeasurableTreatment Goal(s) related to diagnosis / functional impairment(s)  Goal 1: Patient will lower GAD7 score to 3 or below    I will know I've met my goal when I'm less anxious and irritable.      Objective #A (Patient Action)    Patient will  have weekly date nights withi his wife  .  Status: Continued - Date(s): 9/6/2023    Intervention(s)  Therapist will teach  the benefits of date night and intentional time together .    Objective #B  Patient will practice the use of timeouts.  Status: Continued - Date(s): 9/6/2023     Intervention(s)  Therapist will  encourage client to use time outs when overly agitated .    Objective #C  Patient will  use the speaker/listener technique in communication .  Status: Continued - Date(s):  9/6/2023    Intervention(s)  Therapist will teach S/L Technique .      Patient has reviewed and agreed to the above plan.      Trinidad Solorio, ISSACFT  September 6, 2023

## 2023-11-08 ENCOUNTER — OFFICE VISIT (OUTPATIENT)
Dept: PSYCHOLOGY | Facility: CLINIC | Age: 53
End: 2023-11-08
Payer: COMMERCIAL

## 2023-11-08 DIAGNOSIS — F41.1 GAD (GENERALIZED ANXIETY DISORDER): Primary | ICD-10-CM

## 2023-11-08 PROCEDURE — 90847 FAMILY PSYTX W/PT 50 MIN: CPT | Performed by: MARRIAGE & FAMILY THERAPIST

## 2023-11-08 NOTE — PROGRESS NOTES
[facility]                                     Progress Note    Patient Name: Marcel Lauohjacinta  Date: 11/8/2023         Service Type: Individual      Session Start Time: 2:00PM  Session End Time: 2:55PM     Session Length: 55 minutes    Session #: 29    Attendees: Client and Spouse / Significant Other    Service Modality:       DATA  Extended Session (53+ minutes): PROLONGED SERVICE IN THE OUTPATIENT SETTING REQUIRING DIRECT (FACE-TO-FACE) PATIENT CONTACT BEYOND THE USUAL SERVICE:    - Longer session due to limited access to mental health appointments and necessity to address patient's distress / complexity  Interactive Complexity: No  Crisis: No        Progress Since Last Session (Related to Symptoms / Goals / Homework):   Symptoms: Improving rebuilding trust      Homework: Partially completed       Episode of Care Goals: Satisfactory progress - ACTION (Actively working towards change); Intervened by reinforcing change plan / affirming steps taken     Current / Ongoing Stressors and Concerns:  Client continuing with anger management and working to rebuild trust with spouse. He understands this will take time but hopes she will begin to be more affectionate towards him. Talked about non sexual touch and giving her agency over their intimacy for now. Client's spouse stated she continues to heal from their incident. This writer encouraged couple to be more intentional about shared activities and dating one another.      Treatment Objective(s) Addressed in This Session:   use cognitive strategies identified in therapy to challenge anxious thoughts  Use speaker/listener technique when communicating through challenging conversations  Create rituals for connection  Time together to discuss priorities     Intervention:   Relationship counseling    Assessments completed prior to visit:  The following assessments were completed by patient for this visit:  PROMIS 10-Global Health (all questions and  answers displayed):       3/9/2022    10:56 AM 6/8/2022     1:25 PM 6/29/2022    10:00 PM 10/26/2022    12:00 PM 2/1/2023    12:00 PM 5/18/2023     3:00 PM 9/6/2023     3:00 PM   PROMIS 10   In general, would you say your health is: Good         In general, would you say your quality of life is: Very good         In general, how would you rate your physical health? Good         In general, how would you rate your mental health, including your mood and your ability to think? Good         In general, how would you rate your satisfaction with your social activities and relationships? Very good         In general, please rate how well you carry out your usual social activities and roles Very good         To what extent are you able to carry out your everyday physical activities such as walking, climbing stairs, carrying groceries, or moving a chair? Mostly         In the past 7 days, how often have you been bothered by emotional problems such as feeling anxious, depressed, or irritable? Sometimes         In the past 7 days, how would you rate your fatigue on average? Moderate         In the past 7 days, how would you rate your pain on average, where 0 means no pain, and 10 means worst imaginable pain? 5         In general, would you say your health is: 3 3 3 3 3 3 4   In general, would you say your quality of life is: 4 4 4 4 4 3 4   In general, how would you rate your physical health? 3 3 3 3 3 3 3   In general, how would you rate your mental health, including your mood and your ability to think? 3 2 2 3 3 3 3   In general, how would you rate your satisfaction with your social activities and relationships? 4 3 3 4 4 4 4   In general, please rate how well you carry out your usual social activities and roles. (This includes activities at home, at work and in your community, and responsibilities as a parent, child, spouse, employee, friend, etc.) 4 3 3 4 4 4 4   To what extent are you able to carry out your everyday  physical activities such as walking, climbing stairs, carrying groceries, or moving a chair? 4 4 4 4 5 4 4   In the past 7 days, how often have you been bothered by emotional problems such as feeling anxious, depressed, or irritable? 3 3 2 3 2 3 2   In the past 7 days, how would you rate your fatigue on average? 3 2 2 3 2 2 2   In the past 7 days, how would you rate your pain on average, where 0 means no pain, and 10 means worst imaginable pain? 5 4 3 4 3 4 3   Global Mental Health Score 14 12 13 14 15 13 15   Global Physical Health Score 13 14 15 13 16 14 15   PROMIS TOTAL - SUBSCORES 27 26 28 27 31 27 30           ASSESSMENT: Current Emotional / Mental Status (status of significant symptoms):   Risk status (Self / Other harm or suicidal ideation)   Patient denies current fears or concerns for personal safety.   Patient denies current or recent suicidal ideation or behaviors.   Patient denies current or recent homicidal ideation or behaviors.   Patient denies current or recent self injurious behavior or ideation.   Patient denies other safety concerns.   Patient reports there has been no change in risk factors since their last session.     Patient reports there has been no change in protective factors since their last session.     Recommended that patient call 911 or go to the local ED should there be a change in any of these risk factors.     Appearance:   Appropriate    Eye Contact:   Good    Psychomotor Behavior: Normal    Attitude:   Cooperative  Interested   Orientation:   All   Speech    Rate / Production: Normal/ Responsive Emotional Talkative    Volume:  Normal    Mood:    Normal   Affect:    Appropriate    Thought Content:  Clear    Thought Form:  Coherent  Logical    Insight:    Fair      Medication Review:   No changes to current psychiatric medication(s)     Medication Compliance:   Yes     Changes in Health Issues:   None reported     Chemical Use Review:   Substance Use: Chemical use reviewed, no  active concerns identified      Tobacco Use: No current tobacco use.      Diagnosis:  1. ASUNCION (generalized anxiety disorder)          Collateral Reports Completed:   Not Applicable    PLAN: (Patient Tasks / Therapist Tasks / Other)  Homework: Marcel to continue working on rebuilding trust and Ermelinda to consider ways to participate more in Marcel's activities as well as couple to have date nights and/or more shared experiences.    Trinidad HEIN [place], LMFT    ______________________________________________________________________    Treatment Plan    Patient's Name: Marcel Melchor  YOB: 1970    Date of Creation: 6/8/2022  Date Treatment Plan Last Reviewed/Revised: 9/6/2023    DSM5 Diagnoses: 300.02 (F41.1) Generalized Anxiety Disorder  Psychosocial / Contextual Factors: marital discord, work stress, pain issues  PROMIS (reviewed every 90 days): 9/6/2023 Score: 30    Referral / Collaboration:  Was/were discussed and patient will pursue.    Anticipated number of session for this episode of care: 24  Anticipation frequency of session: Every other week  Anticipated Duration of each session: 38-52 minutes  Treatment plan will be reviewed in 90 days or when goals have been changed.       MeasurableTreatment Goal(s) related to diagnosis / functional impairment(s)  Goal 1: Patient will lower GAD7 score to 3 or below    I will know I've met my goal when I'm less anxious and irritable.      Objective #A (Patient Action)    Patient will  have weekly date nights withi his wife  .  Status: Continued - Date(s): 9/6/2023    Intervention(s)  Therapist will teach  the benefits of date night and intentional time together .    Objective #B  Patient will practice the use of timeouts.  Status: Continued - Date(s): 9/6/2023     Intervention(s)  Therapist will  encourage client to use time outs when overly agitated .    Objective #C  Patient will  use the speaker/listener technique in communication .  Status: Continued - Date(s):  9/6/2023    Intervention(s)  Therapist will teach S/L Technique .      Patient has reviewed and agreed to the above plan.      Trinidad Solorio, ISSACFT  September 6, 2023

## 2023-12-06 ENCOUNTER — OFFICE VISIT (OUTPATIENT)
Dept: PSYCHOLOGY | Facility: CLINIC | Age: 53
End: 2023-12-06
Payer: COMMERCIAL

## 2023-12-06 DIAGNOSIS — F41.1 GAD (GENERALIZED ANXIETY DISORDER): ICD-10-CM

## 2023-12-06 DIAGNOSIS — F41.1 GAD (GENERALIZED ANXIETY DISORDER): Primary | ICD-10-CM

## 2023-12-06 PROCEDURE — 90837 PSYTX W PT 60 MINUTES: CPT | Performed by: MARRIAGE & FAMILY THERAPIST

## 2023-12-06 NOTE — PROGRESS NOTES
"[facility]                                     Progress Note    Patient Name: Marcel NG Tutewohl  Date: 12/6/2023         Service Type: Individual      Session Start Time: 2:00PM  Session End Time: 2:55PM     Session Length: 55 minutes    Session #: 30    Attendees: Client and Spouse / Significant Other    Service Modality:       DATA  Extended Session (53+ minutes): PROLONGED SERVICE IN THE OUTPATIENT SETTING REQUIRING DIRECT (FACE-TO-FACE) PATIENT CONTACT BEYOND THE USUAL SERVICE:    - Longer session due to limited access to mental health appointments and necessity to address patient's distress / complexity  Interactive Complexity: No  Crisis: No        Progress Since Last Session (Related to Symptoms / Goals / Homework):   Symptoms: Improving rebuilding trust      Homework: Partially completed       Episode of Care Goals: Satisfactory progress - ACTION (Actively working towards change); Intervened by reinforcing change plan / affirming steps taken     Current / Ongoing Stressors and Concerns:  Client has been transparent about his life and feelings in his anger management group. He continues to make good progress in this treatment. Continues to feel solid. His wife states that he has been \"good\" but she worries about the other shoe dropping. She is worried that Marcel's family is causing stress for him which concerns her because historically he has taken his anger out on her. She has no tolerance for this moving forward however she is fine with him venting to her. Marcel says he doesn't have to let the behavior of his family negatively impact him. It continues to be difficult for Marcel to raise issues between he and tawnya. This writer encouraged couple to be intentional about weekly sharing of feelings. .      Treatment Objective(s) Addressed in This Session:   use cognitive strategies identified in therapy to challenge anxious thoughts  Use speaker/listener technique when communicating through " challenging conversations  Create rituals for connection  Time together to discuss priorities     Intervention:   Relationship counseling    Assessments completed prior to visit:  The following assessments were completed by patient for this visit:  PROMIS 10-Global Health (all questions and answers displayed):       3/9/2022    10:56 AM 6/8/2022     1:25 PM 6/29/2022    10:00 PM 10/26/2022    12:00 PM 2/1/2023    12:00 PM 5/18/2023     3:00 PM 9/6/2023     3:00 PM   PROMIS 10   In general, would you say your health is: Good         In general, would you say your quality of life is: Very good         In general, how would you rate your physical health? Good         In general, how would you rate your mental health, including your mood and your ability to think? Good         In general, how would you rate your satisfaction with your social activities and relationships? Very good         In general, please rate how well you carry out your usual social activities and roles Very good         To what extent are you able to carry out your everyday physical activities such as walking, climbing stairs, carrying groceries, or moving a chair? Mostly         In the past 7 days, how often have you been bothered by emotional problems such as feeling anxious, depressed, or irritable? Sometimes         In the past 7 days, how would you rate your fatigue on average? Moderate         In the past 7 days, how would you rate your pain on average, where 0 means no pain, and 10 means worst imaginable pain? 5         In general, would you say your health is: 3 3 3 3 3 3 4   In general, would you say your quality of life is: 4 4 4 4 4 3 4   In general, how would you rate your physical health? 3 3 3 3 3 3 3   In general, how would you rate your mental health, including your mood and your ability to think? 3 2 2 3 3 3 3   In general, how would you rate your satisfaction with your social activities and relationships? 4 3 3 4 4 4 4   In  general, please rate how well you carry out your usual social activities and roles. (This includes activities at home, at work and in your community, and responsibilities as a parent, child, spouse, employee, friend, etc.) 4 3 3 4 4 4 4   To what extent are you able to carry out your everyday physical activities such as walking, climbing stairs, carrying groceries, or moving a chair? 4 4 4 4 5 4 4   In the past 7 days, how often have you been bothered by emotional problems such as feeling anxious, depressed, or irritable? 3 3 2 3 2 3 2   In the past 7 days, how would you rate your fatigue on average? 3 2 2 3 2 2 2   In the past 7 days, how would you rate your pain on average, where 0 means no pain, and 10 means worst imaginable pain? 5 4 3 4 3 4 3   Global Mental Health Score 14 12 13 14 15 13 15   Global Physical Health Score 13 14 15 13 16 14 15   PROMIS TOTAL - SUBSCORES 27 26 28 27 31 27 30           ASSESSMENT: Current Emotional / Mental Status (status of significant symptoms):   Risk status (Self / Other harm or suicidal ideation)   Patient denies current fears or concerns for personal safety.   Patient denies current or recent suicidal ideation or behaviors.   Patient denies current or recent homicidal ideation or behaviors.   Patient denies current or recent self injurious behavior or ideation.   Patient denies other safety concerns.   Patient reports there has been no change in risk factors since their last session.     Patient reports there has been no change in protective factors since their last session.     Recommended that patient call 911 or go to the local ED should there be a change in any of these risk factors.     Appearance:   Appropriate    Eye Contact:   Good    Psychomotor Behavior: Normal    Attitude:   Cooperative  Interested   Orientation:   All   Speech    Rate / Production: Normal/ Responsive Emotional Talkative    Volume:  Normal    Mood:    Normal   Affect:    Appropriate    Thought  Content:  Clear    Thought Form:  Coherent  Logical    Insight:    Fair      Medication Review:   No changes to current psychiatric medication(s)     Medication Compliance:   Yes     Changes in Health Issues:   None reported     Chemical Use Review:   Substance Use: Chemical use reviewed, no active concerns identified      Tobacco Use: No current tobacco use.      Diagnosis:  1. ASUNCION (generalized anxiety disorder)          Collateral Reports Completed:   Not Applicable    PLAN: (Patient Tasks / Therapist Tasks / Other)  Homework: [place] vacation. Couple to create space where they check in with each other. Marcel to remain boundaried with his family.    Trinidad Solorio, LMFT    ______________________________________________________________________    Treatment Plan    Patient's Name: Marcel Melchor  YOB: 1970    Date of Creation: 6/8/2022  Date Treatment Plan Last Reviewed/Revised: 9/6/2023    DSM5 Diagnoses: 300.02 (F41.1) Generalized Anxiety Disorder  Psychosocial / Contextual Factors: marital discord, work stress, pain issues  PROMIS (reviewed every 90 days): 9/6/2023 Score: 30    Referral / Collaboration:  Was/were discussed and patient will pursue.    Anticipated number of session for this episode of care: 24  Anticipation frequency of session: Every other week  Anticipated Duration of each session: 38-52 minutes  Treatment plan will be reviewed in 90 days or when goals have been changed.       MeasurableTreatment Goal(s) related to diagnosis / functional impairment(s)  Goal 1: Patient will lower GAD7 score to 3 or below    I will know I've met my goal when I'm less anxious and irritable.      Objective #A (Patient Action)    Patient will  have weekly date nights withi his wife  .  Status: Continued - Date(s): 9/6/2023    Intervention(s)  Therapist will teach  the benefits of date night and intentional time together .    Objective #B  Patient will practice the use of timeouts.  Status: Continued -  Date(s): 9/6/2023     Intervention(s)  Therapist will  encourage client to use time outs when overly agitated .    Objective #C  Patient will  use the speaker/listener technique in communication .  Status: Continued - Date(s): 9/6/2023    Intervention(s)  Therapist will teach S/L Technique .      Patient has reviewed and agreed to the above plan.      Trinidad Solorio, DOT  September 6, 2023

## 2023-12-07 RX ORDER — DESVENLAFAXINE 100 MG/1
100 TABLET, EXTENDED RELEASE ORAL DAILY
Qty: 90 TABLET | Refills: 1 | Status: SHIPPED | OUTPATIENT
Start: 2023-12-07 | End: 2024-04-19

## 2023-12-18 ENCOUNTER — OFFICE VISIT (OUTPATIENT)
Dept: FAMILY MEDICINE | Facility: CLINIC | Age: 53
End: 2023-12-18
Payer: COMMERCIAL

## 2023-12-18 VITALS
OXYGEN SATURATION: 99 % | SYSTOLIC BLOOD PRESSURE: 138 MMHG | DIASTOLIC BLOOD PRESSURE: 89 MMHG | BODY MASS INDEX: 27.99 KG/M2 | RESPIRATION RATE: 16 BRPM | TEMPERATURE: 98.2 F | HEART RATE: 52 BPM | WEIGHT: 189 LBS | HEIGHT: 69 IN

## 2023-12-18 DIAGNOSIS — Q55.22 RETRACTILE TESTIS: ICD-10-CM

## 2023-12-18 DIAGNOSIS — F39 MOOD DISORDER (H): ICD-10-CM

## 2023-12-18 DIAGNOSIS — Z01.818 PREOP GENERAL PHYSICAL EXAM: Primary | ICD-10-CM

## 2023-12-18 DIAGNOSIS — L65.9 HAIR LOSS: ICD-10-CM

## 2023-12-18 LAB
ANION GAP SERPL CALCULATED.3IONS-SCNC: 11 MMOL/L (ref 7–15)
BUN SERPL-MCNC: 8.7 MG/DL (ref 6–20)
CALCIUM SERPL-MCNC: 10.4 MG/DL (ref 8.6–10)
CHLORIDE SERPL-SCNC: 101 MMOL/L (ref 98–107)
CREAT SERPL-MCNC: 1.11 MG/DL (ref 0.67–1.17)
DEPRECATED HCO3 PLAS-SCNC: 28 MMOL/L (ref 22–29)
EGFRCR SERPLBLD CKD-EPI 2021: 80 ML/MIN/1.73M2
ERYTHROCYTE [DISTWIDTH] IN BLOOD BY AUTOMATED COUNT: 12.9 % (ref 10–15)
GLUCOSE SERPL-MCNC: 86 MG/DL (ref 70–99)
HCT VFR BLD AUTO: 49.3 % (ref 40–53)
HGB BLD-MCNC: 17.8 G/DL (ref 13.3–17.7)
MCH RBC QN AUTO: 29.9 PG (ref 26.5–33)
MCHC RBC AUTO-ENTMCNC: 36.1 G/DL (ref 31.5–36.5)
MCV RBC AUTO: 83 FL (ref 78–100)
PLATELET # BLD AUTO: 218 10E3/UL (ref 150–450)
POTASSIUM SERPL-SCNC: 4.3 MMOL/L (ref 3.4–5.3)
RBC # BLD AUTO: 5.95 10E6/UL (ref 4.4–5.9)
SODIUM SERPL-SCNC: 140 MMOL/L (ref 135–145)
WBC # BLD AUTO: 7.3 10E3/UL (ref 4–11)

## 2023-12-18 PROCEDURE — 85027 COMPLETE CBC AUTOMATED: CPT | Performed by: PHYSICIAN ASSISTANT

## 2023-12-18 PROCEDURE — 80048 BASIC METABOLIC PNL TOTAL CA: CPT | Performed by: PHYSICIAN ASSISTANT

## 2023-12-18 PROCEDURE — 99214 OFFICE O/P EST MOD 30 MIN: CPT | Performed by: PHYSICIAN ASSISTANT

## 2023-12-18 PROCEDURE — 36415 COLL VENOUS BLD VENIPUNCTURE: CPT | Performed by: PHYSICIAN ASSISTANT

## 2023-12-18 RX ORDER — FINASTERIDE 1 MG/1
TABLET, FILM COATED ORAL
Qty: 90 TABLET | Refills: 1 | Status: SHIPPED | OUTPATIENT
Start: 2023-12-18 | End: 2024-04-19

## 2023-12-18 RX ORDER — ARIPIPRAZOLE 10 MG/1
10 TABLET ORAL DAILY
Qty: 90 TABLET | Refills: 1 | Status: SHIPPED | OUTPATIENT
Start: 2023-12-18 | End: 2024-04-19

## 2023-12-18 RX ORDER — FINASTERIDE 1 MG/1
TABLET, FILM COATED ORAL
Status: CANCELLED | OUTPATIENT
Start: 2023-12-18

## 2023-12-18 ASSESSMENT — PATIENT HEALTH QUESTIONNAIRE - PHQ9
10. IF YOU CHECKED OFF ANY PROBLEMS, HOW DIFFICULT HAVE THESE PROBLEMS MADE IT FOR YOU TO DO YOUR WORK, TAKE CARE OF THINGS AT HOME, OR GET ALONG WITH OTHER PEOPLE: SOMEWHAT DIFFICULT
SUM OF ALL RESPONSES TO PHQ QUESTIONS 1-9: 5
SUM OF ALL RESPONSES TO PHQ QUESTIONS 1-9: 5

## 2023-12-18 ASSESSMENT — PAIN SCALES - GENERAL: PAINLEVEL: NO PAIN (0)

## 2023-12-18 NOTE — PATIENT INSTRUCTIONS
Preparing for Your Surgery  Getting started  A nurse will call you to review your health history and instructions. They will give you an arrival time based on your scheduled surgery time. Please be ready to share:  Your doctor's clinic name and phone number  Your medical, surgical, and anesthesia history  A list of allergies and sensitivities  A list of medicines, including herbal treatments and over-the-counter drugs  Whether the patient has a legal guardian (ask how to send us the papers in advance)  Please tell us if you're pregnant--or if there's any chance you might be pregnant. Some surgeries may injure a fetus (unborn baby), so they require a pregnancy test. Surgeries that are safe for a fetus don't always need a test, and you can choose whether to have one.   If you have a child who's having surgery, please ask for a copy of Preparing for Your Child's Surgery.    Preparing for surgery  Within 10 to 30 days of surgery: Have a pre-op exam (sometimes called an H&P, or History and Physical). This can be done at a clinic or pre-operative center.  If you're having a , you may not need this exam. Talk to your care team.  At your pre-op exam, talk to your care team about all medicines you take. If you need to stop any medicines before surgery, ask when to start taking them again.  We do this for your safety. Many medicines can make you bleed too much during surgery. Some change how well surgery (anesthesia) drugs work.  Call your insurance company to let them know you're having surgery. (If you don't have insurance, call [phone].)  Call your clinic if there's any change in your health. This includes signs of a cold or flu (sore throat, runny nose, cough, rash, fever). It also includes a scrape or scratch near the surgery site.  If you have questions on the day of surgery, call your hospital or surgery center.  Eating and drinking guidelines  For your safety: Unless your surgeon tells you otherwise,  follow the guidelines below.  Eat and drink as usual until 8 hours before you arrive for surgery. After that, no food or milk.  Drink clear liquids until 2 hours before you arrive. These are liquids you can see through, like water, Gatorade, and Propel Water. They also include plain black coffee and tea (no cream or milk), candy, and breath mints. You can spit out gum when you arrive.  If you drink alcohol: Stop drinking it the night before surgery.  If your care team tells you to take medicine on the morning of surgery, it's okay to take it with a sip of water.  Preventing infection  Shower or bathe the night before and morning of your surgery. Follow the instructions your clinic gave you. (If no instructions, use regular soap.)  Don't shave or clip hair near your surgery site. We'll remove the hair if needed.  Don't smoke or vape the morning of surgery. You may chew nicotine gum up to 2 hours before surgery. A nicotine patch is okay.  Note: Some surgeries require you to completely quit smoking and nicotine. Check with your surgeon.  Your care team will make every effort to keep you safe from infection. We will:  Clean our hands often with soap and water (or an alcohol-based hand rub).  Clean the skin at your surgery site with a special soap that kills germs.  Give you a special gown to keep you warm. (Cold raises the risk of infection.)  Wear special hair covers, masks, gowns and gloves during surgery.  Give antibiotic medicine, if prescribed. Not all surgeries need antibiotics.  What to bring on the day of surgery  Photo ID and insurance card  Copy of your health care directive, if you have one  Glasses and hearing aids (bring cases)  You can't wear contacts during surgery  Inhaler and eye drops, if you use them (tell us about these when you arrive)  CPAP machine or breathing device, if you use them  A few personal items, if spending the night  If you have . . .  A pacemaker, ICD (cardiac defibrillator) or other  implant: Bring the ID card.  An implanted stimulator: Bring the remote control.  A legal guardian: Bring a copy of the certified (court-stamped) guardianship papers.  Please remove any jewelry, including body piercings. Leave jewelry and other valuables at home.  If you're going home the day of surgery  You must have a responsible adult drive you home. They should stay with you overnight as well.  If you don't have someone to stay with you, and you aren't safe to go home alone, we may keep you overnight. Insurance often won't pay for this.  After surgery  If it's hard to control your pain or you need more pain medicine, please call your surgeon's office.  Questions?   If you have any questions for your care team, list them here: _________________________________________________________________________________________________________________________________________________________________________ ____________________________________ ____________________________________ ____________________________________  For informational purposes only. Not to replace the advice of your health care provider. Copyright   2003, 2019 [facility]. All rights reserved. Clinically reviewed by Meera Chavez MD. [employer] 597545 - REV 12/22.    How to Take Your Medication Before Surgery  - Take all of your medications before surgery as usual

## 2023-12-18 NOTE — PROGRESS NOTES
[facility]  [address] [facility] [address]  Phone: [phone]  Primary Provider: Wes Gunderson  Pre-op Performing Provider: WES GUNDERSON      PREOPERATIVE EVALUATION:  Today's date: 12/18/2023    Marcel is a 53 year old, presenting for the following:  Pre-Op Exam        12/18/2023     9:14 AM   Additional Questions   Roomed by Leonila         12/18/2023     9:14 AM   Patient Reported Additional Medications   Patient reports taking the following new medications none       Surgical Information:  Surgery/Procedure: Right Testicular surgery  Surgery Location:   Surgeon: Dr. Marcel Crain  Surgery Date: 1/2/24  Time of Surgery:   Where patient plans to recover: At home with family  Fax number for surgical facility:     Assessment & Plan     The proposed surgical procedure is considered INTERMEDIATE risk.    Preop general physical exam  Ok for surgery- labs pending.  - CBC with platelets; Future  - Basic metabolic panel  (Ca, Cl, CO2, Creat, Gluc, K, Na, BUN); Future  - CBC with platelets  - Basic metabolic panel  (Ca, Cl, CO2, Creat, Gluc, K, Na, BUN)    Retractile testis  Follow with Urology    Mood disorder (H24)  Still in therapy with his wife.  Taking pristiq as well.  Feels stable for the most part but at times the anger management, mood swings are still happening.  Ok to increase abilify to 10mg nightly.  Follow up 6 months or sooner prn.  - ARIPiprazole (ABILIFY) 10 MG tablet; Take 1 tablet (10 mg) by mouth daily    Hair loss  Refilling for patient.  Has been on this for a while per specialty clinic but is not planning to go there anymore.  - finasteride (PROPECIA) 1 MG tablet; TAKE 1 TABLET BY MOUTH DAILY FOR HAIR LOSS.            - No identified additional risk factors other than previously addressed    Antiplatelet or Anticoagulation Medication Instructions:   - Patient is on no antiplatelet or anticoagulation medications.    Additional Medication  Instructions:  Patient is to take all scheduled medications on the day of surgery    RECOMMENDATION:  APPROVAL GIVEN to proceed with proposed procedure, without further diagnostic evaluation.        Subjective       HPI related to upcoming procedure: patient here for pre op exam prior to surgery for a retractile testicle.  He believes his surgery to be scheduled on Jan 2, 2024.        12/18/2023     9:01 AM   Preop Questions   1. Have you ever had a heart attack or stroke? No   2. Have you ever had surgery on your heart or blood vessels, such as a stent placement, a coronary artery bypass, or surgery on an artery in your head, neck, heart, or legs? No   3. Do you have chest pain with activity? No   4. Do you have a history of  heart failure? No   5. Do you currently have a cold, bronchitis or symptoms of other infection? No   6. Do you have a cough, shortness of breath, or wheezing? No   7. Do you or anyone in your family have previous history of blood clots? No   8. Do you or does anyone in your family have a serious bleeding problem such as prolonged bleeding following surgeries or cuts? No   9. Have you ever had problems with anemia or been told to take iron pills? No   10. Have you had any abnormal blood loss such as black, tarry or bloody stools? No   11. Have you ever had a blood transfusion? No   12. Are you willing to have a blood transfusion if it is medically needed before, during, or after your surgery? Yes   13. Have you or any of your relatives ever had problems with anesthesia? No   14. Do you have sleep apnea, excessive snoring or daytime drowsiness? YES - sleep apnea   14a. Do you have a CPAP machine? Yes   15. Do you have any artifical heart valves or other implanted medical devices like a pacemaker, defibrillator, or continuous glucose monitor? No   16. Do you have artificial joints? No   17. Are you allergic to latex? No       Health Care Directive:  Patient does not have a Health Care Directive  or Living Will: Patient states has Advance Directive and will bring in a copy to clinic.    Preoperative Review of :   reviewed - controlled substances prescribed by other outside provider(s).      Status of Chronic Conditions:  See problem list for active medical problems.  Problems all longstanding and stable, except as noted/documented.  See ROS for pertinent symptoms related to these conditions.    Review of Systems  Constitutional, neuro, ENT, endocrine, pulmonary, cardiac, gastrointestinal, genitourinary, musculoskeletal, integument and psychiatric systems are negative, except as otherwise noted.    Patient Active Problem List    Diagnosis Date Noted    Major depression, recurrent (H24) 11/22/2022     Priority: Medium    Left carpal tunnel syndrome 11/19/2022     Priority: Medium     Added automatically from request for surgery [id number]      DDD (degenerative disc disease), lumbar 11/12/2020     Priority: Medium    Impulse control disorder in adult 05/10/2019     Priority: Medium    Intermittent explosive disorder in adult 05/10/2019     Priority: Medium    Mood swing 05/03/2019     Priority: Medium    Irritability and anger 05/03/2019     Priority: Medium    Behavior concern in adult 12/13/2018     Priority: Medium    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     Priority: Medium    Narcolepsy 09/27/2018     Priority: Medium    Crohn's disease (H) 06/15/2015     Priority: Medium    AJAY (obstructive sleep apnea) 11/11/2014     Priority: Medium    ASUNCION (generalized anxiety disorder) 09/03/2013     Priority: Medium    Rotator cuff syndrome 01/19/2012     Priority: Medium    Motion sickness 07/14/2011     Priority: Medium    CARDIOVASCULAR SCREENING; LDL GOAL LESS THAN 160 10/31/2010     Priority: Medium    Attention deficit hyperactivity disorder (ADHD), combined type 12/04/2003     Priority: Medium     Problem list name updated by automated process. Provider to review      Dyspnea and respiratory abnormality  12/04/2003     Priority: Medium     Problem list name updated by automated process. Provider to review        Past Medical History:   Diagnosis Date    Anxiety 9/3/2013    AJAY (obstructive sleep apnea) 11/11/2014    Rotator cuff syndrome 1/19/2012     Past Surgical History:   Procedure Laterality Date    RELEASE CARPAL TUNNEL Left 11/29/2022    Procedure: LEFT OPEN CARPAL TUNNEL RELEASE;  Surgeon: Camila Medina MD;  [place] [facility] [place]    RUST NONSPECIFIC PROCEDURE      fx R ankle-ORIF     Current Outpatient Medications   Medication Sig Dispense Refill    anastrozole (ARIMIDEX) 1 MG tablet TAKE 1 TABLET BY MOUTH WEEKLY AS DIRECTED      ARIPiprazole (ABILIFY) 5 MG tablet TAKE 1 TABLET BY MOUTH EVERY DAY 90 tablet 1    armodafinil (NUVIGIL) 250 MG TABS tablet TAKE 1 TABLET BY MOUTH EVERY DAY IN THE MORNING  1    ASACOL  MG EC tablet TAKE 3 TABLETS BY MOUTH TWICE DAILY  0    clindamycin (CLEOCIN T) 1 % external lotion APPLY TOPICALLY TO FACE AND CHEST TWICE A DAY AS NEEDED 60 mL 0    desvenlafaxine (PRISTIQ) 100 MG 24 hr tablet TAKE 1 TABLET BY MOUTH EVERY DAY 90 tablet 1    finasteride (PROPECIA) 1 MG tablet TAKE 1 TABLET BY MOUTH DAILY FOR HAIR LOSS.      modafinil (PROVIGIL) 200 MG tablet TAKE 1 (200MG) BY MOUTH EVERY DAY AT NOON      testosterone cypionate (DEPOTESTOTERONE) 200 MG/ML injection Inject 50 mg into the muscle every 14 days      amphetamine-dextroamphetamine (ADDERALL) 10 MG tablet TAKE 1-2 TABLETS BY ORAL ROUTE IN AFTERNOON DAILY (Patient not taking: Reported on 12/18/2023)      diclofenac (VOLTAREN) 1 % topical gel Apply 4 g topically 4 times daily as needed for moderate pain (Patient not taking: Reported on 12/18/2023) 200 g 1    mesalamine (CANASA) 1000 MG suppository Place 1,000 mg rectally 2 times daily (Patient not taking: Reported on 12/18/2023)         Allergies   Allergen Reactions    Amoxicillin      itching    Penicillins      Other reaction(s): Hives, Hives/Fever        Social  "History     Tobacco Use    Smoking status: Never     Passive exposure: Never    Smokeless tobacco: Never   Substance Use Topics    Alcohol use: No     Alcohol/week: 0.0 standard drinks of alcohol       History   Drug Use No         Objective     /89 (BP Location: Right arm, Patient Position: Sitting, Cuff Size: Adult Regular)   Pulse 52   Temp 98.2  F (36.8  C) (Oral)   Resp 16   Ht 1.753 m (5' 9\")   Wt 85.7 kg (189 lb)   SpO2 99%   BMI 27.91 kg/m      Physical Exam    GENERAL APPEARANCE: healthy, alert and no distress     EYES: EOMI,  PERRL     HENT: ear canals and TM's normal and nose and mouth without ulcers or lesions     NECK: no adenopathy, no asymmetry, masses, or scars and thyroid normal to palpation     RESP: lungs clear to auscultation - no rales, rhonchi or wheezes     CV: regular rates and rhythm, normal S1 S2, no S3 or S4 and no murmur, click or rub     MS: extremities normal- no gross deformities noted, no evidence of inflammation in joints, FROM in all extremities.     SKIN: no suspicious lesions or rashes     NEURO: Normal strength and tone, sensory exam grossly normal, mentation intact and speech normal     PSYCH: mentation appears normal. and affect normal/bright     LYMPHATICS: No cervical adenopathy    Recent Labs   Lab Test 06/09/22  0744   A1C 4.9        Diagnostics:  Labs pending at this time.  Results will be reviewed when available.   No EKG required, no history of coronary heart disease, significant arrhythmia, peripheral arterial disease or other structural heart disease.    Revised Cardiac Risk Index (RCRI):  The patient has the following serious cardiovascular risks for perioperative complications:   - No serious cardiac risks = 0 points     RCRI Interpretation: 0 points: Class I (very low risk - 0.4% complication rate)         Signed Electronically by: Wes Braga PA-C  Copy of this evaluation report is provided to requesting physician.      "

## 2023-12-20 ENCOUNTER — OFFICE VISIT (OUTPATIENT)
Dept: PSYCHOLOGY | Facility: CLINIC | Age: 53
End: 2023-12-20
Payer: COMMERCIAL

## 2023-12-20 DIAGNOSIS — F41.1 GAD (GENERALIZED ANXIETY DISORDER): Primary | ICD-10-CM

## 2023-12-20 PROCEDURE — 90837 PSYTX W PT 60 MINUTES: CPT | Performed by: MARRIAGE & FAMILY THERAPIST

## 2023-12-20 NOTE — PROGRESS NOTES
"[facility]                                     Progress Note    Patient Name: Marcel NG Tutewohl  Date: 12/20/2023         Service Type: Individual      Session Start Time: 2:00PM  Session End Time: 2:55PM     Session Length: 55 minutes    Session #: 31    Attendees: Client and Spouse / Significant Other    Service Modality:       DATA  Extended Session (53+ minutes): PROLONGED SERVICE IN THE OUTPATIENT SETTING REQUIRING DIRECT (FACE-TO-FACE) PATIENT CONTACT BEYOND THE USUAL SERVICE:    - Longer session due to limited access to mental health appointments and necessity to address patient's distress / complexity  Interactive Complexity: No  Crisis: No        Progress Since Last Session (Related to Symptoms / Goals / Homework):   Symptoms: Improving rebuilding trust      Homework: Partially completed       Episode of Care Goals: Satisfactory progress - ACTION (Actively working towards change); Intervened by reinforcing change plan / affirming steps taken     Current / Ongoing Stressors and Concerns:  Talked about their trip to [place] which was a debacle because Marcel forgot his meds. He spent the entire time in his hotel room feeling sick. Overall couple are doing well. Wife states that he has been \"good\" but she worries about the other shoe dropping. She is worried that Marcel's family is causing stress for him which concerns her because historically he has taken his anger out on her. Marcel understands and is committed to not letting that happen.      Treatment Objective(s) Addressed in This Session:   use cognitive strategies identified in therapy to challenge anxious thoughts  Use speaker/listener technique when communicating through challenging conversations  Create rituals for connection  Time together to discuss priorities     Intervention:   Relationship counseling    Assessments completed prior to visit:  The following assessments were completed by patient for this visit:  PROMIS 10-Global Health " (all questions and answers displayed):       6/8/2022     1:25 PM 6/29/2022    10:00 PM 10/26/2022    12:00 PM 2/1/2023    12:00 PM 5/18/2023     3:00 PM 9/6/2023     3:00 PM 12/20/2023     4:00 PM   PROMIS 10   In general, would you say your health is: 3 3 3 3 3 4 3   In general, would you say your quality of life is: 4 4 4 4 3 4 4   In general, how would you rate your physical health? 3 3 3 3 3 3 3   In general, how would you rate your mental health, including your mood and your ability to think? 2 2 3 3 3 3 3   In general, how would you rate your satisfaction with your social activities and relationships? 3 3 4 4 4 4 5   In general, please rate how well you carry out your usual social activities and roles. (This includes activities at home, at work and in your community, and responsibilities as a parent, child, spouse, employee, friend, etc.) 3 3 4 4 4 4 4   To what extent are you able to carry out your everyday physical activities such as walking, climbing stairs, carrying groceries, or moving a chair? 4 4 4 5 4 4 4   In the past 7 days, how often have you been bothered by emotional problems such as feeling anxious, depressed, or irritable? 3 2 3 2 3 2 3   In the past 7 days, how would you rate your fatigue on average? 2 2 3 2 2 2 2   In the past 7 days, how would you rate your pain on average, where 0 means no pain, and 10 means worst imaginable pain? 4 3 4 3 4 3 3   Global Mental Health Score 12 13 14 15 13 15 15   Global Physical Health Score 14 15 13 16 14 15 15   PROMIS TOTAL - SUBSCORES 26 28 27 31 27 30 30           ASSESSMENT: Current Emotional / Mental Status (status of significant symptoms):   Risk status (Self / Other harm or suicidal ideation)   Patient denies current fears or concerns for personal safety.   Patient denies current or recent suicidal ideation or behaviors.   Patient denies current or recent homicidal ideation or behaviors.   Patient denies current or recent self injurious behavior or  ideation.   Patient denies other safety concerns.   Patient reports there has been no change in risk factors since their last session.     Patient reports there has been no change in protective factors since their last session.     Recommended that patient call 911 or go to the local ED should there be a change in any of these risk factors.     Appearance:   Appropriate    Eye Contact:   Good    Psychomotor Behavior: Normal    Attitude:   Cooperative  Interested   Orientation:   All   Speech    Rate / Production: Normal/ Responsive Emotional Talkative    Volume:  Normal    Mood:    Normal   Affect:    Appropriate    Thought Content:  Clear    Thought Form:  Coherent  Logical    Insight:    Fair      Medication Review:   No changes to current psychiatric medication(s)     Medication Compliance:   Yes     Changes in Health Issues:   None reported     Chemical Use Review:   Substance Use: Chemical use reviewed, no active concerns identified      Tobacco Use: No current tobacco use.      Diagnosis:  1. ASUNCION (generalized anxiety disorder)        Collateral Reports Completed:   Not Applicable    PLAN: (Patient Tasks / Therapist Tasks / Other)  Homework: Couple to continue with intentional check-ins. Marcel to remain boundaried with his family and manage his anger.     [facility], [facility]    ______________________________________________________________________    Treatment Plan    Patient's Name: Marcel Melchor  YOB: 1970    Date of Creation: 6/8/2022  Date Treatment Plan Last Reviewed/Revised: 12/20/2023    DSM5 Diagnoses: 300.02 (F41.1) Generalized Anxiety Disorder  Psychosocial / Contextual Factors: marital discord, work stress, pain issues  PROMIS (reviewed every 90 days): 12/20/2023 Score: 30    Referral / Collaboration:  Was/were discussed and patient will pursue.    Anticipated number of session for this episode of care: 24  Anticipation frequency of session: Every other week  Anticipated Duration of  each session: 38-52 minutes  Treatment plan will be reviewed in 90 days or when goals have been changed.       MeasurableTreatment Goal(s) related to diagnosis / functional impairment(s)  Goal 1: Patient will lower GAD7 score to 3 or below    I will know I've met my goal when I'm less anxious and irritable.      Objective #A (Patient Action)    Patient will  have weekly date nights withi his wife  .  Status: Continued - Date(s): 12/20/2023    Intervention(s)  Therapist will teach  the benefits of date night and intentional time together .    Objective #B  Patient will practice the use of timeouts and continue with anger management group.  Status: Continued - Date(s): 12/20/2023     Intervention(s)  Therapist will  encourage client to use time outs when overly agitated .    Objective #C  Patient will  use the speaker/listener technique in communication .  Status: Continued - Date(s): 12/20/2023    Intervention(s)  Therapist will teach S/L Technique .      Patient has reviewed and agreed to the above plan.      Trinidad Solorio, [place]  December 20, 2023

## 2024-01-17 ENCOUNTER — OFFICE VISIT (OUTPATIENT)
Dept: PSYCHOLOGY | Facility: CLINIC | Age: 54
End: 2024-01-17
Payer: COMMERCIAL

## 2024-01-17 DIAGNOSIS — F41.1 GAD (GENERALIZED ANXIETY DISORDER): Primary | ICD-10-CM

## 2024-01-17 PROCEDURE — 90837 PSYTX W PT 60 MINUTES: CPT | Performed by: MARRIAGE & FAMILY THERAPIST

## 2024-01-18 NOTE — PROGRESS NOTES
[facility]                                     Progress Note    Patient Name: Marcel NG Tutewohl  Date: 1/17/2024         Service Type: Individual      Session Start Time: 2:00PM  Session End Time: 2:55PM     Session Length: 55 minutes    Session #: 32    Attendees: Spouse / Significant Other    Service Modality:       DATA  Extended Session (53+ minutes): PROLONGED SERVICE IN THE OUTPATIENT SETTING REQUIRING DIRECT (FACE-TO-FACE) PATIENT CONTACT BEYOND THE USUAL SERVICE:    - Longer session due to limited access to mental health appointments and necessity to address patient's distress / complexity  Interactive Complexity: No  Crisis: No        Progress Since Last Session (Related to Symptoms / Goals / Homework):   Symptoms: Improving rebuilding trust      Homework: Partially completed       Episode of Care Goals: Satisfactory progress - ACTION (Actively working towards change); Intervened by reinforcing change plan / affirming steps taken     Current / Ongoing Stressors and Concerns:  Talked about how things have been continuing to progress with Marcel. He remains consistent in his ability to manage his anger. They are working on their daughter's [employer] house and making it ready for them to move in. Couple are going to [place] on Tuesday for a do-over trip.      Treatment Objective(s) Addressed in This Session:   use cognitive strategies identified in therapy to challenge anxious thoughts  Use speaker/listener technique when communicating through challenging conversations  Create rituals for connection  Time together to discuss priorities     Intervention:   Relationship counseling    Assessments completed prior to visit:  The following assessments were completed by patient for this visit:  PROMIS 10-Global Health (all questions and answers displayed):       6/8/2022     1:25 PM 6/29/2022    10:00 PM 10/26/2022    12:00 PM 2/1/2023    12:00 PM 5/18/2023     3:00 PM 9/6/2023     3:00 PM 12/20/2023      4:00 PM   PROMIS 10   In general, would you say your health is: 3 3 3 3 3 4 3   In general, would you say your quality of life is: 4 4 4 4 3 4 4   In general, how would you rate your physical health? 3 3 3 3 3 3 3   In general, how would you rate your mental health, including your mood and your ability to think? 2 2 3 3 3 3 3   In general, how would you rate your satisfaction with your social activities and relationships? 3 3 4 4 4 4 5   In general, please rate how well you carry out your usual social activities and roles. (This includes activities at home, at work and in your community, and responsibilities as a parent, child, spouse, employee, friend, etc.) 3 3 4 4 4 4 4   To what extent are you able to carry out your everyday physical activities such as walking, climbing stairs, carrying groceries, or moving a chair? 4 4 4 5 4 4 4   In the past 7 days, how often have you been bothered by emotional problems such as feeling anxious, depressed, or irritable? 3 2 3 2 3 2 3   In the past 7 days, how would you rate your fatigue on average? 2 2 3 2 2 2 2   In the past 7 days, how would you rate your pain on average, where 0 means no pain, and 10 means worst imaginable pain? 4 3 4 3 4 3 3   Global Mental Health Score 12 13 14 15 13 15 15   Global Physical Health Score 14 15 13 16 14 15 15   PROMIS TOTAL - SUBSCORES 26 28 27 31 27 30 30           ASSESSMENT: Current Emotional / Mental Status (status of significant symptoms):   Risk status (Self / Other harm or suicidal ideation)   Patient denies current fears or concerns for personal safety.   Patient denies current or recent suicidal ideation or behaviors.   Patient denies current or recent homicidal ideation or behaviors.   Patient denies current or recent self injurious behavior or ideation.   Patient denies other safety concerns.   Patient reports there has been no change in risk factors since their last session.     Patient reports there has been no change in  protective factors since their last session.     Recommended that patient call 911 or go to the local ED should there be a change in any of these risk factors.     Appearance:   Appropriate    Eye Contact:   Good    Psychomotor Behavior: Normal    Attitude:   Cooperative  Interested   Orientation:   All   Speech    Rate / Production: Normal/ Responsive Emotional Talkative    Volume:  Normal    Mood:    Normal   Affect:    Appropriate    Thought Content:  Clear    Thought Form:  Coherent  Logical    Insight:    Fair      Medication Review:   No changes to current psychiatric medication(s)     Medication Compliance:   Yes     Changes in Health Issues:   None reported     Chemical Use Review:   Substance Use: Chemical use reviewed, no active concerns identified      Tobacco Use: No current tobacco use.      Diagnosis:  1. ASUNCION (generalized anxiety disorder)        Collateral Reports Completed:   Not Applicable    PLAN: (Patient Tasks / Therapist Tasks / Other)  Homework: Couple to continue with intentional check-ins. Marcel to remain boundaried with his family and manage his anger.     Trinidad Solorio, [facility]    ______________________________________________________________________    Treatment Plan    Patient's Name: Marcel Melchor  YOB: 1970    Date of Creation: 6/8/2022  Date Treatment Plan Last Reviewed/Revised: 12/20/2023    DSM5 Diagnoses: 300.02 (F41.1) Generalized Anxiety Disorder  Psychosocial / Contextual Factors: marital discord, work stress, pain issues  PROMIS (reviewed every 90 days): 12/20/2023 Score: 30    Referral / Collaboration:  Was/were discussed and patient will pursue.    Anticipated number of session for this episode of care: 24  Anticipation frequency of session: Every other week  Anticipated Duration of each session: 38-52 minutes  Treatment plan will be reviewed in 90 days or when goals have been changed.       MeasurableTreatment Goal(s) related to diagnosis / functional  impairment(s)  Goal 1: Patient will lower GAD7 score to 3 or below    I will know I've met my goal when I'm less anxious and irritable.      Objective #A (Patient Action)    Patient will  have weekly date nights withi his wife  .  Status: Continued - Date(s): 12/20/2023    Intervention(s)  Therapist will teach  the benefits of date night and intentional time together .    Objective #B  Patient will practice the use of timeouts and continue with anger management group.  Status: Continued - Date(s): 12/20/2023     Intervention(s)  Therapist will  encourage client to use time outs when overly agitated .    Objective #C  Patient will  use the speaker/listener technique in communication .  Status: Continued - Date(s): 12/20/2023    Intervention(s)  Therapist will teach S/L Technique .      Patient has reviewed and agreed to the above plan.      Trinidad Solorio, [place]  December 20, 2023

## 2024-01-31 ENCOUNTER — TRANSFERRED RECORDS (OUTPATIENT)
Dept: HEALTH INFORMATION MANAGEMENT | Facility: CLINIC | Age: 54
End: 2024-01-31
Payer: COMMERCIAL

## 2024-01-31 LAB
ALT SERPL-CCNC: 31 IU/L (ref 0–44)
AST SERPL-CCNC: 23 IU/L (ref 0–40)
CREATININE (EXTERNAL): 0.77 MG/DL (ref 0.76–1.27)
GFR ESTIMATED (EXTERNAL): 108 ML/MIN/1.73

## 2024-02-07 ENCOUNTER — OFFICE VISIT (OUTPATIENT)
Dept: PSYCHOLOGY | Facility: CLINIC | Age: 54
End: 2024-02-07
Payer: COMMERCIAL

## 2024-02-07 DIAGNOSIS — F41.1 GAD (GENERALIZED ANXIETY DISORDER): Primary | ICD-10-CM

## 2024-02-07 PROCEDURE — 90837 PSYTX W PT 60 MINUTES: CPT | Performed by: MARRIAGE & FAMILY THERAPIST

## 2024-02-07 NOTE — PROGRESS NOTES
[facility]                                     Progress Note    Patient Name: Marcel NG Tutewohl  Date: 2/7/2024         Service Type: Individual      Session Start Time: 1:00PM  Session End Time: 1:55PM     Session Length: 55 minutes    Session #: 33    Attendees: Spouse / Significant Other    Service Modality:       DATA  Extended Session (53+ minutes): PROLONGED SERVICE IN THE OUTPATIENT SETTING REQUIRING DIRECT (FACE-TO-FACE) PATIENT CONTACT BEYOND THE USUAL SERVICE:    - Longer session due to limited access to mental health appointments and necessity to address patient's distress / complexity  Interactive Complexity: No  Crisis: No        Progress Since Last Session (Related to Symptoms / Goals / Homework):   Symptoms: Improving rebuilding trust      Homework: Partially completed       Episode of Care Goals: Satisfactory progress - ACTION (Actively working towards change); Intervened by reinforcing change plan / affirming steps taken     Current / Ongoing Stressors and Concerns:  Talked about their trip to [place] which went well after day two when Marcel was able to get caffeine. They had a nice time together and with their friends. 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 Shared how Leonie's mother has been disruptive and negative. Marcel had to sit down and have a talk with her. 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 They still need to schedule a follow up with their financial person. Marcel also shared how his time has been with his father and how they hugged and his Dad has shown appreciation.       Treatment Objective(s) Addressed in This Session:   use cognitive strategies identified in therapy to challenge anxious thoughts  Use speaker/listener technique when communicating through challenging conversations  Create rituals for connection  Time together to discuss priorities  Schedule  with .     Intervention:   Relationship counseling    Assessments completed prior to visit:  The following assessments were completed by patient for this visit:  PROMIS 10-Global Health (all questions and answers displayed):       6/8/2022     1:25 PM 6/29/2022    10:00 PM 10/26/2022    12:00 PM 2/1/2023    12:00 PM 5/18/2023     3:00 PM 9/6/2023     3:00 PM 12/20/2023     4:00 PM   PROMIS 10   In general, would you say your health is: 3 3 3 3 3 4 3   In general, would you say your quality of life is: 4 4 4 4 3 4 4   In general, how would you rate your physical health? 3 3 3 3 3 3 3   In general, how would you rate your mental health, including your mood and your ability to think? 2 2 3 3 3 3 3   In general, how would you rate your satisfaction with your social activities and relationships? 3 3 4 4 4 4 5   In general, please rate how well you carry out your usual social activities and roles. (This includes activities at home, at work and in your community, and responsibilities as a parent, child, spouse, employee, friend, etc.) 3 3 4 4 4 4 4   To what extent are you able to carry out your everyday physical activities such as walking, climbing stairs, carrying groceries, or moving a chair? 4 4 4 5 4 4 4   In the past 7 days, how often have you been bothered by emotional problems such as feeling anxious, depressed, or irritable? 3 2 3 2 3 2 3   In the past 7 days, how would you rate your fatigue on average? 2 2 3 2 2 2 2   In the past 7 days, how would you rate your pain on average, where 0 means no pain, and 10 means worst imaginable pain? 4 3 4 3 4 3 3   Global Mental Health Score 12 13 14 15 13 15 15   Global Physical Health Score 14 15 13 16 14 15 15   PROMIS TOTAL - SUBSCORES 26 28 27 31 27 30 30           ASSESSMENT: Current Emotional / Mental Status (status of significant symptoms):   Risk status (Self / Other harm or suicidal ideation)   Patient denies current fears or concerns for personal  safety.   Patient denies current or recent suicidal ideation or behaviors.   Patient denies current or recent homicidal ideation or behaviors.   Patient denies current or recent self injurious behavior or ideation.   Patient denies other safety concerns.   Patient reports there has been no change in risk factors since their last session.     Patient reports there has been no change in protective factors since their last session.     Recommended that patient call 911 or go to the local ED should there be a change in any of these risk factors.     Appearance:   Appropriate    Eye Contact:   Good    Psychomotor Behavior: Normal    Attitude:   Cooperative  Interested   Orientation:   All   Speech    Rate / Production: Normal/ Responsive Emotional Talkative    Volume:  Normal    Mood:    Normal   Affect:    Appropriate    Thought Content:  Clear    Thought Form:  Coherent  Logical    Insight:    Fair      Medication Review:   No changes to current psychiatric medication(s)     Medication Compliance:   Yes     Changes in Health Issues:   None reported     Chemical Use Review:   Substance Use: Chemical use reviewed, no active concerns identified      Tobacco Use: No current tobacco use.      Diagnosis:  1. ASUNCION (generalized anxiety disorder)        Collateral Reports Completed:   Not Applicable    PLAN: (Patient Tasks / Therapist Tasks / Other)  Homework: Couple to schedule a follow up with their . Marcel to reconsider continuing with anger group virtually.     Trinidad Solorio [facility]    ______________________________________________________________________    Treatment Plan    Patient's Name: Marcel Melchor  YOB: 1970    Date of Creation: 6/8/2022  Date Treatment Plan Last Reviewed/Revised: 12/20/2023    DSM5 Diagnoses: 300.02 (F41.1) Generalized Anxiety Disorder  Psychosocial / Contextual Factors: marital discord, work stress, pain issues  PROMIS (reviewed every 90 days): 12/20/2023 Score:  30    Referral / Collaboration:  Was/were discussed and patient will pursue.    Anticipated number of session for this episode of care: 24  Anticipation frequency of session: Every other week  Anticipated Duration of each session: 38-52 minutes  Treatment plan will be reviewed in 90 days or when goals have been changed.       MeasurableTreatment Goal(s) related to diagnosis / functional impairment(s)  Goal 1: Patient will lower GAD7 score to 3 or below    I will know I've met my goal when I'm less anxious and irritable.      Objective #A (Patient Action)    Patient will  have weekly date nights withi his wife  .  Status: Continued - Date(s): 12/20/2023    Intervention(s)  Therapist will teach  the benefits of date night and intentional time together .    Objective #B  Patient will practice the use of timeouts and continue with anger management group.  Status: Continued - Date(s): 12/20/2023     Intervention(s)  Therapist will  encourage client to use time outs when overly agitated .    Objective #C  Patient will  use the speaker/listener technique in communication .  Status: Continued - Date(s): 12/20/2023    Intervention(s)  Therapist will teach S/L Technique .      Patient has reviewed and agreed to the above plan.      Trinidad Solorio, DOT  December 20, 2023

## 2024-04-02 ENCOUNTER — OFFICE VISIT (OUTPATIENT)
Dept: FAMILY MEDICINE | Facility: CLINIC | Age: 54
End: 2024-04-02
Payer: COMMERCIAL

## 2024-04-02 VITALS
HEART RATE: 66 BPM | WEIGHT: 201.4 LBS | SYSTOLIC BLOOD PRESSURE: 121 MMHG | BODY MASS INDEX: 29.83 KG/M2 | HEIGHT: 69 IN | OXYGEN SATURATION: 99 % | DIASTOLIC BLOOD PRESSURE: 74 MMHG | RESPIRATION RATE: 16 BRPM | TEMPERATURE: 98.3 F

## 2024-04-02 DIAGNOSIS — Z01.818 PREOP GENERAL PHYSICAL EXAM: Primary | ICD-10-CM

## 2024-04-02 DIAGNOSIS — G47.33 OSA (OBSTRUCTIVE SLEEP APNEA): ICD-10-CM

## 2024-04-02 DIAGNOSIS — F39 MOOD DISORDER (H): ICD-10-CM

## 2024-04-02 DIAGNOSIS — F41.1 GAD (GENERALIZED ANXIETY DISORDER): ICD-10-CM

## 2024-04-02 DIAGNOSIS — Q55.22 RETRACTILE TESTIS: ICD-10-CM

## 2024-04-02 LAB
ANION GAP SERPL CALCULATED.3IONS-SCNC: 12 MMOL/L (ref 7–15)
BUN SERPL-MCNC: 22.8 MG/DL (ref 6–20)
CALCIUM SERPL-MCNC: 9.9 MG/DL (ref 8.6–10)
CHLORIDE SERPL-SCNC: 101 MMOL/L (ref 98–107)
CREAT SERPL-MCNC: 0.97 MG/DL (ref 0.67–1.17)
DEPRECATED HCO3 PLAS-SCNC: 26 MMOL/L (ref 22–29)
EGFRCR SERPLBLD CKD-EPI 2021: >90 ML/MIN/1.73M2
ERYTHROCYTE [DISTWIDTH] IN BLOOD BY AUTOMATED COUNT: 13 % (ref 10–15)
GLUCOSE SERPL-MCNC: 79 MG/DL (ref 70–99)
HCT VFR BLD AUTO: 47.6 % (ref 40–53)
HGB BLD-MCNC: 17.1 G/DL (ref 13.3–17.7)
MCH RBC QN AUTO: 30 PG (ref 26.5–33)
MCHC RBC AUTO-ENTMCNC: 35.9 G/DL (ref 31.5–36.5)
MCV RBC AUTO: 84 FL (ref 78–100)
PLATELET # BLD AUTO: 188 10E3/UL (ref 150–450)
POTASSIUM SERPL-SCNC: 4.4 MMOL/L (ref 3.4–5.3)
RBC # BLD AUTO: 5.7 10E6/UL (ref 4.4–5.9)
SODIUM SERPL-SCNC: 139 MMOL/L (ref 135–145)
WBC # BLD AUTO: 7 10E3/UL (ref 4–11)

## 2024-04-02 PROCEDURE — 85027 COMPLETE CBC AUTOMATED: CPT | Performed by: PHYSICIAN ASSISTANT

## 2024-04-02 PROCEDURE — 36415 COLL VENOUS BLD VENIPUNCTURE: CPT | Performed by: PHYSICIAN ASSISTANT

## 2024-04-02 PROCEDURE — 99214 OFFICE O/P EST MOD 30 MIN: CPT | Performed by: PHYSICIAN ASSISTANT

## 2024-04-02 PROCEDURE — 80048 BASIC METABOLIC PNL TOTAL CA: CPT | Performed by: PHYSICIAN ASSISTANT

## 2024-04-02 ASSESSMENT — PAIN SCALES - GENERAL: PAINLEVEL: NO PAIN (0)

## 2024-04-02 NOTE — PROGRESS NOTES
Preoperative Evaluation  [facility]  [address] [facility] [address]  Phone: [phone]  Primary Provider: Wes Gunderson  Pre-op Performing Provider: WES GUNDERSON  Apr 2, 2024       Marcel is a 54 year old, presenting for the following:  Pre-Op Exam      Surgical Information  Surgery/Procedure: Right testicle Orchiopexy  Surgery Location: [facility] in [place]   Surgeon: Sathya Crain MD  Surgery Date: 4/12/24  Time of Surgery: tbd  Where patient plans to recover: At home with family  Fax number for surgical facility:     Assessment & Plan     The proposed surgical procedure is considered INTERMEDIATE risk.    Preop general physical exam  Ok for procedure.  Checking labs.  - Basic metabolic panel  (Ca, Cl, CO2, Creat, Gluc, K, Na, BUN); Future  - CBC with platelets; Future  - Basic metabolic panel  (Ca, Cl, CO2, Creat, Gluc, K, Na, BUN)  - CBC with platelets    Retractile testis  Follow with Urology    AJAY (obstructive sleep apnea)  Has CPAP and followed by sleep medicine    Mood disorder (H24)  ASUNCION (generalized anxiety disorder)  Mood stable right now.  Feeling like the change to 10mg abilify was really good at first.  Has some 5mg tabs at home- he may add that to the 10mg and let me know if this helps.  Will refill when needed.            - No identified additional risk factors other than previously addressed    Antiplatelet or Anticoagulation Medication Instructions   - Patient is on no antiplatelet or anticoagulation medications.    Additional Medication Instructions   - Psychostimulants: Hold the day of surgery   - SSRIs, SNRIs, TCAs, Antipsychotics: Continue without modification.     Recommendation  APPROVAL GIVEN to proceed with proposed procedure, without further diagnostic evaluation.          Subjective       HPI related to upcoming procedure: patient here for pre op exam prior to having surgery for retractile right testicle.  He is  currently feeling well.        4/2/2024     8:47 AM   Preop Questions   1. Have you ever had a heart attack or stroke? No   2. Have you ever had surgery on your heart or blood vessels, such as a stent placement, a coronary artery bypass, or surgery on an artery in your head, neck, heart, or legs? No   3. Do you have chest pain with activity? No   4. Do you have a history of  heart failure? No   5. Do you currently have a cold, bronchitis or symptoms of other infection? No   6. Do you have a cough, shortness of breath, or wheezing? No   7. Do you or anyone in your family have previous history of blood clots? No   8. Do you or does anyone in your family have a serious bleeding problem such as prolonged bleeding following surgeries or cuts? No   9. Have you ever had problems with anemia or been told to take iron pills? No   10. Have you had any abnormal blood loss such as black, tarry or bloody stools? No   11. Have you ever had a blood transfusion? No   12. Are you willing to have a blood transfusion if it is medically needed before, during, or after your surgery? Yes   13. Have you or any of your relatives ever had problems with anesthesia? No   14. Do you have sleep apnea, excessive snoring or daytime drowsiness? YES - sleep apnea   14a. Do you have a CPAP machine? Yes   15. Do you have any artifical heart valves or other implanted medical devices like a pacemaker, defibrillator, or continuous glucose monitor? No   16. Do you have artificial joints? No   17. Are you allergic to latex? No       Health Care Directive  Patient does not have a Health Care Directive or Living Will: Discussed advance care planning with patient; information given to patient to review.    Preoperative Review of    reviewed - controlled substances prescribed by other outside provider(s).      Status of Chronic Conditions:  See problem list for active medical problems.  Problems all longstanding and stable, except as noted/documented.   See ROS for pertinent symptoms related to these conditions.    Patient Active Problem List    Diagnosis Date Noted    Major depression, recurrent (H24) 11/22/2022     Priority: Medium    Left carpal tunnel syndrome 11/19/2022     Priority: Medium     Added automatically from request for surgery [id number]      DDD (degenerative disc disease), lumbar 11/12/2020     Priority: Medium    Impulse control disorder in adult 05/10/2019     Priority: Medium    Intermittent explosive disorder in adult 05/10/2019     Priority: Medium    Mood swing 05/03/2019     Priority: Medium    Irritability and anger 05/03/2019     Priority: Medium    Behavior concern in adult 12/13/2018     Priority: Medium    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     Priority: Medium    Narcolepsy 09/27/2018     Priority: Medium    Crohn's disease (H) 06/15/2015     Priority: Medium    AJAY (obstructive sleep apnea) 11/11/2014     Priority: Medium    ASUNCION (generalized anxiety disorder) 09/03/2013     Priority: Medium    Rotator cuff syndrome 01/19/2012     Priority: Medium    Motion sickness 07/14/2011     Priority: Medium    CARDIOVASCULAR SCREENING; LDL GOAL LESS THAN 160 10/31/2010     Priority: Medium    Attention deficit hyperactivity disorder (ADHD), combined type 12/04/2003     Priority: Medium     Problem list name updated by automated process. Provider to review      Dyspnea and respiratory abnormality 12/04/2003     Priority: Medium     Problem list name updated by automated process. Provider to review        Past Medical History:   Diagnosis Date    Anxiety 09/03/2013    Crohn's disease of small and large intestines (H) 08/21/2008    AJAY (obstructive sleep apnea) 11/11/2014    Rotator cuff syndrome 01/19/2012     Past Surgical History:   Procedure Laterality Date    RELEASE CARPAL TUNNEL Left 11/29/2022    Procedure: LEFT OPEN CARPAL TUNNEL RELEASE;  Surgeon: Camila Medina MD;  Location: [facility] OR    [facility] NONSPECIFIC PROCEDURE      fx R ankle-ORIF  "    Current Outpatient Medications   Medication Sig Dispense Refill    anastrozole (ARIMIDEX) 1 MG tablet TAKE 1 TABLET BY MOUTH WEEKLY AS DIRECTED      ARIPiprazole (ABILIFY) 10 MG tablet Take 1 tablet (10 mg) by mouth daily 90 tablet 1    armodafinil (NUVIGIL) 250 MG TABS tablet TAKE 1 TABLET BY MOUTH EVERY DAY IN THE MORNING  1    ASACOL  MG EC tablet TAKE 3 TABLETS BY MOUTH TWICE DAILY  0    clindamycin (CLEOCIN T) 1 % external lotion APPLY TOPICALLY TO FACE AND CHEST TWICE A DAY AS NEEDED 60 mL 0    desvenlafaxine (PRISTIQ) 100 MG 24 hr tablet TAKE 1 TABLET BY MOUTH EVERY DAY 90 tablet 1    finasteride (PROPECIA) 1 MG tablet TAKE 1 TABLET BY MOUTH DAILY FOR HAIR LOSS. 90 tablet 1    modafinil (PROVIGIL) 200 MG tablet TAKE 1 (200MG) BY MOUTH EVERY DAY AT NOON      testosterone cypionate (DEPOTESTOTERONE) 200 MG/ML injection Inject 50 mg into the muscle every 14 days         Allergies   Allergen Reactions    Amoxicillin      itching    Penicillins      Other reaction(s): Hives, Hives/Fever        Social History     Tobacco Use    Smoking status: Never     Passive exposure: Never    Smokeless tobacco: Never   Substance Use Topics    Alcohol use: No     Alcohol/week: 0.0 standard drinks of alcohol       History   Drug Use No         Review of Systems    Review of Systems  Constitutional, HEENT, cardiovascular, pulmonary, gi and gu systems are negative, except as otherwise noted.    Objective    /74 (BP Location: Right arm, Patient Position: Sitting, Cuff Size: Adult Large)   Pulse 66   Temp 98.3  F (36.8  C) (Oral)   Resp 16   Ht 1.753 m (5' 9\")   Wt 91.4 kg (201 lb 6.4 oz)   SpO2 99%   BMI 29.74 kg/m     Estimated body mass index is 29.74 kg/m  as calculated from the following:    Height as of this encounter: 1.753 m (5' 9\").    Weight as of this encounter: 91.4 kg (201 lb 6.4 oz).  Physical Exam  GENERAL: alert and no distress  HENT: ear canals and TM's normal, nose and mouth without ulcers or " lesions  NECK: no adenopathy, no asymmetry, masses, or scars  RESP: lungs clear to auscultation - no rales, rhonchi or wheezes  CV: regular rate and rhythm, normal S1 S2, no S3 or S4, no murmur, click or rub, no peripheral edema  MS: no gross musculoskeletal defects noted, no edema  PSYCH: mentation appears normal, affect normal/bright    Recent Labs   Lab Test 12/18/23  0949 06/09/22  0744   HGB 17.8*  --      --      --    POTASSIUM 4.3  --    CR 1.11  --    A1C  --  4.9        Diagnostics  Labs pending at this time.  Results will be reviewed when available.   No EKG required, no history of coronary heart disease, significant arrhythmia, peripheral arterial disease or other structural heart disease.    Revised Cardiac Risk Index (RCRI)  The patient has the following serious cardiovascular risks for perioperative complications:   - No serious cardiac risks = 0 points     RCRI Interpretation: 0 points: Class I (very low risk - 0.4% complication rate)         Signed Electronically by: Wes Braga PA-C  Copy of this evaluation report is provided to requesting physician.

## 2024-04-02 NOTE — PATIENT INSTRUCTIONS
Preparing for Your Surgery  Getting started  A nurse will call you to review your health history and instructions. They will give you an arrival time based on your scheduled surgery time. Please be ready to share:  Your doctor's clinic name and phone number  Your medical, surgical, and anesthesia history  A list of allergies and sensitivities  A list of medicines, including herbal treatments and over-the-counter drugs  Whether the patient has a legal guardian (ask how to send us the papers in advance)  Please tell us if you're pregnant--or if there's any chance you might be pregnant. Some surgeries may injure a fetus (unborn baby), so they require a pregnancy test. Surgeries that are safe for a fetus don't always need a test, and you can choose whether to have one.   If you have a child who's having surgery, please ask for a copy of Preparing for Your Child's Surgery.    Preparing for surgery  Within 10 to 30 days of surgery: Have a pre-op exam (sometimes called an H&P, or History and Physical). This can be done at a clinic or pre-operative center.  If you're having a , you may not need this exam. Talk to your care team.  At your pre-op exam, talk to your care team about all medicines you take. If you need to stop any medicines before surgery, ask when to start taking them again.  We do this for your safety. Many medicines can make you bleed too much during surgery. Some change how well surgery (anesthesia) drugs work.  Call your insurance company to let them know you're having surgery. (If you don't have insurance, call [phone].)  Call your clinic if there's any change in your health. This includes signs of a cold or flu (sore throat, runny nose, cough, rash, fever). It also includes a scrape or scratch near the surgery site.  If you have questions on the day of surgery, call your hospital or surgery center.  Eating and drinking guidelines  For your safety: Unless your surgeon tells you otherwise,  follow the guidelines below.  Eat and drink as usual until 8 hours before you arrive for surgery. After that, no food or milk.  Drink clear liquids until 2 hours before you arrive. These are liquids you can see through, like water, Gatorade, and Propel Water. They also include plain black coffee and tea (no cream or milk), candy, and breath mints. You can spit out gum when you arrive.  If you drink alcohol: Stop drinking it the night before surgery.  If your care team tells you to take medicine on the morning of surgery, it's okay to take it with a sip of water.  Preventing infection  Shower or bathe the night before and morning of your surgery. Follow the instructions your clinic gave you. (If no instructions, use regular soap.)  Don't shave or clip hair near your surgery site. We'll remove the hair if needed.  Don't smoke or vape the morning of surgery. You may chew nicotine gum up to 2 hours before surgery. A nicotine patch is okay.  Note: Some surgeries require you to completely quit smoking and nicotine. Check with your surgeon.  Your care team will make every effort to keep you safe from infection. We will:  Clean our hands often with soap and water (or an alcohol-based hand rub).  Clean the skin at your surgery site with a special soap that kills germs.  Give you a special gown to keep you warm. (Cold raises the risk of infection.)  Wear special hair covers, masks, gowns and gloves during surgery.  Give antibiotic medicine, if prescribed. Not all surgeries need antibiotics.  What to bring on the day of surgery  Photo ID and insurance card  Copy of your health care directive, if you have one  Glasses and hearing aids (bring cases)  You can't wear contacts during surgery  Inhaler and eye drops, if you use them (tell us about these when you arrive)  CPAP machine or breathing device, if you use them  A few personal items, if spending the night  If you have . . .  A pacemaker, ICD (cardiac defibrillator) or other  implant: Bring the ID card.  An implanted stimulator: Bring the remote control.  A legal guardian: Bring a copy of the certified (court-stamped) guardianship papers.  Please remove any jewelry, including body piercings. Leave jewelry and other valuables at home.  If you're going home the day of surgery  You must have a responsible adult drive you home. They should stay with you overnight as well.  If you don't have someone to stay with you, and you aren't safe to go home alone, we may keep you overnight. Insurance often won't pay for this.  After surgery  If it's hard to control your pain or you need more pain medicine, please call your surgeon's office.  Questions?   If you have any questions for your care team, list them here: _________________________________________________________________________________________________________________________________________________________________________ ____________________________________ ____________________________________ ____________________________________  For informational purposes only. Not to replace the advice of your health care provider. Copyright   2003, 2019 [facility]. All rights reserved. Clinically reviewed by Meera Chavez MD. [employer] [id number] - REV 12/22.    How to Take Your Medication Before Surgery  - Take all of your medications before surgery as usual

## 2024-04-03 ENCOUNTER — OFFICE VISIT (OUTPATIENT)
Dept: PSYCHOLOGY | Facility: CLINIC | Age: 54
End: 2024-04-03
Payer: COMMERCIAL

## 2024-04-03 DIAGNOSIS — F41.1 GAD (GENERALIZED ANXIETY DISORDER): Primary | ICD-10-CM

## 2024-04-03 PROCEDURE — 90837 PSYTX W PT 60 MINUTES: CPT | Performed by: MARRIAGE & FAMILY THERAPIST

## 2024-04-03 NOTE — PROGRESS NOTES
[facility]                                     Progress Note    Patient Name: Marcel NG Tutewohl  Date: 4/3/2024         Service Type: Individual      Session Start Time: 10:00AM  Session End Time: 11:00AM     Session Length: 60 minutes    Session #: 34    Attendees: Spouse / Significant Other    Service Modality:       DATA  Extended Session (53+ minutes): PROLONGED SERVICE IN THE OUTPATIENT SETTING REQUIRING DIRECT (FACE-TO-FACE) PATIENT CONTACT BEYOND THE USUAL SERVICE:    - Longer session due to limited access to mental health appointments and necessity to address patient's distress / complexity  Interactive Complexity: No  Crisis: No        Progress Since Last Session (Related to Symptoms / Goals / Homework):   Symptoms: Improving rebuilding trust      Homework: Partially completed       Episode of Care Goals: Satisfactory progress - ACTION (Actively working towards change); Intervened by reinforcing change plan / affirming steps taken     Current / Ongoing Stressors and Concerns:  Marcel and Ermelinda are now empty nesters and have been busy since this occurred. They are adjusting well although Ermelinda misses the activity of Yara and Leonie being in and out. They have a follow up with their financial person this week and will continue with this process. 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 His sister has been overbearing at times but he blocked her so as not to let her upset him unnecessarily. Couple report things are going well and Ermelinda said Marcel has been steady and stable.       Treatment Objective(s) Addressed in This Session:   use cognitive strategies identified in therapy to challenge anxious thoughts  Use speaker/listener technique when communicating through challenging conversations  Create rituals for connection  Time together to discuss priorities  Schedule with .     Intervention:   Relationship counseling    Assessments completed prior to  visit:  The following assessments were completed by patient for this visit:  PROMIS 10-Global Health (all questions and answers displayed):       6/29/2022    10:00 PM 10/26/2022    12:00 PM 2/1/2023    12:00 PM 5/18/2023     3:00 PM 9/6/2023     3:00 PM 12/20/2023     4:00 PM 4/3/2024    12:00 PM   PROMIS 10   In general, would you say your health is: 3 3 3 3 4 3 3   In general, would you say your quality of life is: 4 4 4 3 4 4 4   In general, how would you rate your physical health? 3 3 3 3 3 3 3   In general, how would you rate your mental health, including your mood and your ability to think? 2 3 3 3 3 3 3   In general, how would you rate your satisfaction with your social activities and relationships? 3 4 4 4 4 5 4   In general, please rate how well you carry out your usual social activities and roles. (This includes activities at home, at work and in your community, and responsibilities as a parent, child, spouse, employee, friend, etc.) 3 4 4 4 4 4 4   To what extent are you able to carry out your everyday physical activities such as walking, climbing stairs, carrying groceries, or moving a chair? 4 4 5 4 4 4 4   In the past 7 days, how often have you been bothered by emotional problems such as feeling anxious, depressed, or irritable? 2 3 2 3 2 3 3   In the past 7 days, how would you rate your fatigue on average? 2 3 2 2 2 2 3   In the past 7 days, how would you rate your pain on average, where 0 means no pain, and 10 means worst imaginable pain? 3 4 3 4 3 3 4   Global Mental Health Score 13 14 15 13 15 15 14   Global Physical Health Score 15 13 16 14 15 15 13   PROMIS TOTAL - SUBSCORES 28 27 31 27 30 30 27           ASSESSMENT: Current Emotional / Mental Status (status of significant symptoms):   Risk status (Self / Other harm or suicidal ideation)   Patient denies current fears or concerns for personal safety.   Patient denies current or recent suicidal ideation or behaviors.   Patient denies current or  recent homicidal ideation or behaviors.   Patient denies current or recent self injurious behavior or ideation.   Patient denies other safety concerns.   Patient reports there has been no change in risk factors since their last session.     Patient reports there has been no change in protective factors since their last session.     Recommended that patient call 911 or go to the local ED should there be a change in any of these risk factors.     Appearance:   Appropriate    Eye Contact:   Good    Psychomotor Behavior: Normal    Attitude:   Cooperative  Interested   Orientation:   All   Speech    Rate / Production: Normal/ Responsive Emotional Talkative    Volume:  Normal    Mood:    Normal   Affect:    Appropriate    Thought Content:  Clear    Thought Form:  Coherent  Logical    Insight:    Fair      Medication Review:   No changes to current psychiatric medication(s)     Medication Compliance:   Yes     Changes in Health Issues:   None reported     Chemical Use Review:   Substance Use: Chemical use reviewed, no active concerns identified      Tobacco Use: No current tobacco use.      Diagnosis:  1. ASUNCION (generalized anxiety disorder)        Collateral Reports Completed:   Not Applicable    PLAN: (Patient Tasks / Therapist Tasks / Other)  Homework: Couple to meet with financial personErmelinda to plan her trip to [place] in June and couple to continue being intentional about time together and managing stress.    Trinidad Solorio, [facility]    ______________________________________________________________________    Treatment Plan    Patient's Name: Marcel Melchor  YOB: 1970    Date of Creation: 6/8/2022  Date Treatment Plan Last Reviewed/Revised: 4/3/2024    DSM5 Diagnoses: 300.02 (F41.1) Generalized Anxiety Disorder  Psychosocial / Contextual Factors: marital discord, work stress, pain issues  PROMIS (reviewed every 90 days): 4/3/2024 Score: 27    Referral / Collaboration:  Was/were discussed and patient will  pursue.    Anticipated number of session for this episode of care: 24  Anticipation frequency of session: Every other week  Anticipated Duration of each session: 38-52 minutes  Treatment plan will be reviewed in 90 days or when goals have been changed.       MeasurableTreatment Goal(s) related to diagnosis / functional impairment(s)  Goal 1: Patient will lower GAD7 score to 3 or below    I will know I've met my goal when I'm less anxious and irritable.      Objective #A (Patient Action)    Patient will  have weekly date nights withi his wife  .  Status: Continued - Date(s): 4/3/2024    Intervention(s)  Therapist will teach  the benefits of date night and intentional time together .    Objective #B  Patient will practice the use of timeouts and continue with anger management group.  Status: Continued - Date(s): 4/3/2024    Intervention(s)  Therapist will  encourage client to use time outs when overly agitated .    Objective #C  Patient will  use the speaker/listener technique in communication .  Status: Continued - Date(s): 4/3/2024    Intervention(s)  Therapist will teach S/L Technique .      Patient has reviewed and agreed to the above plan.      Trinidad Solorio, LMFT

## 2024-04-11 ENCOUNTER — ANESTHESIA EVENT (OUTPATIENT)
Dept: SURGERY | Facility: CLINIC | Age: 54
End: 2024-04-11
Payer: COMMERCIAL

## 2024-04-12 ENCOUNTER — ANESTHESIA (OUTPATIENT)
Dept: SURGERY | Facility: CLINIC | Age: 54
End: 2024-04-12
Payer: COMMERCIAL

## 2024-04-12 ENCOUNTER — HOSPITAL ENCOUNTER (OUTPATIENT)
Facility: CLINIC | Age: 54
Discharge: HOME | End: 2024-04-12
Attending: UROLOGY | Admitting: UROLOGY
Payer: COMMERCIAL

## 2024-04-12 VITALS
BODY MASS INDEX: 29.19 KG/M2 | TEMPERATURE: 97.8 F | HEART RATE: 75 BPM | RESPIRATION RATE: 14 BRPM | HEIGHT: 69 IN | WEIGHT: 197.1 LBS | DIASTOLIC BLOOD PRESSURE: 68 MMHG | SYSTOLIC BLOOD PRESSURE: 122 MMHG | OXYGEN SATURATION: 97 %

## 2024-04-12 DIAGNOSIS — Q55.22 RETRACTILE TESTIS: Primary | ICD-10-CM

## 2024-04-12 PROBLEM — R79.9 ABNORMAL BLOOD CHEMISTRY LEVEL: Status: ACTIVE | Noted: 2021-04-13

## 2024-04-12 PROBLEM — K57.30 DIVERTICULAR DISEASE OF LARGE INTESTINE: Status: ACTIVE | Noted: 2022-08-25

## 2024-04-12 PROCEDURE — 250N000025 HC SEVOFLURANE, PER MIN: Performed by: UROLOGY

## 2024-04-12 PROCEDURE — 272N000001 HC OR GENERAL SUPPLY STERILE: Performed by: UROLOGY

## 2024-04-12 PROCEDURE — 360N000075 HC SURGERY LEVEL 2, PER MIN: Performed by: UROLOGY

## 2024-04-12 PROCEDURE — 250N000011 HC RX IP 250 OP 636: Performed by: ANESTHESIOLOGY

## 2024-04-12 PROCEDURE — 250N000009 HC RX 250: Performed by: NURSE ANESTHETIST, CERTIFIED REGISTERED

## 2024-04-12 PROCEDURE — 54640 ORCHIOPEXY INGUN/SCROT APPR: CPT | Performed by: ANESTHESIOLOGY

## 2024-04-12 PROCEDURE — 250N000009 HC RX 250: Performed by: UROLOGY

## 2024-04-12 PROCEDURE — 258N000003 HC RX IP 258 OP 636: Performed by: NURSE ANESTHETIST, CERTIFIED REGISTERED

## 2024-04-12 PROCEDURE — 54640 ORCHIOPEXY INGUN/SCROT APPR: CPT | Performed by: NURSE ANESTHETIST, CERTIFIED REGISTERED

## 2024-04-12 PROCEDURE — 710N000009 HC RECOVERY PHASE 1, LEVEL 1, PER MIN: Performed by: UROLOGY

## 2024-04-12 PROCEDURE — 250N000011 HC RX IP 250 OP 636

## 2024-04-12 PROCEDURE — 250N000013 HC RX MED GY IP 250 OP 250 PS 637: Performed by: UROLOGY

## 2024-04-12 PROCEDURE — 250N000011 HC RX IP 250 OP 636: Performed by: NURSE ANESTHETIST, CERTIFIED REGISTERED

## 2024-04-12 PROCEDURE — 370N000017 HC ANESTHESIA TECHNICAL FEE, PER MIN: Performed by: UROLOGY

## 2024-04-12 PROCEDURE — 999N000141 HC STATISTIC PRE-PROCEDURE NURSING ASSESSMENT: Performed by: UROLOGY

## 2024-04-12 PROCEDURE — 710N000012 HC RECOVERY PHASE 2, PER MINUTE: Performed by: UROLOGY

## 2024-04-12 RX ORDER — PROPOFOL 10 MG/ML
INJECTION, EMULSION INTRAVENOUS CONTINUOUS PRN
Status: DISCONTINUED | OUTPATIENT
Start: 2024-04-12 | End: 2024-04-12

## 2024-04-12 RX ORDER — PROPOFOL 10 MG/ML
INJECTION, EMULSION INTRAVENOUS PRN
Status: DISCONTINUED | OUTPATIENT
Start: 2024-04-12 | End: 2024-04-12

## 2024-04-12 RX ORDER — SODIUM CHLORIDE, SODIUM LACTATE, POTASSIUM CHLORIDE, CALCIUM CHLORIDE 600; 310; 30; 20 MG/100ML; MG/100ML; MG/100ML; MG/100ML
INJECTION, SOLUTION INTRAVENOUS CONTINUOUS PRN
Status: DISCONTINUED | OUTPATIENT
Start: 2024-04-12 | End: 2024-04-12

## 2024-04-12 RX ORDER — SODIUM CHLORIDE, SODIUM LACTATE, POTASSIUM CHLORIDE, CALCIUM CHLORIDE 600; 310; 30; 20 MG/100ML; MG/100ML; MG/100ML; MG/100ML
INJECTION, SOLUTION INTRAVENOUS CONTINUOUS
Status: DISCONTINUED | OUTPATIENT
Start: 2024-04-12 | End: 2024-04-12 | Stop reason: HOSPADM

## 2024-04-12 RX ORDER — CELECOXIB 200 MG/1
200 CAPSULE ORAL DAILY
Qty: 30 CAPSULE | Refills: 0 | Status: SHIPPED | OUTPATIENT
Start: 2024-04-12 | End: 2024-09-09

## 2024-04-12 RX ORDER — HYDROCODONE BITARTRATE AND ACETAMINOPHEN 5; 325 MG/1; MG/1
1-2 TABLET ORAL EVERY 4 HOURS PRN
Qty: 10 TABLET | Refills: 0 | Status: SHIPPED | OUTPATIENT
Start: 2024-04-12 | End: 2024-09-09

## 2024-04-12 RX ORDER — FENTANYL CITRATE 50 UG/ML
25 INJECTION, SOLUTION INTRAMUSCULAR; INTRAVENOUS EVERY 5 MIN PRN
Status: DISCONTINUED | OUTPATIENT
Start: 2024-04-12 | End: 2024-04-12 | Stop reason: HOSPADM

## 2024-04-12 RX ORDER — FENTANYL CITRATE 50 UG/ML
INJECTION, SOLUTION INTRAMUSCULAR; INTRAVENOUS PRN
Status: DISCONTINUED | OUTPATIENT
Start: 2024-04-12 | End: 2024-04-12

## 2024-04-12 RX ORDER — ONDANSETRON 2 MG/ML
4 INJECTION INTRAMUSCULAR; INTRAVENOUS EVERY 30 MIN PRN
Status: DISCONTINUED | OUTPATIENT
Start: 2024-04-12 | End: 2024-04-12 | Stop reason: HOSPADM

## 2024-04-12 RX ORDER — LIDOCAINE HYDROCHLORIDE 10 MG/ML
INJECTION, SOLUTION EPIDURAL; INFILTRATION; INTRACAUDAL; PERINEURAL
Status: DISCONTINUED
Start: 2024-04-12 | End: 2024-04-12 | Stop reason: HOSPADM

## 2024-04-12 RX ORDER — NALOXONE HYDROCHLORIDE 0.4 MG/ML
0.1 INJECTION, SOLUTION INTRAMUSCULAR; INTRAVENOUS; SUBCUTANEOUS
Status: DISCONTINUED | OUTPATIENT
Start: 2024-04-12 | End: 2024-04-12 | Stop reason: HOSPADM

## 2024-04-12 RX ORDER — FENTANYL CITRATE 50 UG/ML
50 INJECTION, SOLUTION INTRAMUSCULAR; INTRAVENOUS EVERY 5 MIN PRN
Status: DISCONTINUED | OUTPATIENT
Start: 2024-04-12 | End: 2024-04-12 | Stop reason: HOSPADM

## 2024-04-12 RX ORDER — OXYCODONE HYDROCHLORIDE 5 MG/1
5 TABLET ORAL
Status: DISCONTINUED | OUTPATIENT
Start: 2024-04-12 | End: 2024-04-12 | Stop reason: HOSPADM

## 2024-04-12 RX ORDER — CEFAZOLIN SODIUM/WATER 2 G/20 ML
2 SYRINGE (ML) INTRAVENOUS
Status: COMPLETED | OUTPATIENT
Start: 2024-04-12 | End: 2024-04-12

## 2024-04-12 RX ORDER — LIDOCAINE HYDROCHLORIDE 20 MG/ML
INJECTION, SOLUTION INFILTRATION; PERINEURAL PRN
Status: DISCONTINUED | OUTPATIENT
Start: 2024-04-12 | End: 2024-04-12

## 2024-04-12 RX ORDER — MEPERIDINE HYDROCHLORIDE 25 MG/ML
12.5 INJECTION INTRAMUSCULAR; INTRAVENOUS; SUBCUTANEOUS EVERY 5 MIN PRN
Status: DISCONTINUED | OUTPATIENT
Start: 2024-04-12 | End: 2024-04-12 | Stop reason: HOSPADM

## 2024-04-12 RX ORDER — DEXAMETHASONE SODIUM PHOSPHATE 4 MG/ML
INJECTION, SOLUTION INTRA-ARTICULAR; INTRALESIONAL; INTRAMUSCULAR; INTRAVENOUS; SOFT TISSUE PRN
Status: DISCONTINUED | OUTPATIENT
Start: 2024-04-12 | End: 2024-04-12

## 2024-04-12 RX ORDER — MAGNESIUM HYDROXIDE 1200 MG/15ML
LIQUID ORAL PRN
Status: DISCONTINUED | OUTPATIENT
Start: 2024-04-12 | End: 2024-04-12 | Stop reason: HOSPADM

## 2024-04-12 RX ORDER — LABETALOL HYDROCHLORIDE 5 MG/ML
10 INJECTION, SOLUTION INTRAVENOUS
Status: DISCONTINUED | OUTPATIENT
Start: 2024-04-12 | End: 2024-04-12 | Stop reason: HOSPADM

## 2024-04-12 RX ORDER — ALBUTEROL SULFATE 0.83 MG/ML
2.5 SOLUTION RESPIRATORY (INHALATION) EVERY 4 HOURS PRN
Status: DISCONTINUED | OUTPATIENT
Start: 2024-04-12 | End: 2024-04-12 | Stop reason: HOSPADM

## 2024-04-12 RX ORDER — ONDANSETRON 4 MG/1
4 TABLET, ORALLY DISINTEGRATING ORAL EVERY 30 MIN PRN
Status: DISCONTINUED | OUTPATIENT
Start: 2024-04-12 | End: 2024-04-12 | Stop reason: HOSPADM

## 2024-04-12 RX ORDER — HYDROMORPHONE HCL IN WATER/PF 6 MG/30 ML
0.4 PATIENT CONTROLLED ANALGESIA SYRINGE INTRAVENOUS EVERY 5 MIN PRN
Status: DISCONTINUED | OUTPATIENT
Start: 2024-04-12 | End: 2024-04-12 | Stop reason: HOSPADM

## 2024-04-12 RX ORDER — ONDANSETRON 2 MG/ML
INJECTION INTRAMUSCULAR; INTRAVENOUS PRN
Status: DISCONTINUED | OUTPATIENT
Start: 2024-04-12 | End: 2024-04-12

## 2024-04-12 RX ORDER — FENTANYL CITRATE 50 UG/ML
25 INJECTION, SOLUTION INTRAMUSCULAR; INTRAVENOUS
Status: DISCONTINUED | OUTPATIENT
Start: 2024-04-12 | End: 2024-04-12 | Stop reason: HOSPADM

## 2024-04-12 RX ORDER — HYDROMORPHONE HCL IN WATER/PF 6 MG/30 ML
0.2 PATIENT CONTROLLED ANALGESIA SYRINGE INTRAVENOUS EVERY 5 MIN PRN
Status: DISCONTINUED | OUTPATIENT
Start: 2024-04-12 | End: 2024-04-12 | Stop reason: HOSPADM

## 2024-04-12 RX ORDER — OXYCODONE HYDROCHLORIDE 5 MG/1
10 TABLET ORAL
Status: DISCONTINUED | OUTPATIENT
Start: 2024-04-12 | End: 2024-04-12 | Stop reason: HOSPADM

## 2024-04-12 RX ORDER — CEFAZOLIN SODIUM/WATER 2 G/20 ML
2 SYRINGE (ML) INTRAVENOUS SEE ADMIN INSTRUCTIONS
Status: DISCONTINUED | OUTPATIENT
Start: 2024-04-12 | End: 2024-04-12 | Stop reason: HOSPADM

## 2024-04-12 RX ORDER — HYDROCODONE BITARTRATE AND ACETAMINOPHEN 5; 325 MG/1; MG/1
1 TABLET ORAL
Status: COMPLETED | OUTPATIENT
Start: 2024-04-12 | End: 2024-04-12

## 2024-04-12 RX ORDER — HYDRALAZINE HYDROCHLORIDE 20 MG/ML
2.5-5 INJECTION INTRAMUSCULAR; INTRAVENOUS EVERY 10 MIN PRN
Status: DISCONTINUED | OUTPATIENT
Start: 2024-04-12 | End: 2024-04-12 | Stop reason: HOSPADM

## 2024-04-12 RX ADMIN — LIDOCAINE HYDROCHLORIDE 100 MG: 20 INJECTION, SOLUTION INFILTRATION; PERINEURAL at 13:05

## 2024-04-12 RX ADMIN — HYDROCODONE BITARTRATE AND ACETAMINOPHEN 1 TABLET: 5; 325 TABLET ORAL at 14:33

## 2024-04-12 RX ADMIN — DEXAMETHASONE SODIUM PHOSPHATE 4 MG: 4 INJECTION, SOLUTION INTRA-ARTICULAR; INTRALESIONAL; INTRAMUSCULAR; INTRAVENOUS; SOFT TISSUE at 13:21

## 2024-04-12 RX ADMIN — FENTANYL CITRATE 50 MCG: 50 INJECTION INTRAMUSCULAR; INTRAVENOUS at 13:05

## 2024-04-12 RX ADMIN — ONDANSETRON 4 MG: 2 INJECTION INTRAMUSCULAR; INTRAVENOUS at 13:21

## 2024-04-12 RX ADMIN — FENTANYL CITRATE 50 MCG: 50 INJECTION, SOLUTION INTRAMUSCULAR; INTRAVENOUS at 14:10

## 2024-04-12 RX ADMIN — MIDAZOLAM 2 MG: 1 INJECTION INTRAMUSCULAR; INTRAVENOUS at 13:05

## 2024-04-12 RX ADMIN — Medication 2 G: at 13:00

## 2024-04-12 RX ADMIN — PROPOFOL 200 MG: 10 INJECTION, EMULSION INTRAVENOUS at 13:05

## 2024-04-12 RX ADMIN — PROPOFOL 50 MCG/KG/MIN: 10 INJECTION, EMULSION INTRAVENOUS at 13:10

## 2024-04-12 RX ADMIN — SODIUM CHLORIDE, POTASSIUM CHLORIDE, SODIUM LACTATE AND CALCIUM CHLORIDE: 600; 310; 30; 20 INJECTION, SOLUTION INTRAVENOUS at 13:10

## 2024-04-12 ASSESSMENT — ACTIVITIES OF DAILY LIVING (ADL)
ADLS_ACUITY_SCORE: 35

## 2024-04-12 NOTE — ANESTHESIA POSTPROCEDURE EVALUATION
Patient: Marcel Melchor    Procedure: Procedure(s):  Right scrotal orchiopexy       Anesthesia Type:  General    Note:     Postop Pain Control: Uneventful            Sign Out: Well controlled pain   PONV: No   Neuro/Psych: Uneventful            Sign Out: Acceptable/Baseline neuro status   Airway/Respiratory: Uneventful            Sign Out: Acceptable/Baseline resp. status   CV/Hemodynamics: Uneventful            Sign Out: Acceptable CV status; No obvious hypovolemia; No obvious fluid overload   Other NRE:    DID A NON-ROUTINE EVENT OCCUR? No           Last vitals:  Vitals Value Taken Time   /69 04/12/24 1415   Temp 36.6  C (97.8  F) 04/12/24 1349   Pulse 56 04/12/24 1422   Resp 12 04/12/24 1422   SpO2 99 % 04/12/24 1422   Vitals shown include unfiled device data.    Electronically Signed By: Eliza Mcmullen MD, MD  April 12, 2024  2:24 PM

## 2024-04-12 NOTE — DISCHARGE INSTRUCTIONS
Same Day Surgery Discharge Instructions for  Sedation and General Anesthesia     It's not unusual to feel dizzy, light-headed or faint for up to 24 hours after surgery or while taking pain medication.  If you have these symptoms: sit for a few minutes before standing and have someone assist you when you get up to walk or use the bathroom.    You should rest and relax for the next 24 hours. We recommend you make arrangements to have an adult stay with you for at least 24 hours after your discharge.  Avoid hazardous and strenuous activity.    DO NOT DRIVE any vehicle or operate mechanical equipment for 24 hours following the end of your surgery.  Even though you may feel normal, your reactions may be affected by the medication you have received.    Do not drink alcoholic beverages for 24 hours following surgery.     Slowly progress to your regular diet as you feel able. It's not unusual to feel nauseated and/or vomit after receiving anesthesia.  If you develop these symptoms, drink clear liquids (apple juice, ginger ale, broth, 7-up, etc. ) until you feel better.  If your nausea and vomiting persists for 24 hours, please notify your surgeon.      All narcotic pain medications, along with inactivity and anesthesia, can cause constipation. Drinking plenty of liquids and increasing fiber intake will help.    For any questions of a medical nature, call your surgeon.    Do not make important decisions for 24 hours.    If you had general anesthesia, you may have a sore throat for a couple of days related to the breathing tube used during surgery.  You may use Cepacol lozenges to help with this discomfort.  If it worsens or if you develop a fever, contact your surgeon.     If you feel your pain is not well managed with the pain medications prescribed by your surgeon, please contact your surgeon's office to let them know so they can address your concerns.           **Because you had anesthesia today and your history of sleep  apnea, it is extremely important that you use your CPAP machine for the next 24 hours while napping or sleeping.**         **If you have questions or concerns about your procedure,  call Dr. Sood at [phone]

## 2024-04-12 NOTE — ANESTHESIA CARE TRANSFER NOTE
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    Anesthesia Type:   General     Note:    Oropharynx: oropharynx clear of all foreign objects and spontaneously breathing  Level of Consciousness: awake  Oxygen Supplementation: face mask  Level of Supplemental Oxygen (L/min / FiO2): 6  Independent Airway: airway patency satisfactory and stable  Dentition: dentition unchanged  Vital Signs Stable: post-procedure vital signs reviewed and stable  Report to RN Given: handoff report given  Patient transferred to: PACU    Handoff Report: Identifed the Patient, Identified the Reponsible Provider, Reviewed the pertinent medical history, Discussed the surgical course, Reviewed Intra-OP anesthesia mangement and issues during anesthesia, Set expectations for post-procedure period and Allowed opportunity for questions and acknowledgement of understanding      Vitals:  Vitals Value Taken Time   /82 04/12/24 1349   Temp     Pulse 54 04/12/24 1350   Resp 14 04/12/24 1350   SpO2 100 % 04/12/24 1350       Electronically Signed By: FAIZAN Stafford CRNA  April 12, 2024  1:52 PM

## 2024-04-12 NOTE — ANESTHESIA PROCEDURE NOTES
Airway    Staff -        Anesthesiologist:  Eliza Mcmullen MD       CRNA: Jb Kee APRN CRNA       Other Anesthesia Staff: Yovanny Marshall       Performed By: JOSE RAFAEL and other anesthesia staffIndications and Patient Condition       Indications for airway management: norris-procedural       Induction type:intravenous       Mask difficulty assessment: 0 - not attempted    Final Airway Details       Final airway type: supraglottic airway    Supraglottic Airway Details        Type: LMA       Brand: I-Gel       LMA size: 5    Post intubation assessment        Placement verified by: capnometry, equal breath sounds and chest rise        Number of attempts at approach: 1       Number of other approaches attempted: 0       Secured with: tape       Ease of procedure: easy       Dentition: Intact and Unchanged

## 2024-04-12 NOTE — ANESTHESIA PREPROCEDURE EVALUATION
Anesthesia Pre-Procedure Evaluation    Patient: Marcel Melchor   MRN: [id number] : 1970        Procedure : Procedure(s):  Right scrotal orchiopexy          Past Medical History:   Diagnosis Date    ADHD (attention deficit hyperactivity disorder)     Carpal tunnel syndrome, left     Crohn's disease of small and large intestines (H) 2008    DDD (degenerative disc disease), lumbar     Dyspnea and respiratory abnormality     ASUNCION (generalized anxiety disorder)     Impulse control disorder in adult     Intermittent explosive disorder     Irritability and anger     Major depression, recurrent (H24)     Mood disorder (H24)     Motion sickness     Narcolepsy     AJAY (obstructive sleep apnea) 2014    cpap    Rotator cuff syndrome 2012      Past Surgical History:   Procedure Laterality Date    RELEASE CARPAL TUNNEL Left 2022    Procedure: LEFT OPEN CARPAL TUNNEL RELEASE;  Surgeon: Camila Medina MD;  [place] [facility] [place]    [facility] NONSPECIFIC PROCEDURE      fx R ankle-ORIF      Allergies   Allergen Reactions    Amoxicillin Itching and Rash    Penicillins Other (See Comments), Hives and Rash     Fever      Social History     Tobacco Use    Smoking status: Never     Passive exposure: Never    Smokeless tobacco: Never   Substance Use Topics    Alcohol use: No     Alcohol/week: 0.0 standard drinks of alcohol      Wt Readings from Last 1 Encounters:   24 89.4 kg (197 lb 1.6 oz)        Anesthesia Evaluation   Pt has had prior anesthetic.     No history of anesthetic complications       ROS/MED HX  ENT/Pulmonary:     (+) sleep apnea,                                       Neurologic:    (-) no CVA   Cardiovascular:    (-) CAD   METS/Exercise Tolerance:     Hematologic:       Musculoskeletal:       GI/Hepatic:     (+)       Inflammatory bowel disease,          (-) GERD   Renal/Genitourinary:       Endo:    (-) Type II DM   Psychiatric/Substance Use:     (+) psychiatric history anxiety (Intermittent  "Explosive d/o)       Infectious Disease:       Malignancy:       Other:            Physical Exam    Airway        Mallampati: II   TM distance: > 3 FB   Neck ROM: full   Mouth opening: > 3 cm    Respiratory Devices and Support         Dental  no notable dental history     (+) Minor Abnormalities - some fillings, tiny chips      Cardiovascular   cardiovascular exam normal          Pulmonary   pulmonary exam normal                OUTSIDE LABS:  CBC:   Lab Results   Component Value Date    WBC 7.0 04/02/2024    WBC 7.3 12/18/2023    HGB 17.1 04/02/2024    HGB 17.8 (H) 12/18/2023    HCT 47.6 04/02/2024    HCT 49.3 12/18/2023     04/02/2024     12/18/2023     BMP:   Lab Results   Component Value Date     04/02/2024     12/18/2023    POTASSIUM 4.4 04/02/2024    POTASSIUM 4.3 12/18/2023    CHLORIDE 101 04/02/2024    CHLORIDE 101 12/18/2023    CO2 26 04/02/2024    CO2 28 12/18/2023    BUN 22.8 (H) 04/02/2024    BUN 8.7 12/18/2023    CR 0.97 04/02/2024    CR 1.11 12/18/2023    GLC 79 04/02/2024    GLC 86 12/18/2023     COAGS: No results found for: \"PTT\", \"INR\", \"FIBR\"  POC: No results found for: \"BGM\", \"HCG\", \"HCGS\"  HEPATIC:   Lab Results   Component Value Date    ALBUMIN 4.2 05/03/2019    PROTTOTAL 7.3 05/03/2019    ALT 35 04/30/2020    AST 26 04/30/2020    ALKPHOS 103 05/03/2019    BILITOTAL 0.8 05/03/2019     OTHER:   Lab Results   Component Value Date    A1C 4.9 06/09/2022    RICH 9.9 04/02/2024    TSH 1.13 05/03/2019    CRP <2.9 05/03/2019       Anesthesia Plan    ASA Status:  2    NPO Status:  NPO Appropriate    Anesthesia Type: General.     - Airway: LMA   Induction: Propofol, Intravenous.   Maintenance: Balanced.        Consents    Anesthesia Plan(s) and associated risks, benefits, and realistic alternatives discussed. Questions answered and patient/representative(s) expressed understanding.     - Discussed:     - Discussed with:  Patient            Postoperative Care    Pain management: " "Multi-modal analgesia.   PONV prophylaxis: Ondansetron (or other 5HT-3), Dexamethasone or Solumedrol, Background Propofol Infusion     Comments:               Eliza Mcmullen MD, MD    I have reviewed the pertinent notes and labs in the chart from the past 30 days and (re)examined the patient.  Any updates or changes from those notes are reflected in this note.              # Overweight: Estimated body mass index is 29.11 kg/m  as calculated from the following:    Height as of this encounter: 1.753 m (5' 9\").    Weight as of this encounter: 89.4 kg (197 lb 1.6 oz).      "

## 2024-04-12 NOTE — OP NOTE
OPERATIVE REPORT    PATIENT: Marcel Melchor  : 1970, AGE: 54 year old  SSN: [id number]    SURGEON  Sathya Crain MD    Circulator: Angel Knapp, RN  Scrub Person: Nila Dee E; Gregoria Hedrick    PREOP DIAGNOSIS:  Retractile TEstis    POSTOP DIAGNOSIS: Same    Procedure(s):  Right scrotal orchiopexy    ANESTHESIA  General    COMPLICATIONS:   None    FINDINGS   Testis fixed at two locations    SPECIMEN  None    IMPLANT   None    EBL 8cc    TECHNIQUE  After informed consent was obtained within the preoperative care unit the patient was transferred to the operative theater in stable condition.  There he was transferred from his [facility] to the operative table and placed in supine position.  Bilateral lower extremity sequential compression devices were applied and perioperative antibiotic prophylaxis was undertaken with cefazolin.  After appropriate induction of general anesthesia the patient's genitalia was then prepped and draped in the usual sterile fashion utilizing Betadine.  At this point a surgical timeout was performed with all those in attendance agreeing correct patient and procedure.    I began by anesthetizing the skin overlying the midline scrotal raphae. Next a #15 blade was used to create a 3 cm incision. The Bovie was used to dissect down to the right testis and deliver it via the incision. I next selected a spot along the lateral dependent scrotum. A 3-O PDS suture was used to perform the orchiopexy in two locations.  With the testis back in its anatomical location I closed the dartos tissue in two layers with a 3-O vicryl suture. Next the skin was closed with a 4-O monocryl suture in a running fashion. Lastly the skin was dressed with Dermabond, thus concluding the case. Patient tolerated the procedure well and without complication. EBL was 8 mL and all surgical counts were correct at its conclusion.    The patient was cleansed of all prep material before being awoken from  general anesthesia transferred back to his hospital [facility] and discharged to the postanesthesia care unit in stable condition.    PLAN  Patient to discharge home, will follow up with me in 4-6 weeks.    Sathya [facility]  Pager: [phone]  Office: [phone]  Surgical [phone]

## 2024-04-16 DIAGNOSIS — L65.9 HAIR LOSS: ICD-10-CM

## 2024-04-16 DIAGNOSIS — F41.1 GAD (GENERALIZED ANXIETY DISORDER): ICD-10-CM

## 2024-04-16 DIAGNOSIS — F39 MOOD DISORDER (H): ICD-10-CM

## 2024-04-19 RX ORDER — FINASTERIDE 1 MG/1
TABLET, FILM COATED ORAL
Qty: 90 TABLET | Refills: 1 | Status: SHIPPED | OUTPATIENT
Start: 2024-04-19 | End: 2024-09-09

## 2024-04-19 RX ORDER — DESVENLAFAXINE 100 MG/1
100 TABLET, EXTENDED RELEASE ORAL DAILY
Qty: 90 TABLET | Refills: 1 | Status: SHIPPED | OUTPATIENT
Start: 2024-04-19 | End: 2024-09-09

## 2024-04-19 RX ORDER — ARIPIPRAZOLE 10 MG/1
10 TABLET ORAL DAILY
Qty: 90 TABLET | Refills: 1 | Status: SHIPPED | OUTPATIENT
Start: 2024-04-19 | End: 2024-09-09

## 2024-05-15 ENCOUNTER — VIRTUAL VISIT (OUTPATIENT)
Dept: PSYCHOLOGY | Facility: CLINIC | Age: 54
End: 2024-05-15
Payer: COMMERCIAL

## 2024-05-15 DIAGNOSIS — F41.1 GAD (GENERALIZED ANXIETY DISORDER): Primary | ICD-10-CM

## 2024-05-15 PROCEDURE — 90837 PSYTX W PT 60 MINUTES: CPT | Mod: 95 | Performed by: MARRIAGE & FAMILY THERAPIST
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
## 2024-06-26 ENCOUNTER — OFFICE VISIT (OUTPATIENT)
Dept: PSYCHOLOGY | Facility: CLINIC | Age: 54
End: 2024-06-26
Payer: COMMERCIAL

## 2024-06-26 DIAGNOSIS — F41.1 GAD (GENERALIZED ANXIETY DISORDER): Primary | ICD-10-CM

## 2024-06-26 PROCEDURE — 90837 PSYTX W PT 60 MINUTES: CPT | Performed by: MARRIAGE & FAMILY THERAPIST
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
## 2024-08-07 ENCOUNTER — OFFICE VISIT (OUTPATIENT)
Dept: PSYCHOLOGY | Facility: CLINIC | Age: 54
End: 2024-08-07
Payer: COMMERCIAL

## 2024-08-07 DIAGNOSIS — F41.1 GAD (GENERALIZED ANXIETY DISORDER): Primary | ICD-10-CM

## 2024-08-07 PROCEDURE — 90837 PSYTX W PT 60 MINUTES: CPT | Performed by: MARRIAGE & FAMILY THERAPIST
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
## 2024-08-11 ENCOUNTER — HEALTH MAINTENANCE LETTER (OUTPATIENT)
Age: 54
End: 2024-08-11

## 2024-08-25 DIAGNOSIS — F41.1 GAD (GENERALIZED ANXIETY DISORDER): ICD-10-CM

## 2024-08-26 RX ORDER — DESVENLAFAXINE 100 MG/1
100 TABLET, EXTENDED RELEASE ORAL DAILY
Qty: 90 TABLET | Refills: 1 | OUTPATIENT
Start: 2024-08-26

## 2024-09-09 ENCOUNTER — OFFICE VISIT (OUTPATIENT)
Dept: FAMILY MEDICINE | Facility: CLINIC | Age: 54
End: 2024-09-09
Payer: COMMERCIAL

## 2024-09-09 VITALS
BODY MASS INDEX: 30.33 KG/M2 | DIASTOLIC BLOOD PRESSURE: 72 MMHG | HEART RATE: 74 BPM | TEMPERATURE: 98.1 F | HEIGHT: 69 IN | SYSTOLIC BLOOD PRESSURE: 112 MMHG | RESPIRATION RATE: 16 BRPM | WEIGHT: 204.8 LBS | OXYGEN SATURATION: 99 %

## 2024-09-09 DIAGNOSIS — M54.50 ACUTE BILATERAL LOW BACK PAIN WITHOUT SCIATICA: Primary | ICD-10-CM

## 2024-09-09 DIAGNOSIS — F39 MOOD DISORDER (H): ICD-10-CM

## 2024-09-09 DIAGNOSIS — L65.9 HAIR LOSS: ICD-10-CM

## 2024-09-09 DIAGNOSIS — F41.1 GAD (GENERALIZED ANXIETY DISORDER): ICD-10-CM

## 2024-09-09 PROCEDURE — 96127 BRIEF EMOTIONAL/BEHAV ASSMT: CPT | Performed by: NURSE PRACTITIONER

## 2024-09-09 PROCEDURE — 99214 OFFICE O/P EST MOD 30 MIN: CPT | Performed by: NURSE PRACTITIONER

## 2024-09-09 RX ORDER — CYCLOBENZAPRINE HCL 10 MG
10 TABLET ORAL 3 TIMES DAILY PRN
Qty: 30 TABLET | Refills: 0 | Status: SHIPPED | OUTPATIENT
Start: 2024-09-09

## 2024-09-09 RX ORDER — METHYLPREDNISOLONE 4 MG
TABLET, DOSE PACK ORAL
Qty: 21 TABLET | Refills: 0 | Status: SHIPPED | OUTPATIENT
Start: 2024-09-09

## 2024-09-09 RX ORDER — DESVENLAFAXINE 100 MG/1
100 TABLET, EXTENDED RELEASE ORAL DAILY
Qty: 90 TABLET | Refills: 3 | Status: SHIPPED | OUTPATIENT
Start: 2024-09-09

## 2024-09-09 RX ORDER — ARIPIPRAZOLE 10 MG/1
10 TABLET ORAL DAILY
Qty: 90 TABLET | Refills: 3 | Status: SHIPPED | OUTPATIENT
Start: 2024-09-09

## 2024-09-09 RX ORDER — FINASTERIDE 1 MG/1
TABLET, FILM COATED ORAL
Qty: 90 TABLET | Refills: 3 | Status: SHIPPED | OUTPATIENT
Start: 2024-09-09

## 2024-09-09 ASSESSMENT — ANXIETY QUESTIONNAIRES
GAD7 TOTAL SCORE: 0
7. FEELING AFRAID AS IF SOMETHING AWFUL MIGHT HAPPEN: NOT AT ALL
GAD7 TOTAL SCORE: 0
8. IF YOU CHECKED OFF ANY PROBLEMS, HOW DIFFICULT HAVE THESE MADE IT FOR YOU TO DO YOUR WORK, TAKE CARE OF THINGS AT HOME, OR GET ALONG WITH OTHER PEOPLE?: NOT DIFFICULT AT ALL
GAD7 TOTAL SCORE: 0

## 2024-09-09 ASSESSMENT — PATIENT HEALTH QUESTIONNAIRE - PHQ9
SUM OF ALL RESPONSES TO PHQ QUESTIONS 1-9: 2
SUM OF ALL RESPONSES TO PHQ QUESTIONS 1-9: 2
10. IF YOU CHECKED OFF ANY PROBLEMS, HOW DIFFICULT HAVE THESE PROBLEMS MADE IT FOR YOU TO DO YOUR WORK, TAKE CARE OF THINGS AT HOME, OR GET ALONG WITH OTHER PEOPLE: NOT DIFFICULT AT ALL

## 2024-09-09 ASSESSMENT — ENCOUNTER SYMPTOMS: BACK PAIN: 1

## 2024-09-09 NOTE — PROGRESS NOTES
"  Assessment & Plan     Acute bilateral low back pain without sciatica  As below. Follow up with PCP if ongoing.  - methylPREDNISolone (MEDROL DOSEPAK) 4 MG tablet therapy pack  Dispense: 21 tablet; Refill: 0  - cyclobenzaprine (FLEXERIL) 10 MG tablet  Dispense: 30 tablet; Refill: 0    Mood disorder (H24)  Refill provided. Stable on regimen.  - ARIPiprazole (ABILIFY) 10 MG tablet  Dispense: 90 tablet; Refill: 3    ASUNCION (generalized anxiety disorder)  - desvenlafaxine (PRISTIQ) 100 MG 24 hr tablet  Dispense: 90 tablet; Refill: 3    Hair loss  - finasteride (PROPECIA) 1 MG tablet  Dispense: 90 tablet; Refill: 3            BMI  Estimated body mass index is 30.24 kg/m  as calculated from the following:    Height as of this encounter: 1.753 m (5' 9\").    Weight as of this encounter: 92.9 kg (204 lb 12.8 oz).   Weight management plan: Discussed healthy diet and exercise guidelines      See Patient Instructions    Geovani Rod is a 54 year old, presenting for the following health issues:  Recheck Medication and Back Pain        9/9/2024    11:05 AM   Additional Questions   Roomed by Jelly     Back Pain     History of Present Illness       Mental Health Follow-up:  Patient presents to follow-up on Depression & Anxiety.Patient's depression since last visit has been:  No change  The patient is not having other symptoms associated with depression.  Patient's anxiety since last visit has been:  No change  The patient is not having other symptoms associated with anxiety.  Any significant life events: No  Patient is not feeling anxious or having panic attacks.  Patient has no concerns about alcohol or drug use.    He eats 0-1 servings of fruits and vegetables daily.He consumes 5 sweetened beverage(s) daily.He exercises with enough effort to increase his heart rate 20 to 29 minutes per day.  He exercises with enough effort to increase his heart rate 3 or less days per week.   He is taking medications regularly.         " "10/12/2022     7:07 AM 5/25/2023     5:48 PM 9/9/2024    10:49 AM   SAUNCION-7 SCORE   Total Score 0 (minimal anxiety)  0 (minimal anxiety)   Total Score 0 9 0            5/25/2023     5:48 PM 12/18/2023     9:00 AM 9/9/2024    10:49 AM   PHQ   PHQ-9 Total Score 9 5 2   Q9: Thoughts of better off dead/self-harm past 2 weeks Not at all Not at all Not at all        Concern - Back pain  Onset: 2 weeks   Description: woke up with excruciating back pain in low back   Intensity: 4/10  Progression of Symptoms:  improving slightly except when sitting   Accompanying Signs & Symptoms: none   Previous history of similar problem: had back surgery in 2021   Precipitating factors:        Worsened by: sitting for long periods   Alleviating factors:        Improved by: stretching, ice, heat   Therapies tried and outcome: ice, heat, red light therapy, stretching         Constitutional, HEENT, cardiovascular, pulmonary, GI, , musculoskeletal, neuro, skin, endocrine and psych systems are negative, except as otherwise noted.        Objective    /72   Pulse 74   Temp 98.1  F (36.7  C) (Tympanic)   Resp 16   Ht 1.753 m (5' 9\")   Wt 92.9 kg (204 lb 12.8 oz)   SpO2 99%   BMI 30.24 kg/m    Body mass index is 30.24 kg/m .  Physical Exam   GENERAL: alert and no distress  NECK: no adenopathy, no asymmetry, masses, or scars  RESP: lungs clear to auscultation - no rales, rhonchi or wheezes  CV: regular rate and rhythm, normal S1 S2, no S3 or S4, no murmur, click or rub, no peripheral edema  ABDOMEN: soft, nontender, no hepatosplenomegaly, no masses and bowel sounds normal  MS: no gross musculoskeletal defects noted, no edema, tender para lumber musculature bilaterally.            Signed Electronically by: Jovita Ivory CNP    "

## 2024-09-11 ENCOUNTER — OFFICE VISIT (OUTPATIENT)
Dept: PSYCHOLOGY | Facility: CLINIC | Age: 54
End: 2024-09-11
Payer: COMMERCIAL

## 2024-09-11 DIAGNOSIS — F41.1 GAD (GENERALIZED ANXIETY DISORDER): Primary | ICD-10-CM

## 2024-09-11 PROCEDURE — 90837 PSYTX W PT 60 MINUTES: CPT | Performed by: MARRIAGE & FAMILY THERAPIST

## 2024-09-11 NOTE — PROGRESS NOTES
[facility]                                     Progress Note    Patient Name: Marcel NG Tutewohl  Date: 9/11/2024         Service Type: Individual      Session Start Time: 11:00AM  Session End Time: 12:00PM     Session Length: 60 minutes    Session #: 38    Attendees: Spouse / Significant Other    Service Modality:       DATA  Extended Session (53+ minutes):   - Longer session due to limited access to mental health appointments and necessity to address patient's distress / complexity  Interactive Complexity: No  Crisis: No        Progress Since Last Session (Related to Symptoms / Goals / Homework):   Symptoms: Improving rebuilding trust      Homework: Partially completed       Episode of Care Goals: Satisfactory progress - ACTION (Actively working towards change); Intervened by reinforcing change plan / affirming steps taken     Current / Ongoing Stressors and Concerns:  Marcel and Ermelinda are doing well and things have been very calm and uneventful. No change with Aleja (Leonie's mother) but Ermelinda indicated that she may be preparing to talk to Yara and make amends. For now, Yara is focused on her Master's program and will not want to deal with Aleja. Their wedding is in September of next year. Marcel hurt his back and is still healing from it. He was also called for jury duty which he hopes to get out of.      Treatment Objective(s) Addressed in This Session:   use cognitive strategies identified in therapy to challenge anxious thoughts  Use speaker/listener technique when communicating through challenging conversations  Create rituals for connection  Time together to discuss priorities  Schedule with .     Intervention:   Relationship counseling      The following assessments were completed by patient for this visit:  PROMIS 10-Global Health (all questions and answers displayed):       10/26/2022    12:00 PM 2/1/2023    12:00 PM 5/18/2023     3:00 PM 9/6/2023     3:00 PM 12/20/2023      4:00 PM 4/3/2024    12:00 PM 8/7/2024    12:00 PM   PROMIS 10   In general, would you say your health is: 3 3 3 4 3 3 3   In general, would you say your quality of life is: 4 4 3 4 4 4 4   In general, how would you rate your physical health? 3 3 3 3 3 3 3   In general, how would you rate your mental health, including your mood and your ability to think? 3 3 3 3 3 3 3   In general, how would you rate your satisfaction with your social activities and relationships? 4 4 4 4 5 4 4   In general, please rate how well you carry out your usual social activities and roles. (This includes activities at home, at work and in your community, and responsibilities as a parent, child, spouse, employee, friend, etc.) 4 4 4 4 4 4 4   To what extent are you able to carry out your everyday physical activities such as walking, climbing stairs, carrying groceries, or moving a chair? 4 5 4 4 4 4 4   In the past 7 days, how often have you been bothered by emotional problems such as feeling anxious, depressed, or irritable? 3 2 3 2 3 3 3   In the past 7 days, how would you rate your fatigue on average? 3 2 2 2 2 3 2   In the past 7 days, how would you rate your pain on average, where 0 means no pain, and 10 means worst imaginable pain? 4 3 4 3 3 4 2   Global Mental Health Score 14 15 13 15 15 14 14   Global Physical Health Score 13 16 14 15 15 13 15   PROMIS TOTAL - SUBSCORES 27 31 27 30 30 27 29           ASSESSMENT: Current Emotional / Mental Status (status of significant symptoms):   Risk status (Self / Other harm or suicidal ideation)   Patient denies current fears or concerns for personal safety.   Patient denies current or recent suicidal ideation or behaviors.   Patient denies current or recent homicidal ideation or behaviors.   Patient denies current or recent self injurious behavior or ideation.   Patient denies other safety concerns.   Patient reports there has been no change in risk factors since their last session.     Patient  reports there has been no change in protective factors since their last session.     Recommended that patient call 911 or go to the local ED should there be a change in any of these risk factors.     Appearance:   Appropriate    Eye Contact:   Good    Psychomotor Behavior: Normal    Attitude:   Cooperative  Interested   Orientation:   All   Speech    Rate / Production: Normal/ Responsive Talkative    Volume:  Normal    Mood:    Normal   Affect:    Appropriate    Thought Content:  Clear    Thought Form:  Coherent  Logical    Insight:    Fair      Medication Review:   No changes to current psychiatric medication(s)     Medication Compliance:   Yes     Changes in Health Issues:   None reported     Chemical Use Review:   Substance Use: Chemical use reviewed, no active concerns identified      Tobacco Use: No current tobacco use.      Diagnosis:  No diagnosis found.    Collateral Reports Completed:   Not Applicable    PLAN: (Patient Tasks / Therapist Tasks / Other)  Homework: Couple to find ways to spend time together.    DOT [place]    ______________________________________________________________________    Treatment Plan    Patient's Name: Marcel Melchor  YOB: 1970    Date of Creation: 6/8/2022  Date Treatment Plan Last Reviewed/Revised: 8/7/2024    DSM5 Diagnoses: 300.02 (F41.1) Generalized Anxiety Disorder  Psychosocial / Contextual Factors: marital discord, work stress, pain issues  PROMIS (reviewed every 90 days): 8/7/2024   Score: 29    Referral / Collaboration:  Was/were discussed and patient will pursue.    Anticipated number of session for this episode of care: 24  Anticipation frequency of session: Every other week  Anticipated Duration of each session: 38-52 minutes  Treatment plan will be reviewed in 90 days or when goals have been changed.       MeasurableTreatment Goal(s) related to diagnosis / functional impairment(s)  Goal 1: Patient will lower GAD7 score to 3 or below    I  will know I've met my goal when I'm less anxious and irritable.      Objective #A (Patient Action)    Patient will  have weekly date nights withi his wife  .  Status: Continued - Date(s): 8/7/2024    Intervention(s)  Therapist will teach  the benefits of date night and intentional time together .    Objective #B  Patient will practice the use of timeouts and continue with anger management group.  Status: Continued - Date(s): 8/7/2024    Intervention(s)  Therapist will  encourage client to use time outs when overly agitated .    Objective #C  Patient will  use the speaker/listener technique in communication .  Status: Continued - Date(s): 8/7/2024    Intervention(s)  Therapist will teach S/L Technique .      Patient has reviewed and agreed to the above plan.      Trinidad Solorio, LMFT

## 2024-09-23 ENCOUNTER — E-VISIT (OUTPATIENT)
Dept: FAMILY MEDICINE | Facility: CLINIC | Age: 54
End: 2024-09-23
Payer: COMMERCIAL

## 2024-09-23 ENCOUNTER — TELEPHONE (OUTPATIENT)
Dept: FAMILY MEDICINE | Facility: CLINIC | Age: 54
End: 2024-09-23
Payer: COMMERCIAL

## 2024-09-23 ENCOUNTER — MYC MEDICAL ADVICE (OUTPATIENT)
Dept: PEDIATRICS | Facility: CLINIC | Age: 54
End: 2024-09-23

## 2024-09-23 DIAGNOSIS — F39 MOOD DISORDER (H): Primary | ICD-10-CM

## 2024-09-23 PROCEDURE — 99207 PR NON-BILLABLE SERV PER CHARTING: CPT | Performed by: PHYSICIAN ASSISTANT

## 2024-09-23 NOTE — TELEPHONE ENCOUNTER
LORENA and sent [facility] message. Pt submitted an evisit requesting a letter to be excused from jury duty due to narcolepsy and ADHD.    See provider message below regarding recently submitted evisit:    I don't treat Marcel for either of these problems.  He will have to ask his specialists to do this for him.  Please call to let patient know.           Wes Sylvester RN on 9/23/2024 at 2:07 PM

## 2024-09-23 NOTE — TELEPHONE ENCOUNTER
Received call from patient, requesting letter of excuse from jury duty due to his ADHD and narcolepsy. RN advised e-visit w/ PCP. [employer] message sent with instructions. No further questions at this time.     Roshan KANG RN 9/23/2024 at 9:56 AM

## 2024-09-23 NOTE — TELEPHONE ENCOUNTER
Provider E-Visit time total (minutes): 0      I don't treat Marcel for either of these problems.  He will have to ask his specialists to do this for him.  Please call to let patient know.        Wes

## 2024-11-10 NOTE — TELEPHONE ENCOUNTER
Pt calling to speak with Celsa or Asmita about his pain and to verify is calls have been made to an outside surgeon. Pt wants to proceed to next steps.      Xochilt ESPAÑA    [facility] [place]     show

## 2024-11-22 NOTE — TELEPHONE ENCOUNTER
Called patient to schedule LESI with Dr. Pierre or Dr. Alex LM 3/1/17  [facility] [place]    Family

## 2024-12-04 ENCOUNTER — OFFICE VISIT (OUTPATIENT)
Dept: PSYCHOLOGY | Facility: CLINIC | Age: 54
End: 2024-12-04
Payer: COMMERCIAL

## 2024-12-04 DIAGNOSIS — F41.1 GAD (GENERALIZED ANXIETY DISORDER): Primary | ICD-10-CM

## 2024-12-04 PROCEDURE — 90837 PSYTX W PT 60 MINUTES: CPT | Performed by: MARRIAGE & FAMILY THERAPIST

## 2024-12-05 NOTE — PROGRESS NOTES
[facility]                                     Progress Note    Patient Name: Marcel NG Tutewohl  Date: 12/4/2024         Service Type: Individual      Session Start Time: 2:00PM  Session End Time: 3:00PM     Session Length: 60 minutes    Session #: 39    Attendees: Spouse / Significant Other    Service Modality:       DATA  Extended Session (53+ minutes):   - Longer session due to limited access to mental health appointments and necessity to address patient's distress / complexity  Interactive Complexity: No  Crisis: No        Progress Since Last Session (Related to Symptoms / Goals / Homework):   Symptoms: Improving rebuilding trust      Homework: Partially completed       Episode of Care Goals: Satisfactory progress - ACTION (Actively working towards change); Intervened by reinforcing change plan / affirming steps taken     Current / Ongoing Stressors and Concerns:  Marcel and Ermelinda have been dealing with new and challenging dynamics at home. Their niece has been having mental health issues and they've been trying to help her get help as well as take care of her 5-year old daughter Terese. Ermelinda was instrumental in creating a boundary with their niece as she was causing more instability with Terese than was necessary. Marcel has been supportive and helpful as they have taken care of her together. Both agree she is a good girl and they want to protect her while her mother gets help for her OCD. Ermelinda is going to [place] and Marcel will have her for a week on his own. The couple also have a trip planned in January with friends. They report things have been going well. Marcel shares frustration however with Ermelinda not initiating intimacy but he has not talked to her about it. The frequency of intimacy has improved but he wishes she would initiate more often. This writer encouraged Marcel to lovingly discuss with her before he becomes angry and acts out in anger. Things with his father continue to be collaborative  but Marcel wishes he would show more appreciation.     Treatment Objective(s) Addressed in This Session:   use cognitive strategies identified in therapy to challenge anxious thoughts  Use speaker/listener technique when communicating through challenging conversations  Create rituals for connection  Time together to discuss priorities  Schedule with .     Intervention:   Relationship counseling      The following assessments were completed by patient for this visit:  PROMIS 10-Global Health (all questions and answers displayed):       2/1/2023    12:00 PM 5/18/2023     3:00 PM 9/6/2023     3:00 PM 12/20/2023     4:00 PM 4/3/2024    12:00 PM 8/7/2024    12:00 PM 12/4/2024     8:00 PM   PROMIS 10   In general, would you say your health is: 3 3 4 3 3 3 3   In general, would you say your quality of life is: 4 3 4 4 4 4 4   In general, how would you rate your physical health? 3 3 3 3 3 3 3   In general, how would you rate your mental health, including your mood and your ability to think? 3 3 3 3 3 3 3   In general, how would you rate your satisfaction with your social activities and relationships? 4 4 4 5 4 4 4   In general, please rate how well you carry out your usual social activities and roles. (This includes activities at home, at work and in your community, and responsibilities as a parent, child, spouse, employee, friend, etc.) 4 4 4 4 4 4 4   To what extent are you able to carry out your everyday physical activities such as walking, climbing stairs, carrying groceries, or moving a chair? 5 4 4 4 4 4 4   In the past 7 days, how often have you been bothered by emotional problems such as feeling anxious, depressed, or irritable? 2 3 2 3 3 3 3   In the past 7 days, how would you rate your fatigue on average? 2 2 2 2 3 2 2   In the past 7 days, how would you rate your pain on average, where 0 means no pain, and 10 means worst imaginable pain? 3 4 3 3 4 2 2   Global Mental Health Score 15 13 15 15 14 14  14   Global Physical Health Score 16 14 15 15 13 15 15   PROMIS TOTAL - SUBSCORES 31 27 30 30 27 29 29           ASSESSMENT: Current Emotional / Mental Status (status of significant symptoms):   Risk status (Self / Other harm or suicidal ideation)   Patient denies current fears or concerns for personal safety.   Patient denies current or recent suicidal ideation or behaviors.   Patient denies current or recent homicidal ideation or behaviors.   Patient denies current or recent self injurious behavior or ideation.   Patient denies other safety concerns.   Patient reports there has been no change in risk factors since their last session.     Patient reports there has been no change in protective factors since their last session.     Recommended that patient call 911 or go to the local ED should there be a change in any of these risk factors.     Appearance:   Appropriate    Eye Contact:   Good    Psychomotor Behavior: Normal    Attitude:   Cooperative  Interested   Orientation:   All   Speech    Rate / Production: Normal/ Responsive Talkative    Volume:  Normal    Mood:    Normal   Affect:    Appropriate    Thought Content:  Clear    Thought Form:  Coherent  Logical    Insight:    Fair      Medication Review:   No changes to current psychiatric medication(s)     Medication Compliance:   Yes     Changes in Health Issues:   None reported     Chemical Use Review:   Substance Use: Chemical use reviewed, no active concerns identified      Tobacco Use: No current tobacco use.      Diagnosis:  1. ASUNCION (generalized anxiety disorder)        Collateral Reports Completed:   Not Applicable    PLAN: (Patient Tasks / Therapist Tasks / Other)  Homework: Couple to find ways to spend time together. Marcel to be honest with Ermelinda.    Trinidad Solorio, DOT    ______________________________________________________________________    Treatment Plan    Patient's Name: Marcel NG Jill  YOB: 1970    Date of Creation:  6/8/2022  Date Treatment Plan Last Reviewed/Revised: 12/4/2024    DSM5 Diagnoses: 300.02 (F41.1) Generalized Anxiety Disorder  Psychosocial / Contextual Factors: marital discord, work stress, pain issues  PROMIS (reviewed every 90 days): 12/4/2024   Score: 29    Referral / Collaboration:  Was/were discussed and patient will pursue.    Anticipated number of session for this episode of care: 24  Anticipation frequency of session: Every other week  Anticipated Duration of each session: 38-52 minutes  Treatment plan will be reviewed in 90 days or when goals have been changed.       MeasurableTreatment Goal(s) related to diagnosis / functional impairment(s)  Goal 1: Patient will lower GAD7 score to 3 or below    I will know I've met my goal when I'm less anxious and irritable.      Objective #A (Patient Action)    Patient will  plan intentional time together .  Status: Continued - Date(s): 12/4/2024    Intervention(s)  Therapist will teach  the benefits of date night and intentional time together .    Objective #B  Patient will practice the use of timeouts and continue with anger management group.  Status: Continued - Date(s): 12/4/2024    Intervention(s)  Therapist will  encourage client to use time outs when overly agitated .    Objective #C  Patient will  work on being more honest in his communication and not stuff his feelings .  Status: New - Date: 12/4/2024      Intervention(s)  Therapist will teach assertiveness skills. In loving ways .      Patient has reviewed and agreed to the above plan.      Trinidad Solorio, DOT

## 2024-12-18 ENCOUNTER — TRANSFERRED RECORDS (OUTPATIENT)
Dept: HEALTH INFORMATION MANAGEMENT | Facility: CLINIC | Age: 54
End: 2024-12-18
Payer: COMMERCIAL

## 2025-01-02 ENCOUNTER — TRANSFERRED RECORDS (OUTPATIENT)
Dept: HEALTH INFORMATION MANAGEMENT | Facility: CLINIC | Age: 55
End: 2025-01-02
Payer: COMMERCIAL

## 2025-01-02 LAB
ALT SERPL-CCNC: 26 IU/L (ref 0–44)
AST SERPL-CCNC: 28 IU/L (ref 0–40)
CREATININE (EXTERNAL): 0.83 MG/DL (ref 0.76–1.27)
GFR ESTIMATED (EXTERNAL): 105 ML/MIN/1.73

## 2025-02-19 ENCOUNTER — OFFICE VISIT (OUTPATIENT)
Facility: CLINIC | Age: 55
End: 2025-02-19
Payer: COMMERCIAL

## 2025-02-19 DIAGNOSIS — F41.1 GAD (GENERALIZED ANXIETY DISORDER): Primary | ICD-10-CM

## 2025-02-19 PROCEDURE — 90837 PSYTX W PT 60 MINUTES: CPT | Performed by: MARRIAGE & FAMILY THERAPIST

## 2025-02-20 NOTE — PROGRESS NOTES
[facility]                                     Progress Note    Patient Name: Marcel NG Tutewohl  Date: 2/19/2025         Service Type: Individual      Session Start Time: 11:00AM  Session End Time: 12:00PM     Session Length: 60 minutes    Session #: 40    Attendees: Spouse / Significant Other    Service Modality: In person      DATA  Extended Session (53+ minutes):   - Patient's presenting concerns require more intensive intervention than could be completed within the usual service  Interactive Complexity: No  Crisis: No        Progress Since Last Session (Related to Symptoms / Goals / Homework):   Symptoms: Improving rebuilding trust      Homework: Partially completed       Episode of Care Goals: Satisfactory progress - ACTION (Actively working towards change); Intervened by reinforcing change plan / affirming steps taken     Current / Ongoing Stressors and Concerns:  Marcel and Ermelinda went on vacation together and it didn't go well for Marcel. He was bored and also had recently discontinued energy drinks. He was having back and liver pain from drinking too many. Ermelinda feels frustrated that Marcel can't relax with her especially when he has multiple trips with guys. Overall seem to be doing ok but Marcel is working through some issues with being slow at work and trying to stay busy and not stress about it.     Treatment Objective(s) Addressed in This Session:   use cognitive strategies identified in therapy to challenge anxious thoughts  Use speaker/listener technique when communicating through challenging conversations  Create rituals for connection  Time together to discuss priorities  Schedule with .     Intervention:   Relationship counseling      The following assessments were completed by patient for this visit:  PROMIS 10-Global Health (all questions and answers displayed):       2/1/2023    12:00 PM 5/18/2023     3:00 PM 9/6/2023     3:00 PM 12/20/2023     4:00 PM 4/3/2024     12:00 PM 8/7/2024    12:00 PM 12/4/2024     8:00 PM   PROMIS 10   In general, would you say your health is: 3 3 4 3 3 3 3   In general, would you say your quality of life is: 4 3 4 4 4 4 4   In general, how would you rate your physical health? 3 3 3 3 3 3 3   In general, how would you rate your mental health, including your mood and your ability to think? 3 3 3 3 3 3 3   In general, how would you rate your satisfaction with your social activities and relationships? 4 4 4 5 4 4 4   In general, please rate how well you carry out your usual social activities and roles. (This includes activities at home, at work and in your community, and responsibilities as a parent, child, spouse, employee, friend, etc.) 4 4 4 4 4 4 4   To what extent are you able to carry out your everyday physical activities such as walking, climbing stairs, carrying groceries, or moving a chair? 5 4 4 4 4 4 4   In the past 7 days, how often have you been bothered by emotional problems such as feeling anxious, depressed, or irritable? 2 3 2 3 3 3 3   In the past 7 days, how would you rate your fatigue on average? 2 2 2 2 3 2 2   In the past 7 days, how would you rate your pain on average, where 0 means no pain, and 10 means worst imaginable pain? 3 4 3 3 4 2 2   Global Mental Health Score 15 13 15 15 14 14 14   Global Physical Health Score 16 14 15 15 13 15 15   PROMIS TOTAL - SUBSCORES 31 27 30 30 27 29 29           ASSESSMENT: Current Emotional / Mental Status (status of significant symptoms):   Risk status (Self / Other harm or suicidal ideation)   Patient denies current fears or concerns for personal safety.   Patient denies current or recent suicidal ideation or behaviors.   Patient denies current or recent homicidal ideation or behaviors.   Patient denies current or recent self injurious behavior or ideation.   Patient denies other safety concerns.   Patient reports there has been no change in risk factors since their last session.     Patient  reports there has been no change in protective factors since their last session.     Recommended that patient call 911 or go to the local ED should there be a change in any of these risk factors     Appearance:   Appropriate    Eye Contact:   Good    Psychomotor Behavior: Normal    Attitude:   Cooperative  Interested   Orientation:   All   Speech    Rate / Production: Normal     Volume:  Normal    Mood:    Irritable    Affect:    Flat    Thought Content:  Clear    Thought Form:  Coherent  Logical    Insight:    Fair      Medication Review:   No changes to current psychiatric medication(s)     Medication Compliance:   Yes     Changes in Health Issues:   None reported     Chemical Use Review:   Substance Use: Chemical use reviewed, no active concerns identified      Tobacco Use: No current tobacco use.      Diagnosis:  1. ASUNCION (generalized anxiety disorder)        Collateral Reports Completed:   Not Applicable    PLAN: (Patient Tasks / Therapist Tasks / Other)  Homework: Couple to find ways to spend time together.     [employer] [place]    ______________________________________________________________________    Treatment Plan    Patient's Name: Marcel Melchor  YOB: 1970    Date of Creation: 6/8/2022  Date Treatment Plan Last Reviewed/Revised: 12/4/2024    DSM5 Diagnoses: 300.02 (F41.1) Generalized Anxiety Disorder  Psychosocial / Contextual Factors: marital discord, work stress, pain issues  PROMIS (reviewed every 90 days): 12/4/2024   Score: 29    Referral / Collaboration:  Was/were discussed and patient will pursue.    Anticipated number of session for this episode of care: 24  Anticipation frequency of session: Every other week  Anticipated Duration of each session: 38-52 minutes  Treatment plan will be reviewed in 90 days or when goals have been changed.       MeasurableTreatment Goal(s) related to diagnosis / functional impairment(s)  Goal 1: Patient will lower GAD7 score to 3 or below    I  will know I've met my goal when I'm less anxious and irritable.      Objective #A (Patient Action)    Patient will  plan intentional time together .  Status: Continued - Date(s): 12/4/2024    Intervention(s)  Therapist will teach  the benefits of date night and intentional time together .    Objective #B  Patient will practice the use of timeouts and continue with anger management group.  Status: Continued - Date(s): 12/4/2024    Intervention(s)  Therapist will  encourage client to use time outs when overly agitated .    Objective #C  Patient will  work on being more honest in his communication and not stuff his feelings .  Status: New - Date: 12/4/2024      Intervention(s)  Therapist will teach assertiveness skills. In loving ways .      Patient has reviewed and agreed to the above plan.      Trinidad Solorio, LMFT

## 2025-03-18 NOTE — PROGRESS NOTES
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 was anesthetized with a few drops of 1% lidocaine with 1:100,000 epinephrine. Then, an appropriate sized scleral shield was chosen and coated with lacrilube ointment. The shield was gently inserted and left in place for the duration of each stage. After the stage was completed, the shield was gently removed. Mohs Method Verbiage: An incision at a 45 degree angle following the standard Mohs approach was done and the specimen was harvested as a microscopic controlled layer. Surgeon/Pathologist Verbiage (Will Incorporate Name Of Surgeon From Intro If Not Blank): operated in two distinct and integrated capacities as the surgeon and pathologist. Mohs Histo Method Verbiage: Each section was then chromacoded and processed in the Mohs lab using the Mohs protocol and submitted for frozen section, utilizing hematoxylin and eosin histochemical stains. Subsequent Stages Histo Method Verbiage: Using a similar technique to that described above, a thin layer of tissue was removed from all areas where tumor was visible on the previous stage.  The tissue was again oriented, mapped, dyed, and processed as above. Mohs Rapid Report Verbiage: The area of clinically evident tumor was marked with skin marking ink and appropriately hatched.  The initial incision was made following the Mohs approach through the skin.  The specimen was taken to the lab, divided into the necessary number of pieces, chromacoded and processed according to the Mohs protocol.  This was repeated in successive stages until a tumor free defect was achieved. Complex Repair Preamble Text (Leave Blank If You Do Not Want): Extensive wide undermining was performed. Intermediate Repair Preamble Text (Leave Blank If You Do Not Want): Undermining was performed with blunt dissection. Graft Cartilage Fenestration Text: The cartilage was fenestrated with a 2mm punch biopsy to help facilitate graft survival and healing. Non-Graft Cartilage Fenestration Text: The cartilage was fenestrated with a 2mm punch biopsy to help facilitate healing. Secondary Intention Text (Leave Blank If You Do Not Want): The defect will heal with secondary intention. No Repair - Repaired With Adjacent Surgical Defect Text (Leave Blank If You Do Not Want): After obtaining clear surgical margins the defect was repaired concurrently with another surgical defect which was in close approximation. Adjacent Tissue Transfer Text: The defect edges were debeveled with a #15 scalpel blade. Given the location of the defect and the proximity to free margins an adjacent tissue transfer was deemed most appropriate. Using a sterile surgical marker, an appropriate flap was drawn incorporating the defect and placing the expected incisions within the relaxed skin tension lines where possible. The area thus outlined was incised deep to adipose tissue with a #15 scalpel blade. The skin margins were undermined to an appropriate distance in all directions utilizing iris scissors and carried over to close the primary defect. Advancement Flap (Single) Text: The defect edges were debeveled with a #15 scalpel blade. Given the location of the defect and the proximity to free margins a single advancement flap was deemed most appropriate. Using a sterile surgical marker, an appropriate advancement flap was drawn incorporating the defect and placing the expected incisions within the relaxed skin tension lines where possible. The area thus outlined was incised deep to adipose tissue with a #15 scalpel blade. The skin margins were undermined to an appropriate distance in all directions utilizing iris scissors. Following this, the designed flap was advanced and carried over into the primary defect and sutured into place. Advancement Flap (Double) Text: The defect edges were debeveled with a #15 scalpel blade. Given the location of the defect and the proximity to free margins a double advancement flap was deemed most appropriate. Using a sterile surgical marker, the appropriate advancement flaps were drawn incorporating the defect and placing the expected incisions within the relaxed skin tension lines where possible. The area thus outlined was incised deep to adipose tissue with a #15 scalpel blade. The skin margins were undermined to an appropriate distance in all directions utilizing iris scissors. Following this, the designed flaps were advanced and carried over into the primary defect and sutured into place. Advancement-Rotation Flap Text: The defect edges were debeveled with a #15 scalpel blade. Given the location of the defect, shape of the defect and the proximity to free margins an advancement-rotation flap was deemed most appropriate. Using a sterile surgical marker, an appropriate flap was drawn incorporating the defect and placing the expected incisions within the relaxed skin tension lines where possible. The area thus outlined was incised deep to adipose tissue with a #15 scalpel blade. The skin margins were undermined to an appropriate distance in all directions utilizing iris scissors. Following this, the designed flap was carried over into the primary defect and sutured into place. Alar Island Pedicle Flap Text: The defect edges were debeveled with a #15 scalpel blade. Given the location of the defect, shape of the defect and the proximity to the alar rim an island pedicle advancement flap was deemed most appropriate. Using a sterile surgical marker, an appropriate advancement flap was drawn incorporating the defect, outlining the appropriate donor tissue and placing the expected incisions within the nasal ala running parallel to the alar rim. The area thus outlined was incised with a #15 scalpel blade. The skin margins were undermined minimally to an appropriate distance in all directions around the primary defect and laterally outward around the island pedicle utilizing iris scissors.  There was minimal undermining beneath the pedicle flap. Following this, the designed flap was carried over into the primary defect and sutured into place. A-T Advancement Flap Text: The defect edges were debeveled with a #15 scalpel blade. Given the location of the defect, shape of the defect and the proximity to free margins an A-T advancement flap was deemed most appropriate. Using a sterile surgical marker, an appropriate advancement flap was drawn incorporating the defect and placing the expected incisions within the relaxed skin tension lines where possible. The area thus outlined was incised deep to adipose tissue with a #15 scalpel blade. The skin margins were undermined to an appropriate distance in all directions utilizing iris scissors. Following this, the designed flap was advanced and carried over into the primary defect and sutured into place. Banner Transposition Flap Text: The defect edges were debeveled with a #15 scalpel blade. Given the location of the defect and the proximity to free margins a Banner transposition flap was deemed most appropriate. Using a sterile surgical marker, an appropriate flap was drawn around the defect. The area thus outlined was incised deep to adipose tissue with a #15 scalpel blade. The skin margins were undermined to an appropriate distance in all directions utilizing iris scissors. Following this, the designed flap was carried into the primary defect and sutured into place. Bilateral Helical Rim Advancement Flap Text: The defect edges were debeveled with a #15 blade scalpel.  Given the location of the defect and the proximity to free margins (helical rim) a bilateral helical rim advancement flap was deemed most appropriate. Using a sterile surgical marker, the appropriate advancement flaps were drawn incorporating the defect and placing the expected incisions between the helical rim and antihelix where possible.  The area thus outlined was incised through and through with a #15 scalpel blade.  With a skin hook and iris scissors, the flaps were gently and sharply undermined and freed up. Following this, the designed flaps were placed into the primary defect and sutured into place. Bilateral Rotation Flap Text: The defect edges were debeveled with a #15 scalpel blade. Given the location of the defect, shape of the defect and the proximity to free margins a bilateral rotation flap was deemed most appropriate. Using a sterile surgical marker, an appropriate rotation flap was drawn incorporating the defect and placing the expected incisions within the relaxed skin tension lines where possible. The area thus outlined was incised deep to adipose tissue with a #15 scalpel blade. The skin margins were undermined to an appropriate distance in all directions utilizing iris scissors. Following this, the designed flap was carried over into the primary defect and sutured into place. Bilobed Flap Text: The defect edges were debeveled with a #15 scalpel blade. Given the location of the defect and the proximity to free margins a bilobe flap was deemed most appropriate. Using a sterile surgical marker, an appropriate bilobe flap drawn around the defect. The area thus outlined was incised deep to adipose tissue with a #15 scalpel blade. The skin margins were undermined to an appropriate distance in all directions utilizing iris scissors. Following this, the designed flap was carried over into the primary defect and sutured into place. Bilobed Transposition Flap Text: The defect edges were debeveled with a #15 scalpel blade. Given the location of the defect and the proximity to free margins a bilobed transposition flap was deemed most appropriate. Using a sterile surgical marker, an appropriate bilobe flap drawn around the defect. The area thus outlined was incised deep to adipose tissue with a #15 scalpel blade. The skin margins were undermined to an appropriate distance in all directions utilizing iris scissors. Following this, the designed flap was carried over into the primary defect and sutured into place. Bi-Rhombic Flap Text: The defect edges were debeveled with a #15 scalpel blade. Given the location of the defect and the proximity to free margins a bi-rhombic flap was deemed most appropriate. Using a sterile surgical marker, an appropriate rhombic flap was drawn incorporating the defect. The area thus outlined was incised deep to adipose tissue with a #15 scalpel blade. The skin margins were undermined to an appropriate distance in all directions utilizing iris scissors. Following this, the designed flap was carried over into the primary defect and sutured into place. Burow's Advancement Flap Text: The defect edges were debeveled with a #15 scalpel blade. Given the location of the defect and the proximity to free margins a Burow's advancement flap was deemed most appropriate. Using a sterile surgical marker, the appropriate advancement flap was drawn incorporating the defect and placing the expected incisions within the relaxed skin tension lines where possible. The area thus outlined was incised deep to adipose tissue with a #15 scalpel blade. The skin margins were undermined to an appropriate distance in all directions utilizing iris scissors. Following this, the designed flap was advanced and carried over into the primary defect and sutured into place. Chonodrocutaneous Helical Advancement Flap Text: The defect edges were debeveled with a #15 scalpel blade. Given the location of the defect and the proximity to free margins a chondrocutaneous helical advancement flap was deemed most appropriate. Using a sterile surgical marker, the appropriate advancement flap was drawn incorporating the defect and placing the expected incisions within the relaxed skin tension lines where possible. The area thus outlined was incised deep to adipose tissue with a #15 scalpel blade. The skin margins were undermined to an appropriate distance in all directions utilizing iris scissors. Following this, the designed flap was advanced and carried over into the primary defect and sutured into place. Crescentic Advancement Flap Text: The defect edges were debeveled with a #15 scalpel blade. Given the location of the defect and the proximity to free margins a crescentic advancement flap was deemed most appropriate. Using a sterile surgical marker, the appropriate advancement flap was drawn incorporating the defect and placing the expected incisions within the relaxed skin tension lines where possible. The area thus outlined was incised deep to adipose tissue with a #15 scalpel blade. The skin margins were undermined to an appropriate distance in all directions utilizing iris scissors. Following this, the designed flap was advanced and carried over into the primary defect and sutured into place. Dorsal Nasal Flap Text: The defect edges were debeveled with a #15 scalpel blade. Given the location of the defect and the proximity to free margins a dorsal nasal flap was deemed most appropriate. Using a sterile surgical marker, an appropriate dorsal nasal flap was drawn around the defect. The area thus outlined was incised deep to adipose tissue with a #15 scalpel blade. The skin margins were undermined to an appropriate distance in all directions utilizing iris scissors. Following this, the designed flap was carried into the primary defect and sutured into place. Double Island Pedicle Flap Text: The defect edges were debeveled with a #15 scalpel blade. Given the location of the defect, shape of the defect and the proximity to free margins a double island pedicle advancement flap was deemed most appropriate. Using a sterile surgical marker, an appropriate advancement flap was drawn incorporating the defect, outlining the appropriate donor tissue and placing the expected incisions within the relaxed skin tension lines where possible. The area thus outlined was incised deep to adipose tissue with a #15 scalpel blade. The skin margins were undermined to an appropriate distance in all directions around the primary defect and laterally outward around the island pedicle utilizing iris scissors.  There was minimal undermining beneath the pedicle flap. Following this, the flap was carried over into the primary defect and sutured into place. Double O-Z Flap Text: The defect edges were debeveled with a #15 scalpel blade. Given the location of the defect, shape of the defect and the proximity to free margins a Double O-Z flap was deemed most appropriate. Using a sterile surgical marker, an appropriate transposition flap was drawn incorporating the defect and placing the expected incisions within the relaxed skin tension lines where possible. The area thus outlined was incised deep to adipose tissue with a #15 scalpel blade. The skin margins were undermined to an appropriate distance in all directions utilizing iris scissors. Following this, the designed flap was carried over into the primary defect and sutured into place. Double O-Z Plasty Text: The defect edges were debeveled with a #15 scalpel blade. Given the location of the defect, shape of the defect and the proximity to free margins a Double O-Z plasty (double transposition flap) was deemed most appropriate. Using a sterile surgical marker, the appropriate transposition flaps were drawn incorporating the defect and placing the expected incisions within the relaxed skin tension lines where possible. The area thus outlined was incised deep to adipose tissue with a #15 scalpel blade. The skin margins were undermined to an appropriate distance in all directions utilizing iris scissors. Hemostasis was achieved with electrocautery. The flaps were then transposed and carried over into place, one clockwise and the other counterclockwise, and anchored with interrupted buried subcutaneous sutures. Double Z Plasty Text: The lesion was extirpated to the level of the fat with a #15 scalpel blade. Given the location of the defect, shape of the defect and the proximity to free margins a double Z-plasty was deemed most appropriate for repair. Using a sterile surgical marker, the appropriate transposition arms of the double Z-plasty were drawn incorporating the defect and placing the expected incisions within the relaxed skin tension lines where possible. The area thus outlined was incised deep to adipose tissue with a #15 scalpel blade. The skin margins were undermined to an appropriate distance in all directions utilizing iris scissors. The opposing transposition arms were then transposed and carried over into place in opposite direction and anchored with interrupted buried subcutaneous sutures. Ear Star Wedge Flap Text: The defect edges were debeveled with a #15 blade scalpel.  Given the location of the defect and the proximity to free margins (helical rim) an ear star wedge flap was deemed most appropriate. Using a sterile surgical marker, the appropriate flap was drawn incorporating the defect and placing the expected incisions between the helical rim and antihelix where possible.  The area thus outlined was incised through and through with a #15 scalpel blade. Following this, the designed flap was carried over into the primary defect and sutured into place. Flip-Flop Flap Text: The defect edges were debeveled with a #15 blade scalpel.  Given the location of the defect and the proximity to free margins a flip-flop flap was deemed most appropriate. Using a sterile surgical marker, the appropriate flap was drawn incorporating the defect and placing the expected incisions between the helical rim and antihelix where possible.  The area thus outlined was incised through and through with a #15 scalpel blade. Following this, the designed flap was carried over into the primary defect and sutured into place. Hatchet Flap Text: The defect edges were debeveled with a #15 scalpel blade. Given the location of the defect, shape of the defect and the proximity to free margins a hatchet flap was deemed most appropriate. Using a sterile surgical marker, an appropriate hatchet flap was drawn incorporating the defect and placing the expected incisions within the relaxed skin tension lines where possible. The area thus outlined was incised deep to adipose tissue with a #15 scalpel blade. The skin margins were undermined to an appropriate distance in all directions utilizing iris scissors. Following this, the designed flap was carried over into the primary defect and sutured into place. Helical Rim Advancement Flap Text: The defect edges were debeveled with a #15 blade scalpel.  Given the location of the defect and the proximity to free margins (helical rim) a double helical rim advancement flap was deemed most appropriate. Using a sterile surgical marker, the appropriate advancement flaps were drawn incorporating the defect and placing the expected incisions between the helical rim and antihelix where possible.  The area thus outlined was incised through and through with a #15 scalpel blade.  With a skin hook and iris scissors, the flaps were gently and sharply undermined and freed up. Folllowing this, the designed flaps were carried over into the primary defect and sutured into place. H Plasty Text: Given the location of the defect, shape of the defect and the proximity to free margins a H-plasty was deemed most appropriate for repair. Using a sterile surgical marker, the appropriate advancement arms of the H-plasty were drawn incorporating the defect and placing the expected incisions within the relaxed skin tension lines where possible. The area thus outlined was incised deep to adipose tissue with a #15 scalpel blade. The skin margins were undermined to an appropriate distance in all directions utilizing iris scissors.  The opposing advancement arms were then advanced and carried over into place in opposite direction and anchored with interrupted buried subcutaneous sutures. Island Pedicle Flap Text: The defect edges were debeveled with a #15 scalpel blade. Given the location of the defect, shape of the defect and the proximity to free margins an island pedicle advancement flap was deemed most appropriate. Using a sterile surgical marker, an appropriate advancement flap was drawn incorporating the defect, outlining the appropriate donor tissue and placing the expected incisions within the relaxed skin tension lines where possible. The area thus outlined was incised deep to adipose tissue with a #15 scalpel blade. The skin margins were undermined to an appropriate distance in all directions around the primary defect and laterally outward around the island pedicle utilizing iris scissors.  There was minimal undermining beneath the pedicle flap. Following this, the flap was carried over into the primary defect and sutured into place. Island Pedicle Flap With Canthal Suspension Text: The defect edges were debeveled with a #15 scalpel blade. Given the location of the defect, shape of the defect and the proximity to free margins an island pedicle advancement flap was deemed most appropriate. Using a sterile surgical marker, an appropriate advancement flap was drawn incorporating the defect, outlining the appropriate donor tissue and placing the expected incisions within the relaxed skin tension lines where possible. The area thus outlined was incised deep to adipose tissue with a #15 scalpel blade. The skin margins were undermined to an appropriate distance in all directions around the primary defect and laterally outward around the island pedicle utilizing iris scissors.  There was minimal undermining beneath the pedicle flap. A suspension suture was placed in the canthal tendon to prevent tension and prevent ectropion. Following this, the designed flap was placed into the primary defect and sutured into place. Island Pedicle Flap-Requiring Vessel Identification Text: The defect edges were debeveled with a #15 scalpel blade. Given the location of the defect, shape of the defect and the proximity to free margins an island pedicle advancement flap was deemed most appropriate. Using a sterile surgical marker, an appropriate advancement flap was drawn, based on the axial vessel mentioned above, incorporating the defect, outlining the appropriate donor tissue and placing the expected incisions within the relaxed skin tension lines where possible. The area thus outlined was incised deep to adipose tissue with a #15 scalpel blade. The skin margins were undermined to an appropriate distance in all directions around the primary defect and laterally outward around the island pedicle utilizing iris scissors.  There was minimal undermining beneath the pedicle flap. Following this, the designed flap was carried over into the primary defect and sutured into place. Keystone Flap Text: The defect edges were debeveled with a #15 scalpel blade. Given the location of the defect, shape of the defect a keystone flap was deemed most appropriate. Using a sterile surgical marker, an appropriate keystone flap was drawn incorporating the defect, outlining the appropriate donor tissue and placing the expected incisions within the relaxed skin tension lines where possible. The area thus outlined was incised deep to adipose tissue with a #15 scalpel blade. The skin margins were undermined to an appropriate distance in all directions around the primary defect and laterally outward around the flap utilizing iris scissors. Following this, the designed flap was carried into the primary defect and sutured into place. Melolabial Transposition Flap Text: The defect edges were debeveled with a #15 scalpel blade. Given the location of the defect and the proximity to free margins a melolabial flap was deemed most appropriate. Using a sterile surgical marker, an appropriate melolabial transposition flap was drawn incorporating the defect. The area thus outlined was incised deep to adipose tissue with a #15 scalpel blade. The skin margins were undermined to an appropriate distance in all directions utilizing iris scissors. Following this, the designed flap was carried over into the primary defect and sutured into place. Mercedes Flap Text: The defect edges were debeveled with a #15 scalpel blade. Given the location of the defect, shape of the defect and the proximity to free margins a Mercedes flap was deemed most appropriate. Using a sterile surgical marker, an appropriate advancement flap was drawn incorporating the defect and placing the expected incisions within the relaxed skin tension lines where possible. The area thus outlined was incised deep to adipose tissue with a #15 scalpel blade. The skin margins were undermined to an appropriate distance in all directions utilizing iris scissors. Following this, the designed flap was advanced and carried over into the primary defect and sutured into place. Modified Advancement Flap Text: The defect edges were debeveled with a #15 scalpel blade. Given the location of the defect, shape of the defect and the proximity to free margins a modified advancement flap was deemed most appropriate. Using a sterile surgical marker, an appropriate advancement flap was drawn incorporating the defect and placing the expected incisions within the relaxed skin tension lines where possible. The area thus outlined was incised deep to adipose tissue with a #15 scalpel blade. The skin margins were undermined to an appropriate distance in all directions utilizing iris scissors. Following this, the designed flap was advanced and carried over into the primary defect and sutured into place. Mucosal Advancement Flap Text: Given the location of the defect, shape of the defect and the proximity to free margins a mucosal advancement flap was deemed most appropriate. Incisions were made with a 15 blade scalpel in the appropriate fashion along the cutaneous vermilion border and the mucosal lip. The remaining actinically damaged mucosal tissue was excised.  The mucosal advancement flap was then elevated to the gingival sulcus with care taken to preserve the neurovascular structures and advanced into the primary defect. Care was taken to ensure that precise realignment of the vermilion border was achieved. Muscle Hinge Flap Text: The defect edges were debeveled with a #15 scalpel blade.  Given the size, depth and location of the defect and the proximity to free margins a muscle hinge flap was deemed most appropriate. Using a sterile surgical marker, an appropriate hinge flap was drawn incorporating the defect. The area thus outlined was incised with a #15 scalpel blade. The skin margins were undermined to an appropriate distance in all directions utilizing iris scissors. Following this, the designed flap was carried into the primary defect and sutured into place. Mustarde Flap Text: The defect edges were debeveled with a #15 scalpel blade.  Given the size, depth and location of the defect and the proximity to free margins a Mustarde flap was deemed most appropriate. Using a sterile surgical marker, an appropriate flap was drawn incorporating the defect. The area thus outlined was incised with a #15 scalpel blade. The skin margins were undermined to an appropriate distance in all directions utilizing iris scissors. Following this, the designed flap was carried into the primary defect and sutured into place. Nasal Turnover Hinge Flap Text: The defect edges were debeveled with a #15 scalpel blade.  Given the size, depth, location of the defect and the defect being full thickness a nasal turnover hinge flap was deemed most appropriate. Using a sterile surgical marker, an appropriate hinge flap was drawn incorporating the defect. The area thus outlined was incised with a #15 scalpel blade. The flap was designed to recreate the nasal mucosal lining and the alar rim. The skin margins were undermined to an appropriate distance in all directions utilizing iris scissors. Following this, the designed flap was carried over into the primary defect and sutured into place Nasalis-Muscle-Based Myocutaneous Island Pedicle Flap Text: Using a #15 blade, an incision was made around the donor flap to the level of the nasalis muscle. Wide lateral undermining was then performed in both the subcutaneous plane above the nasalis muscle, and in a submuscular plane just above periosteum. This allowed the formation of a free nasalis muscle axial pedicle (based on the angular artery) which was still attached to the actual cutaneous flap, increasing its mobility and vascular viability. Hemostasis was obtained with pinpoint electrocoagulation. The flap was mobilized into position and the pivotal anchor points positioned and stabilized with buried interrupted sutures. Subcutaneous and dermal tissues were closed in a multilayered fashion with sutures. Tissue redundancies were excised, and the epidermal edges were apposed without significant tension and sutured with sutures. Nasalis Myocutaneous Flap Text: Using a #15 blade, an incision was made around the donor flap to the level of the nasalis muscle. Wide lateral undermining was then performed in both the subcutaneous plane above the nasalis muscle, and in a submuscular plane just above periosteum. This allowed the formation of a free nasalis muscle axial pedicle which was still attached to the actual cutaneous flap, increasing its mobility and vascular viability. Hemostasis was obtained with pinpoint electrocoagulation. The flap was mobilized into position and the pivotal anchor points positioned and stabilized with buried interrupted sutures. Subcutaneous and dermal tissues were closed in a multilayered fashion with sutures. Tissue redundancies were excised, and the epidermal edges were apposed without significant tension and sutured with sutures. Nasolabial Transposition Flap Text: The defect edges were debeveled with a #15 scalpel blade.  Given the size, depth and location of the defect and the proximity to free margins a nasolabial transposition flap was deemed most appropriate. Using a sterile surgical marker, an appropriate flap was drawn incorporating the defect. The area thus outlined was incised with a #15 scalpel blade. The skin margins were undermined to an appropriate distance in all directions utilizing iris scissors. Following this, the designed flap was carried into the primary defect and sutured into place. Orbicularis Oris Muscle Flap Text: The defect edges were debeveled with a #15 scalpel blade.  Given that the defect affected the competency of the oral sphincter an orbicularis oris muscle flap was deemed most appropriate to restore this competency and normal muscle function.  Using a sterile surgical marker, an appropriate flap was drawn incorporating the defect. The area thus outlined was incised with a #15 scalpel blade. Following this, the designed flap was carried over into the primary defect and sutured into place. O-T Advancement Flap Text: The defect edges were debeveled with a #15 scalpel blade. Given the location of the defect, shape of the defect and the proximity to free margins an O-T advancement flap was deemed most appropriate. Using a sterile surgical marker, an appropriate advancement flap was drawn incorporating the defect and placing the expected incisions within the relaxed skin tension lines where possible. The area thus outlined was incised deep to adipose tissue with a #15 scalpel blade. The skin margins were undermined to an appropriate distance in all directions utilizing iris scissors. Following this, the designed flap was advanced and carried over into the primary defect and sutured into place. O-T Plasty Text: The defect edges were debeveled with a #15 scalpel blade. Given the location of the defect, shape of the defect and the proximity to free margins an O-T plasty was deemed most appropriate. Using a sterile surgical marker, an appropriate O-T plasty was drawn incorporating the defect and placing the expected incisions within the relaxed skin tension lines where possible. The area thus outlined was incised deep to adipose tissue with a #15 scalpel blade. The skin margins were undermined to an appropriate distance in all directions utilizing iris scissors. Following this, the designed flap was carried over into the primary defect and sutured into place. O-L Flap Text: The defect edges were debeveled with a #15 scalpel blade. Given the location of the defect, shape of the defect and the proximity to free margins an O-L flap was deemed most appropriate. Using a sterile surgical marker, an appropriate advancement flap was drawn incorporating the defect and placing the expected incisions within the relaxed skin tension lines where possible. The area thus outlined was incised deep to adipose tissue with a #15 scalpel blade. The skin margins were undermined to an appropriate distance in all directions utilizing iris scissors. Following this, the designed flap was advanced and carried over into the primary defect and sutured into place. O-Z Flap Text: The defect edges were debeveled with a #15 scalpel blade. Given the location of the defect, shape of the defect and the proximity to free margins an O-Z flap was deemed most appropriate. Using a sterile surgical marker, an appropriate transposition flap was drawn incorporating the defect and placing the expected incisions within the relaxed skin tension lines where possible. The area thus outlined was incised deep to adipose tissue with a #15 scalpel blade. The skin margins were undermined to an appropriate distance in all directions utilizing iris scissors. Following this, the designed flap was carried over into the primary defect and sutured into place. O-Z Plasty Text: The defect edges were debeveled with a #15 scalpel blade. Given the location of the defect, shape of the defect and the proximity to free margins an O-Z plasty (double transposition flap) was deemed most appropriate. Using a sterile surgical marker, the appropriate transposition flaps were drawn incorporating the defect and placing the expected incisions within the relaxed skin tension lines where possible. The area thus outlined was incised deep to adipose tissue with a #15 scalpel blade. The skin margins were undermined to an appropriate distance in all directions utilizing iris scissors. Hemostasis was achieved with electrocautery. The flaps were then transposed and carried over into place, one clockwise and the other counterclockwise, and anchored with interrupted buried subcutaneous sutures. Peng Advancement Flap Text: The defect edges were debeveled with a #15 scalpel blade. Given the location of the defect, shape of the defect and the proximity to free margins a Peng advancement flap was deemed most appropriate. Using a sterile surgical marker, an appropriate advancement flap was drawn incorporating the defect and placing the expected incisions within the relaxed skin tension lines where possible. The area thus outlined was incised deep to adipose tissue with a #15 scalpel blade. The skin margins were undermined to an appropriate distance in all directions utilizing iris scissors. Following this, the designed flap was advanced and carried over into the primary defect and sutured into place. Rectangular Flap Text: The defect edges were debeveled with a #15 scalpel blade. Given the location of the defect and the proximity to free margins a rectangular flap was deemed most appropriate. Using a sterile surgical marker, an appropriate rectangular flap was drawn incorporating the defect. The area thus outlined was incised deep to adipose tissue with a #15 scalpel blade. The skin margins were undermined to an appropriate distance in all directions utilizing iris scissors. Following this, the designed flap was carried over into the primary defect and sutured into place. Rhombic Flap Text: The defect edges were debeveled with a #15 scalpel blade. Given the location of the defect and the proximity to free margins a rhombic flap was deemed most appropriate. Using a sterile surgical marker, an appropriate rhombic flap was drawn incorporating the defect. The area thus outlined was incised deep to adipose tissue with a #15 scalpel blade. The skin margins were undermined to an appropriate distance in all directions utilizing iris scissors. Following this, the designed flap was carried over into the primary defect and sutured into place. Rhomboid Transposition Flap Text: The defect edges were debeveled with a #15 scalpel blade. Given the location of the defect and the proximity to free margins a rhomboid transposition flap was deemed most appropriate. Using a sterile surgical marker, an appropriate rhomboid flap was drawn incorporating the defect. The area thus outlined was incised deep to adipose tissue with a #15 scalpel blade. The skin margins were undermined to an appropriate distance in all directions utilizing iris scissors. Following this, the designed flap was carried over into the primary defect and sutured into place. Rotation Flap Text: The defect edges were debeveled with a #15 scalpel blade. Given the location of the defect, shape of the defect and the proximity to free margins a rotation flap was deemed most appropriate. Using a sterile surgical marker, an appropriate rotation flap was drawn incorporating the defect and placing the expected incisions within the relaxed skin tension lines where possible. The area thus outlined was incised deep to adipose tissue with a #15 scalpel blade. The skin margins were undermined to an appropriate distance in all directions utilizing iris scissors. Following this, the designed flap was carried over into the primary defect and sutured into place. Spiral Flap Text: The defect edges were debeveled with a #15 scalpel blade. Given the location of the defect, shape of the defect and the proximity to free margins a spiral flap was deemed most appropriate. Using a sterile surgical marker, an appropriate rotation flap was drawn incorporating the defect and placing the expected incisions within the relaxed skin tension lines where possible. The area thus outlined was incised deep to adipose tissue with a #15 scalpel blade. The skin margins were undermined to an appropriate distance in all directions utilizing iris scissors. Following this, the designed flap was carried over into the primary defect and sutured into place. Staged Advancement Flap Text: The defect edges were debeveled with a #15 scalpel blade. Given the location of the defect, shape of the defect and the proximity to free margins a staged advancement flap was deemed most appropriate. Using a sterile surgical marker, an appropriate advancement flap was drawn incorporating the defect and placing the expected incisions within the relaxed skin tension lines where possible. The area thus outlined was incised deep to adipose tissue with a #15 scalpel blade. The skin margins were undermined to an appropriate distance in all directions utilizing iris scissors. Following this, the designed flap was carried over into the primary defect and sutured into place. Star Wedge Flap Text: The defect edges were debeveled with a #15 scalpel blade. Given the location of the defect, shape of the defect and the proximity to free margins a star wedge flap was deemed most appropriate. Using a sterile surgical marker, an appropriate rotation flap was drawn incorporating the defect and placing the expected incisions within the relaxed skin tension lines where possible. The area thus outlined was incised deep to adipose tissue with a #15 scalpel blade. The skin margins were undermined to an appropriate distance in all directions utilizing iris scissors. Following this, the designed flap was carried over into the primary defect and sutured into place. Transposition Flap Text: The defect edges were debeveled with a #15 scalpel blade. Given the location of the defect and the proximity to free margins a transposition flap was deemed most appropriate. Using a sterile surgical marker, an appropriate transposition flap was drawn incorporating the defect. The area thus outlined was incised deep to adipose tissue with a #15 scalpel blade. The skin margins were undermined to an appropriate distance in all directions utilizing iris scissors. Following this, the designed flap was carried over into the primary defect and sutured into place. Trilobed Flap Text: The defect edges were debeveled with a #15 scalpel blade. Given the location of the defect and the proximity to free margins a trilobed flap was deemed most appropriate. Using a sterile surgical marker, an appropriate trilobed flap was drawn around the defect. The area thus outlined was incised deep to adipose tissue with a #15 scalpel blade. The skin margins were undermined to an appropriate distance in all directions utilizing iris scissors. Following this, the designed flap was carried into the primary defect and sutured into place. V-Y Flap Text: The defect edges were debeveled with a #15 scalpel blade. Given the location of the defect, shape of the defect and the proximity to free margins a V-Y flap was deemed most appropriate. Using a sterile surgical marker, an appropriate advancement flap was drawn incorporating the defect and placing the expected incisions within the relaxed skin tension lines where possible. The area thus outlined was incised deep to adipose tissue with a #15 scalpel blade. The skin margins were undermined to an appropriate distance in all directions utilizing iris scissors. Following this, the designed flap was advanced and carried over into the primary defect and sutured into place. V-Y Plasty Text: The defect edges were debeveled with a #15 scalpel blade. Given the location of the defect, shape of the defect and the proximity to free margins an V-Y advancement flap was deemed most appropriate. Using a sterile surgical marker, an appropriate advancement flap was drawn incorporating the defect and placing the expected incisions within the relaxed skin tension lines where possible. The area thus outlined was incised deep to adipose tissue with a #15 scalpel blade. The skin margins were undermined to an appropriate distance in all directions utilizing iris scissors. Following this, the designed flap was advanced and carried over into the primary defect and sutured into place. W Plasty Text: The lesion was extirpated to the level of the fat with a #15 scalpel blade. Given the location of the defect, shape of the defect and the proximity to free margins a W-plasty was deemed most appropriate for repair. Using a sterile surgical marker, the appropriate transposition arms of the W-plasty were drawn incorporating the defect and placing the expected incisions within the relaxed skin tension lines where possible. The area thus outlined was incised deep to adipose tissue with a #15 scalpel blade. The skin margins were undermined to an appropriate distance in all directions utilizing iris scissors. The opposing transposition arms were then transposed and carried over into place in opposite direction and anchored with interrupted buried subcutaneous sutures. Z Plasty Text: The lesion was extirpated to the level of the fat with a #15 scalpel blade. Given the location of the defect, shape of the defect and the proximity to free margins a Z-plasty was deemed most appropriate for repair. Using a sterile surgical marker, the appropriate transposition arms of the Z-plasty were drawn incorporating the defect and placing the expected incisions within the relaxed skin tension lines where possible. The area thus outlined was incised deep to adipose tissue with a #15 scalpel blade. The skin margins were undermined to an appropriate distance in all directions utilizing iris scissors. The opposing transposition arms were then transposed and carried over into place in opposite direction and anchored with interrupted buried subcutaneous sutures. Zygomaticofacial Flap Text: Given the location of the defect, shape of the defect and the proximity to free margins a zygomaticofacial flap was deemed most appropriate for repair. Using a sterile surgical marker, the appropriate flap was drawn incorporating the defect and placing the expected incisions within the relaxed skin tension lines where possible. The area thus outlined was incised deep to adipose tissue with a #15 scalpel blade with preservation of a vascular pedicle.  The skin margins were undermined to an appropriate distance in all directions utilizing iris scissors. The flap was then carried over into the defect and anchored with interrupted buried subcutaneous sutures. Abbe Flap (Lower To Upper Lip) Text: The defect of the upper lip was assessed and measured.  Given the location and size of the defect, an Abbe flap was deemed most appropriate. Using a sterile surgical marker, an appropriate Abbe flap was measured and drawn on the lower lip. Local anesthesia was then infiltrated. A scalpel was then used to incise the upper lip through and through the skin, vermilion, muscle and mucosa, leaving the flap pedicled on the opposite side.  The flap was then rotated and transferred to the lower lip defect.  The flap was then sutured into place with a three layer technique, closing the orbicularis oris muscle layer with subcutaneous buried sutures, followed by a mucosal layer and an epidermal layer. Abbe Flap (Upper To Lower Lip) Text: The defect of the lower lip was assessed and measured.  Given the location and size of the defect, an Abbe flap was deemed most appropriate. Using a sterile surgical marker, an appropriate Abbe flap was measured and drawn on the upper lip. Local anesthesia was then infiltrated.  A scalpel was then used to incise the upper lip through and through the skin, vermilion, muscle and mucosa, leaving the flap pedicled on the opposite side.  The flap was then rotated and transferred to the lower lip defect.  The flap was then sutured into place with a three layer technique, closing the orbicularis oris muscle layer with subcutaneous buried sutures, followed by a mucosal layer and an epidermal layer. Cheek Interpolation Flap Text: A decision was made to reconstruct the defect utilizing an interpolation axial flap and a staged reconstruction.  A telfa template was made of the defect.  This telfa template was then used to outline the Cheek Interpolation flap.  The donor area for the pedicle flap was then injected with anesthesia.  The flap was excised through the skin and subcutaneous tissue down to the layer of the underlying musculature.  The interpolation flap was carefully excised within this deep plane to maintain its blood supply.  The edges of the donor site were undermined.   The donor site was closed in a primary fashion.  The pedicle was then rotated into position and sutured.  Once the tube was sutured into place, adequate blood supply was confirmed with blanching and refill.  The pedicle was then wrapped with xeroform gauze and dressed appropriately with a telfa and gauze bandage to ensure continued blood supply and protect the attached pedicle. Cheek-To-Nose Interpolation Flap Text: A decision was made to reconstruct the defect utilizing an interpolation axial flap and a staged reconstruction.  A telfa template was made of the defect.  This telfa template was then used to outline the Cheek-To-Nose Interpolation flap.  The donor area for the pedicle flap was then injected with anesthesia.  The flap was excised through the skin and subcutaneous tissue down to the layer of the underlying musculature.  The interpolation flap was carefully excised within this deep plane to maintain its blood supply.  The edges of the donor site were undermined.   The donor site was closed in a primary fashion.  The pedicle was then rotated into position and sutured.  Once the tube was sutured into place, adequate blood supply was confirmed with blanching and refill.  The pedicle was then wrapped with xeroform gauze and dressed appropriately with a telfa and gauze bandage to ensure continued blood supply and protect the attached pedicle. Estlander Flap (Lower To Upper Lip) Text: The defect of the lower lip was assessed and measured.  Given the location and size of the defect, an Estlander flap was deemed most appropriate. Using a sterile surgical marker, an appropriate Estlander flap was measured and drawn on the upper lip. Local anesthesia was then infiltrated. A scalpel was then used to incise the lateral aspect of the flap, through skin, muscle and mucosa, leaving the flap pedicled medially.  The flap was then rotated and positioned to fill the lower lip defect.  The flap was then sutured into place with a three layer technique, closing the orbicularis oris muscle layer with subcutaneous buried sutures, followed by a mucosal layer and an epidermal layer. Interpolation Flap Text: A decision was made to reconstruct the defect utilizing an interpolation axial flap and a staged reconstruction.  A telfa template was made of the defect.  This telfa template was then used to outline the interpolation flap.  The donor area for the pedicle flap was then injected with anesthesia.  The flap was excised through the skin and subcutaneous tissue down to the layer of the underlying musculature.  The interpolation flap was carefully excised within this deep plane to maintain its blood supply.  The edges of the donor site were undermined.   The donor site was closed in a primary fashion.  The pedicle was then rotated into position and sutured.  Once the tube was sutured into place, adequate blood supply was confirmed with blanching and refill.  The pedicle was then wrapped with xeroform gauze and dressed appropriately with a telfa and gauze bandage to ensure continued blood supply and protect the attached pedicle. Melolabial Interpolation Flap Text: A decision was made to reconstruct the defect utilizing an interpolation axial flap and a staged reconstruction.  A telfa template was made of the defect.  This telfa template was then used to outline the melolabial interpolation flap.  The donor area for the pedicle flap was then injected with anesthesia.  The flap was excised through the skin and subcutaneous tissue down to the layer of the underlying musculature.  The pedicle flap was carefully excised within this deep plane to maintain its blood supply.  The edges of the donor site were undermined.   The donor site was closed in a primary fashion.  The pedicle was then rotated into position and sutured.  Once the tube was sutured into place, adequate blood supply was confirmed with blanching and refill.  The pedicle was then wrapped with xeroform gauze and dressed appropriately with a telfa and gauze bandage to ensure continued blood supply and protect the attached pedicle. Mastoid Interpolation Flap Text: A decision was made to reconstruct the defect utilizing an interpolation axial flap and a staged reconstruction.  A telfa template was made of the defect.  This telfa template was then used to outline the mastoid interpolation flap.  The donor area for the pedicle flap was then injected with anesthesia.  The flap was excised through the skin and subcutaneous tissue down to the layer of the underlying musculature.  The pedicle flap was carefully excised within this deep plane to maintain its blood supply.  The edges of the donor site were undermined.   The donor site was closed in a primary fashion.  The pedicle was then rotated into position and sutured.  Once the tube was sutured into place, adequate blood supply was confirmed with blanching and refill.  The pedicle was then wrapped with xeroform gauze and dressed appropriately with a telfa and gauze bandage to ensure continued blood supply and protect the attached pedicle. Paramedian Forehead Flap Text: A decision was made to reconstruct the defect utilizing an interpolation axial flap and a staged reconstruction.  A telfa template was made of the defect.  This telfa template was then used to outline the paramedian forehead pedicle flap.  The donor area for the pedicle flap was then injected with anesthesia.  The flap was excised through the skin and subcutaneous tissue down to the layer of the underlying musculature.  The pedicle flap was carefully excised within this deep plane to maintain its blood supply.  The edges of the donor site were undermined.   The donor site was closed in a primary fashion.  The pedicle was then rotated into position and sutured.  Once the tube was sutured into place, adequate blood supply was confirmed with blanching and refill.  The pedicle was then wrapped with xeroform gauze and dressed appropriately with a telfa and gauze bandage to ensure continued blood supply and protect the attached pedicle. Posterior Auricular Interpolation Flap Text: A decision was made to reconstruct the defect utilizing an interpolation axial flap and a staged reconstruction.  A telfa template was made of the defect.  This telfa template was then used to outline the posterior auricular interpolation flap.  The donor area for the pedicle flap was then injected with anesthesia.  The flap was excised through the skin and subcutaneous tissue down to the layer of the underlying musculature.  The pedicle flap was carefully excised within this deep plane to maintain its blood supply.  The edges of the donor site were undermined.   The donor site was closed in a primary fashion.  The pedicle was then rotated into position and sutured.  Once the tube was sutured into place, adequate blood supply was confirmed with blanching and refill.  The pedicle was then wrapped with xeroform gauze and dressed appropriately with a telfa and gauze bandage to ensure continued blood supply and protect the attached pedicle. Cheiloplasty (Complex) Text: A decision was made to reconstruct the defect with a  cheiloplasty.  The defect was undermined extensively.  Additional orbicularis oris muscle was excised with a 15 blade scalpel.  The defect was converted into a full thickness wedge to facilite a better cosmetic result.  Small vessels were then tied off with 5-0 monocyrl. The orbicularis oris, superficial fascia, adipose and dermis were then reapproximated.  After the deeper layers were approximated the epidermis was reapproximated with particular care given to realign the vermilion border. Cheiloplasty (Less Than 50%) Text: A decision was made to reconstruct the defect with a  cheiloplasty.  The defect was undermined extensively.  Additional orbicularis oris muscle was excised with a 15 blade scalpel.  The defect was converted into a full thickness wedge, of less than 50% of the vertical height of the lip, to facilite a better cosmetic result.  Small vessels were then tied off with 5-0 monocyrl. The orbicularis oris, superficial fascia, adipose and dermis were then reapproximated.  After the deeper layers were approximated the epidermis was reapproximated with particular care given to realign the vermilion border. Ear Wedge Repair Text: A wedge excision was completed by carrying down an excision through the full thickness of the ear and cartilage with an inward facing Burow's triangle. The wound was then closed in a layered fashion. Full Thickness Lip Wedge Repair (Flap) Text: Given the location of the defect and the proximity to free margins a full thickness wedge repair was deemed most appropriate. Using a sterile surgical marker, the appropriate repair was drawn incorporating the defect and placing the expected incisions perpendicular to the vermilion border.  The vermilion border was also meticulously outlined to ensure appropriate reapproximation during the repair.  The area thus outlined was incised through and through with a #15 scalpel blade.  The muscularis and dermis were reaproximated with deep sutures following hemostasis. Care was taken to realign the vermilion border before proceeding with the superficial closure.  Once the vermilion was realigned the superfical and mucosal closure was finished. Burow's Graft Text: The defect edges were debeveled with a #15 scalpel blade. Given the location of the defect, shape of the defect, the proximity to free margins and the presence of a standing cone deformity a Burow's skin graft was deemed most appropriate. The standing cone was removed and this tissue was then trimmed to the shape of the primary defect. The adipose tissue was also removed until only dermis and epidermis were left.  The skin graft was then placed in the primary defect and oriented appropriately. Cartilage Graft Text: The defect edges were debeveled with a #15 scalpel blade. Given the location of the defect, shape of the defect, the fact the defect involved a full thickness cartilage defect a cartilage graft was deemed most appropriate.  An appropriate donor site was identified, cleansed, and anesthetized. The cartilage graft was then harvested and transferred to the recipient site, oriented appropriately and then sutured into place.  The secondary defect was then repaired using a primary closure. Composite Graft Text: The defect edges were debeveled with a #15 scalpel blade. Given the location of the defect, shape of the defect, the proximity to free margins and the fact the defect was full thickness a composite graft was deemed most appropriate.  The defect was outline and then transferred to the donor site.  A full thickness graft was then excised from the donor site. The graft was then placed in the primary defect, oriented appropriately and then sutured into place.  The secondary defect was then repaired using a primary closure. Epidermal Autograft Text: The defect edges were debeveled with a #15 scalpel blade. Given the location of the defect, shape of the defect and the proximity to free margins an epidermal autograft was deemed most appropriate. Using a sterile surgical marker, the primary defect shape was transferred to the donor site. The epidermal graft was then harvested.  The skin graft was then placed in the primary defect and oriented appropriately. Dermal Autograft Text: The defect edges were debeveled with a #15 scalpel blade. Given the location of the defect, shape of the defect and the proximity to free margins a dermal autograft was deemed most appropriate. Using a sterile surgical marker, the primary defect shape was transferred to the donor site. The area thus outlined was incised deep to adipose tissue with a #15 scalpel blade.  The harvested graft was then trimmed of adipose and epidermal tissue until only dermis was left.  The skin graft was then placed in the primary defect and oriented appropriately. Ftsg Text: The defect edges were debeveled with a #15 scalpel blade. Given the location of the defect, shape of the defect and the proximity to free margins a full thickness skin graft was deemed most appropriate. Using a sterile surgical marker, the primary defect shape was transferred to the donor site. The area thus outlined was incised deep to adipose tissue with a #15 scalpel blade.  The harvested graft was then trimmed of adipose tissue until only dermis and epidermis was left.  The skin graft was then placed in the primary defect and oriented appropriately. Pinch Graft Text: The defect edges were debeveled with a #15 scalpel blade. Given the location of the defect, shape of the defect and the proximity to free margins a pinch graft was deemed most appropriate. Using a sterile surgical marker, the primary defect shape was transferred to the donor site. The area thus outlined was incised deep to adipose tissue with a #15 scalpel blade.  The harvested graft was then trimmed of adipose tissue until only dermis and epidermis was left. The skin graft was then placed in the primary defect and oriented appropriately. Skin Substitute Text: The defect edges were debeveled with a #15 scalpel blade. Given the location of the defect, shape of the defect and the proximity to free margins a skin substitute graft was deemed most appropriate.  The graft material was trimmed to fit the size of the defect. The graft was then placed in the primary defect and oriented appropriately. Split-Thickness Skin Graft Text: The defect edges were debeveled with a #15 scalpel blade. Given the location of the defect, shape of the defect and the proximity to free margins a split thickness skin graft was deemed most appropriate. Using a sterile surgical marker, the primary defect shape was transferred to the donor site. The split thickness graft was then harvested.  The skin graft was then placed in the primary defect and oriented appropriately. Tissue Cultured Epidermal Autograft Text: The defect edges were debeveled with a #15 scalpel blade. Given the location of the defect, shape of the defect and the proximity to free margins a tissue cultured epidermal autograft was deemed most appropriate.  The graft was then trimmed to fit the size of the defect.  The graft was then placed in the primary defect and oriented appropriately. Xenograft Text: The defect edges were debeveled with a #15 scalpel blade. Given the location of the defect, shape of the defect and the proximity to free margins a xenograft was deemed most appropriate.  The graft was then trimmed to fit the size of the defect.  The graft was then placed in the primary defect and oriented appropriately. Complex Repair And Flap Additional Text (Will Appearing After The Standard Complex Repair Text): The complex repair was not sufficient to completely close the primary defect. The remaining additional defect was repaired with the flap mentioned below. Complex Repair And Graft Additional Text (Will Appearing After The Standard Complex Repair Text): The complex repair was not sufficient to completely close the primary defect. The remaining additional defect was repaired with the graft mentioned below. Eyelid Full Thickness Repair - 17991: The eyelid defect was full thickness which required a wedge repair of the eyelid. Special care was taken to ensure that the eyelid margin was realligned when placing sutures. Eyelid Partial Thickness Repair - 95970: The eyelid defect was partial thickness which required a wedge repair of the eyelid. Special care was taken to ensure that the eyelid margin was realligned when placing sutures. Intermediate Repair And Flap Additional Text (Will Appearing After The Standard Complex Repair Text): The intermediate repair was not sufficient to completely close the primary defect. The remaining additional defect was repaired with the flap mentioned below. Intermediate Repair And Graft Additional Text (Will Appearing After The Standard Complex Repair Text): The intermediate repair was not sufficient to completely close the primary defect. The remaining additional defect was repaired with the graft mentioned below. Localized Dermabrasion With 15 Blade Text: The patient was draped in routine manner.  Localized dermabrasion using a 15 blade was performed in routine manner to papillary dermis. This spot dermabrasion is being performed to complete skin cancer reconstruction. It also will eliminate the other sun damaged precancerous cells that are known to be part of the regional effect of a lifetime's worth of sun exposure. This localized dermabrasion is therapeutic and should not be considered cosmetic in any regard. Localized Dermabrasion With Sand Papertext: The patient was draped in routine manner.  Localized dermabrasion using sterile sand paper was performed in routine manner to papillary dermis. This spot dermabrasion is being performed to complete skin cancer reconstruction. It also will eliminate the other sun damaged precancerous cells that are known to be part of the regional effect of a lifetime's worth of sun exposure. This localized dermabrasion is therapeutic and should not be considered cosmetic in any regard. Localized Dermabrasion With Wire Brush Text: The patient was draped in routine manner.  Localized dermabrasion using 3 x 17 mm wire brush was performed in routine manner to papillary dermis. This spot dermabrasion is being performed to complete skin cancer reconstruction. It also will eliminate the other sun damaged precancerous cells that are known to be part of the regional effect of a lifetime's worth of sun exposure. This localized dermabrasion is therapeutic and should not be considered cosmetic in any regard. Purse String (Simple) Text: Given the location of the defect and the characteristics of the surrounding skin a purse string closure was deemed most appropriate.  Undermining was performed circumferentially around the surgical defect.  A purse string suture was then placed and tightened. Purse String (Intermediate) Text: Given the location of the defect and the characteristics of the surrounding skin a purse string intermediate closure was deemed most appropriate.  Undermining was performed circumferentially around the surgical defect.  A purse string suture was then placed and tightened. Partial Purse String (Simple) Text: Given the location of the defect and the characteristics of the surrounding skin a simple purse string closure was deemed most appropriate.  Undermining was performed circumferentially around the surgical defect.  A purse string suture was then placed and tightened. Wound tension only allowed a partial closure of the circular defect. Partial Purse String (Intermediate) Text: Given the location of the defect and the characteristics of the surrounding skin an intermediate purse string closure was deemed most appropriate.  Undermining was performed circumferentially around the surgical defect.  A purse string suture was then placed and tightened. Wound tension only allowed a partial closure of the circular defect. Tarsorrhaphy Text: A tarsorrhaphy was performed using Frost sutures. Manual Repair Warning Statement: We plan on removing the manually selected variable below in favor of our much easier automatic structured text blocks found in the previous tab. We decided to do this to help make the flow better and give you the full power of structured data. Manual selection is never going to be ideal in our platform and I would encourage you to avoid using manual selection from this point on, especially since I will be sunsetting this feature. It is important that you do one of two things with the customized text below. First, you can save all of the text in a word file so you can have it for future reference. Second, transfer the text to the appropriate area in the Library tab. Lastly, if there is a flap or graft type which we do not have you need to let us know right away so I can add it in before the variable is hidden. No need to panic, we plan to give you roughly 6 months to make the change. Same Histology In Subsequent Stages Text: The pattern and morphology of the tumor is as described in the first stage. No Residual Tumor Seen Histology Text: There were no malignant cells seen in the sections examined. Inflammation Suggestive Of Cancer Camouflage Histology Text: There was a dense lymphocytic infiltrate which prevented adequate histologic evaluation of adjacent structures. Incidental Superficial Basal Cell Carcinoma Histology Text: Basaloid cells that appear as buds or nests from the epidermis and extend into the superficial dermis. Incidental Squamous Cell Carcinoma In Situ Histology Text: Loss of the granular layer and overlying zones of parakeratosis with full thickness atypia. Incidental Actinic Keratosis Histology Text: Area of atypia that is not full thickness. Incidental Basaloid Follicular Hamartoma Histology Text: A proliferation of branching and anastomosing cords and thin strands of basaloid cells without obvious retraction and atypia. Missing Epidermis Histology Text: Inadequate visualization of epidermis to make a definitive read and unable to obtain on recuts . Incomplete Deep Margin Histology Text: Inadequate visualization of dermis and subcutis to make a definitive read and unable to obtain on recuts. Bcc Histology Text: There were numerous aggregates of basaloid cells. Bcc Infiltrative Histology Text: There were numerous aggregates of basaloid cells demonstrating an infiltrative pattern. Bcc Micronodular Histology Text: Small islands or nests of malignant tumor cells that infiltrate deep into the dermis. Bcc  Morpheaform/Sclerosing Histology Text: thin nests of basaloid cells with atypia that infiltrate deeply with minimal retraction. Bcc  Nodular Histology Text: Basaloid cells with peripheral palisading of the nuclei and stromal proliferation. Scc Histology Text: Polygonal cells, with abundant acidophilic cytoplasm, and prominent hyperchromatic nuclei. Scc Moderately Differentiated Histology Text: Neoplastic cells are oval to round, and have a variety of nuclei shapes and sizes. The nuclei are hypochromic and have moderate nuclear pleomorphism. Scc Poorly Differentiated Histology Text: Neoplastic cells are oval to round, and have a variety of nuclei shapes and sizes. The nuclei are hypochromic and have high nuclear pleomorphism. Mart-1 - Positive Histology Text: MART-1 staining demonstrates areas of higher density and clustering of melanocytes with Pagetoid spread upwards within the epidermis. The surgical margins are positive for tumor cells. Mart-1 - Negative Histology Text: MART-1 staining demonstrates a normal density and pattern of melanocytes along the dermal-epidermal junction. The surgical margins are negative for tumor cells. Information: Selecting Yes will display possible errors in your note based on the variables you have selected. This validation is only offered as a suggestion for you. PLEASE NOTE THAT THE VALIDATION TEXT WILL BE REMOVED WHEN YOU FINALIZE YOUR NOTE. IF YOU WANT TO FAX A PRELIMINARY NOTE YOU WILL NEED TO TOGGLE THIS TO 'NO' IF YOU DO NOT WANT IT IN YOUR FAXED NOTE. Bill 59 Modifier?: No - Continue to Bill 79 Modifier

## 2025-04-09 ENCOUNTER — VIRTUAL VISIT (OUTPATIENT)
Dept: FAMILY MEDICINE | Facility: CLINIC | Age: 55
End: 2025-04-09
Payer: COMMERCIAL

## 2025-04-09 DIAGNOSIS — F33.0 MILD EPISODE OF RECURRENT MAJOR DEPRESSIVE DISORDER: Primary | ICD-10-CM

## 2025-04-09 PROCEDURE — 98006 SYNCH AUDIO-VIDEO EST MOD 30: CPT | Performed by: PHYSICIAN ASSISTANT

## 2025-04-09 PROCEDURE — 96127 BRIEF EMOTIONAL/BEHAV ASSMT: CPT | Mod: 95 | Performed by: PHYSICIAN ASSISTANT

## 2025-04-09 RX ORDER — DESVENLAFAXINE 50 MG/1
50 TABLET, FILM COATED, EXTENDED RELEASE ORAL DAILY
Qty: 90 TABLET | Refills: 0 | Status: SHIPPED | OUTPATIENT
Start: 2025-04-09

## 2025-04-09 ASSESSMENT — ANXIETY QUESTIONNAIRES
2. NOT BEING ABLE TO STOP OR CONTROL WORRYING: NOT AT ALL
1. FEELING NERVOUS, ANXIOUS, OR ON EDGE: MORE THAN HALF THE DAYS
6. BECOMING EASILY ANNOYED OR IRRITABLE: SEVERAL DAYS
3. WORRYING TOO MUCH ABOUT DIFFERENT THINGS: NOT AT ALL
GAD7 TOTAL SCORE: 4
IF YOU CHECKED OFF ANY PROBLEMS ON THIS QUESTIONNAIRE, HOW DIFFICULT HAVE THESE PROBLEMS MADE IT FOR YOU TO DO YOUR WORK, TAKE CARE OF THINGS AT HOME, OR GET ALONG WITH OTHER PEOPLE: NOT DIFFICULT AT ALL
GAD7 TOTAL SCORE: 4
7. FEELING AFRAID AS IF SOMETHING AWFUL MIGHT HAPPEN: NOT AT ALL
5. BEING SO RESTLESS THAT IT IS HARD TO SIT STILL: NOT AT ALL

## 2025-04-09 ASSESSMENT — PATIENT HEALTH QUESTIONNAIRE - PHQ9
5. POOR APPETITE OR OVEREATING: SEVERAL DAYS
SUM OF ALL RESPONSES TO PHQ QUESTIONS 1-9: 6

## 2025-04-09 NOTE — PROGRESS NOTES
Marcel is a 55 year old who is being evaluated via a billable video visit.    How would you like to obtain your AVS? MyChart  If the video visit is dropped, the invitation should be resent by: Text to cell phone: [phone]  Will anyone else be joining your video visit? No      Assessment  - Depression, characterized by lack of motivation and interest in activities, despite current treatment with Pristiq and Abilify.    Plan  - Increase Desvenlafaxine (Pristiq) dosage by 50 mg, resulting in a total of 150 mg daily. This adjustment is intended to address symptoms of depression and lack of motivation. Monitor the effects over the next few weeks to a month.  - If the increased dosage does not adequately control mood symptoms, consider a referral to a psychiatrist for further evaluation and management. This step is suggested if current treatment adjustments do not yield satisfactory results.  - Monitor mood and response to the increased dosage. If the patient experiences improvement and wishes to continue with the adjusted dosage, continue refilling the prescription. If further intervention is needed, consider additional consultation or referral.    Prescription  - Desvenlafaxine (Pristiq) 150 mg daily: Increase from 100 mg by adding an extra 50 mg in the morning.      The longitudinal plan of care for the diagnosis(es)/condition(s) as documented were addressed during this visit. Due to the added complexity in care, I will continue to support Marcel in the subsequent management and with ongoing continuity of care.      Subjective   Marcel is a 55 year old, presenting for the following health issues:  Recheck Medication        4/9/2025     3:17 PM   Additional Questions   Roomed by Hermila MANSFIELD MA   Accompanied by self         4/9/2025     3:17 PM   Patient Reported Additional Medications   Patient reports taking the following new medications none     History of Present Illness       Reason for visit:  Med check   He is taking  medications regularly.      Anxiety   How are you doing with your anxiety since your last visit? Same   Are you having other symptoms that might be associated with anxiety? No  Have you had a significant life event? No   Are you feeling depressed? Yes:  a little   Do you have any concerns with your use of alcohol or other drugs? No    Social History     Tobacco Use    Smoking status: Never     Passive exposure: Never    Smokeless tobacco: Never   Vaping Use    Vaping status: Never Used   Substance Use Topics    Alcohol use: No     Alcohol/week: 0.0 standard drinks of alcohol    Drug use: No         9/9/2024    10:49 AM 4/2/2025    12:28 PM 4/9/2025     3:19 PM   ASUNCION-7 SCORE   Total Score 0 (minimal anxiety) 3 (minimal anxiety)    Total Score 0 3  4       Patient-reported         9/9/2024    10:49 AM 4/2/2025    12:26 PM 4/9/2025     3:19 PM   PHQ   PHQ-9 Total Score 2 12  6   Q9: Thoughts of better off dead/self-harm past 2 weeks Not at all Not at all Not at all       Patient-reported         4/9/2025     3:19 PM   Last PHQ-9   1.  Little interest or pleasure in doing things 2   2.  Feeling down, depressed, or hopeless 0   3.  Trouble falling or staying asleep, or sleeping too much 2   4.  Feeling tired or having little energy 2   5.  Poor appetite or overeating 0   6.  Feeling bad about yourself 0   7.  Trouble concentrating 0   8.  Moving slowly or restless 0   Q9: Thoughts of better off dead/self-harm past 2 weeks 0   PHQ-9 Total Score 6   Difficulty at work, home, or with people Somewhat difficult         4/9/2025     3:19 PM   ASUNCION-7    1. Feeling nervous, anxious, or on edge 2   2. Not being able to stop or control worrying 0   3. Worrying too much about different things 0   4. Trouble relaxing 1   5. Being so restless that it is hard to sit still 0   6. Becoming easily annoyed or irritable 1   7. Feeling afraid, as if something awful might happen 0   ASUNCION-7 Total Score 4   If you checked any problems, how  difficult have they made it for you to do your work, take care of things at home, or get along with other people? Not difficult at all       History of Present Illness    Marcel Melchor, age: 55 years    Depression  - Little to no interest in activities, feeling unmotivated both at work and home  - Symptoms present for quite a while, started before work became slow  - Previously tried to discontinue Pristiq with poor results, including withdrawal issues  - Currently taking Pristiq and Abilify, but feels they are not effective  - Adderall use from a different provider  - Previous genetic testing for medication efficacy  - Therapy sessions attended, but no recent psychiatric consultations           Objective           Vitals:  No vitals were obtained today due to virtual visit.    Physical Exam   GENERAL: alert and no distress  EYES: Eyes grossly normal to inspection.  No discharge or erythema, or obvious scleral/conjunctival abnormalities.  RESP: No audible wheeze, cough, or visible cyanosis.    SKIN: Visible skin clear. No significant rash, abnormal pigmentation or lesions.  NEURO: Cranial nerves grossly intact.  Mentation and speech appropriate for age.  PSYCH: Appropriate affect, tone, and pace of words          Video-Visit Details    Type of service:  Video Visit   Originating Location (pt. Location): Home    Distant Location (provider location):  Off-site  Platform used for Video Visit: Deepak  Signed Electronically by: Wes Braga PA-C

## 2025-04-30 ENCOUNTER — OFFICE VISIT (OUTPATIENT)
Facility: CLINIC | Age: 55
End: 2025-04-30
Payer: COMMERCIAL

## 2025-04-30 DIAGNOSIS — F41.1 GAD (GENERALIZED ANXIETY DISORDER): Primary | ICD-10-CM

## 2025-04-30 PROCEDURE — 90837 PSYTX W PT 60 MINUTES: CPT | Performed by: MARRIAGE & FAMILY THERAPIST

## 2025-04-30 NOTE — PROGRESS NOTES
[facility]                                     Progress Note    Patient Name: Marcel NG Tutewohl  Date: 4/30/2025         Service Type: Individual      Session Start Time: 11:00AM  Session End Time: 12:00PM     Session Length: 60 minutes    Session #: 41    Attendees: Spouse / Significant Other    Service Modality: In person      DATA  Extended Session (53+ minutes):   - Patient's presenting concerns require more intensive intervention than could be completed within the usual service  Interactive Complexity: No  Crisis: No        Progress Since Last Session (Related to Symptoms / Goals / Homework):   Symptoms: Improving rebuilding trust      Homework: Partially completed       Episode of Care Goals: Satisfactory progress - ACTION (Actively working towards change); Intervened by reinforcing change plan / affirming steps taken     Current / Ongoing Stressors and Concerns:  Marcel is dealing with low energy and fatigue. He met with his doctor who offered a higher dose of his anti-depressant but he has not started taking it. He is unsure if his symptoms are mental and physical but he speculates that work being slow has contributed. Ermelinda reports similar feelings of poor motivation and loss of focus. Work has improved now and they will be busier than they can support. Talked about Marcel's nutrition which is very poor. Encouraged him to consider healthier eating. The couple are helping plan their daughter's wedding (September) and dealing with drama from the MIL.      Treatment Objective(s) Addressed in This Session:   use cognitive strategies identified in therapy to challenge anxious thoughts  Use speaker/listener technique when communicating through challenging conversations  Create rituals for connection     Intervention:   Relationship counseling      The following assessments were completed by patient for this visit:  PROMIS 10-Global Health (all questions and answers displayed):       5/18/2023      3:00 PM 9/6/2023     3:00 PM 12/20/2023     4:00 PM 4/3/2024    12:00 PM 8/7/2024    12:00 PM 12/4/2024     8:00 PM 4/30/2025     7:00 PM   PROMIS 10   In general, would you say your health is: 3 4 3 3 3 3 3   In general, would you say your quality of life is: 3 4 4 4 4 4 3   In general, how would you rate your physical health? 3 3 3 3 3 3 3   In general, how would you rate your mental health, including your mood and your ability to think? 3 3 3 3 3 3 3   In general, how would you rate your satisfaction with your social activities and relationships? 4 4 5 4 4 4 3   In general, please rate how well you carry out your usual social activities and roles. (This includes activities at home, at work and in your community, and responsibilities as a parent, child, spouse, employee, friend, etc.) 4 4 4 4 4 4 4   To what extent are you able to carry out your everyday physical activities such as walking, climbing stairs, carrying groceries, or moving a chair? 4 4 4 4 4 4 4   In the past 7 days, how often have you been bothered by emotional problems such as feeling anxious, depressed, or irritable? 3 2 3 3 3 3 3   In the past 7 days, how would you rate your fatigue on average? 2 2 2 3 2 2 4   In the past 7 days, how would you rate your pain on average, where 0 means no pain, and 10 means worst imaginable pain? 4 3 3 4 2 2 2   Global Mental Health Score 13 15 15 14 14 14 12   Global Physical Health Score 14 15 15 13 15 15 13   PROMIS TOTAL - SUBSCORES 27 30 30 27 29 29 25           ASSESSMENT: Current Emotional / Mental Status (status of significant symptoms):   Risk status (Self / Other harm or suicidal ideation)   Patient denies current fears or concerns for personal safety.   Patient denies current or recent suicidal ideation or behaviors.   Patient denies current or recent homicidal ideation or behaviors.   Patient denies current or recent self injurious behavior or ideation.   Patient denies other safety concerns.   Patient  reports there has been no change in risk factors since their last session.     Patient reports there has been no change in protective factors since their last session.     Recommended that patient call 911 or go to the local ED should there be a change in any of these risk factors     Appearance:   Appropriate    Eye Contact:   Good    Psychomotor Behavior: Normal    Attitude:   Cooperative  Interested   Orientation:   All   Speech    Rate / Production: Normal     Volume:  Normal    Mood:    Anxious    Affect:    Worrisome    Thought Content:  Clear    Thought Form:  Coherent  Logical    Insight:    Fair      Medication Review:   No changes to current psychiatric medication(s)     Medication Compliance:   Yes     Changes in Health Issues:   None reported     Chemical Use Review:   Substance Use: Chemical use reviewed, no active concerns identified      Tobacco Use: No current tobacco use.      Diagnosis:  1. ASUNCION (generalized anxiety disorder)        Collateral Reports Completed:   Not Applicable    PLAN: (Patient Tasks / Therapist Tasks / Other)  Homework: Marcel to reconsider his eating habits.    Trinidad Solorio, [facility]    ______________________________________________________________________    Treatment Plan    Patient's Name: Marcel Melchor  YOB: 1970    Date of Creation: 6/8/2022  Date Treatment Plan Last Reviewed/Revised: 4/30/2025    DSM5 Diagnoses: 300.02 (F41.1) Generalized Anxiety Disorder  Psychosocial / Contextual Factors: marital discord, work stress, pain issues  PROMIS (reviewed every 90 days): 4/30/2025   Score: 25    Referral / Collaboration:  Was/were discussed and patient will pursue.    Anticipated number of session for this episode of care: 24  Anticipation frequency of session: Every other week  Anticipated Duration of each session: 38-52 minutes  Treatment plan will be reviewed in 90 days or when goals have been changed.       MeasurableTreatment Goal(s) related to diagnosis /  functional impairment(s)  Goal 1: Patient will lower GAD7 score to 3 or below    I will know I've met my goal when I'm less anxious and irritable.      Objective #A (Patient Action)    Patient will  plan intentional time together .  Status: Continued - Date(s): 4/30/2025    Intervention(s)  Therapist will teach  the benefits of date night and intentional time together .    Objective #B  Patient will  consider healthier eating.    Status: New - Date: 4/30/2025      Intervention(s)  Therapist will  encourage client to eat healthier .    Objective #C  Patient will  work on being more honest in his communication and not stuff his feelings .  Status: Continued - Date(s):  4/30/2025      Intervention(s)  Therapist will teach assertiveness skills. In loving ways .      Patient has reviewed and agreed to the above plan.      Trinidad Solorio, LMFT

## 2025-06-25 ENCOUNTER — OFFICE VISIT (OUTPATIENT)
Facility: CLINIC | Age: 55
End: 2025-06-25
Payer: COMMERCIAL

## 2025-06-25 DIAGNOSIS — F41.1 GAD (GENERALIZED ANXIETY DISORDER): Primary | ICD-10-CM

## 2025-06-25 PROCEDURE — 90837 PSYTX W PT 60 MINUTES: CPT | Performed by: MARRIAGE & FAMILY THERAPIST

## 2025-06-25 NOTE — PROGRESS NOTES
[facility]                                     Progress Note    Patient Name: Marcel NG Tutewohl  Date: 6/25/2025         Service Type: Individual      Session Start Time: 11:00AM  Session End Time: 12:00PM     Session Length: 60 minutes    Session #: 42    Attendees: Spouse / Significant Other    Service Modality: In person      DATA  Extended Session (53+ minutes):   - Patient's presenting concerns require more intensive intervention than could be completed within the usual service  Interactive Complexity: No  Crisis: No        Progress Since Last Session (Related to Symptoms / Goals / Homework):   Symptoms: Worsening Marcel's anger has been increasing     Homework: Partially completed       Episode of Care Goals: Minimal progress - ACTION (Actively working towards change); Intervened by reinforcing change plan / affirming steps taken     Current / Ongoing Stressors and Concerns:  Marcel describes 2 incidents that occurred since we met last. An event at their [place] where Marcel confronted a neighbor and a situation with his sister that was upsetting to Ermelinda. Both situations were triggering for her and she has been avoiding talking about it. Talked more with Marcel about how taking care of his father has been an added stressor and could be the cause of him being worn out. Encouraged him to look at assisted living homes or alternate ways to manage this situation. Also encouraged Marcel to have better boundaries for himself to be able to say no when he's tired or stressed versus taking out his anger on others. He tries to do everything but then is unhappy with the circumstances and lashes out. Also trying to make demands on people like his sister or Ermelinda that are not reasonable. Marcel has a tendency to expect others to behave as he behaves. Struggles to understand when people can't handle his reactions. Processed these dynamics and encouraged more self care and self reflection.      Treatment  Objective(s) Addressed in This Session:   use cognitive strategies identified in therapy to challenge anxious thoughts  Use speaker/listener technique when communicating through challenging conversations  Create rituals for connection     Intervention:   Relationship counseling      The following assessments were completed by patient for this visit:  PROMIS 10-Global Health (all questions and answers displayed):       5/18/2023     3:00 PM 9/6/2023     3:00 PM 12/20/2023     4:00 PM 4/3/2024    12:00 PM 8/7/2024    12:00 PM 12/4/2024     8:00 PM 4/30/2025     7:00 PM   PROMIS 10   In general, would you say your health is: 3 4 3 3 3 3 3   In general, would you say your quality of life is: 3 4 4 4 4 4 3   In general, how would you rate your physical health? 3 3 3 3 3 3 3   In general, how would you rate your mental health, including your mood and your ability to think? 3 3 3 3 3 3 3   In general, how would you rate your satisfaction with your social activities and relationships? 4 4 5 4 4 4 3   In general, please rate how well you carry out your usual social activities and roles. (This includes activities at home, at work and in your community, and responsibilities as a parent, child, spouse, employee, friend, etc.) 4 4 4 4 4 4 4   To what extent are you able to carry out your everyday physical activities such as walking, climbing stairs, carrying groceries, or moving a chair? 4 4 4 4 4 4 4   In the past 7 days, how often have you been bothered by emotional problems such as feeling anxious, depressed, or irritable? 3 2 3 3 3 3 3   In the past 7 days, how would you rate your fatigue on average? 2 2 2 3 2 2 4   In the past 7 days, how would you rate your pain on average, where 0 means no pain, and 10 means worst imaginable pain? 4 3 3 4 2 2 2   Global Mental Health Score 13 15 15 14 14 14 12   Global Physical Health Score 14 15 15 13 15 15 13   PROMIS TOTAL - SUBSCORES 27 30 30 27 29 29 25           ASSESSMENT: Current  Emotional / Mental Status (status of significant symptoms):   Risk status (Self / Other harm or suicidal ideation)   Patient denies current fears or concerns for personal safety.   Patient denies current or recent suicidal ideation or behaviors.   Patient denies current or recent homicidal ideation or behaviors.   Patient denies current or recent self injurious behavior or ideation.   Patient denies other safety concerns.   Patient reports there has been no change in risk factors since their last session.     Patient reports there has been no change in protective factors since their last session.     Recommended that patient call 911 or go to the local ED should there be a change in any of these risk factors     Appearance:   Appropriate    Eye Contact:   Good    Psychomotor Behavior: Normal    Attitude:   Cooperative  Interested   Orientation:   All   Speech    Rate / Production: Normal     Volume:  Normal    Mood:    Angry  Irritable    Affect:    Expansive    Thought Content:  Clear    Thought Form:  Coherent  Logical    Insight:    Fair      Medication Review:   No changes to current psychiatric medication(s)     Medication Compliance:   Yes     Changes in Health Issues:   None reported     Chemical Use Review:   Substance Use: Chemical use reviewed, no active concerns identified      Tobacco Use: No current tobacco use.      Diagnosis:  1. ASUNCION (generalized anxiety disorder)        Collateral Reports Completed:   Not Applicable    PLAN: (Patient Tasks / Therapist Tasks / Other)  Homework: Marcel to work on anger and stress management. Consider options in how to best care for his father.    Trinidad Solorio, [facility]    ______________________________________________________________________    Treatment Plan    Patient's Name: Marcel NG Jill  YOB: 1970    Date of Creation: 6/8/2022  Date Treatment Plan Last Reviewed/Revised: 4/30/2025    DSM5 Diagnoses: 300.02 (F41.1) Generalized Anxiety  Disorder  Psychosocial / Contextual Factors: marital discord, work stress, pain issues  PROMIS (reviewed every 90 days): 4/30/2025   Score: 25    Referral / Collaboration:  Was/were discussed and patient will pursue.    Anticipated number of session for this episode of care: 24  Anticipation frequency of session: Every other week  Anticipated Duration of each session: 38-52 minutes  Treatment plan will be reviewed in 90 days or when goals have been changed.       MeasurableTreatment Goal(s) related to diagnosis / functional impairment(s)  Goal 1: Patient will lower GAD7 score to 3 or below    I will know I've met my goal when I'm less anxious and irritable.      Objective #A (Patient Action)    Patient will plan intentional time together.  Status: Continued - Date(s): 4/30/2025    Intervention(s)  Therapist will teach the benefits of date night and intentional time together.    Objective #B  Patient will consider healthier eating.   Status: New - Date: 4/30/2025     Intervention(s)  Therapist will encourage client to eat healthier.    Objective #C  Patient will work on being more honest in his communication and not stuff his feelings.  Status: Continued - Date(s):  4/30/2025     Intervention(s)  Therapist will teach assertiveness skills. In loving ways.      Patient has reviewed and agreed to the above plan.      Trinidad Solorio, LMFT

## 2025-07-08 DIAGNOSIS — F33.0 MILD EPISODE OF RECURRENT MAJOR DEPRESSIVE DISORDER: ICD-10-CM

## 2025-07-08 RX ORDER — DESVENLAFAXINE 50 MG/1
50 TABLET, FILM COATED, EXTENDED RELEASE ORAL DAILY
Qty: 90 TABLET | Refills: 0 | Status: SHIPPED | OUTPATIENT
Start: 2025-07-08

## 2025-07-23 ENCOUNTER — OFFICE VISIT (OUTPATIENT)
Facility: CLINIC | Age: 55
End: 2025-07-23
Payer: COMMERCIAL

## 2025-07-23 DIAGNOSIS — F41.1 GAD (GENERALIZED ANXIETY DISORDER): Primary | ICD-10-CM

## 2025-07-23 PROCEDURE — 90837 PSYTX W PT 60 MINUTES: CPT | Performed by: MARRIAGE & FAMILY THERAPIST

## 2025-07-23 NOTE — PROGRESS NOTES
[facility]                                     Progress Note    Patient Name: Marcel NG Tutewohl  Date: 7/23/2025         Service Type: Individual      Session Start Time: 11:00AM  Session End Time: 12:00PM     Session Length: 60 minutes    Session #: 43    Attendees: Spouse / Significant Other    Service Modality: In person      DATA  Extended Session (53+ minutes):   - Patient's presenting concerns require more intensive intervention than could be completed within the usual service  Interactive Complexity: No  Crisis: No        Progress Since Last Session (Related to Symptoms / Goals / Homework):   Symptoms: Improving Marcel in anger management group     Homework: Partially completed       Episode of Care Goals: Satisfactory progress - ACTION (Actively working towards change); Intervened by reinforcing change plan / affirming steps taken     Current / Ongoing Stressors and Concerns:  Marcel shares about his father being hospitalized after a fall where he broke his leg. He now has pneumonia as well. They think he may be discharged tomorrow to a TCU. Marcel is the financial POA for his father and is helping him set up a trust. Talked about planning for his father's future financially and medically. Marcel is still upset with his sister Elise who decided to discontinue caring for their father. Talked about the trauma bond and intense resulting loyalty in the family that makes it difficult to have healthy boundaries. Encouraged Marcel to forgive for his own sake. Also talked about Ermelinda not getting in the middle and or causing Marcel to question her loyalty. Marcel is back in the anger management group which has been helpful.      Treatment Objective(s) Addressed in This Session:   use cognitive strategies identified in therapy to challenge anxious thoughts  Use speaker/listener technique when communicating through challenging conversations  Create rituals for connection     Intervention:   Relationship  counseling   Anger management      The following assessments were completed by patient for this visit: Assessed in person    PROMIS 10-Global Health (all questions and answers displayed):       5/18/2023     3:00 PM 9/6/2023     3:00 PM 12/20/2023     4:00 PM 4/3/2024    12:00 PM 8/7/2024    12:00 PM 12/4/2024     8:00 PM 4/30/2025     7:00 PM   PROMIS 10   In general, would you say your health is: 3 4 3 3 3 3 3   In general, would you say your quality of life is: 3 4 4 4 4 4 3   In general, how would you rate your physical health? 3 3 3 3 3 3 3   In general, how would you rate your mental health, including your mood and your ability to think? 3 3 3 3 3 3 3   In general, how would you rate your satisfaction with your social activities and relationships? 4 4 5 4 4 4 3   In general, please rate how well you carry out your usual social activities and roles. (This includes activities at home, at work and in your community, and responsibilities as a parent, child, spouse, employee, friend, etc.) 4 4 4 4 4 4 4   To what extent are you able to carry out your everyday physical activities such as walking, climbing stairs, carrying groceries, or moving a chair? 4 4 4 4 4 4 4   In the past 7 days, how often have you been bothered by emotional problems such as feeling anxious, depressed, or irritable? 3 2 3 3 3 3 3   In the past 7 days, how would you rate your fatigue on average? 2 2 2 3 2 2 4   In the past 7 days, how would you rate your pain on average, where 0 means no pain, and 10 means worst imaginable pain? 4 3 3 4 2 2 2   Global Mental Health Score 13 15 15 14 14 14 12   Global Physical Health Score 14 15 15 13 15 15 13   PROMIS TOTAL - SUBSCORES 27 30 30 27 29 29 25           ASSESSMENT: Current Emotional / Mental Status (status of significant symptoms):   Risk status (Self / Other harm or suicidal ideation)   Patient denies current fears or concerns for personal safety.   Patient denies current or recent suicidal  ideation or behaviors.   Patient denies current or recent homicidal ideation or behaviors.   Patient denies current or recent self injurious behavior or ideation.   Patient denies other safety concerns.   Patient reports there has been no change in risk factors since their last session.     Patient reports there has been no change in protective factors since their last session.     Recommended that patient call 911 or go to the local ED should there be a change in any of these risk factors     Appearance:   Appropriate    Eye Contact:   Good    Psychomotor Behavior: Normal    Attitude:   Cooperative  Interested   Orientation:   All   Speech    Rate / Production: Normal     Volume:  Normal    Mood:    Angry  Irritable    Affect:    Expansive    Thought Content:  Clear    Thought Form:  Coherent  Logical    Insight:    Fair      Medication Review:   No changes to current psychiatric medication(s)     Medication Compliance:   Yes     Changes in Health Issues:   None reported     Chemical Use Review:   Substance Use: Chemical use reviewed, no active concerns identified      Tobacco Use: No current tobacco use.      Diagnosis:  1. ASUNCION (generalized anxiety disorder)        Collateral Reports Completed:   Not Applicable    PLAN: (Patient Tasks / Therapist Tasks / Other)  Homework: Marcel to continue with anger management group. Complete estate planning with his father. Prepare for daughters wedding.    Trinidad Solorio, [facility]    ______________________________________________________________________          Treatment Plan    Patient's Name: Marcel Melchor  YOB: 1970    Date of Creation: 6/8/2022  Date Treatment Plan Last Reviewed/Revised: 4/30/2025    DSM5 Diagnoses: 300.02 (F41.1) Generalized Anxiety Disorder  Psychosocial / Contextual Factors: marital discord, work stress, pain issues  PROMIS (reviewed every 90 days): 4/30/2025   Score: 25    Referral / Collaboration:  Was/were discussed and patient will  pursue.    Anticipated number of session for this episode of care: 24  Anticipation frequency of session: Every other week  Anticipated Duration of each session: 38-52 minutes  Treatment plan will be reviewed in 90 days or when goals have been changed.       MeasurableTreatment Goal(s) related to diagnosis / functional impairment(s)  Goal 1: Patient will lower GAD7 score to 3 or below    I will know I've met my goal when I'm less anxious and irritable.      Objective #A (Patient Action)    Patient will plan intentional time together.  Status: Continued - Date(s): 4/30/2025    Intervention(s)  Therapist will teach the benefits of date night and intentional time together.    Objective #B  Patient will consider healthier eating.   Status: New - Date: 4/30/2025     Intervention(s)  Therapist will encourage client to eat healthier.    Objective #C  Patient will work on being more honest in his communication and not stuff his feelings.  Status: Continued - Date(s):  4/30/2025     Intervention(s)  Therapist will teach assertiveness skills. In loving ways.      Patient has reviewed and agreed to the above plan.      Trinidad Solorio, LMFT

## 2025-08-17 ENCOUNTER — HEALTH MAINTENANCE LETTER (OUTPATIENT)
Age: 55
End: 2025-08-17

## 2025-08-23 DIAGNOSIS — F41.1 GAD (GENERALIZED ANXIETY DISORDER): ICD-10-CM

## 2025-08-23 DIAGNOSIS — F39 MOOD DISORDER: ICD-10-CM

## 2025-08-26 RX ORDER — ARIPIPRAZOLE 10 MG/1
10 TABLET ORAL DAILY
Qty: 90 TABLET | Refills: 1 | Status: SHIPPED | OUTPATIENT
Start: 2025-08-26

## 2025-08-26 RX ORDER — DESVENLAFAXINE 100 MG/1
100 TABLET, EXTENDED RELEASE ORAL DAILY
Qty: 90 TABLET | Refills: 1 | Status: SHIPPED | OUTPATIENT
Start: 2025-08-26

## (undated) DEVICE — SOL NACL 0.9% IRRIG 1000ML BOTTLE 2F7124

## (undated) DEVICE — DRAPE LAP TRANSVERSE 29421

## (undated) DEVICE — SOL WATER IRRIG 1000ML BOTTLE 2F7114

## (undated) DEVICE — PACK HAND CUSTOM ASC

## (undated) DEVICE — SU VICRYL 2-0 SH 27" J317H

## (undated) DEVICE — SU MONOCRYL 3-0 PS-2 27" Y427H

## (undated) DEVICE — SUPPORTER ATHLETIC LG LATEX 202636

## (undated) DEVICE — DRSG XEROFORM 1X8"

## (undated) DEVICE — GLOVE BIOGEL PI MICRO INDICATOR UNDERGLOVE SZ 8.0 48980

## (undated) DEVICE — PACK MINOR SBA15MIFSE

## (undated) DEVICE — GLOVE BIOGEL PI MICRO SZ 7.5 48575

## (undated) DEVICE — GLOVE PROTEXIS W/NEU-THERA 6.0  2D73TE60

## (undated) DEVICE — GLOVE PROTEXIS BLUE W/NEU-THERA 6.0  2D73EB60

## (undated) DEVICE — BLADE CLIPPER 4406

## (undated) DEVICE — SU SILK 0 24" TIE SA76G

## (undated) DEVICE — SU SILK 2-0 SH 30" K833H

## (undated) DEVICE — ESU PENCIL W/HOLSTER E2350H

## (undated) DEVICE — PREP SKIN SCRUB TRAY 4461A

## (undated) DEVICE — COVER CAMERA IN-LIGHT DISP LT-C02

## (undated) DEVICE — PREP CHLORHEXIDINE 4% 4OZ (HIBICLENS) 57504

## (undated) DEVICE — BNDG KLING 2" 2231

## (undated) DEVICE — TOURNIQUET SGL BLADDER 18"X4" RED 5921-218-135

## (undated) DEVICE — LINEN ORTHO PACK 5446

## (undated) DEVICE — DECANTER BAG 2002S

## (undated) DEVICE — BNDG ELASTIC 2"X5YDS UNSTERILE 6611-20

## (undated) DEVICE — SYR EAR BULB 3OZ 0035830

## (undated) DEVICE — LINEN TOWEL PACK X5 5464

## (undated) DEVICE — SOL NACL 0.9% IRRIG 500ML BOTTLE 2F7123

## (undated) DEVICE — SU ETHILON 4-0 PS-2 18" BLACK 1667H

## (undated) DEVICE — SU PDS II 2-0 CT-2 27"  Z333H

## (undated) RX ORDER — FENTANYL CITRATE 50 UG/ML
INJECTION, SOLUTION INTRAMUSCULAR; INTRAVENOUS
Status: DISPENSED
Start: 2022-11-29

## (undated) RX ORDER — FENTANYL CITRATE 0.05 MG/ML
INJECTION, SOLUTION INTRAMUSCULAR; INTRAVENOUS
Status: DISPENSED
Start: 2024-04-12

## (undated) RX ORDER — HYDROCODONE BITARTRATE AND ACETAMINOPHEN 5; 325 MG/1; MG/1
TABLET ORAL
Status: DISPENSED
Start: 2024-04-12

## (undated) RX ORDER — CEFAZOLIN SODIUM/WATER 2 G/20 ML
SYRINGE (ML) INTRAVENOUS
Status: DISPENSED
Start: 2024-04-12

## (undated) RX ORDER — PROPOFOL 10 MG/ML
INJECTION, EMULSION INTRAVENOUS
Status: DISPENSED
Start: 2022-11-29

## (undated) RX ORDER — GABAPENTIN 300 MG/1
CAPSULE ORAL
Status: DISPENSED
Start: 2022-11-29

## (undated) RX ORDER — ONDANSETRON 2 MG/ML
INJECTION INTRAMUSCULAR; INTRAVENOUS
Status: DISPENSED
Start: 2024-04-12

## (undated) RX ORDER — FENTANYL CITRATE 50 UG/ML
INJECTION, SOLUTION INTRAMUSCULAR; INTRAVENOUS
Status: DISPENSED
Start: 2024-04-12

## (undated) RX ORDER — LIDOCAINE HYDROCHLORIDE AND EPINEPHRINE 10; 10 MG/ML; UG/ML
INJECTION, SOLUTION INFILTRATION; PERINEURAL
Status: DISPENSED
Start: 2022-11-29

## (undated) RX ORDER — EPINEPHRINE 1 MG/ML
INJECTION, SOLUTION INTRAMUSCULAR; SUBCUTANEOUS
Status: DISPENSED
Start: 2022-11-29

## (undated) RX ORDER — DEXAMETHASONE SODIUM PHOSPHATE 4 MG/ML
INJECTION, SOLUTION INTRA-ARTICULAR; INTRALESIONAL; INTRAMUSCULAR; INTRAVENOUS; SOFT TISSUE
Status: DISPENSED
Start: 2024-04-12

## (undated) RX ORDER — ACETAMINOPHEN 325 MG/1
TABLET ORAL
Status: DISPENSED
Start: 2022-11-29

## (undated) RX ORDER — LIDOCAINE HYDROCHLORIDE 10 MG/ML
INJECTION, SOLUTION EPIDURAL; INFILTRATION; INTRACAUDAL; PERINEURAL
Status: DISPENSED
Start: 2022-11-29

## (undated) RX ORDER — BUPIVACAINE HYDROCHLORIDE 2.5 MG/ML
INJECTION, SOLUTION EPIDURAL; INFILTRATION; INTRACAUDAL
Status: DISPENSED
Start: 2022-11-29